# Patient Record
Sex: FEMALE | Race: WHITE | NOT HISPANIC OR LATINO | Employment: FULL TIME | URBAN - METROPOLITAN AREA
[De-identification: names, ages, dates, MRNs, and addresses within clinical notes are randomized per-mention and may not be internally consistent; named-entity substitution may affect disease eponyms.]

---

## 2018-05-21 LAB
HPV ABSTRACT: NORMAL
PAP-ABSTRACT: NORMAL

## 2018-10-26 ENCOUNTER — TRANSFERRED RECORDS (OUTPATIENT)
Dept: HEALTH INFORMATION MANAGEMENT | Facility: CLINIC | Age: 43
End: 2018-10-26

## 2019-02-25 ENCOUNTER — OFFICE VISIT (OUTPATIENT)
Dept: FAMILY MEDICINE | Facility: CLINIC | Age: 44
End: 2019-02-25
Payer: COMMERCIAL

## 2019-02-25 VITALS
DIASTOLIC BLOOD PRESSURE: 84 MMHG | RESPIRATION RATE: 20 BRPM | HEART RATE: 91 BPM | WEIGHT: 277 LBS | TEMPERATURE: 98.5 F | SYSTOLIC BLOOD PRESSURE: 130 MMHG | OXYGEN SATURATION: 96 % | HEIGHT: 64 IN | BODY MASS INDEX: 47.29 KG/M2

## 2019-02-25 DIAGNOSIS — E78.5 HYPERLIPIDEMIA LDL GOAL <100: ICD-10-CM

## 2019-02-25 DIAGNOSIS — G43.009 MIGRAINE WITHOUT AURA AND WITHOUT STATUS MIGRAINOSUS, NOT INTRACTABLE: ICD-10-CM

## 2019-02-25 DIAGNOSIS — K21.9 GASTROESOPHAGEAL REFLUX DISEASE WITHOUT ESOPHAGITIS: ICD-10-CM

## 2019-02-25 DIAGNOSIS — J30.9 ALLERGIC RHINITIS, UNSPECIFIED SEASONALITY, UNSPECIFIED TRIGGER: ICD-10-CM

## 2019-02-25 DIAGNOSIS — Z79.4 TYPE 2 DIABETES MELLITUS WITH HYPERGLYCEMIA, WITH LONG-TERM CURRENT USE OF INSULIN (H): ICD-10-CM

## 2019-02-25 DIAGNOSIS — R42 VERTIGO: Primary | ICD-10-CM

## 2019-02-25 DIAGNOSIS — E11.65 TYPE 2 DIABETES MELLITUS WITH HYPERGLYCEMIA, WITH LONG-TERM CURRENT USE OF INSULIN (H): ICD-10-CM

## 2019-02-25 DIAGNOSIS — E66.01 MORBID OBESITY (H): ICD-10-CM

## 2019-02-25 DIAGNOSIS — R51.9 OCCIPITAL PAIN: ICD-10-CM

## 2019-02-25 PROCEDURE — 99203 OFFICE O/P NEW LOW 30 MIN: CPT | Performed by: FAMILY MEDICINE

## 2019-02-25 RX ORDER — ROSUVASTATIN CALCIUM 10 MG/1
10 TABLET, COATED ORAL DAILY
COMMUNITY
Start: 2018-11-12 | End: 2019-06-06

## 2019-02-25 RX ORDER — KETOCONAZOLE 20 MG/ML
SHAMPOO TOPICAL
COMMUNITY
Start: 2018-01-03 | End: 2021-04-26

## 2019-02-25 RX ORDER — GLIPIZIDE 10 MG/1
20 TABLET, FILM COATED, EXTENDED RELEASE ORAL DAILY
COMMUNITY
Start: 2017-05-07 | End: 2019-04-30

## 2019-02-25 RX ORDER — MULTIPLE VITAMINS W/ MINERALS TAB 9MG-400MCG
1 TAB ORAL
COMMUNITY

## 2019-02-25 RX ORDER — MONTELUKAST SODIUM 10 MG/1
10 TABLET ORAL AT BEDTIME
COMMUNITY
Start: 2018-11-12 | End: 2019-06-06

## 2019-02-25 RX ORDER — ASCORBATE CALCIUM 500 MG
500 TABLET ORAL DAILY
COMMUNITY

## 2019-02-25 RX ORDER — CETIRIZINE HYDROCHLORIDE 10 MG/1
10 TABLET ORAL DAILY
COMMUNITY

## 2019-02-25 RX ORDER — DOXYLAMINE SUCCINATE 25 MG
1 TABLET ORAL
COMMUNITY
Start: 2018-08-09

## 2019-02-25 RX ORDER — METHOCARBAMOL 750 MG/1
750 TABLET, FILM COATED ORAL PRN
COMMUNITY
Start: 2017-11-07 | End: 2019-07-08

## 2019-02-25 RX ORDER — SUCRALFATE 1 G/1
1 TABLET ORAL PRN
Refills: 5 | COMMUNITY
Start: 2018-04-09 | End: 2020-06-09

## 2019-02-25 RX ORDER — SUMATRIPTAN 100 MG/1
100 TABLET, FILM COATED ORAL PRN
COMMUNITY
Start: 2018-05-21

## 2019-02-25 RX ORDER — ALBUTEROL SULFATE 90 UG/1
2 AEROSOL, METERED RESPIRATORY (INHALATION)
COMMUNITY
Start: 2017-11-13

## 2019-02-25 RX ORDER — GABAPENTIN 300 MG/1
CAPSULE ORAL
Qty: 90 CAPSULE | Refills: 0 | Status: SHIPPED | OUTPATIENT
Start: 2019-02-25 | End: 2019-03-20 | Stop reason: SINTOL

## 2019-02-25 RX ORDER — ACETAMINOPHEN 325 MG/1
TABLET ORAL
COMMUNITY

## 2019-02-25 ASSESSMENT — MIFFLIN-ST. JEOR: SCORE: 1890.43

## 2019-02-25 ASSESSMENT — PAIN SCALES - GENERAL: PAINLEVEL: EXTREME PAIN (8)

## 2019-02-25 NOTE — PROGRESS NOTES
"  SUBJECTIVE:   Cornelia Rodriguez is a 43 year old female who presents to clinic today for the following health issues:    Dizziness  Onset: 2016    Description:   Do you feel faint:  YES  Does it feel like the surroundings (bed, room) are moving: YES  Unsteady/off balance: YES  Have you passed out or fallen: no     Intensity: 8/10    Progression of Symptoms:  worsening    Accompanying Signs & Symptoms:  Heart palpitations: no   Nausea, vomiting: YES  Weakness in arms or legs: no   Fatigue: YES- because the pain is waking her up at night  Vision or speech changes: no   Ringing in ears (Tinnitus): No, pain in back of head  Hearing Loss: no     History:   Head trauma/concussion hx: no   Previous similar symptoms: YES  Recent bleeding history: no     Precipitating factors:   Worse with activity or head movement: No  Any new medications (BP?): no   Alcohol/drug abuse/withdrawal: no     Alleviating factors:   Does staying in a fixed position give relief:  no     Therapies Tried and outcome: saw an ENT    -Patient complains of sharp pain in the back of her head on the right, onset two and a half years ago after mastoiditis and progressively worsening, flared 5 days ago. Pain radiates down her neck into her shoulder. She originally attributed the pain to her ear because when it flared five days ago her ear was congested but the ear congestion has improved and the pain has not. The current episode of pain is different from past episodes because symptoms usually occur with sinus infections   -Denies any injuries preceding pain, though does note a week and a half ago she had an episode of vomiting while at work   -Complains of dizziness described as spinning and off balance, lightheadedness, nausea that occur with the sharp stabs of pain. Also mentions feeling cognitively \"off\" as it took her longer than usual to understand a text her sister sent her   -Denies hearing changes, weakness or numbness in arms or legs, vision " changes, changes to gait, fever, chills, rhinorrhea, sinus pressure or pain   -Pain is worse with certain neck movements, is not affected by breathing  -Patient has not been to work in over a week due to her dizziness. 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 She needs disability paperwork figured out and her ENT said they couldn't do it because they did not find any abnormal results and that primary care needed to manage the care, but patients PCP said ENT needed to manage it. Patient was frustrated by this and came to new office today for our visit  -Saw ENT who ordered a VNG for tomorrow  -Has not seen neurology or had any recent brain imaging. Ct sinuses was negative.   -Patient has history of migraines in her temples, treatable with Imitrex. This pain is different because it is not relieved by Imitrex or Tylenol. Migraines usually start with photophobia   --No chance of pregnancy     Additional:  -Since symptoms started patient's blood sugars have been above 200  -Patient usually wakes up feeling refreshed but hasn't since symptoms flared because of the pain. She does not think she snores a lot; has never been screened for narendra     Problem list and histories reviewed & adjusted, as indicated.  Additional history: as documented    Patient Active Problem List   Diagnosis     Acute recurrent maxillary sinusitis     Right chronic serous otitis media     Morbid obesity (H)     Migraine without aura and without status migrainosus, not intractable     Gastroesophageal reflux disease without esophagitis     Type 2 diabetes mellitus with hyperglycemia (H)     Allergic rhinitis, unspecified seasonality, unspecified trigger     Hyperlipidemia LDL goal <100     Past Surgical History:   Procedure Laterality Date     CHOLECYSTECTOMY         Social History     Tobacco Use     Smoking status: Former Smoker     Smokeless tobacco: Never Used     Tobacco comment: quit 3/2013   Substance Use Topics      "Alcohol use: Yes     Comment: Occasionally     Family History   Problem Relation Age of Onset     Diabetes Maternal Grandmother      Diabetes Sister      Hypertension Sister            Reviewed and updated as needed this visit by clinical staff  Tobacco  Allergies  Meds  Med Hx  Surg Hx  Fam Hx  Soc Hx      Reviewed and updated as needed this visit by Provider         ROS:  Constitutional, HEENT, cardiovascular, pulmonary, gi and gu systems are negative, except as otherwise noted.    This document serves as a record of the services and decisions personally performed by ANGI BRAGA. It was created on his/her behalf by Sarthak Bautista, a trained medical scribe. The creation of this document is based on the provider's statements to the medical scribe. Sarthak Bautista, February 25, 2019 2:25 PM  OBJECTIVE:     /84   Pulse 91   Temp 98.5  F (36.9  C) (Oral)   Resp 20   Ht 1.632 m (5' 4.25\")   Wt 125.6 kg (277 lb)   LMP 02/24/2019 (Exact Date)   SpO2 96%   Breastfeeding? No   BMI 47.18 kg/m    Body mass index is 47.18 kg/m .  GENERAL: healthy, alert and no distress  HENT: crowded arleen/posteriorpharynx. ear canals and TM's normal, nose and mouth without ulcers or lesions  NECK: no adenopathy, no asymmetry, masses, or scars and thyroid normal to palpation  RESP: lungs clear to auscultation - no rales, rhonchi or wheezes  CV: regular rate and rhythm, normal S1 S2, no S3 or S4, no murmur, click or rub, no peripheral edema and peripheral pulses strong  ABDOMEN: soft, nontender, no hepatosplenomegaly, no masses and bowel sounds normal  MS:Tender over right posterior occipital w/ two focal areas of exquisite tenderness, otherwise no gross musculoskeletal defects noted, no edema  NEURO: Normal strength and tone, mentation intact and speech normal. Normal heel, toe, and heel-to-toe walk. Negative Romberg's. Cranial nerves 2-12 intact   PSYCH: mentation appears normal, affect " normal/bright  ASSESSMENT/PLAN:       ICD-10-CM    1. Vertigo R42 MR Brain w/o & w Contrast     MRA Brain ([facility]) wo Contrast     gabapentin (NEURONTIN) 300 MG capsule     NEUROLOGY ADULT REFERRAL   2. Occipital pain R51 MR Brain w/o & w Contrast     MRA Brain ([facility]) wo Contrast     gabapentin (NEURONTIN) 300 MG capsule     NEUROLOGY ADULT REFERRAL   3. Morbid obesity (H) E66.01    4. Migraine without aura and without status migrainosus, not intractable G43.009 gabapentin (NEURONTIN) 300 MG capsule     NEUROLOGY ADULT REFERRAL   5. Gastroesophageal reflux disease without esophagitis K21.9    6. Type 2 diabetes mellitus with hyperglycemia, with long-term current use of insulin (H) E11.65     Z79.4    7. Allergic rhinitis, unspecified seasonality, unspecified trigger J30.9    8. Hyperlipidemia LDL goal <100 E78.5      ? Pain due to occipital neuralgia, dizziness of unclear etiology. Continue evaluation with ENT including VNG. Given worsening symptoms would like patient to see neurology. Also, would be due dilligence at this point to get mri brain especially given the cognitive issues patient is reporting. Filled out form for patient to be off work 2/17/19-3/2/19. Will need clinic f/u if further time off is needed.   Also discussed referral for sleep aval at some point- patient denies overt fatigue but given body habitus/exam and atypical headaches think this would be benefiscial. Patient declines today but will consider going forward.     Patient Instructions   Please call [facility] (formerly called [facility]) at [phone] to schedule your brain MRI.     Wait to start the gabapentin until after you have the VNG done.    Start with one tablet of gabapentin at bedtime. After 3-5 days you can add a second dose, and after another 3-5 days you can add a third dose, spaced out throughout the day.       Length of visit was 37 minutes with more than  50 percent of that time used for discussing medical concerns and education    The information in this document, created by the medical scribe for me, accurately reflects the services I personally performed and the decisions made by me. I have reviewed and approved this document for accuracy.   Sarah Lombardo MD  [facility]

## 2019-02-25 NOTE — PATIENT INSTRUCTIONS
Please call [facility] (formerly called [facility]) at [phone] to schedule your brain MRI.     Wait to start the gabapentin until after you have the VNG done.Start with one tablet of gabapentin at bedtime. After 3-5 days you can add a second dose, and after another 3-5 days you can add a third dose, spaced out throughout the day.    Schedule an appointment with neurology.

## 2019-02-25 NOTE — Clinical Note
Please abstract the following data from this visit with this patient into the appropriate field in Epic:Mammogram done on this date: 10/26/18 (approximately), by this group: [facility], results were negative. Pap smear done on this date: 5/21/18 (approximately), by this group: [facility], results were NIL. See Care Everywhere

## 2019-02-28 ENCOUNTER — OFFICE VISIT (OUTPATIENT)
Dept: FAMILY MEDICINE | Facility: CLINIC | Age: 44
End: 2019-02-28
Payer: COMMERCIAL

## 2019-02-28 VITALS
WEIGHT: 280 LBS | TEMPERATURE: 98 F | DIASTOLIC BLOOD PRESSURE: 96 MMHG | RESPIRATION RATE: 22 BRPM | HEIGHT: 64 IN | BODY MASS INDEX: 47.8 KG/M2 | OXYGEN SATURATION: 96 % | SYSTOLIC BLOOD PRESSURE: 144 MMHG | HEART RATE: 86 BPM

## 2019-02-28 DIAGNOSIS — R42 VERTIGO: ICD-10-CM

## 2019-02-28 DIAGNOSIS — M54.2 CERVICALGIA: ICD-10-CM

## 2019-02-28 DIAGNOSIS — R51.9 OCCIPITAL PAIN: Primary | ICD-10-CM

## 2019-02-28 PROCEDURE — 99214 OFFICE O/P EST MOD 30 MIN: CPT | Performed by: FAMILY MEDICINE

## 2019-02-28 RX ORDER — CARBAMAZEPINE 200 MG/1
200 CAPSULE, EXTENDED RELEASE ORAL DAILY
Qty: 30 CAPSULE | Refills: 0 | Status: SHIPPED | OUTPATIENT
Start: 2019-02-28 | End: 2019-03-20

## 2019-02-28 ASSESSMENT — MIFFLIN-ST. JEOR: SCORE: 1904.04

## 2019-02-28 NOTE — PROGRESS NOTES
SUBJECTIVE:   Cornelia Rodriguez is a 43 year old female who presents to clinic today for the following health issues:    Patient is here today, still having nerve pain on the back of her head. Sharp pain that runs down neck.     Neck/Head Pain  Onset: ongoing     Description:   Location: Neck/Head     Intensity: moderate, severe    Progression of Symptoms:  intermittent    Accompanying Signs & Symptoms:  Burning, prickly sensation (paresthesias) in arm(s): no   Numbness in arm(s): no   Weakness in arm(s):  no   Fever: no   Headache: no   Nausea and/or vomiting: YES- Nausea    History:   Trauma: no   Previous neck pain: YES- stiff neck once in a while   Previous surgery or injections: no   Previous Imaging (MRI,X ray): no     Precipitating factors:   Does movement increase the pain:  No     Alleviating factors:  Goes away on its own     Therapies Tried and outcome:  Gabapentin did not help - caused nausea     -Patient was seen three days ago for occipital pain and vertigo. Pain described as sharp and burning. It radiates down into right shoulder and up into head, which patient wonders if it is related to tendonitis in her arm. She notes stretching her head in a certain way improves pain  -Ordered MR/MRA brain which is scheduled for tomorrow and neurology referral; neurology appointment scheduled for 03/20  -Patient was given gabapentin for pain. She does not think it is helping. She takes it at night prior to going to bed and wakes up in more pain, though overall pain is slightly improved. She was told she cannot go back to work until she is improved completely and she does not feel she is ready. She brings in disability work forms again  -Last night when patient got up to use the bathroom at night she felt very unsteady and thinks this may be due to the gabapentin, though notes she had similar symptoms when her dizziness was worse but not to that extent    -Thinks the gabapentin is causing nausea because she wakes  up nauseous, and does not think the nausea is correlated with pain. Nausea usually resolves after 2-3 hours  -Dizziness and hearing are improved. Patient had her VNG two days ago but does not know the results yet and does not have any follow-up scheduled w/ ENT   -Denies new neurological symptoms   -Patient needs an updated disability form since she cannot return to work until her pain is 100% improved     Diabetes:  -Blood sugars have been in the 240-250, coming down from the 270's in the last few days      Problem list and histories reviewed & adjusted, as indicated.  Additional history: as documented    Patient Active Problem List   Diagnosis     Acute recurrent maxillary sinusitis     Right chronic serous otitis media     Morbid obesity (H)     Migraine without aura and without status migrainosus, not intractable     Gastroesophageal reflux disease without esophagitis     Type 2 diabetes mellitus with hyperglycemia (H)     Allergic rhinitis, unspecified seasonality, unspecified trigger     Hyperlipidemia LDL goal <100     Past Surgical History:   Procedure Laterality Date     CHOLECYSTECTOMY         Social History     Tobacco Use     Smoking status: Former Smoker     Smokeless tobacco: Never Used     Tobacco comment: quit 3/2013   Substance Use Topics     Alcohol use: Yes     Comment: Occasionally     Family History   Problem Relation Age of Onset     Diabetes Maternal Grandmother      Diabetes Sister      Hypertension Sister            Reviewed and updated as needed this visit by clinical staff  Tobacco  Allergies  Meds       Reviewed and updated as needed this visit by Provider  Allergies         ROS:  Constitutional, HEENT, cardiovascular, pulmonary, gi and gu systems are negative, except as otherwise noted.    This document serves as a record of the services and decisions personally performed by ANGI BRAGA. It was created on his/her behalf by Sarthak Bautista, a trained medical scribe. The  "creation of this document is based on the provider's statements to the medical scribe. Sarthak Bautista, February 28, 2019 10:00 AM  OBJECTIVE:     BP (!) 144/96 (BP Location: Right arm, Patient Position: Sitting, Cuff Size: Adult Large)   Pulse 86   Temp 98  F (36.7  C) (Oral)   Resp 22   Ht 1.632 m (5' 4.25\")   Wt 127 kg (280 lb)   LMP 02/24/2019 (Exact Date)   SpO2 96%   BMI 47.69 kg/m    Body mass index is 47.69 kg/m .  GENERAL: healthy, alert and no distress  NECK: no adenopathy, no asymmetry, masses, or scars and thyroid normal to palpation  MS: tender over right posterior occipital area in pinpoint areas. Also mild pericervical muslce tenderness on right. no gross musculoskeletal defects noted, no edema  PSYCH: mentation appears normal, affect normal/bright    ASSESSMENT/PLAN:       ICD-10-CM    1. Occipital pain R51 carBAMazepine (CARBATROL) 200 MG 12 hr capsule     [facility]   2. Vertigo R42    3. Cervicalgia M54.2 [facility]     Stop gabapentin due to adverse effects (nausea and unsteadiness) and start carbamazepine. Reviewed timing of taking, onset, benefits, monitoring and typical and severe AE (including SJS) of the medication including allergic rash. Start PT. Neurology visit in 3 weeks, follow-up after that. Updated short-term disability form filled out for work off 2/17-3/20, if symptoms are improving and patient wants to return to work she will let me know to update form.     Patient Instructions   Stop using the gabapentin and start the carbamazepine. Start with 200 mg daily until you see the neurologist.     Schedule a follow-up visit at the end of March, after you have seen neurology.     If the medicine and physical therapy is working and your pain is improving and you're ready to go back to work, call us and we can give you an updated note.         The information in this document, created by the medical scribe for me, accurately " reflects the services I personally performed and the decisions made by me. I have reviewed and approved this document for accuracy.   Sarah Lombardo MD  [facility]

## 2019-02-28 NOTE — PATIENT INSTRUCTIONS
Stop using the gabapentin and start the carbamazepine. Start with 200 mg daily until you see the neurologist.     Schedule a follow-up visit at the end of March, after you have seen neurology.     If the medicine and physical therapy is working and your pain is improving and you're ready to go back to work, call us and we can give you an updated note.

## 2019-03-01 ENCOUNTER — ANCILLARY PROCEDURE (OUTPATIENT)
Dept: MRI IMAGING | Facility: CLINIC | Age: 44
End: 2019-03-01
Attending: FAMILY MEDICINE
Payer: COMMERCIAL

## 2019-03-01 DIAGNOSIS — R42 VERTIGO: ICD-10-CM

## 2019-03-01 DIAGNOSIS — R51.9 OCCIPITAL PAIN: ICD-10-CM

## 2019-03-01 LAB
CREAT BLD-MCNC: 0.5 MG/DL (ref 0.52–1.04)
GFR SERPL CREATININE-BSD FRML MDRD: >90 ML/MIN/{1.73_M2}

## 2019-03-01 PROCEDURE — 70544 MR ANGIOGRAPHY HEAD W/O DYE: CPT | Performed by: RADIOLOGY

## 2019-03-01 PROCEDURE — 70553 MRI BRAIN STEM W/O & W/DYE: CPT | Performed by: RADIOLOGY

## 2019-03-01 PROCEDURE — A9585 GADOBUTROL INJECTION: HCPCS | Performed by: FAMILY MEDICINE

## 2019-03-01 RX ORDER — GADOBUTROL 604.72 MG/ML
10 INJECTION INTRAVENOUS ONCE
Status: COMPLETED | OUTPATIENT
Start: 2019-03-01 | End: 2019-03-01

## 2019-03-01 RX ADMIN — GADOBUTROL 10 ML: 604.72 INJECTION INTRAVENOUS at 10:46

## 2019-03-04 ENCOUNTER — TELEPHONE (OUTPATIENT)
Dept: FAMILY MEDICINE | Facility: CLINIC | Age: 44
End: 2019-03-04

## 2019-03-04 NOTE — TELEPHONE ENCOUNTER
Called patient with mri brain results  Small punctate infarct in cerebellum reviewed with patient. She already has f/u scheduled with neurology     She is sleeping better since start of carbamazepine.     Please continue with the plan we developed in the office and f/u prn

## 2019-03-05 ENCOUNTER — THERAPY VISIT (OUTPATIENT)
Dept: PHYSICAL THERAPY | Facility: CLINIC | Age: 44
End: 2019-03-05
Attending: FAMILY MEDICINE
Payer: COMMERCIAL

## 2019-03-05 DIAGNOSIS — M54.2 CERVICALGIA: ICD-10-CM

## 2019-03-05 DIAGNOSIS — R51.9 OCCIPITAL PAIN: ICD-10-CM

## 2019-03-05 PROCEDURE — 97110 THERAPEUTIC EXERCISES: CPT | Mod: GP | Performed by: PHYSICAL THERAPIST

## 2019-03-05 PROCEDURE — 97140 MANUAL THERAPY 1/> REGIONS: CPT | Mod: GP | Performed by: PHYSICAL THERAPIST

## 2019-03-05 PROCEDURE — 97162 PT EVAL MOD COMPLEX 30 MIN: CPT | Mod: GP | Performed by: PHYSICAL THERAPIST

## 2019-03-05 NOTE — PROGRESS NOTES
"[facility] Initial Evaluation  Subjective:  [facility] Evaluation    Ms. 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 In February 2019, she had been having a sinus infection - then woke up with a very stiff upper neck. She has improved since her MD put her on a medication, but still rates her pain as 7/10. No complaint of radicular symptoms. U/E AROM and strength are WNL's. Pain with palpation at the occiput. Although CROM is \"tight\" the patient feels better when she stretches. MRI performed - apparent prior cerebral infarct. The patient is see a neurologist.      The history is provided by the patient. No  was used.   Cornelia Rodriguez is a 43 year old female with a cervical spine condition.  Condition occurred with:  Insidious onset.  Condition occurred: at home.  This is a new condition  February 2019.    Patient reports pain:  Upper cervical spine.  Radiates to:  None.  Pain is described as cramping and aching and is constant and reported as 7/10.  Associated symptoms:  Headache and loss of motion/stiffness. Pain is the same all the time.  Symptoms are exacerbated by certain positions and lifting Relieved by: recent medication prescribed by MD.  Since onset symptoms are gradually improving.        General health as reported by patient is fair.                  Barriers include:  None as reported by the patient.    Red flags:  Pain at rest/night.                        Objective:  Standing Alignment:    Cervical/Thoracic:  Forward head and cervical lordosis decreased                Gait:    Gait Type:  Normal         Flexibility/Screens:   Positive screens:  CervicalNegative screens: Shoulder   Upper Extremity:    Decreased left upper extremity flexibility at:  Pectoralis Major and Pectoralis Minor    Decreased right upper extremity flexibility present at:  Pectoralis Major and Pectoralis Minor    Spine:  Decreased left " spine flexibility:  Sternocleidomastoid and Upper Trap    Decreased right spine flexibility:  Sternocleidomastoid and Upper Trap                  Cervical/Thoracic Evaluation    AROM:  AROM Cervical:    Flexion:          WNL  Extension:       60%  Rotation:         Left: 70%     Right: 60%  Side Bend:      Left:     Right:       Headaches: migraine  Cervical Myotomes:  normal                  DTR's:  not assessed          Cervical Dermatomes:  not assessed                    Cervical Palpation:    Tenderness present at Left:    Suboccipitals  Tenderness present at Right:    Suboccipitals        Cord Sign:  not assessed                                                                             HENT:   Head:           Musculoskeletal:        Cervical back: She exhibits decreased range of motion and tenderness.       ROS    Assessment/Plan:    Patient is a 43 year old female with cervical complaints.    Patient has the following significant findings with corresponding treatment plan.                Diagnosis 1:  cervalgia  Pain -  manual therapy, self management, education and home program  Decreased ROM/flexibility - manual therapy, therapeutic exercise and home program  Impaired muscle performance - neuro re-education and home program  Decreased function - home program  Impaired posture - neuro re-education and home program    Therapy Evaluation Codes:   1) History comprised of:   Personal factors that impact the plan of care:      None.    Comorbidity factors that impact the plan of care are:      Diabetes, Migraines/headaches, Overweight and Pain at night/rest.     Medications impacting care: Meds for diabetes.  2) Examination of Body Systems comprised of:   Body structures and functions that impact the plan of care:      Cervical spine.   Activity limitations that impact the plan of care are:      Lifting, Working and Sleeping.  3) Clinical presentation characteristics  are:   Evolving/Changing.  4) Decision-Making    Moderate complexity using standardized patient assessment instrument and/or measureable assessment of functional outcome.  Cumulative Therapy Evaluation is: Moderate complexity.    Previous and current functional limitations:  (See Goal Flow Sheet for this information)    Short term and Long term goals: (See Goal Flow Sheet for this information)     Communication ability:  Patient appears to be able to clearly communicate and understand verbal and written communication and follow directions correctly.  Treatment Explanation - The following has been discussed with the patient:   RX ordered/plan of care  Anticipated outcomes  Possible risks and side effects  This patient would benefit from PT intervention to resume normal activities.   Rehab potential is good.    Frequency:  1 X week, once daily  Duration:  for 6 weeks  Discharge Plan:  Achieve all LTG.  Independent in home treatment program.  Reach maximal therapeutic benefit.    Please refer to the daily flowsheet for treatment today, total treatment time and time spent performing 1:1 timed codes.

## 2019-03-05 NOTE — PROGRESS NOTES
[facility] Initial Evaluation  Subjective:                                       Pertinent medical history includes:  Diabetes, migraines/headaches and overweight.  Medical allergies: latex.  Surgical history: gall bladder.  Medication history: diabetic medicine.  Current occupation is [employer].    Primary job tasks include:  Driving, lifting, repetitive tasks and other (carrying, pushing, pulling, computer work).

## 2019-03-07 ENCOUNTER — THERAPY VISIT (OUTPATIENT)
Dept: PHYSICAL THERAPY | Facility: CLINIC | Age: 44
End: 2019-03-07
Payer: COMMERCIAL

## 2019-03-07 DIAGNOSIS — M54.2 CERVICALGIA: ICD-10-CM

## 2019-03-07 PROCEDURE — 97035 APP MDLTY 1+ULTRASOUND EA 15: CPT | Mod: GP | Performed by: PHYSICAL THERAPIST

## 2019-03-07 PROCEDURE — 97140 MANUAL THERAPY 1/> REGIONS: CPT | Mod: GP | Performed by: PHYSICAL THERAPIST

## 2019-03-11 ENCOUNTER — THERAPY VISIT (OUTPATIENT)
Dept: PHYSICAL THERAPY | Facility: CLINIC | Age: 44
End: 2019-03-11
Payer: COMMERCIAL

## 2019-03-11 DIAGNOSIS — M54.2 CERVICALGIA: ICD-10-CM

## 2019-03-11 PROCEDURE — 97140 MANUAL THERAPY 1/> REGIONS: CPT | Mod: GP | Performed by: PHYSICAL THERAPIST

## 2019-03-11 PROCEDURE — 97035 APP MDLTY 1+ULTRASOUND EA 15: CPT | Mod: GP | Performed by: PHYSICAL THERAPIST

## 2019-03-11 PROCEDURE — 97110 THERAPEUTIC EXERCISES: CPT | Mod: GP | Performed by: PHYSICAL THERAPIST

## 2019-03-14 ENCOUNTER — THERAPY VISIT (OUTPATIENT)
Dept: PHYSICAL THERAPY | Facility: CLINIC | Age: 44
End: 2019-03-14
Payer: COMMERCIAL

## 2019-03-14 DIAGNOSIS — M54.2 CERVICALGIA: ICD-10-CM

## 2019-03-14 PROCEDURE — 97140 MANUAL THERAPY 1/> REGIONS: CPT | Mod: GP | Performed by: PHYSICAL THERAPIST

## 2019-03-14 PROCEDURE — 97035 APP MDLTY 1+ULTRASOUND EA 15: CPT | Mod: GP | Performed by: PHYSICAL THERAPIST

## 2019-03-18 ENCOUNTER — PRE VISIT (OUTPATIENT)
Dept: NEUROLOGY | Facility: CLINIC | Age: 44
End: 2019-03-18

## 2019-03-18 ENCOUNTER — THERAPY VISIT (OUTPATIENT)
Dept: PHYSICAL THERAPY | Facility: CLINIC | Age: 44
End: 2019-03-18
Payer: COMMERCIAL

## 2019-03-18 DIAGNOSIS — M54.2 CERVICALGIA: ICD-10-CM

## 2019-03-18 PROCEDURE — 97035 APP MDLTY 1+ULTRASOUND EA 15: CPT | Mod: GP | Performed by: PHYSICAL THERAPIST

## 2019-03-18 PROCEDURE — 97140 MANUAL THERAPY 1/> REGIONS: CPT | Mod: GP | Performed by: PHYSICAL THERAPIST

## 2019-03-18 NOTE — TELEPHONE ENCOUNTER
[facility] CLINICAL DOCUMENTATION    Pre-Visit Planning   PREVISIT INFORMATION                                                    Cornelia Rodriguez scheduled for future visit at [facility] specialty clinics.    Patient is scheduled to see humberto (provider) on 3/20/19 (date)  Reason for visit:   R42 (ICD-10-CM) - Vertigo   R51 (ICD-10-CM) - Occipital pain   G43.009 (ICD-10-CM) - Migraine without aura and without status migrainosus, not intractable       Referring provider Sarah Lombardo MD  Has patient seen previous specialist? ??  Medical Records:  Available in chart.  Patient was previously seen at a [facility] or [facility] facility. Asked patient to callback if they have seen any other Neurologist  REVIEW                                                      New patient packet mailed to patient: No  Medication reconciliation complete: No      Current Outpatient Medications   Medication Sig Dispense Refill     acetaminophen (TYLENOL) 325 MG tablet        albuterol (PROVENTIL HFA) 108 (90 Base) MCG/ACT inhaler Inhale 2 puffs into the lungs       calcium ascorbate 500 MG TABS Take 500 mg by mouth daily       carBAMazepine (CARBATROL) 200 MG 12 hr capsule Take 1 capsule (200 mg) by mouth daily 30 capsule 0     cetirizine (ZYRTEC) 10 MG tablet Take 10 mg by mouth daily       dulaglutide (TRULICITY) 1.5 MG/0.5ML pen Inject 1.5 mg Subcutaneous       gabapentin (NEURONTIN) 300 MG capsule Take 1 capsule (300 mg) by mouth At Bedtime for 5 days, THEN 1 capsule (300 mg) 2 times daily for 5 days, THEN 1 capsule (300 mg) 3 times daily. 90 capsule 0     glipiZIDE (GLUCOTROL XL) 10 MG 24 hr tablet Take 20 mg by mouth daily       insulin glargine (LANTUS SOLOSTAR PEN) 100 UNIT/ML pen Inject 70-80 Units Subcutaneous       ketoconazole (NIZORAL) 2 % external shampoo        methocarbamol (ROBAXIN) 750 MG tablet Take 750 mg by mouth as needed       montelukast (SINGULAIR) 10 MG tablet  Take 10 mg by mouth At Bedtime       Multiple Vitamins-Minerals (HM HAIR/SKIN/NAILS) TABS Take 1 tablet by mouth       multivitamin w/minerals (MULTI-VITAMIN) tablet Take 1 tablet by mouth       ranitidine (ZANTAC) 300 MG tablet Take 300 mg by mouth daily  3     rosuvastatin (CRESTOR) 10 MG tablet Take 10 mg by mouth daily       sucralfate (CARAFATE) 1 GM tablet Take 1 tablet by mouth 4 times daily  5     SUMAtriptan (IMITREX) 100 MG tablet Take 100 mg by mouth as needed         Allergies: Adhesive tape; Benadryl [diphenhydramine]; Codeine; Ibuprofen; Penicillins; Ciprofloxacin; Cyclobenzaprine; Ferrous gluconate; Nortriptyline; and Sumatriptan    (insert provider dot-phrase for provider specific visit requirements)    PLAN/FOLLOW-UP NEEDED                                                      Previsit review complete.  Patient will see provider at future scheduled appointment.     Patient Reminders Given:  Please, make sure you bring an updated list of your medications.   If you are having a procedure, please, present 15 minutes early.  If you need to cancel or reschedule,please call [phone].    Darla Severin-Brown

## 2019-03-18 NOTE — PROGRESS NOTES
Subjective:  HPI                    Objective:  System    Physical Exam    General     ROS    Assessment/Plan:    PROGRESS  REPORT    Progress reporting period is from 3/5 to 3/18/19.       SUBJECTIVE  Subjective changes noted by patient:  Cornelia had been doing somewhat better last week with PT and her HEP, but now reports pain - like she had initially - since 3/16/19. The only change was that she went to see a movie. Cornelia is having bilateral cervical symptoms, right > left, and some left upper extremity numbness that does not follow a dermatome level. She is still off of work..        Current Pain level: (3-8/10).     Initial Pain level: 8/10.   Changes in function:  None  Adverse reaction to treatment or activity: None    OBJECTIVE  Changes noted in objective findings:  CROM limited by 50% in extension, right rotation and right side bending. AROM of bilateral U/E's is WNL's. Normal left arm strength.        ASSESSMENT/PLAN  Updated problem list and treatment plan: Diagnosis 1:  Cervalgia  Pain -  US, manual therapy, self management, education and home program  Decreased ROM/flexibility - manual therapy, therapeutic exercise and home program  Impaired muscle performance - home program  Decreased function - home program  Impaired posture - neuro re-education and home program  STG/LTGs have been met or progress has been made towards goals:  Intermittent improvement last week. Exacerbation this weekend.  Assessment of Progress: The patient's condition is unchanged.  Self Management Plans:  Patient has been instructed in a home treatment program.  Patient  has been instructed in self management of symptoms.      Recommendations:  Cornelia is seeing the neurologist this week. Will keep the chart open if further PT is indicated. No further appointments made.    Please refer to the daily flowsheet for treatment today, total treatment time and time spent performing 1:1 timed codes.

## 2019-03-18 NOTE — LETTER
[facility]  [address] [address] [place] [id number]  [phone]    2019    Re: Cornelia Rodriguez   :   1975  MRN:  [id number]   REFERRING PHYSICIAN:   Sarah Lombardo  [facility]    Date of Initial Evaluation:  2019  Visits:  Rxs Used: 5  Reason for Referral:  Cervicalgia    EVALUATION SUMMARY    PROGRESS  REPORT  Progress reporting period is from 3/5 to 3/18/19.       SUBJECTIVE  Subjective changes noted by patient:  Cornelia had been doing somewhat better last week with PT and her HEP, but now reports pain - like she had initially - since 3/16/19. The only change was that she went to see a movie. Cornelia is having bilateral cervical symptoms, right > left, and some left upper extremity numbness that does not follow a dermatome level. She is still off of work..        Current Pain level: (3-8/10).     Initial Pain level: 8/10.   Changes in function:  None  Adverse reaction to treatment or activity: None  OBJECTIVE  Changes noted in objective findings:  CROM limited by 50% in extension, right rotation and right side bending. AROM of bilateral U/E's is WNL's. Normal left arm strength.  ASSESSMENT/PLAN  Updated problem list and treatment plan: Diagnosis 1:  Cervalgia  Pain -  US, manual therapy, self management, education and home program  Decreased ROM/flexibility - manual therapy, therapeutic exercise and home program  Impaired muscle performance - home program  Decreased function - home program  Impaired posture - neuro re-education and home program  STG/LTGs have been met or progress has been made towards goals:  Intermittent improvement last week. Exacerbation this weekend.  Assessment of Progress: The patient's condition is unchanged.  Self Management Plans:  Patient has been instructed in a home treatment program.  Patient  has been instructed in self management of symptoms.    Recommendations:  Cornelia is seeing the neurologist this week.  Will keep the chart open if further PT is indicated. No further appointments made.            Thank you for your referral.    INQUIRIES  Therapist: Mala Chowdary DPT  [address] [address]  Phone: [phone]  Fax: [phone]

## 2019-03-20 ENCOUNTER — OFFICE VISIT (OUTPATIENT)
Dept: NEUROLOGY | Facility: CLINIC | Age: 44
End: 2019-03-20
Attending: FAMILY MEDICINE
Payer: COMMERCIAL

## 2019-03-20 VITALS
HEIGHT: 64 IN | HEART RATE: 96 BPM | DIASTOLIC BLOOD PRESSURE: 97 MMHG | BODY MASS INDEX: 47.63 KG/M2 | OXYGEN SATURATION: 98 % | SYSTOLIC BLOOD PRESSURE: 147 MMHG | WEIGHT: 279 LBS

## 2019-03-20 DIAGNOSIS — M54.81 OCCIPITAL NEURALGIA OF RIGHT SIDE: Primary | ICD-10-CM

## 2019-03-20 DIAGNOSIS — R51.9 OCCIPITAL PAIN: ICD-10-CM

## 2019-03-20 PROCEDURE — 99204 OFFICE O/P NEW MOD 45 MIN: CPT | Performed by: PSYCHIATRY & NEUROLOGY

## 2019-03-20 RX ORDER — CARBAMAZEPINE 200 MG/1
200 CAPSULE, EXTENDED RELEASE ORAL SEE ADMIN INSTRUCTIONS
Qty: 90 CAPSULE | Refills: 1 | Status: SHIPPED | OUTPATIENT
Start: 2019-03-20 | End: 2019-06-20

## 2019-03-20 ASSESSMENT — PAIN SCALES - GENERAL: PAINLEVEL: EXTREME PAIN (8)

## 2019-03-20 ASSESSMENT — MIFFLIN-ST. JEOR: SCORE: 1899.51

## 2019-03-20 NOTE — LETTER
3/20/2019         RE: Cornelia Rodriguez  [address]  [address]  [place] [address]        Dear Colleague,    Thank you for referring your patient, Cornelia Rodriguez, to the [facility]. Please see a copy of my visit note below.    Visit Date:   03/20/2019      NEUROLOGIC CONSULTATION      HISTORY OF PRESENT ILLNESS:  This patient is a 43-year-old right-handed woman seen at the request of Dr. Lombardo for neurologic consultation with complaint of right occipital head pain.  She has had this symptoms since January.  She reports that in 2016, she had a mastoid infection bilaterally.  This was successfully treated.  She had a lot of pain with the infection.  Subsequently, she would get an intermittent pain in the mastoid region.  In January, she experienced a sensation of her right ear becoming plugged with ringing in the ear.  She had right occipital pain as well.  The symptoms seem to merge.  In any case, she was taking an over-the-counter remedy for sinus congestion.  That did not really help.  She saw ENT.  She had Audiology and Balance testing.  There was a questionable finding of central vertigo.  I do not have the report for review.  She reports that this pain persists.  That is the main problem.  Some days she has minor pain and other days she has major pain.  The pain is behind the right ear and in the occipital region.  It extends down into the right shoulder.  After the vestibular testing, she no longer has the symptom of ringing in the ear or vertigo.  Her hearing is good.  The main symptom now is just the discomfort.  She has tried hot and cold packs without benefit.  She describes it as a shooting type of pain from the right occiput down into the right shoulder.  It does not go into the top of her head.  Yesterday the pain was recurring over hours.  It was so bad that she had to go to bed.  She does take carbamazepine sporadically.  It has not really helped much.  She tried  "gabapentin first but that had too many side effects.  She has no symptoms with regard to vision or hearing.  Her speech is slightly slurred.  She has no problem with swallowing.  She has no problem with bowel or bladder control.  At times her left upper arm feels \"shaky.\"  She has no issues with walking or balance.      PAST MEDICAL HISTORY:  Significant for diabetes.  She does not have high blood pressure, thyroid or asthma.  She has not had pertinent surgery or trauma to the head or neck.  She is not pregnant.  She rarely has an alcoholic beverage.  She quit smoking several years ago.  I have reviewed her medication list with her.  She uses an occasional Aleve for headache.      SOCIAL HISTORY:  She is unmarried with 1 child.  She works for [employer].  She is currently on disability for these symptoms.      FAMILY HISTORY:  Positive for heart disease and diabetes.      PHYSICAL EXAMINATION:   GENERAL:  The patient is cooperative and in no distress.   VITAL SIGNS:  Her blood pressure is 147/97.   NECK:  There are no carotid bruits.   HEART:  Auscultation of the heart shows S1 and S2.   NEUROLOGIC:  The patient is alert, oriented and lucid.  She does have tenderness at the tip of the right mastoid, her occipital condyle on the right.  There is no specific tenderness in the right occipital foramen, but she does have some discomfort in this region.  Cranial nerve testing shows full visual fields to confrontation.  Funduscopic exam shows sharp discs bilaterally.  Visual acuity 20/20 bilaterally.  Eye movements are complete and conjugate without nystagmus.  Pupils react to light.  Facial sensation is normal.  Face moves symmetrically.  Palate elevates in the midline.  Tongue protrudes in the midline.  Motor evaluation shows no pronator drift, normal finger tapping, finger-nose-finger and heel-knee-shin.  The patient has good strength in the arms and legs.  Muscle stretch reflexes are trace-absent and symmetric.  Toes are " downgoing.  Sensory exam shows preserved vibration and temperature. Romberg sign is absent.  She can walk on her heels, toes and tandem.      She did have an MRI scan of the brain performed.  I have reviewed it with her.  The study is basically normal aside from an area of a signal change in the right cerebellar hemisphere suspicious for punctate infarction, age indeterminate.  She had an MR angiogram of the head.  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      ASSESSMENT: Right occipital head pain.      DISCUSSION:  This patient is seen for evaluation of pain in the right occipital and suboccipital region and at the tip of the occipital condyle.  Differential diagnosis includes occipital neuralgia.  Her exam is normal.  The MR angiogram of the head does show evidence for an absent left vertebral artery, of doubtful significance.  In terms of further evaluation, I am going to obtain an MRA of the neck to make sure there is no dissection or other lesion that could explain this.  I am going to put her on carbamazepine scheduled building up to 200 mg 3 times a day.  If she stays on this medicine, she will need to get labs checked every month or so, including CBC and metabolic panel.      I am going to refer her to Dr. Rutledge for consideration of an occipital nerve block.  This will be arranged.  I will see her in followup afterwards on an as needed basis.         CHI LAI MD             D: 2019   T: 2019   MT: AS      Name:     DAPHNEY POWELL   MRN:      [id number]        Account:      [id number]   :      1975           Visit Date:   2019      Document: [id number]       cc: Sarah Lombardo MD      Again, thank you for allowing me to participate in the care of your patient.        Sincerely,        Chi Lai MD

## 2019-03-20 NOTE — NURSING NOTE
"Cornelia Rodriguez's goals for this visit include:   Chief Complaint   Patient presents with     Consult     Vertigo, right ear pain, head pain      She requests these members of her care team be copied on today's visit information: pcp    PCP: [facility]    Referring Provider:  Sarah Lombardo MD  [address] [place] [address]    BP (!) 147/97 (BP Location: Left arm, Patient Position: Sitting, Cuff Size: Adult Regular)   Pulse 96   Ht 1.632 m (5' 4.25\")   Wt 126.6 kg (279 lb)   LMP 02/24/2019 (Exact Date)   SpO2 98%   BMI 47.52 kg/m      Do you need any medication refills at today's visit? n  "

## 2019-03-21 ENCOUNTER — ANCILLARY PROCEDURE (OUTPATIENT)
Dept: MRI IMAGING | Facility: CLINIC | Age: 44
End: 2019-03-21
Attending: PSYCHIATRY & NEUROLOGY
Payer: COMMERCIAL

## 2019-03-21 DIAGNOSIS — R51.9 OCCIPITAL PAIN: ICD-10-CM

## 2019-03-21 NOTE — PROGRESS NOTES
Visit Date:   03/20/2019      NEUROLOGIC CONSULTATION      HISTORY OF PRESENT ILLNESS:  This patient is a 43-year-old right-handed woman seen at the request of Dr. Lombardo for neurologic consultation with complaint of right occipital head pain.  She has had this symptoms since January.  She reports that in 2016, she had a mastoid infection bilaterally.  This was successfully treated.  She had a lot of pain with the infection.  Subsequently, she would get an intermittent pain in the mastoid region.  In January, she experienced a sensation of her right ear becoming plugged with ringing in the ear.  She had right occipital pain as well.  The symptoms seem to merge.  In any case, she was taking an over-the-counter remedy for sinus congestion.  That did not really help.  She saw ENT.  She had Audiology and Balance testing.  There was a questionable finding of central vertigo.  I do not have the report for review.  She reports that this pain persists.  That is the main problem.  Some days she has minor pain and other days she has major pain.  The pain is behind the right ear and in the occipital region.  It extends down into the right shoulder.  After the vestibular testing, she no longer has the symptom of ringing in the ear or vertigo.  Her hearing is good.  The main symptom now is just the discomfort.  She has tried hot and cold packs without benefit.  She describes it as a shooting type of pain from the right occiput down into the right shoulder.  It does not go into the top of her head.  Yesterday the pain was recurring over hours.  It was so bad that she had to go to bed.  She does take carbamazepine sporadically.  It has not really helped much.  She tried gabapentin first but that had too many side effects.  She has no symptoms with regard to vision or hearing.  Her speech is slightly slurred.  She has no problem with swallowing.  She has no problem with bowel or bladder control.  At times her left upper arm  "feels \"shaky.\"  She has no issues with walking or balance.      PAST MEDICAL HISTORY:  Significant for diabetes.  She does not have high blood pressure, thyroid or asthma.  She has not had pertinent surgery or trauma to the head or neck.  She is not pregnant.  She rarely has an alcoholic beverage.  She quit smoking several years ago.  I have reviewed her medication list with her.  She uses an occasional Aleve for headache.      SOCIAL HISTORY:  She is unmarried with 1 child.  She works for [employer].  She is currently on disability for these symptoms.      FAMILY HISTORY:  Positive for heart disease and diabetes.      PHYSICAL EXAMINATION:   GENERAL:  The patient is cooperative and in no distress.   VITAL SIGNS:  Her blood pressure is 147/97.   NECK:  There are no carotid bruits.   HEART:  Auscultation of the heart shows S1 and S2.   NEUROLOGIC:  The patient is alert, oriented and lucid.  She does have tenderness at the tip of the right mastoid, her occipital condyle on the right.  There is no specific tenderness in the right occipital foramen, but she does have some discomfort in this region.  Cranial nerve testing shows full visual fields to confrontation.  Funduscopic exam shows sharp discs bilaterally.  Visual acuity 20/20 bilaterally.  Eye movements are complete and conjugate without nystagmus.  Pupils react to light.  Facial sensation is normal.  Face moves symmetrically.  Palate elevates in the midline.  Tongue protrudes in the midline.  Motor evaluation shows no pronator drift, normal finger tapping, finger-nose-finger and heel-knee-shin.  The patient has good strength in the arms and legs.  Muscle stretch reflexes are trace-absent and symmetric.  Toes are downgoing.  Sensory exam shows preserved vibration and temperature. Romberg sign is absent.  She can walk on her heels, toes and tandem.      She did have an MRI scan of the brain performed.  I have reviewed it with her.  The study is basically normal aside from " an area of a signal change in the right cerebellar hemisphere suspicious for punctate infarction, age indeterminate.  She had an MR angiogram of the head.  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      ASSESSMENT: Right occipital head pain.      DISCUSSION:  This patient is seen for evaluation of pain in the right occipital and suboccipital region and at the tip of the occipital condyle.  Differential diagnosis includes occipital neuralgia.  Her exam is normal.  The MR angiogram of the head does show evidence for an absent left vertebral artery, of doubtful significance.  In terms of further evaluation, I am going to obtain an MRA of the neck to make sure there is no dissection or other lesion that could explain this.  I am going to put her on carbamazepine scheduled building up to 200 mg 3 times a day.  If she stays on this medicine, she will need to get labs checked every month or so, including CBC and metabolic panel.      I am going to refer her to Dr. Rutledge for consideration of an occipital nerve block.  This will be arranged.  I will see her in followup afterwards on an as needed basis.         CHI LAI MD             D: 2019   T: 2019   MT: AS      Name:     DAPHNEY POWELL   MRN:      [id number]        Account:      [id number]   :      1975           Visit Date:   2019      Document: [id number]       cc: Sarah Lombardo MD

## 2019-03-25 ENCOUNTER — TELEPHONE (OUTPATIENT)
Dept: NEUROLOGY | Facility: CLINIC | Age: 44
End: 2019-03-25

## 2019-03-25 NOTE — TELEPHONE ENCOUNTER
I called patient and she stated that they just did not like the images and want to add contrast. Pt does not need sedation.     Gladys Lam LPN

## 2019-03-25 NOTE — TELEPHONE ENCOUNTER
----- Message from Ajit Terry MD sent at 3/25/2019  6:41 AM CDT -----  Regarding: MRA neck  Gladys. This is a [facility] patient. She had a neck MRA that was nondiagnostic. Can you contact her and find out what happened? Does she need sedation or just redo the studt. Thanks Ajit Terry

## 2019-03-26 ENCOUNTER — ANCILLARY PROCEDURE (OUTPATIENT)
Dept: MRI IMAGING | Facility: CLINIC | Age: 44
End: 2019-03-26
Attending: PSYCHIATRY & NEUROLOGY
Payer: COMMERCIAL

## 2019-03-26 DIAGNOSIS — R51.9 OCCIPITAL PAIN: ICD-10-CM

## 2019-03-26 PROCEDURE — A9585 GADOBUTROL INJECTION: HCPCS | Performed by: PSYCHIATRY & NEUROLOGY

## 2019-03-26 PROCEDURE — 70548 MR ANGIOGRAPHY NECK W/DYE: CPT | Performed by: RADIOLOGY

## 2019-03-26 RX ORDER — GADOBUTROL 604.72 MG/ML
10 INJECTION INTRAVENOUS ONCE
Status: COMPLETED | OUTPATIENT
Start: 2019-03-26 | End: 2019-03-26

## 2019-03-26 RX ADMIN — GADOBUTROL 10 ML: 604.72 INJECTION INTRAVENOUS at 15:31

## 2019-03-27 ENCOUNTER — OFFICE VISIT (OUTPATIENT)
Dept: FAMILY MEDICINE | Facility: CLINIC | Age: 44
End: 2019-03-27
Payer: COMMERCIAL

## 2019-03-27 VITALS
RESPIRATION RATE: 18 BRPM | SYSTOLIC BLOOD PRESSURE: 122 MMHG | HEART RATE: 89 BPM | HEIGHT: 64 IN | OXYGEN SATURATION: 99 % | BODY MASS INDEX: 47.8 KG/M2 | TEMPERATURE: 98.8 F | DIASTOLIC BLOOD PRESSURE: 86 MMHG | WEIGHT: 280 LBS

## 2019-03-27 DIAGNOSIS — Z11.4 SCREENING FOR HIV (HUMAN IMMUNODEFICIENCY VIRUS): ICD-10-CM

## 2019-03-27 DIAGNOSIS — E78.5 HYPERLIPIDEMIA LDL GOAL <100: ICD-10-CM

## 2019-03-27 DIAGNOSIS — R51.9 OCCIPITAL PAIN: ICD-10-CM

## 2019-03-27 DIAGNOSIS — R74.8 ELEVATED LIVER ENZYMES: ICD-10-CM

## 2019-03-27 DIAGNOSIS — E66.01 MORBID OBESITY (H): ICD-10-CM

## 2019-03-27 DIAGNOSIS — Z13.89 SCREENING FOR DIABETIC PERIPHERAL NEUROPATHY: ICD-10-CM

## 2019-03-27 DIAGNOSIS — Z79.4 TYPE 2 DIABETES MELLITUS WITH HYPERGLYCEMIA, WITH LONG-TERM CURRENT USE OF INSULIN (H): Primary | ICD-10-CM

## 2019-03-27 DIAGNOSIS — E11.65 TYPE 2 DIABETES MELLITUS WITH HYPERGLYCEMIA, WITH LONG-TERM CURRENT USE OF INSULIN (H): Primary | ICD-10-CM

## 2019-03-27 LAB
ALBUMIN SERPL-MCNC: 3.9 G/DL (ref 3.4–5)
ALP SERPL-CCNC: 79 U/L (ref 40–150)
ALT SERPL W P-5'-P-CCNC: 86 U/L (ref 0–50)
ANION GAP SERPL CALCULATED.3IONS-SCNC: 6 MMOL/L (ref 3–14)
AST SERPL W P-5'-P-CCNC: 60 U/L (ref 0–45)
BASOPHILS # BLD AUTO: 0 10E9/L (ref 0–0.2)
BASOPHILS NFR BLD AUTO: 0.5 %
BILIRUB SERPL-MCNC: 0.3 MG/DL (ref 0.2–1.3)
BUN SERPL-MCNC: 9 MG/DL (ref 7–30)
CALCIUM SERPL-MCNC: 9.5 MG/DL (ref 8.5–10.1)
CHLORIDE SERPL-SCNC: 99 MMOL/L (ref 94–109)
CO2 SERPL-SCNC: 32 MMOL/L (ref 20–32)
CREAT SERPL-MCNC: 0.59 MG/DL (ref 0.52–1.04)
CREAT UR-MCNC: 100 MG/DL
DIFFERENTIAL METHOD BLD: NORMAL
EOSINOPHIL # BLD AUTO: 0.2 10E9/L (ref 0–0.7)
EOSINOPHIL NFR BLD AUTO: 2.6 %
ERYTHROCYTE [DISTWIDTH] IN BLOOD BY AUTOMATED COUNT: 12.5 % (ref 10–15)
GFR SERPL CREATININE-BSD FRML MDRD: >90 ML/MIN/{1.73_M2}
GLUCOSE SERPL-MCNC: 259 MG/DL (ref 70–99)
HBA1C MFR BLD: 10.8 % (ref 0–5.6)
HCT VFR BLD AUTO: 44.3 % (ref 35–47)
HGB BLD-MCNC: 15.2 G/DL (ref 11.7–15.7)
LYMPHOCYTES # BLD AUTO: 2.1 10E9/L (ref 0.8–5.3)
LYMPHOCYTES NFR BLD AUTO: 34 %
MCH RBC QN AUTO: 29.2 PG (ref 26.5–33)
MCHC RBC AUTO-ENTMCNC: 34.3 G/DL (ref 31.5–36.5)
MCV RBC AUTO: 85 FL (ref 78–100)
MICROALBUMIN UR-MCNC: 14 MG/L
MICROALBUMIN/CREAT UR: 13.57 MG/G CR (ref 0–25)
MONOCYTES # BLD AUTO: 0.4 10E9/L (ref 0–1.3)
MONOCYTES NFR BLD AUTO: 6.3 %
NEUTROPHILS # BLD AUTO: 3.4 10E9/L (ref 1.6–8.3)
NEUTROPHILS NFR BLD AUTO: 56.6 %
PLATELET # BLD AUTO: 244 10E9/L (ref 150–450)
POTASSIUM SERPL-SCNC: 4.3 MMOL/L (ref 3.4–5.3)
PROT SERPL-MCNC: 7.5 G/DL (ref 6.8–8.8)
RBC # BLD AUTO: 5.2 10E12/L (ref 3.8–5.2)
SODIUM SERPL-SCNC: 137 MMOL/L (ref 133–144)
TSH SERPL DL<=0.005 MIU/L-ACNC: 1.34 MU/L (ref 0.4–4)
WBC # BLD AUTO: 6.1 10E9/L (ref 4–11)

## 2019-03-27 PROCEDURE — 84443 ASSAY THYROID STIM HORMONE: CPT | Performed by: FAMILY MEDICINE

## 2019-03-27 PROCEDURE — 36415 COLL VENOUS BLD VENIPUNCTURE: CPT | Performed by: FAMILY MEDICINE

## 2019-03-27 PROCEDURE — 82043 UR ALBUMIN QUANTITATIVE: CPT | Performed by: FAMILY MEDICINE

## 2019-03-27 PROCEDURE — 83036 HEMOGLOBIN GLYCOSYLATED A1C: CPT | Performed by: FAMILY MEDICINE

## 2019-03-27 PROCEDURE — 85025 COMPLETE CBC W/AUTO DIFF WBC: CPT | Performed by: FAMILY MEDICINE

## 2019-03-27 PROCEDURE — 80053 COMPREHEN METABOLIC PANEL: CPT | Performed by: FAMILY MEDICINE

## 2019-03-27 PROCEDURE — 87389 HIV-1 AG W/HIV-1&-2 AB AG IA: CPT | Performed by: FAMILY MEDICINE

## 2019-03-27 PROCEDURE — 99214 OFFICE O/P EST MOD 30 MIN: CPT | Performed by: FAMILY MEDICINE

## 2019-03-27 PROCEDURE — 99207 C FOOT EXAM  NO CHARGE: CPT | Mod: 25 | Performed by: FAMILY MEDICINE

## 2019-03-27 ASSESSMENT — MIFFLIN-ST. JEOR: SCORE: 1900.07

## 2019-03-27 NOTE — PATIENT INSTRUCTIONS
Split your lantus to 40 in the morning and 40 at night.     Schedule an appointment with diabetes education.     Schedule a lab only visit for fasting labs in 1 month. You need to be fasting for 10-12 hours.     Schedule an eye exam.     Schedule a medication check in 3-6 months.

## 2019-03-27 NOTE — PROGRESS NOTES
SUBJECTIVE:   Cornelia Rodriguez is a 43 year old female who presents to clinic today for the following health issues:    Diabetes Follow-up    Patient is checking blood sugars: twice daily.    Blood sugar testing frequency justification: On insulin, frequency appropriate  and Uncontrolled diabetes  Results are as follows:         am - Avg. 230-250's         suppertime - same     Diabetic concerns: blood sugar frequently over 200     Symptoms of hypoglycemia (low blood sugar): none     Paresthesias (numbness or burning in feet) or sores: No     Date of last diabetic eye exam: 2018    -Patient's fasting sugar in the morning and sugar before bed have been 230-250's. These higher sugars have been for the past couple months. At one point they were 280-290 so they have been decreasing   -Taking Trulicity once weekly, taking glipizide daily, 80 units lantus daily   -Previously took metformin but could not tolerate due to diarrhea. She did try extended release but could not tolerate it either  -After her last elevated A1c her lantus was increased by 2 units  -Has not worked with diabetes education in a long time   -Denies numbness or tingling in feet     BP Readings from Last 2 Encounters:   03/27/19 122/86   03/20/19 (!) 147/97     No results found for: A1C, LDL    Diabetes Management Resources    Hyperlipidemia Follow-Up      Rate your low fat/cholesterol diet?: not monitoring fat    Taking statin?  Yes, no muscle aches from statin    Other lipid medications/supplements?:  none    Migraine Follow-Up    Headaches symptoms:  Stable     Frequency: 6 months      Duration of headaches: N/A    Able to do normal daily activities/work with migraines: No     Rescue/Relief medication:sumatriptan (Imitrex)              Effectiveness: total relief    Preventative medication: Tylenol or Aleve     Neurologic complications: No new stroke-like symptoms, loss of vision or speech, numbness or weakness    In the past 4 weeks, how often have  you gone to Urgent Care or the emergency room because of your headaches?  0     Neck pain:  -Patient saw neurology who increased carbamazepine which is helping. Patient also has an appointment scheduled with a nerve specialist       Amount of exercise or physical activity: Going up the stairs at home     Problems taking medications regularly: No    Medication side effects: none    Diet: regular (no restrictions)      Problem list and histories reviewed & adjusted, as indicated.  Additional history: as documented    Patient Active Problem List   Diagnosis     Acute recurrent maxillary sinusitis     Right chronic serous otitis media     Morbid obesity (H)     Migraine without aura and without status migrainosus, not intractable     Gastroesophageal reflux disease without esophagitis     Type 2 diabetes mellitus with hyperglycemia (H)     Allergic rhinitis, unspecified seasonality, unspecified trigger     Hyperlipidemia LDL goal <100     Cervicalgia     Past Surgical History:   Procedure Laterality Date     CHOLECYSTECTOMY         Social History     Tobacco Use     Smoking status: Former Smoker     Smokeless tobacco: Never Used     Tobacco comment: quit 3/2013   Substance Use Topics     Alcohol use: Yes     Comment: Occasionally     Family History   Problem Relation Age of Onset     Diabetes Maternal Grandmother      Diabetes Sister      Hypertension Sister            Reviewed and updated as needed this visit by clinical staff  Tobacco  Allergies  Meds  Med Hx  Surg Hx  Fam Hx  Soc Hx      Reviewed and updated as needed this visit by Provider         ROS:  Constitutional, HEENT, cardiovascular, pulmonary, gi and gu systems are negative, except as otherwise noted.    This document serves as a record of the services and decisions personally performed by ANGI BRAGA. It was created on his/her behalf by Sarthak Bautista, a trained medical scribe. The creation of this document is based on the provider's  "statements to the medical scribe. Sarthak Bautista, March 27, 2019 1:02 PM  OBJECTIVE:     /86 (BP Location: Right arm)   Pulse 89   Temp 98.8  F (37.1  C) (Oral)   Resp 18   Ht 1.626 m (5' 4\")   Wt 127 kg (280 lb)   LMP 02/01/2019 (Approximate)   SpO2 99%   BMI 48.06 kg/m    Body mass index is 48.06 kg/m .  GENERAL: healthy, alert and no distress  NECK: no adenopathy, no asymmetry, masses, or scars and thyroid normal to palpation  RESP: lungs clear to auscultation - no rales, rhonchi or wheezes  CV: regular rate and rhythm, normal S1 S2, no S3 or S4, no murmur, click or rub, no peripheral edema and peripheral pulses strong  ABDOMEN: soft, nontender, no hepatosplenomegaly, no masses and bowel sounds normal  MS: no gross musculoskeletal defects noted, no edema  PSYCH: mentation appears normal, affect normal/bright  Diabetic foot exam: normal DP and PT pulses, no trophic changes or ulcerative lesions and normal monofilament exam  ASSESSMENT/PLAN:     1. Type 2 diabetes mellitus with hyperglycemia, with long-term current use of insulin (H)  Depending on A1c today will start Jardiance. Patient will meet with diabetes education. Might need to add daytime insulin but will start with the jardiance first and have her check in with diabetic ed to monitor sugars. Next diabetes check in 3 months of jardiance is started, otherwise 6 months  - HEMOGLOBIN A1C  - Lipid panel reflex to direct LDL Fasting; Future  - Albumin Random Urine Quantitative with Creat Ratio  - TSH WITH FREE T4 REFLEX  - Comprehensive metabolic panel  - DIABETES EDUCATOR REFERRAL  - insulin glargine (LANTUS SOLOSTAR PEN) 100 UNIT/ML pen; Inject 40 Units Subcutaneous 2 times daily  Dispense: 90 mL; Refill: 3  - empagliflozin (JARDIANCE) 10 MG TABS tablet; Take 1 tablet (10 mg) by mouth daily  Dispense: 30 tablet; Refill: 2  - **Basic metabolic panel FUTURE anytime; Standing  - CBC with platelets differential; Standing    2. Screening for diabetic " peripheral neuropathy  - FOOT EXAM  NO CHARGE [51776.114]    3. Screening for HIV (human immunodeficiency virus)  - HIV Screening    4. Morbid obesity (H)  Body mass index is 48.06 kg/m .     5. Hyperlipidemia LDL goal <100  - Lipid panel reflex to direct LDL Fasting; Future    6. Occipital pain  Followed by neurology   - CBC with platelets differential  - **Basic metabolic panel FUTURE anytime; Standing  - CBC with platelets differential; Standing    Patient Instructions   Split your lantus to 40 in the morning and 40 at night.     Schedule an appointment with diabetes education.     Schedule a lab only visit for fasting labs in 1 month. You need to be fasting for 10-12 hours.     Schedule an eye exam.     Schedule a medication check in 3-6 months.           The information in this document, created by the medical scribe for me, accurately reflects the services I personally performed and the decisions made by me. I have reviewed and approved this document for accuracy.   Sarah Lombardo MD  [facility]

## 2019-03-28 DIAGNOSIS — E11.65 TYPE 2 DIABETES MELLITUS WITH HYPERGLYCEMIA, WITH LONG-TERM CURRENT USE OF INSULIN (H): Primary | ICD-10-CM

## 2019-03-28 DIAGNOSIS — Z79.4 TYPE 2 DIABETES MELLITUS WITH HYPERGLYCEMIA, WITH LONG-TERM CURRENT USE OF INSULIN (H): Primary | ICD-10-CM

## 2019-03-28 LAB — HIV 1+2 AB+HIV1 P24 AG SERPL QL IA: NONREACTIVE

## 2019-03-28 NOTE — TELEPHONE ENCOUNTER
Prescription signed. Please clarify with patient over the phone that order is correct and 180 needles should last 90 days

## 2019-03-28 NOTE — RESULT ENCOUNTER NOTE
Pao Lombardo is out of the office and I am reviewing your results.   Your HIV test was negative.   Please call or MyChart my office with any questions or concerns.    Naomi Cosby, PAC

## 2019-03-28 NOTE — TELEPHONE ENCOUNTER
No Rx for pen needles on file. Provider, please review pen needle size for Rx. Pharmacy is pended too.     Jessica Valdez RN

## 2019-03-28 NOTE — TELEPHONE ENCOUNTER
Reason for Call:  Medication or medication refill:    Do you use a [facility]?  Name of the pharmacy and phone number for the current request:  -[facility], [place], [place] - [place] [facility] [address] [facility]     Name of the medication requested: Insulin pen needles richelle gauage 32g x4     Other request: All out needs asap please call when approved. Sending High Priority Message    Can we leave a detailed message on this number? YES    Phone number patient can be reached at: Cell number on file:    Telephone Information:   Mobile [phone]     Best Time: any    Call taken on 3/28/2019 at 3:36 PM by Paris Hurst

## 2019-04-01 ENCOUNTER — TELEPHONE (OUTPATIENT)
Dept: NEUROLOGY | Facility: CLINIC | Age: 44
End: 2019-04-01

## 2019-04-01 NOTE — TELEPHONE ENCOUNTER
Reviewed MRI/A studies with pt.  No evidence of dissection or other explanation for headache.  CBZ helping to sleep but not helping head pain.  Has appt with Dr. Rutledge next week.

## 2019-04-02 ENCOUNTER — TELEPHONE (OUTPATIENT)
Dept: FAMILY MEDICINE | Facility: CLINIC | Age: 44
End: 2019-04-02

## 2019-04-02 ENCOUNTER — OFFICE VISIT (OUTPATIENT)
Dept: FAMILY MEDICINE | Facility: CLINIC | Age: 44
End: 2019-04-02
Payer: COMMERCIAL

## 2019-04-02 VITALS
DIASTOLIC BLOOD PRESSURE: 82 MMHG | SYSTOLIC BLOOD PRESSURE: 132 MMHG | WEIGHT: 279 LBS | BODY MASS INDEX: 47.63 KG/M2 | HEART RATE: 88 BPM | TEMPERATURE: 98 F | OXYGEN SATURATION: 99 % | HEIGHT: 64 IN | RESPIRATION RATE: 20 BRPM

## 2019-04-02 DIAGNOSIS — Z02.89 ENCOUNTER FOR COMPLETION OF FORM WITH PATIENT: Primary | ICD-10-CM

## 2019-04-02 PROCEDURE — 99212 OFFICE O/P EST SF 10 MIN: CPT | Performed by: PHYSICIAN ASSISTANT

## 2019-04-02 ASSESSMENT — MIFFLIN-ST. JEOR: SCORE: 1895.54

## 2019-04-02 ASSESSMENT — PAIN SCALES - GENERAL: PAINLEVEL: MODERATE PAIN (4)

## 2019-04-02 NOTE — PROGRESS NOTES
SUBJECTIVE:   Cornelia Rodriguez is a 43 year old female who presents to clinic today for the following health issues:      FMLA Forms      Has been on Disability since February 17 - followed by Dr. Lombardo who is currently out of the office.   Job giving me till Friday to complete FMLA forms- because past due  Aggravated nerve with Occipital neuralgia   Has another appointment April 8 with neurologist.   Saw Dr paul (neurologist) and referred on to a different neurologist    Problem list and histories reviewed & adjusted, as indicated.  Additional history: as documented    Patient Active Problem List   Diagnosis     Acute recurrent maxillary sinusitis     Right chronic serous otitis media     Morbid obesity (H)     Migraine without aura and without status migrainosus, not intractable     Gastroesophageal reflux disease without esophagitis     Type 2 diabetes mellitus with hyperglycemia (H)     Allergic rhinitis, unspecified seasonality, unspecified trigger     Hyperlipidemia LDL goal <100     Cervicalgia     Past Surgical History:   Procedure Laterality Date     CHOLECYSTECTOMY         Social History     Tobacco Use     Smoking status: Former Smoker     Smokeless tobacco: Never Used     Tobacco comment: quit 3/2013   Substance Use Topics     Alcohol use: Yes     Comment: Occasionally     Family History   Problem Relation Age of Onset     Diabetes Maternal Grandmother      Diabetes Sister      Hypertension Sister          Current Outpatient Medications   Medication Sig Dispense Refill     acetaminophen (TYLENOL) 325 MG tablet        albuterol (PROVENTIL HFA) 108 (90 Base) MCG/ACT inhaler Inhale 2 puffs into the lungs       calcium ascorbate 500 MG TABS Take 500 mg by mouth daily       carBAMazepine (CARBATROL) 200 MG 12 hr capsule Take 1 capsule (200 mg) by mouth See Admin Instructions One po bid for one week, then one po tid. 90 capsule 1     cetirizine (ZYRTEC) 10 MG tablet Take 10 mg by mouth daily        "dulaglutide (TRULICITY) 1.5 MG/0.5ML pen Inject 1.5 mg Subcutaneous       empagliflozin (JARDIANCE) 10 MG TABS tablet Take 1 tablet (10 mg) by mouth daily 30 tablet 2     glipiZIDE (GLUCOTROL XL) 10 MG 24 hr tablet Take 20 mg by mouth daily       insulin glargine (LANTUS SOLOSTAR PEN) 100 UNIT/ML pen Inject 40 Units Subcutaneous 2 times daily 90 mL 3     insulin pen needle (32G X 4 MM) 32G X 4 MM miscellaneous Use 2 pen needles daily or as directed. 180 each 1     ketoconazole (NIZORAL) 2 % external shampoo        methocarbamol (ROBAXIN) 750 MG tablet Take 750 mg by mouth as needed       montelukast (SINGULAIR) 10 MG tablet Take 10 mg by mouth At Bedtime       Multiple Vitamins-Minerals (HM HAIR/SKIN/NAILS) TABS Take 1 tablet by mouth       multivitamin w/minerals (MULTI-VITAMIN) tablet Take 1 tablet by mouth       ranitidine (ZANTAC) 300 MG tablet Take 300 mg by mouth daily  3     rosuvastatin (CRESTOR) 10 MG tablet Take 10 mg by mouth daily       sucralfate (CARAFATE) 1 GM tablet Take 1 tablet by mouth 4 times daily  5     SUMAtriptan (IMITREX) 100 MG tablet Take 100 mg by mouth as needed       BP Readings from Last 3 Encounters:   04/02/19 132/82   03/27/19 122/86   03/20/19 (!) 147/97    Wt Readings from Last 3 Encounters:   04/02/19 126.6 kg (279 lb)   03/27/19 127 kg (280 lb)   03/20/19 126.6 kg (279 lb)                    Reviewed and updated as needed this visit by clinical staff  Tobacco  Allergies  Meds  Med Hx  Surg Hx  Fam Hx  Soc Hx      Reviewed and updated as needed this visit by Provider         ROS:  Constitutional, HEENT, cardiovascular, pulmonary, gi and gu systems are negative, except as otherwise noted.    OBJECTIVE:     /82 (BP Location: Right arm, Patient Position: Chair, Cuff Size: Adult Large)   Pulse 88   Temp 98  F (36.7  C) (Oral)   Resp 20   Ht 1.626 m (5' 4\")   Wt 126.6 kg (279 lb)   LMP 04/02/2019 (Approximate)   SpO2 99%   Breastfeeding? No   BMI 47.89 kg/m  "   Body mass index is 47.89 kg/m .  GENERAL: healthy, alert and no distress  PSYCH: mentation appears normal, affect normal/bright and appearance well groomed    Diagnostic Test Results:  none     ASSESSMENT/PLAN:             1. Encounter for completion of form with patient  Dr. Lobmardo is out of the office. Has upcoming appointment with neurology- unable to work.  FMLA form completed and return to work possibly 4/22/19 - upcoming appointment with neurology on 4/8/19       There are no Patient Instructions on file for this visit.    Naomi Cosby PA-C  [facility]

## 2019-04-08 ENCOUNTER — OFFICE VISIT (OUTPATIENT)
Dept: NEUROLOGY | Facility: CLINIC | Age: 44
End: 2019-04-08
Attending: PSYCHIATRY & NEUROLOGY
Payer: COMMERCIAL

## 2019-04-08 VITALS
HEIGHT: 64 IN | WEIGHT: 277 LBS | DIASTOLIC BLOOD PRESSURE: 96 MMHG | OXYGEN SATURATION: 98 % | HEART RATE: 98 BPM | SYSTOLIC BLOOD PRESSURE: 145 MMHG | BODY MASS INDEX: 47.29 KG/M2

## 2019-04-08 DIAGNOSIS — M54.81 OCCIPITAL NEURALGIA OF RIGHT SIDE: Primary | ICD-10-CM

## 2019-04-08 RX ORDER — DULOXETIN HYDROCHLORIDE 30 MG/1
30 CAPSULE, DELAYED RELEASE ORAL 2 TIMES DAILY
Qty: 60 CAPSULE | Refills: 3 | Status: SHIPPED | OUTPATIENT
Start: 2019-04-08 | End: 2019-09-19

## 2019-04-08 ASSESSMENT — ENCOUNTER SYMPTOMS
SEIZURES: 0
EYE WATERING: 0
EYE PAIN: 0
DOUBLE VISION: 0
HOT FLASHES: 0
WEAKNESS: 0
TINGLING: 1
DIZZINESS: 0
LOSS OF CONSCIOUSNESS: 0
HEADACHES: 1
NUMBNESS: 0
EYE REDNESS: 0
EYE IRRITATION: 0
DECREASED LIBIDO: 0
PARALYSIS: 0
MEMORY LOSS: 0
TREMORS: 0
SPEECH CHANGE: 0
DISTURBANCES IN COORDINATION: 0

## 2019-04-08 ASSESSMENT — PAIN SCALES - GENERAL: PAINLEVEL: EXTREME PAIN (9)

## 2019-04-08 ASSESSMENT — MIFFLIN-ST. JEOR: SCORE: 1886.46

## 2019-04-08 NOTE — LETTER
4/8/2019       RE: Cornelia Rodriguez  [address]  [address]  [facility] [address]     Dear Colleague,    Thank you for referring your patient, Cornelia Rodriguez, to the [facility] NEUROLOGY at [facility]. Please see a copy of my visit note below.    [facility]  Neurology Consult     Cornelia Rodriguez MRN# [id number]   YOB: 1975 Age: 43 year old     Requesting physician: Dr. Combs         Assessment and Recommendations:   Cornelia Rodriguez is a 43 year old female who was referred to the neurology clinic for further evaluation of right posterior head pain.    Her headache presentation is most consistent with right occipital neuralgia, most prominent in the lesser branch distribution.  She has been suffering from this for the last 3 years, with worsening in pain since January 2019.  She has some intermittent neck pain as well, and I wonder if this could be secondary to a cervicogenic issue.  She has undergone physical therapy for her neck, which has been somewhat helpful.    We reviewed her recent MRI imaging, which was unrevealing for structural causes, although this did not include the cervical spine.  I did not recommend further workup today, but x-ray of the cervical spine could be considered in the future.  Going forward, we discussed several symptomatic treatment strategies that could be tried, including trying an antidepressant as a preventative, such as duloxetine, or an occipital nerve block.  She is interested in starting duloxetine 30 mg twice a day, and we will add on a nerve block if this is not adequate.  She was encouraged to continue daily neck stretches and exercises that she learned at physical therapy.    She was given prescriptions for duloxetine 30 mg twice a day and offered an appointment for an occipital nerve block, lesser branch on the right.    I will plan to see her back in 3 months to  monitor her progress, or sooner for the nerve block if needed.    Carolann Rutledge MD  Neurology  Pager: [phone]              Chief Complaint:   Chief Complaint   Patient presents with     RECHECK     OCCIPITAL NEURALGIA           History is obtained from the patient and medical record.      Cornelia Rodriguez is a 43 year old female who is been suffering from right-sided posterior head pain since suffering from mastoiditis in 2016.  Initially, she reports significant ear symptoms, although these resolved, and she was left with constant pain.  She describes the pain as shooting or twinges of pain, which is constant since January 2019.  She rates the pain as a 9 out of 10.  When asked to point to where it occurs, she traces the distribution of the lesser occipital nerve branch on the right.  She denies pain in other areas, but has rarely had left-sided pain, which she attributes to tensing her neck when the right side is very painful.    Her pain is worse with sneezing.  She can rarely have nausea associated with severe pain, although this is generally mild.  She denies other associated features.  She denies any warning.  She denies any clear positional component.  She denies autonomic features.  She denies other illness associated with her headaches.    Separate from this, she is also suffered from headaches diagnosed as migraines in the past.  She describes these as occurring rarely, most recently 6 months ago, that tend to be triggered by stress.  These are well treated with Imitrex 100 mg as needed.    For treatment of her right posterior head pain, she has tried carbamazepine, which helps her sleep does not help the pain.  She is also tried gabapentin which caused severe nausea and was not helpful for pain.  She is tried over-the-counter medications, including acetaminophen and NSAIDs, which were not helpful.  She has tried nortriptyline in the past, and had hives and rash, so this has been avoided.  She underwent  physical therapy, which was somewhat helpful.  She has neck stretches that she does, and finds that if she stretches appropriately this does help.  She also notes neck cracking at times, which sometimes relieves pain in her neck.            Past Medical History:     Past Medical History:   Diagnosis Date     Diabetes (H)    She has occasional neck pain.          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  She has not been working since February 2019 due to her symptoms.  Social History     Socioeconomic History     Marital status: Single     Spouse name: Not on file     Number of children: Not on file     Years of education: Not on file     Highest education level: Not on file   Occupational History     Not on file   Social Needs     Financial resource strain: Not on file     Food insecurity:     Worry: Not on file     Inability: Not on file     Transportation needs:     Medical: Not on file     Non-medical: Not on file   Tobacco Use     Smoking status: Former Smoker     Smokeless tobacco: Never Used     Tobacco comment: quit 3/2013   Substance and Sexual Activity     Alcohol use: Yes     Comment: Occasionally     Drug use: No     Sexual activity: Never   Lifestyle     Physical activity:     Days per week: Not on file     Minutes per session: Not on file     Stress: Not on file   Relationships     Social connections:     Talks on phone: Not on file     Gets together: Not on file     Attends [employer] service: Not on file     Active member of club or organization: Not on file     Attends meetings of clubs or organizations: Not on file     Relationship status: Not on file     Intimate partner violence:     Fear of current or ex partner: Not on file     Emotionally abused: Not on file     Physically abused: Not on file     Forced sexual activity: Not on file    Other Topics Concern     Not on file   Social History Narrative     Not on file             Family History:   Sister with migraines  Family History   Problem Relation Age of Onset     Diabetes Maternal Grandmother      Diabetes Sister      Hypertension Sister              Allergies:      Allergies   Allergen Reactions     Adhesive Tape Dermatitis     Benadryl [Diphenhydramine]      Codeine      Gabapentin      Ibuprofen      Penicillins      Ciprofloxacin Diarrhea, Nausea and Nausea and Vomiting     Cyclobenzaprine Nausea and Vomiting     Feels shakey       Ferrous Gluconate Rash     Nortriptyline Hives and Rash     Sumatriptan Nausea and Nausea and Vomiting             Medications:     Current Outpatient Medications:      acetaminophen (TYLENOL) 325 MG tablet, , Disp: , Rfl:      albuterol (PROVENTIL HFA) 108 (90 Base) MCG/ACT inhaler, Inhale 2 puffs into the lungs, Disp: , Rfl:      calcium ascorbate 500 MG TABS, Take 500 mg by mouth daily, Disp: , Rfl:      carBAMazepine (CARBATROL) 200 MG 12 hr capsule, Take 1 capsule (200 mg) by mouth See Admin Instructions One po bid for one week, then one po tid., Disp: 90 capsule, Rfl: 1     cetirizine (ZYRTEC) 10 MG tablet, Take 10 mg by mouth daily, Disp: , Rfl:      dulaglutide (TRULICITY) 1.5 MG/0.5ML pen, Inject 1.5 mg Subcutaneous, Disp: , Rfl:      empagliflozin (JARDIANCE) 10 MG TABS tablet, Take 1 tablet (10 mg) by mouth daily, Disp: 30 tablet, Rfl: 2     glipiZIDE (GLUCOTROL XL) 10 MG 24 hr tablet, Take 20 mg by mouth daily, Disp: , Rfl:      insulin glargine (LANTUS SOLOSTAR PEN) 100 UNIT/ML pen, Inject 40 Units Subcutaneous 2 times daily, Disp: 90 mL, Rfl: 3     insulin pen needle (32G X 4 MM) 32G X 4 MM miscellaneous, Use 2 pen needles daily or as directed., Disp: 180 each, Rfl: 1     ketoconazole (NIZORAL) 2 % external shampoo, , Disp: , Rfl:      methocarbamol (ROBAXIN) 750 MG tablet, Take 750 mg by mouth as needed, Disp: , Rfl:      montelukast (SINGULAIR)  "10 MG tablet, Take 10 mg by mouth At Bedtime, Disp: , Rfl:      Multiple Vitamins-Minerals (HM HAIR/SKIN/NAILS) TABS, Take 1 tablet by mouth, Disp: , Rfl:      multivitamin w/minerals (MULTI-VITAMIN) tablet, Take 1 tablet by mouth, Disp: , Rfl:      ranitidine (ZANTAC) 300 MG tablet, Take 300 mg by mouth daily, Disp: , Rfl: 3     rosuvastatin (CRESTOR) 10 MG tablet, Take 10 mg by mouth daily, Disp: , Rfl:      sucralfate (CARAFATE) 1 GM tablet, Take 1 tablet by mouth 4 times daily, Disp: , Rfl: 5     SUMAtriptan (IMITREX) 100 MG tablet, Take 100 mg by mouth as needed, Disp: , Rfl:             Physical Exam:   BP (!) 145/96 (BP Location: Left arm, Patient Position: Sitting, Cuff Size: Adult Large)   Pulse 98   Ht 1.626 m (5' 4\")   Wt 125.6 kg (277 lb)   LMP 04/02/2019 (Approximate)   SpO2 98%   BMI 47.55 kg/m      Physical Exam:   General: NAD  Neurologic:   Mental Status Exam: Alert, awake and oriented to situation. No dysarthria. Speech of normal fluency.   Cranial Nerves: PERRLA, EOMs intact, no nystagmus, facial movements symmetric, facial sensation intact to light touch, hearing intact to conversation, trapezius and SCMs 5/5 bilaterally, tongue midline and fully mobile. No tongue atrophy or fasciculations.    Motor: Normal tone in all four extremities, no atrophy or fasciculations. 5/5 strength bilaterally in shoulder abduction, elbow extensors and flexors, wrist extensors and flexors, hip flexors, knee extensors and flexors, dorsi- and plantarflexion. No tremors or abnormal movements noted.   Sensory: Sensation intact to light touch on arms and legs bilaterally.    Coordination: Finger-nose-finger intact bilaterally.  Rapidly alternating movements intact bilaterally in the upper extremities.  Normal finger tapping bilaterally.  Normal Romberg.   Reflexes: 2+ and symmetric in triceps, biceps, brachioradialis, patellar, Achilles, and plantars downgoing bilaterally.   Gait: Normal gait.  Able to toe and heel " walk.  Tandem gait normal.  Head: Normocephalic, atraumatic. No radiating pain with palpation over the supraorbital notches.  There is pain re-created with palpation over the right occipital nerve.  Temporal pulses intact.   Neck: Normal range of motion with lateral head movements and neck flexion.  Lateral head movement to the right exacerbates pain.  Eyes: No conjunctival injection, no scleral icterus.   Respiratory: No increased work of breathing.  Cardiovascular: No lower extremity edema.  Extremities: Warm, dry.          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    Again, thank you for allowing me to participate in the care of your patient.      Sincerely,    Carolann Rutledge MD

## 2019-04-08 NOTE — PROGRESS NOTES
[facility]  Neurology Consult     Cornelia Rodriguez MRN# [id number]   YOB: 1975 Age: 43 year old     Requesting physician: Dr. Combs         Assessment and Recommendations:   Cornelia Rodriguez is a 43 year old female who was referred to the neurology clinic for further evaluation of right posterior head pain.    Her headache presentation is most consistent with right occipital neuralgia, most prominent in the lesser branch distribution.  She has been suffering from this for the last 3 years, with worsening in pain since January 2019.  She has some intermittent neck pain as well, and I wonder if this could be secondary to a cervicogenic issue.  She has undergone physical therapy for her neck, which has been somewhat helpful.    We reviewed her recent MRI imaging, which was unrevealing for structural causes, although this did not include the cervical spine.  I did not recommend further workup today, but x-ray of the cervical spine could be considered in the future.  Going forward, we discussed several symptomatic treatment strategies that could be tried, including trying an antidepressant as a preventative, such as duloxetine, or an occipital nerve block.  She is interested in starting duloxetine 30 mg twice a day, and we will add on a nerve block if this is not adequate.  She was encouraged to continue daily neck stretches and exercises that she learned at physical therapy.    She was given prescriptions for duloxetine 30 mg twice a day and offered an appointment for an occipital nerve block, lesser branch on the right.    I will plan to see her back in 3 months to monitor her progress, or sooner for the nerve block if needed.    Carolann Rutledge MD  Neurology  Pager: [phone]            Chief Complaint:   Chief Complaint   Patient presents with     RECHECK     OCCIPITAL NEURALGIA           History is obtained from the patient and medical record.       Cornelia Rodriguez is a 43 year old female who is been suffering from right-sided posterior head pain since suffering from mastoiditis in 2016.  Initially, she reports significant ear symptoms, although these resolved, and she was left with constant pain.  She describes the pain as shooting or twinges of pain, which is constant since January 2019.  She rates the pain as a 9 out of 10.  When asked to point to where it occurs, she traces the distribution of the lesser occipital nerve branch on the right.  She denies pain in other areas, but has rarely had left-sided pain, which she attributes to tensing her neck when the right side is very painful.    Her pain is worse with sneezing.  She can rarely have nausea associated with severe pain, although this is generally mild.  She denies other associated features.  She denies any warning.  She denies any clear positional component.  She denies autonomic features.  She denies other illness associated with her headaches.    Separate from this, she is also suffered from headaches diagnosed as migraines in the past.  She describes these as occurring rarely, most recently 6 months ago, that tend to be triggered by stress.  These are well treated with Imitrex 100 mg as needed.    For treatment of her right posterior head pain, she has tried carbamazepine, which helps her sleep does not help the pain.  She is also tried gabapentin which caused severe nausea and was not helpful for pain.  She is tried over-the-counter medications, including acetaminophen and NSAIDs, which were not helpful.  She has tried nortriptyline in the past, and had hives and rash, so this has been avoided.  She underwent physical therapy, which was somewhat helpful.  She has neck stretches that she does, and finds that if she stretches appropriately this does help.  She also notes neck cracking at times, which sometimes relieves pain in her neck.            Past Medical History:     Past Medical History:    Diagnosis Date     Diabetes (H)    She has occasional neck pain.          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  She has not been working since February 2019 due to her symptoms.  Social History     Socioeconomic History     Marital status: Single     Spouse name: Not on file     Number of children: Not on file     Years of education: Not on file     Highest education level: Not on file   Occupational History     Not on file   Social Needs     Financial resource strain: Not on file     Food insecurity:     Worry: Not on file     Inability: Not on file     Transportation needs:     Medical: Not on file     Non-medical: Not on file   Tobacco Use     Smoking status: Former Smoker     Smokeless tobacco: Never Used     Tobacco comment: quit 3/2013   Substance and Sexual Activity     Alcohol use: Yes     Comment: Occasionally     Drug use: No     Sexual activity: Never   Lifestyle     Physical activity:     Days per week: Not on file     Minutes per session: Not on file     Stress: Not on file   Relationships     Social connections:     Talks on phone: Not on file     Gets together: Not on file     Attends Je[employer]'s witness service: Not on file     Active member of club or organization: Not on file     Attends meetings of clubs or organizations: Not on file     Relationship status: Not on file     Intimate partner violence:     Fear of current or ex partner: Not on file     Emotionally abused: Not on file     Physically abused: Not on file     Forced sexual activity: Not on file   Other Topics Concern     Not on file   Social History Narrative     Not on file             Family History:   Sister with migraines  Family History   Problem Relation Age of Onset     Diabetes Maternal Grandmother      Diabetes Sister      Hypertension Sister              Allergies:       Allergies   Allergen Reactions     Adhesive Tape Dermatitis     Benadryl [Diphenhydramine]      Codeine      Gabapentin      Ibuprofen      Penicillins      Ciprofloxacin Diarrhea, Nausea and Nausea and Vomiting     Cyclobenzaprine Nausea and Vomiting     Feels shakey       Ferrous Gluconate Rash     Nortriptyline Hives and Rash     Sumatriptan Nausea and Nausea and Vomiting             Medications:     Current Outpatient Medications:      acetaminophen (TYLENOL) 325 MG tablet, , Disp: , Rfl:      albuterol (PROVENTIL HFA) 108 (90 Base) MCG/ACT inhaler, Inhale 2 puffs into the lungs, Disp: , Rfl:      calcium ascorbate 500 MG TABS, Take 500 mg by mouth daily, Disp: , Rfl:      carBAMazepine (CARBATROL) 200 MG 12 hr capsule, Take 1 capsule (200 mg) by mouth See Admin Instructions One po bid for one week, then one po tid., Disp: 90 capsule, Rfl: 1     cetirizine (ZYRTEC) 10 MG tablet, Take 10 mg by mouth daily, Disp: , Rfl:      dulaglutide (TRULICITY) 1.5 MG/0.5ML pen, Inject 1.5 mg Subcutaneous, Disp: , Rfl:      empagliflozin (JARDIANCE) 10 MG TABS tablet, Take 1 tablet (10 mg) by mouth daily, Disp: 30 tablet, Rfl: 2     glipiZIDE (GLUCOTROL XL) 10 MG 24 hr tablet, Take 20 mg by mouth daily, Disp: , Rfl:      insulin glargine (LANTUS SOLOSTAR PEN) 100 UNIT/ML pen, Inject 40 Units Subcutaneous 2 times daily, Disp: 90 mL, Rfl: 3     insulin pen needle (32G X 4 MM) 32G X 4 MM miscellaneous, Use 2 pen needles daily or as directed., Disp: 180 each, Rfl: 1     ketoconazole (NIZORAL) 2 % external shampoo, , Disp: , Rfl:      methocarbamol (ROBAXIN) 750 MG tablet, Take 750 mg by mouth as needed, Disp: , Rfl:      montelukast (SINGULAIR) 10 MG tablet, Take 10 mg by mouth At Bedtime, Disp: , Rfl:      Multiple Vitamins-Minerals (HM HAIR/SKIN/NAILS) TABS, Take 1 tablet by mouth, Disp: , Rfl:      multivitamin w/minerals (MULTI-VITAMIN) tablet, Take 1 tablet by mouth, Disp: , Rfl:      ranitidine (ZANTAC) 300 MG tablet, Take  "300 mg by mouth daily, Disp: , Rfl: 3     rosuvastatin (CRESTOR) 10 MG tablet, Take 10 mg by mouth daily, Disp: , Rfl:      sucralfate (CARAFATE) 1 GM tablet, Take 1 tablet by mouth 4 times daily, Disp: , Rfl: 5     SUMAtriptan (IMITREX) 100 MG tablet, Take 100 mg by mouth as needed, Disp: , Rfl:           Review of Systems:   ROS submitted by the patient on 4/8/2019   General Symptoms: No  Skin Symptoms: No  HENT Symptoms: No  EYE SYMPTOMS: Yes  HEART SYMPTOMS: No  LUNG SYMPTOMS: No  INTESTINAL SYMPTOMS: No  URINARY SYMPTOMS: No  GYNECOLOGIC SYMPTOMS: Yes  BREAST SYMPTOMS: No  SKELETAL SYMPTOMS: No  BLOOD SYMPTOMS: No  NERVOUS SYSTEM SYMPTOMS: Yes  MENTAL HEALTH SYMPTOMS: No  Eye pain: No  Vision loss: No  Dry eyes: Yes  Watery eyes: No  Eye bulging: No  Double vision: No  Flashing of lights: No  Spots: No  Floaters: No  Redness: No  Crossed eyes: No  Tunnel Vision: No  Yellowing of eyes: No  Eye irritation: No  Trouble with coordination: No  Dizziness or trouble with balance: No  Fainting or black-out spells: No  Memory loss: No  Headache: Yes  Seizures: No  Speech problems: No  Tingling: Yes  Tremor: No  Weakness: No  Difficulty walking: No  Paralysis: No  Numbness: No  Bleeding or spotting between periods: No  Heavy or painful periods: No  Irregular periods: Yes  Vaginal discharge: No  Hot flashes: No  Vaginal dryness: Yes  Genital ulcers: No  Reduced libido: No  Painful intercourse: No  Difficulty with sexual arousal: No  Post-menopausal bleeding: No         Physical Exam:   BP (!) 145/96 (BP Location: Left arm, Patient Position: Sitting, Cuff Size: Adult Large)   Pulse 98   Ht 1.626 m (5' 4\")   Wt 125.6 kg (277 lb)   LMP 04/02/2019 (Approximate)   SpO2 98%   BMI 47.55 kg/m     Physical Exam:   General: NAD  Neurologic:   Mental Status Exam: Alert, awake and oriented to situation. No dysarthria. Speech of normal fluency.   Cranial Nerves: PERRLA, EOMs intact, no nystagmus, facial movements symmetric, " facial sensation intact to light touch, hearing intact to conversation, trapezius and SCMs 5/5 bilaterally, tongue midline and fully mobile. No tongue atrophy or fasciculations.    Motor: Normal tone in all four extremities, no atrophy or fasciculations. 5/5 strength bilaterally in shoulder abduction, elbow extensors and flexors, wrist extensors and flexors, hip flexors, knee extensors and flexors, dorsi- and plantarflexion. No tremors or abnormal movements noted.   Sensory: Sensation intact to light touch on arms and legs bilaterally.    Coordination: Finger-nose-finger intact bilaterally.  Rapidly alternating movements intact bilaterally in the upper extremities.  Normal finger tapping bilaterally.  Normal Romberg.   Reflexes: 2+ and symmetric in triceps, biceps, brachioradialis, patellar, Achilles, and plantars downgoing bilaterally.   Gait: Normal gait.  Able to toe and heel walk.  Tandem gait normal.  Head: Normocephalic, atraumatic. No radiating pain with palpation over the supraorbital notches.  There is pain re-created with palpation over the right occipital nerve.  Temporal pulses intact.   Neck: Normal range of motion with lateral head movements and neck flexion.  Lateral head movement to the right exacerbates pain.  Eyes: No conjunctival injection, no scleral icterus.   Respiratory: No increased work of breathing.  Cardiovascular: No lower extremity edema.  Extremities: Warm, dry.          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

## 2019-04-09 ENCOUNTER — TELEPHONE (OUTPATIENT)
Dept: NEUROLOGY | Facility: CLINIC | Age: 44
End: 2019-04-09

## 2019-04-09 NOTE — TELEPHONE ENCOUNTER
Dr. Rutledge is advising that Cornelia stop the Carbamazapine and she placed a referral for PT.  I called Cornelia and informed her of this information.  No further questions or concerns.

## 2019-04-09 NOTE — TELEPHONE ENCOUNTER
[facility] Call Center    Phone Message    May a detailed message be left on voicemail: yes    Reason for Call: Other: Cornelia calling to ask if she should still take the Carbatrol prescribed by Dr. Combs in addition to the Cymbalta.  Also she is wondering about getting a referral for PT for posture and strengthening.  Please call her back to discuss     Action Taken: Message routed to:  [facility]

## 2019-04-16 ENCOUNTER — THERAPY VISIT (OUTPATIENT)
Dept: PHYSICAL THERAPY | Facility: CLINIC | Age: 44
End: 2019-04-16
Payer: COMMERCIAL

## 2019-04-16 DIAGNOSIS — M54.81 OCCIPITAL NEURALGIA OF RIGHT SIDE: ICD-10-CM

## 2019-04-16 DIAGNOSIS — M54.2 CERVICAL PAIN: ICD-10-CM

## 2019-04-16 PROCEDURE — 97112 NEUROMUSCULAR REEDUCATION: CPT | Mod: GP | Performed by: PHYSICAL THERAPIST

## 2019-04-16 PROCEDURE — 97110 THERAPEUTIC EXERCISES: CPT | Mod: GP | Performed by: PHYSICAL THERAPIST

## 2019-04-16 PROCEDURE — 97161 PT EVAL LOW COMPLEX 20 MIN: CPT | Mod: GP | Performed by: PHYSICAL THERAPIST

## 2019-04-16 NOTE — PROGRESS NOTES
[facility] Initial Evaluation  Subjective:    Cornelia Rodriguez is a 43 year old female with a cervical spine condition.  Condition occurred with:  Insidious onset.  Condition occurred: for unknown reasons.  This is a new condition  Pt presents to clinic with ongoing complaints of R sided upper cervical pain starting in February 2019 for unknown reasons. Pt had neurology appointment on 4/8/19 and referred to PT. Pt will be having occipital nerve injection on 4/19/19. Pt reports having inconsistency of symptoms throughout the day. Pt having difficulty with overhead lifting and rotating neck at times. .    Patient reports pain:  Cervical right side and upper cervical spine.  Radiates to:  Head and shoulder right.  Pain is described as sharp and shooting and is intermittent and reported as 8/10.  Associated symptoms:  Headache and loss of motion/stiffness. Pain is the same all the time.  Symptoms are exacerbated by lifting, rotating head and certain positions and relieved by rest, ice and analgesics.  Since onset symptoms are gradually worsening.  Special tests:  MRI.  Previous treatment includes physical therapy.  There was mild improvement following previous treatment.  General health as reported by patient is good.                  Barriers include:  None as reported by the patient.    Red flags:  None as reported by the patient.                        Objective:  Standing Alignment:    Cervical/Thoracic:  Forward head  Shoulder/UE:  Rounded shoulders                                  Cervical/Thoracic Evaluation    AROM:  AROM Cervical:    Flexion:            WNL -pain  Extension:       75% -pain  Rotation:         Left: 60 deg -pain     Right: 60 deg +pain R  Side Bend:      Left: 30 deg -pain     Right:  30 deg +pain R        Cervical Myotomes:  normal                      Cervical Dermatomes:  normal                    Cervical Palpation:      Tenderness not present at Left:   Upper Trap;  Levator; Erector Spinae or Suboccipitals  Tenderness present at Right:    Upper Trap; Levator; Erector Spinae and Suboccipitals      Spinal Segmental Conclusions:    Level:  Hypo at C7, T1, T5 and T6                                                General     ROS    Assessment/Plan:    Patient is a 43 year old female with cervical complaints.    Patient has the following significant findings with corresponding treatment plan.                Diagnosis 1:  Cervical pain, occipital neuralgia  Pain -  hot/cold therapy, manual therapy, self management, education and home program  Decreased ROM/flexibility - manual therapy, therapeutic exercise, therapeutic activity and home program  Impaired muscle performance - neuro re-education and home program  Decreased function - therapeutic activities and home program  Impaired posture - neuro re-education, therapeutic activities and home program    Therapy Evaluation Codes:   1) History comprised of:   Personal factors that impact the plan of care:      None.    Comorbidity factors that impact the plan of care are:      Diabetes and Migraines/headaches.     Medications impacting care: Anti-depressant and Pain.  2) Examination of Body Systems comprised of:   Body structures and functions that impact the plan of care:      Cervical spine.   Activity limitations that impact the plan of care are:      Driving, Lifting and Sleeping.  3) Clinical presentation characteristics are:   Stable/Uncomplicated.  4) Decision-Making    Low complexity using standardized patient assessment instrument and/or measureable assessment of functional outcome.  Cumulative Therapy Evaluation is: Low complexity.    Previous and current functional limitations:  (See Goal Flow Sheet for this information)    Short term and Long term goals: (See Goal Flow Sheet for this information)     Communication ability:  Patient appears to be able to clearly communicate and understand verbal and written communication and  follow directions correctly.  Treatment Explanation - The following has been discussed with the patient:   RX ordered/plan of care  Anticipated outcomes  Possible risks and side effects  This patient would benefit from PT intervention to resume normal activities.   Rehab potential is good.    Frequency:  1 X week, once daily  Duration:  for 6 weeks  Discharge Plan:  Achieve all LTG.  Independent in home treatment program.  Return to previous functional level by discharge.  Reach maximal therapeutic benefit.    Please refer to the daily flowsheet for treatment today, total treatment time and time spent performing 1:1 timed codes.

## 2019-04-16 NOTE — PROGRESS NOTES
[facility] Initial Evaluation  Subjective:                                       Pertinent medical history includes:  Diabetes and migraines/headaches.  Medical allergies: no.  Other surgeries include:  Other (Gallbladder).  Current medications:  Pain medication and other.  Current occupation is [employer].                                    Objective:  System    Physical Exam    General     ROS    Assessment/Plan:

## 2019-04-19 ENCOUNTER — OFFICE VISIT (OUTPATIENT)
Dept: NEUROLOGY | Facility: CLINIC | Age: 44
End: 2019-04-19
Payer: COMMERCIAL

## 2019-04-19 VITALS
SYSTOLIC BLOOD PRESSURE: 132 MMHG | HEIGHT: 64 IN | DIASTOLIC BLOOD PRESSURE: 80 MMHG | WEIGHT: 274 LBS | BODY MASS INDEX: 46.78 KG/M2 | HEART RATE: 94 BPM

## 2019-04-19 DIAGNOSIS — E11.65 TYPE 2 DIABETES MELLITUS WITH HYPERGLYCEMIA, WITH LONG-TERM CURRENT USE OF INSULIN (H): ICD-10-CM

## 2019-04-19 DIAGNOSIS — R51.9 OCCIPITAL PAIN: ICD-10-CM

## 2019-04-19 DIAGNOSIS — R74.8 ELEVATED LIVER ENZYMES: ICD-10-CM

## 2019-04-19 DIAGNOSIS — M54.81 OCCIPITAL NEURALGIA OF RIGHT SIDE: Primary | ICD-10-CM

## 2019-04-19 DIAGNOSIS — Z79.4 TYPE 2 DIABETES MELLITUS WITH HYPERGLYCEMIA, WITH LONG-TERM CURRENT USE OF INSULIN (H): ICD-10-CM

## 2019-04-19 DIAGNOSIS — E78.5 HYPERLIPIDEMIA LDL GOAL <100: ICD-10-CM

## 2019-04-19 PROBLEM — L23.1 ALLERGIC CONTACT DERMATITIS DUE TO ADHESIVES: Status: ACTIVE | Noted: 2017-01-13

## 2019-04-19 PROBLEM — R10.9 ABDOMINAL PAIN: Status: ACTIVE | Noted: 2017-06-15

## 2019-04-19 PROBLEM — J30.89 PERENNIAL ALLERGIC RHINITIS: Status: ACTIVE | Noted: 2017-01-13

## 2019-04-19 LAB
ALBUMIN SERPL-MCNC: 4.2 G/DL (ref 3.4–5)
ALP SERPL-CCNC: 74 U/L (ref 40–150)
ALT SERPL W P-5'-P-CCNC: 100 U/L (ref 0–50)
ANION GAP SERPL CALCULATED.3IONS-SCNC: 4 MMOL/L (ref 3–14)
AST SERPL W P-5'-P-CCNC: 58 U/L (ref 0–45)
BASOPHILS # BLD AUTO: 0 10E9/L (ref 0–0.2)
BASOPHILS NFR BLD AUTO: 0.8 %
BILIRUB DIRECT SERPL-MCNC: 0.2 MG/DL (ref 0–0.2)
BILIRUB SERPL-MCNC: 0.5 MG/DL (ref 0.2–1.3)
BUN SERPL-MCNC: 7 MG/DL (ref 7–30)
CALCIUM SERPL-MCNC: 9.3 MG/DL (ref 8.5–10.1)
CHLORIDE SERPL-SCNC: 106 MMOL/L (ref 94–109)
CHOLEST SERPL-MCNC: 128 MG/DL
CO2 SERPL-SCNC: 30 MMOL/L (ref 20–32)
CREAT SERPL-MCNC: 0.62 MG/DL (ref 0.52–1.04)
DIFFERENTIAL METHOD BLD: ABNORMAL
EOSINOPHIL # BLD AUTO: 0.1 10E9/L (ref 0–0.7)
EOSINOPHIL NFR BLD AUTO: 2.7 %
ERYTHROCYTE [DISTWIDTH] IN BLOOD BY AUTOMATED COUNT: 13.4 % (ref 10–15)
GFR SERPL CREATININE-BSD FRML MDRD: >90 ML/MIN/{1.73_M2}
GLUCOSE SERPL-MCNC: 148 MG/DL (ref 70–99)
HCT VFR BLD AUTO: 49.9 % (ref 35–47)
HDLC SERPL-MCNC: 41 MG/DL
HGB BLD-MCNC: 16.8 G/DL (ref 11.7–15.7)
LDLC SERPL CALC-MCNC: 65 MG/DL
LYMPHOCYTES # BLD AUTO: 1.5 10E9/L (ref 0.8–5.3)
LYMPHOCYTES NFR BLD AUTO: 30.8 %
MCH RBC QN AUTO: 29.3 PG (ref 26.5–33)
MCHC RBC AUTO-ENTMCNC: 33.7 G/DL (ref 31.5–36.5)
MCV RBC AUTO: 87 FL (ref 78–100)
MONOCYTES # BLD AUTO: 0.4 10E9/L (ref 0–1.3)
MONOCYTES NFR BLD AUTO: 7.6 %
NEUTROPHILS # BLD AUTO: 2.9 10E9/L (ref 1.6–8.3)
NEUTROPHILS NFR BLD AUTO: 58.1 %
NONHDLC SERPL-MCNC: 87 MG/DL
PLATELET # BLD AUTO: 222 10E9/L (ref 150–450)
POTASSIUM SERPL-SCNC: 4 MMOL/L (ref 3.4–5.3)
PROT SERPL-MCNC: 7.5 G/DL (ref 6.8–8.8)
RBC # BLD AUTO: 5.73 10E12/L (ref 3.8–5.2)
SODIUM SERPL-SCNC: 140 MMOL/L (ref 133–144)
TRIGL SERPL-MCNC: 108 MG/DL
WBC # BLD AUTO: 4.9 10E9/L (ref 4–11)

## 2019-04-19 PROCEDURE — 86803 HEPATITIS C AB TEST: CPT | Performed by: FAMILY MEDICINE

## 2019-04-19 PROCEDURE — 36415 COLL VENOUS BLD VENIPUNCTURE: CPT | Performed by: FAMILY MEDICINE

## 2019-04-19 PROCEDURE — 82728 ASSAY OF FERRITIN: CPT | Performed by: FAMILY MEDICINE

## 2019-04-19 PROCEDURE — 87340 HEPATITIS B SURFACE AG IA: CPT | Performed by: FAMILY MEDICINE

## 2019-04-19 PROCEDURE — 83540 ASSAY OF IRON: CPT | Performed by: FAMILY MEDICINE

## 2019-04-19 PROCEDURE — 83550 IRON BINDING TEST: CPT | Performed by: FAMILY MEDICINE

## 2019-04-19 PROCEDURE — 82977 ASSAY OF GGT: CPT | Performed by: FAMILY MEDICINE

## 2019-04-19 PROCEDURE — 80048 BASIC METABOLIC PNL TOTAL CA: CPT | Performed by: FAMILY MEDICINE

## 2019-04-19 PROCEDURE — 85025 COMPLETE CBC W/AUTO DIFF WBC: CPT | Performed by: FAMILY MEDICINE

## 2019-04-19 PROCEDURE — 80076 HEPATIC FUNCTION PANEL: CPT | Performed by: FAMILY MEDICINE

## 2019-04-19 PROCEDURE — 80061 LIPID PANEL: CPT | Performed by: FAMILY MEDICINE

## 2019-04-19 RX ORDER — TRIAMCINOLONE ACETONIDE 40 MG/ML
20 INJECTION, SUSPENSION INTRA-ARTICULAR; INTRAMUSCULAR ONCE
Status: COMPLETED | OUTPATIENT
Start: 2019-04-19 | End: 2019-04-19

## 2019-04-19 RX ORDER — BUPIVACAINE HYDROCHLORIDE 5 MG/ML
4 INJECTION, SOLUTION PERINEURAL ONCE
Status: COMPLETED | OUTPATIENT
Start: 2019-04-19 | End: 2019-04-19

## 2019-04-19 RX ADMIN — TRIAMCINOLONE ACETONIDE 20 MG: 40 INJECTION, SUSPENSION INTRA-ARTICULAR; INTRAMUSCULAR at 13:58

## 2019-04-19 RX ADMIN — BUPIVACAINE HYDROCHLORIDE 20 MG: 5 INJECTION, SOLUTION PERINEURAL at 13:57

## 2019-04-19 ASSESSMENT — MIFFLIN-ST. JEOR: SCORE: 1872.86

## 2019-04-19 ASSESSMENT — PAIN SCALES - GENERAL: PAINLEVEL: EXTREME PAIN (8)

## 2019-04-19 NOTE — NURSING NOTE
Chief Complaint   Patient presents with     RECHECK     OCCIPITAL NEVER BLOCK       Naomi May MA

## 2019-04-19 NOTE — LETTER
4/19/2019       RE: Cornelia Rodriguez  [address]  [address]  [place] [address]     Dear Colleague,    Thank you for referring your patient, Cornelia Rodriguez, to the [facility] NEUROLOGY at [facility]. Please see a copy of my visit note below.    [facility]  Neurology Procedure  04/19/19     Cornelia Rodriguez MRN# [id number]   YOB: 1975 Age: 43 year old            Occipital Nerve Block Procedure Note   DIAGNOSIS  Encounter Diagnosis   Name Primary?     Occipital neuralgia of right side Yes      PROCEDURE   Right, Greater and Lesser occipital nerve blocks.      INFORMED CONSENT  I discussed the risks, benefits, alternatives, and the necessity of other members of the healthcare team participating in the procedure with the patient.  The patient understood and wished to proceed with the procedure.    PROCEDURAL PAUSE   Patient identity, procedure to be performed, correct side and site, patient position, availability of equipment, and special requirements were verified.      PROCEDURE DETAILS   Ms. Rodriguez was placed in the sitting position with her head and neck flexed.  Landmarks were palpated.  The area was prepped with ChloraPrep and sterile technique was used.  A 1.50 inch 25 gauge sterile needle was introduced 1/3 of the distance lateral from the occipital protuberance to the mastoid process on the right side.  After negative aspiration for blood, a solution containing 20 mg of triamcinolone acetonide diluted in a 1:2 mixture of 1% lidocaine and 0.5% bupivacaine for a total volume of 6 mL was injected in a fan-like fashion along the nuchal ridge between the occipital protuberance and mastoid process.     Ms. Rodriguez tolerated the procedure well, and there were no apparent complications.  After appropriate observation, she was dismissed in good condition under her own power.    COMMENTS  Ms. Rodriguez received a total of 20 mg of  triamcinolone acetonide.  Her pain was 8/10 in severity prior to the procedure, and 2/10 following the procedure.    Lidocaine 1%, lot number [id number], expiration date June 2022  Bupivacaine 0.5%, lot number [id number], expiration date September 2021  Triamcinolone acetonide, lot number [id number], expiration date September 2020    Again, thank you for allowing me to participate in the care of your patient.      Sincerely,    Carolann Rutledge MD

## 2019-04-19 NOTE — PROGRESS NOTES
[facility]  Neurology Procedure  04/19/19     Cornelia Rodriguez MRN# [id number]   YOB: 1975 Age: 43 year old            Occipital Nerve Block Procedure Note   DIAGNOSIS  Encounter Diagnosis   Name Primary?     Occipital neuralgia of right side Yes      PROCEDURE   Right, Greater and Lesser occipital nerve blocks.      INFORMED CONSENT  I discussed the risks, benefits, alternatives, and the necessity of other members of the healthcare team participating in the procedure with the patient.  The patient understood and wished to proceed with the procedure.    PROCEDURAL PAUSE   Patient identity, procedure to be performed, correct side and site, patient position, availability of equipment, and special requirements were verified.      PROCEDURE DETAILS   Ms. Rodriguez was placed in the sitting position with her head and neck flexed.  Landmarks were palpated.  The area was prepped with ChloraPrep and sterile technique was used.  A 1.50 inch 25 gauge sterile needle was introduced 1/3 of the distance lateral from the occipital protuberance to the mastoid process on the right side.  After negative aspiration for blood, a solution containing 20 mg of triamcinolone acetonide diluted in a 1:2 mixture of 1% lidocaine and 0.5% bupivacaine for a total volume of 6 mL was injected in a fan-like fashion along the nuchal ridge between the occipital protuberance and mastoid process.     Ms. Rodriguez tolerated the procedure well, and there were no apparent complications.  After appropriate observation, she was dismissed in good condition under her own power.    COMMENTS  Ms. Rodriguez received a total of 20 mg of triamcinolone acetonide.  Her pain was 8/10 in severity prior to the procedure, and 2/10 following the procedure.    Lidocaine 1%, lot number [id number], expiration date June 2022  Bupivacaine 0.5%, lot number [id number], expiration date September 2021  Triamcinolone acetonide, lot  number [id number], expiration date September 2020

## 2019-04-23 ENCOUNTER — DOCUMENTATION ONLY (OUTPATIENT)
Dept: FAMILY MEDICINE | Facility: CLINIC | Age: 44
End: 2019-04-23

## 2019-04-23 ENCOUNTER — THERAPY VISIT (OUTPATIENT)
Dept: PHYSICAL THERAPY | Facility: CLINIC | Age: 44
End: 2019-04-23
Payer: COMMERCIAL

## 2019-04-23 DIAGNOSIS — M54.2 CERVICAL PAIN: ICD-10-CM

## 2019-04-23 LAB
FERRITIN SERPL-MCNC: 124 NG/ML (ref 8–252)
GGT SERPL-CCNC: 102 U/L (ref 0–40)
IRON SATN MFR SERPL: 20 % (ref 15–46)
IRON SERPL-MCNC: 70 UG/DL (ref 35–180)
TIBC SERPL-MCNC: 355 UG/DL (ref 240–430)

## 2019-04-23 PROCEDURE — 97112 NEUROMUSCULAR REEDUCATION: CPT | Mod: GP | Performed by: PHYSICAL THERAPIST

## 2019-04-23 PROCEDURE — 97110 THERAPEUTIC EXERCISES: CPT | Mod: GP | Performed by: PHYSICAL THERAPIST

## 2019-04-23 NOTE — PROGRESS NOTES
Priority list would be:  Ferritin  tibc  Hep b surface ag  Hep c immanuel  GGT  TTG/IGA  KAYDEN    Thanks!

## 2019-04-23 NOTE — PROGRESS NOTES
"With regard to your add-on orders from 4/23/2019, we have enough sample to do some of the tests you ordered but we do not have specimen for the INR. She will have to be drawn for that as that requires a specific tube that is not normally drawn as a \"just in case\" tube. She can also be drawn for whatever tests do not get done today at that time.  I can do the Ferritin and the iron study for sure. I am wondering if you have a preference to what other tests are sent out today.     Lakisha Butcher MLT, (ASCP)  [facility]    "

## 2019-04-24 LAB
HBV SURFACE AG SERPL QL IA: NONREACTIVE
HCV AB SERPL QL IA: NONREACTIVE

## 2019-04-26 ENCOUNTER — OFFICE VISIT (OUTPATIENT)
Dept: FAMILY MEDICINE | Facility: CLINIC | Age: 44
End: 2019-04-26
Payer: COMMERCIAL

## 2019-04-26 VITALS
TEMPERATURE: 97.9 F | BODY MASS INDEX: 46.61 KG/M2 | OXYGEN SATURATION: 98 % | RESPIRATION RATE: 18 BRPM | HEIGHT: 64 IN | HEART RATE: 95 BPM | SYSTOLIC BLOOD PRESSURE: 124 MMHG | WEIGHT: 273 LBS | DIASTOLIC BLOOD PRESSURE: 84 MMHG

## 2019-04-26 DIAGNOSIS — M54.81 OCCIPITAL NEURALGIA OF RIGHT SIDE: Primary | ICD-10-CM

## 2019-04-26 PROCEDURE — 99213 OFFICE O/P EST LOW 20 MIN: CPT | Performed by: NURSE PRACTITIONER

## 2019-04-26 ASSESSMENT — MIFFLIN-ST. JEOR: SCORE: 1868.32

## 2019-04-26 ASSESSMENT — PAIN SCALES - GENERAL: PAINLEVEL: NO PAIN (0)

## 2019-04-26 NOTE — LETTER
April 26, 2019      Cornelia Rodriguez  [address]  [address]  [place] [address]        To Whom It May Concern:    Cornelia Rodriguez was seen in our clinic today 4/26/19. She may return to work without restrictions or limitations as of 4/29/19.       Sincerely,      IRMA Latham, NP-C  [facility]

## 2019-04-26 NOTE — PROGRESS NOTES
SUBJECTIVE:   Cornelia Rodriguez is a 43 year old female who presents to clinic today for the following   health issues:      Need a work letter stating  no restrictions or limitations  On disability since Feb 17th.   Had nerve block on 4/19/19: feeling much improved. Wakes up a little stiff, then stretches and improves.   Works for [employer], in [employer] . 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     Ideally weeks to months the shot will last. She may do another shot if it is needed.  Still taking duloxetine 30 mg BID-has other joint pains it is helping so she feels significantly better since the nerve block.        Additional history: as documented    Reviewed  and updated as needed this visit by clinical staff  Tobacco  Allergies  Meds  Problems  Med Hx  Surg Hx  Fam Hx         Reviewed and updated as needed this visit by Provider  Tobacco  Allergies  Meds  Problems  Med Hx  Surg Hx  Fam Hx         Patient Active Problem List   Diagnosis     Acute recurrent maxillary sinusitis     Right chronic serous otitis media     Morbid obesity (H)     Migraine without aura and without status migrainosus, not intractable     Gastroesophageal reflux disease without esophagitis     Type 2 diabetes mellitus with hyperglycemia (H)     Allergic rhinitis, unspecified seasonality, unspecified trigger     Hyperlipidemia LDL goal <100     Cervical pain     Allergic contact dermatitis due to adhesives     Back muscle spasm     History of Helicobacter pylori infection     Migraine     Seborrheic dermatitis of scalp     Type 2 diabetes mellitus (H)     Perennial allergic rhinitis     Hyperlipidemia     Abdominal pain     Low back pain     Occipital neuralgia of right side     Past Surgical History:   Procedure Laterality Date     CHOLECYSTECTOMY         Social History     Tobacco Use     Smoking status: Former Smoker     Smokeless tobacco: Never Used     Tobacco comment: quit 3/2013   Substance Use Topics     Alcohol  use: Yes     Comment: Occasionally     Family History   Problem Relation Age of Onset     Diabetes Maternal Grandmother      Diabetes Sister      Hypertension Sister          Current Outpatient Medications   Medication Sig Dispense Refill     acetaminophen (TYLENOL) 325 MG tablet        albuterol (PROVENTIL HFA) 108 (90 Base) MCG/ACT inhaler Inhale 2 puffs into the lungs       calcium ascorbate 500 MG TABS Take 500 mg by mouth daily       carBAMazepine (CARBATROL) 200 MG 12 hr capsule Take 1 capsule (200 mg) by mouth See Admin Instructions One po bid for one week, then one po tid. 90 capsule 1     cetirizine (ZYRTEC) 10 MG tablet Take 10 mg by mouth daily       dulaglutide (TRULICITY) 1.5 MG/0.5ML pen Inject 1.5 mg Subcutaneous       DULoxetine (CYMBALTA) 30 MG capsule Take 1 capsule (30 mg) by mouth 2 times daily 60 capsule 3     empagliflozin (JARDIANCE) 10 MG TABS tablet Take 1 tablet (10 mg) by mouth daily 30 tablet 2     glipiZIDE (GLUCOTROL XL) 10 MG 24 hr tablet Take 20 mg by mouth daily       insulin glargine (LANTUS SOLOSTAR PEN) 100 UNIT/ML pen Inject 40 Units Subcutaneous 2 times daily 90 mL 3     insulin pen needle (32G X 4 MM) 32G X 4 MM miscellaneous Use 2 pen needles daily or as directed. 180 each 1     ketoconazole (NIZORAL) 2 % external shampoo        methocarbamol (ROBAXIN) 750 MG tablet Take 750 mg by mouth as needed       montelukast (SINGULAIR) 10 MG tablet Take 10 mg by mouth At Bedtime       Multiple Vitamins-Minerals (HM HAIR/SKIN/NAILS) TABS Take 1 tablet by mouth       multivitamin w/minerals (MULTI-VITAMIN) tablet Take 1 tablet by mouth       ranitidine (ZANTAC) 300 MG tablet Take 300 mg by mouth daily  3     rosuvastatin (CRESTOR) 10 MG tablet Take 10 mg by mouth daily       sucralfate (CARAFATE) 1 GM tablet Take 1 tablet by mouth 4 times daily  5     SUMAtriptan (IMITREX) 100 MG tablet Take 100 mg by mouth as needed       Allergies   Allergen Reactions     Adhesive Tape Dermatitis      "Benadryl [Diphenhydramine]      Codeine      Gabapentin      Ibuprofen      Penicillins      Ciprofloxacin Diarrhea, Nausea and Nausea and Vomiting     Cyclobenzaprine Nausea and Vomiting     Feels shakey       Ferrous Gluconate Rash     Nortriptyline Hives and Rash     Sumatriptan Nausea and Nausea and Vomiting       ROS:  CV, Res, MS    OBJECTIVE:     /84 (BP Location: Right arm, Patient Position: Sitting, Cuff Size: Adult Regular)   Pulse 95   Temp 97.9  F (36.6  C) (Oral)   Resp 18   Ht 1.626 m (5' 4\")   Wt 123.8 kg (273 lb)   LMP 04/02/2019 (Approximate)   SpO2 98%   BMI 46.86 kg/m    Body mass index is 46.86 kg/m .  GENERAL: healthy, alert and no distress  RESP: lungs clear to auscultation - no rales, rhonchi or wheezes  CV: regular rate and rhythm, normal S1 S2, no S3 or S4, no murmur, click or rub  MS: no gross musculoskeletal defects noted, movement a little stiff    Diagnostic Test Results:  No results found for this or any previous visit (from the past 24 hour(s)).    ASSESSMENT/PLAN:         1. Occipital neuralgia of right side  Pain much improved since nerve block.  Letter written to go back to work with no restrictions      FUTURE APPOINTMENTS:       - Follow-up visit in 1 month as needed    IRMA Latham, NP-C  [facility]    This chart was documented by provider using a voice activated software called Dragon in addition to manual typing. There may be vocabulary errors or other grammatical errors due to this.       "

## 2019-04-29 PROBLEM — M54.81 OCCIPITAL NEURALGIA OF RIGHT SIDE: Status: ACTIVE | Noted: 2019-04-29

## 2019-04-30 DIAGNOSIS — E11.65 TYPE 2 DIABETES MELLITUS WITH HYPERGLYCEMIA, WITH LONG-TERM CURRENT USE OF INSULIN (H): Primary | ICD-10-CM

## 2019-04-30 DIAGNOSIS — Z79.4 TYPE 2 DIABETES MELLITUS WITH HYPERGLYCEMIA, WITH LONG-TERM CURRENT USE OF INSULIN (H): Primary | ICD-10-CM

## 2019-04-30 NOTE — TELEPHONE ENCOUNTER
"Requested Prescriptions   Pending Prescriptions Disp Refills     glipiZIDE (GLUCOTROL XL) 10 MG 24 hr tablet       Sig: Take 2 tablets (20 mg) by mouth daily       Sulfonylurea Agents Passed - 4/30/2019  8:31 AM        Passed - Blood pressure less than 140/90 in past 6 months     BP Readings from Last 3 Encounters:   04/26/19 124/84   04/19/19 132/80   04/08/19 (!) 145/96                 Passed - Patient has documented LDL within the past 12 mos.     Recent Labs   Lab Test 04/19/19  0757   LDL 65             Passed - Patient has had a Microalbumin in the past 12 mos.     Recent Labs   Lab Test 03/27/19  1331   MICROL 14   UMALCR 13.57             Passed - Patient has documented A1c within the specified period of time.     If HgbA1C is 8 or greater, it needs to be on file within the past 3 months.  If less than 8, must be on file within the past 6 months.     Recent Labs   Lab Test 03/27/19  1325   A1C 10.8*             Passed - Medication is active on med list        Passed - Patient is age 18 or older        Passed - No active pregnancy on record        Passed - Patient has a recent creatinine (normal) within the past 12 mos.     Recent Labs   Lab Test 04/19/19  0757  03/01/19  0916   CR 0.62   < >  --    CREAT  --   --  0.5*    < > = values in this interval not displayed.             Passed - Patient has not had a positive pregnancy test within the past 12 mos.        Passed - Recent (6 mo) or future (30 days) visit within the authorizing provider's specialty     Patient had office visit in the last 6 months or has a visit in the next 30 days with authorizing provider or within the authorizing provider's specialty.  See \"Patient Info\" tab in inbasket, or \"Choose Columns\" in Meds & Orders section of the refill encounter.              glipiZIDE (GLUCOTROL XL) 10 MG 24 hr tablet      Last Written Prescription Date:  5/7/17  Last Fill Quantity: na,   # refills: na  Last Office Visit: 4/26/19  Future Office visit:   "     Routing refill request to provider for review/approval because:  Medication is reported/historical

## 2019-05-01 RX ORDER — GLIPIZIDE 10 MG/1
20 TABLET, FILM COATED, EXTENDED RELEASE ORAL DAILY
Qty: 180 TABLET | Refills: 1 | Status: SHIPPED | OUTPATIENT
Start: 2019-05-01 | End: 2019-12-04

## 2019-05-01 NOTE — TELEPHONE ENCOUNTER
Routing refill request to provider for review/approval because:  Medication is reported/historical    Jessica Valdez RN

## 2019-05-03 ENCOUNTER — THERAPY VISIT (OUTPATIENT)
Dept: PHYSICAL THERAPY | Facility: CLINIC | Age: 44
End: 2019-05-03
Payer: COMMERCIAL

## 2019-05-03 DIAGNOSIS — M54.2 CERVICAL PAIN: ICD-10-CM

## 2019-05-03 PROCEDURE — 97110 THERAPEUTIC EXERCISES: CPT | Mod: GP | Performed by: PHYSICAL THERAPIST

## 2019-05-03 PROCEDURE — 97112 NEUROMUSCULAR REEDUCATION: CPT | Mod: GP | Performed by: PHYSICAL THERAPIST

## 2019-05-03 NOTE — PROGRESS NOTES
Subjective:  HPI                    Objective:  System    Physical Exam    General     ROS    Assessment/Plan:    PROGRESS  REPORT    Progress reporting period is from 4/16/19 to 5/3/19. Progress report on 5/3/19 equal to discharge summary.     SUBJECTIVE  Subjective: Cornelia has returned to work this week. Noticing overall body/muscle soreness since starting but no complaints of upper cervical pain. Has been consistent with HEP and will continue independently at this time, reporting back is symptoms return.   Current Pain level: 0/10   Initial Pain level: 8/10   Changes in function: Yes, see goal flow sheet for change in function   Adverse reactions: None;   ,         OBJECTIVE  Objective: Cervical AROM: extension 75% -pain, flexion WNL -pain, L rotation 80 deg R rotation 75 deg, bilateral SB 40 deg; bilateral shoulder abduction, flexion, IR, ER WNL and painfree      ASSESSMENT/PLAN  Updated problem list and treatment plan: Diagnosis 1:  Cervical pain, occipital neuralgia  Pain -  hot/cold therapy, manual therapy, self management, education and home program  Decreased ROM/flexibility - manual therapy, therapeutic exercise, therapeutic activity and home program  Decreased strength - therapeutic exercise, therapeutic activities and home program  Impaired muscle performance - neuro re-education and home program  Decreased function - therapeutic activities and home program  Impaired posture - neuro re-education, therapeutic activities and home program  STG/LTGs have been met or progress has been made towards goals:  Yes (See Goal flow sheet completed today.)  Assessment of Progress: The patient has met all of their long term goals.  Self Management Plans:  Patient has been instructed in a home treatment program.  Patient is independent in a home treatment program.  Patient  has been instructed in self management of symptoms.  Patient is independent in self management of symptoms.  I have re-evaluated this patient and find  that the nature, scope, duration and intensity of the therapy is appropriate for the medical condition of the patient.  Cornelia continues to require the following intervention to meet STG and LTG's: PT intervention is no longer required to meet STG/LTG.  We will discharge this patient from PT.    Recommendations:  This patient is ready to be discharged from therapy and continue their home treatment program.    Please refer to the daily flowsheet for treatment today, total treatment time and time spent performing 1:1 timed codes.

## 2019-05-07 DIAGNOSIS — Z79.4 TYPE 2 DIABETES MELLITUS WITH HYPERGLYCEMIA, WITH LONG-TERM CURRENT USE OF INSULIN (H): Primary | ICD-10-CM

## 2019-05-07 DIAGNOSIS — E11.65 TYPE 2 DIABETES MELLITUS WITH HYPERGLYCEMIA, WITH LONG-TERM CURRENT USE OF INSULIN (H): Primary | ICD-10-CM

## 2019-05-07 NOTE — TELEPHONE ENCOUNTER
"Requested Prescriptions   Pending Prescriptions Disp Refills     dulaglutide (TRULICITY) 1.5 MG/0.5ML pen  Last Written Prescription Date:  12/21/16  Last Fill Quantity: n/a,  # refills: n/a   Last office visit: 4/26/2019 with Abida Mckee NP  Future Office Visit:      Routing refill request to provider for review/approval because:  Drug not active on patient's medication list  Medication is reported/historical           Sig: Inject 1.5 mg Subcutaneous       GLP-1 Agonists Protocol Passed - 5/7/2019  9:10 AM        Passed - Blood pressure less than 140/90 in past 6 months     BP Readings from Last 3 Encounters:   04/26/19 124/84   04/19/19 132/80   04/08/19 (!) 145/96                 Passed - LDL on file in past 12 months     Recent Labs   Lab Test 04/19/19  0757   LDL 65             Passed - Microalbumin on file in past 12 months     Recent Labs   Lab Test 03/27/19  1331   MICROL 14   UMALCR 13.57             Passed - HgbA1C in past 3 or 6 months     If HgbA1C is 8 or greater, it needs to be on file within the past 3 months.  If less than 8, must be on file within the past 6 months.     Recent Labs   Lab Test 03/27/19  1325   A1C 10.8*             Passed - Medication is active on med list        Passed - Patient is age 18 or older        Passed - No active pregnancy on record        Passed - Normal serum creatinine on file in past 12 months     Recent Labs   Lab Test 04/19/19  0757  03/01/19  0916   CR 0.62   < >  --    CREAT  --   --  0.5*    < > = values in this interval not displayed.             Passed - No positive pregnancy test in past 12 months        Passed - Recent (6 mo) or future (30 days) visit within the authorizing provider's specialty     Patient had office visit in the last 6 months or has a visit in the next 30 days with authorizing provider.  See \"Patient Info\" tab in inbasket, or \"Choose Columns\" in Meds & Orders section of the refill encounter.              "

## 2019-05-09 NOTE — TELEPHONE ENCOUNTER
Routing refill request to provider for review/approval because:  Medication is reported/historical    Jessica Valdez RN, BSN

## 2019-05-17 ENCOUNTER — ANCILLARY PROCEDURE (OUTPATIENT)
Dept: ULTRASOUND IMAGING | Facility: CLINIC | Age: 44
End: 2019-05-17
Attending: FAMILY MEDICINE
Payer: COMMERCIAL

## 2019-05-17 DIAGNOSIS — R74.8 ELEVATED LIVER ENZYMES: ICD-10-CM

## 2019-05-17 PROCEDURE — 76705 ECHO EXAM OF ABDOMEN: CPT | Performed by: RADIOLOGY

## 2019-05-28 DIAGNOSIS — E11.65 TYPE 2 DIABETES MELLITUS WITH HYPERGLYCEMIA, WITH LONG-TERM CURRENT USE OF INSULIN (H): ICD-10-CM

## 2019-05-28 DIAGNOSIS — Z79.4 TYPE 2 DIABETES MELLITUS WITH HYPERGLYCEMIA, WITH LONG-TERM CURRENT USE OF INSULIN (H): ICD-10-CM

## 2019-05-28 NOTE — TELEPHONE ENCOUNTER
"Requested Prescriptions   Pending Prescriptions Disp Refills     insulin glargine (LANTUS SOLOSTAR PEN) 100 UNIT/ML pen  Last Written Prescription Date:  3/27/19  Last Fill Quantity: 90 mL,  # refills: 3   Last office visit: 4/26/2019 with prescribing provider:  Dr. Lombardo   Future Office Visit:   Next 5 appointments (look out 90 days)    Jun 20, 2019  7:00 AM CDT  Candi Guzman with Sarah Lombardo MD  [facility] ([address] [address] [phone]          90 mL 3     Sig: Inject 40 Units Subcutaneous 2 times daily       Long Acting Insulin Protocol Passed - 5/28/2019  9:34 AM        Passed - Blood pressure less than 140/90 in past 6 months     BP Readings from Last 3 Encounters:   04/26/19 124/84   04/19/19 132/80   04/08/19 (!) 145/96                 Passed - LDL on file in past 12 months     Recent Labs   Lab Test 04/19/19  0757   LDL 65             Passed - Microalbumin on file in past 12 months     Recent Labs   Lab Test 03/27/19  1331   MICROL 14   UMALCR 13.57             Passed - Serum creatinine on file in past 12 months     Recent Labs   Lab Test 04/19/19  0757  03/01/19  0916   CR 0.62   < >  --    CREAT  --   --  0.5*    < > = values in this interval not displayed.             Passed - HgbA1C in past 3 or 6 months     If HgbA1C is 8 or greater, it needs to be on file within the past 3 months.  If less than 8, must be on file within the past 6 months.     Recent Labs   Lab Test 03/27/19  1325   A1C 10.8*             Passed - Medication is active on med list        Passed - Patient is age 18 or older        Passed - Recent (6 mo) or future (30 days) visit within the authorizing provider's specialty     Patient had office visit in the last 6 months or has a visit in the next 30 days with authorizing provider or within the authorizing provider's specialty.  See \"Patient Info\" tab in inbasket, or \"Choose Columns\" " in Meds & Orders section of the refill encounter.

## 2019-05-29 NOTE — TELEPHONE ENCOUNTER
Called and verified that the patient still has medication available.    Jeanie Castillo RN, [facility] Triage

## 2019-06-06 ENCOUNTER — TELEPHONE (OUTPATIENT)
Dept: FAMILY MEDICINE | Facility: CLINIC | Age: 44
End: 2019-06-06

## 2019-06-06 DIAGNOSIS — E78.5 HYPERLIPIDEMIA LDL GOAL <100: Primary | ICD-10-CM

## 2019-06-06 DIAGNOSIS — J30.9 ALLERGIC RHINITIS, UNSPECIFIED SEASONALITY, UNSPECIFIED TRIGGER: ICD-10-CM

## 2019-06-06 NOTE — TELEPHONE ENCOUNTER
"Requested Prescriptions   Pending Prescriptions Disp Refills     rosuvastatin (CRESTOR) 10 MG tablet       Sig: Take 1 tablet (10 mg) by mouth daily       Statins Protocol Passed - 6/6/2019  8:09 AM        Passed - LDL on file in past 12 months     Recent Labs   Lab Test 04/19/19  0757   LDL 65             Passed - No abnormal creatine kinase in past 12 months     No lab results found.             Passed - Recent (12 mo) or future (30 days) visit within the authorizing provider's specialty     Patient had office visit in the last 12 months or has a visit in the next 30 days with authorizing provider or within the authorizing provider's specialty.  See \"Patient Info\" tab in inbasket, or \"Choose Columns\" in Meds & Orders section of the refill encounter.              Passed - Medication is active on med list        Passed - Patient is age 18 or older        Passed - No active pregnancy on record        Passed - No positive pregnancy test in past 12 months        montelukast (SINGULAIR) 10 MG tablet       Sig: Take 1 tablet (10 mg) by mouth At Bedtime       Leukotriene Inhibitors Protocol Passed - 6/6/2019  8:09 AM        Passed - Patient is age 12 or older     If patient is under 16, ok to refill using age based dosing.           Passed - Recent (12 mo) or future (30 days) visit within the authorizing provider's specialty     Patient had office visit in the last 12 months or has a visit in the next 30 days with authorizing provider or within the authorizing provider's specialty.  See \"Patient Info\" tab in inbasket, or \"Choose Columns\" in Meds & Orders section of the refill encounter.              Passed - Medication is active on med list          rosuvastatin (CRESTOR) 10 MG tablet      Last Written Prescription Date:  11/12/18  Last Fill Quantity: 90,   # refills: 0  Last Office Visit: 4/26/19  Future Office visit:    Next 5 appointments (look out 90 days)    Jun 20, 2019  7:00 AM KYET  Candi Guzman with Sarah Soares " MD [place]  [facility] [address] [facility] [address]  [phone]           Routing refill request to provider for review/approval because:  Medication is reported/historical    montelukast (SINGULAIR) 10 MG tablet      Last Written Prescription Date:  11/12/18  Last Fill Quantity: 90,   # refills: 0  Last Office Visit: 4/26/19  Future Office visit:    Next 5 appointments (look out 90 days)    Jun 20, 2019  7:00 AM CDT  Candi Guzman with Sarah Lombardo MD  [facility] [address] [facility] [address]  [phone]           Routing refill request to provider for review/approval because:  Medication is reported/historical

## 2019-06-10 NOTE — TELEPHONE ENCOUNTER
.Reason for Call:   checking on her refill requests    Detailed comments: originally called the pharmacy on 05-, patient is now all out    [facility] [phone]    Phone Number Patient can be reached at: Home number on file [phone] (home)    Best Time: anytime    Can we leave a detailed message on this number? YES    Call taken on 6/10/2019 at 3:00 PM by Anika Thrasher

## 2019-06-11 ENCOUNTER — OFFICE VISIT (OUTPATIENT)
Dept: PODIATRY | Facility: CLINIC | Age: 44
End: 2019-06-11
Payer: COMMERCIAL

## 2019-06-11 VITALS — OXYGEN SATURATION: 98 % | SYSTOLIC BLOOD PRESSURE: 118 MMHG | DIASTOLIC BLOOD PRESSURE: 84 MMHG | HEART RATE: 90 BPM

## 2019-06-11 DIAGNOSIS — B35.3 TINEA PEDIS OF BOTH FEET: ICD-10-CM

## 2019-06-11 DIAGNOSIS — L03.032 ONYCHIA OF TOE OF LEFT FOOT: Primary | ICD-10-CM

## 2019-06-11 PROCEDURE — 99202 OFFICE O/P NEW SF 15 MIN: CPT | Performed by: PODIATRIST

## 2019-06-11 RX ORDER — ECONAZOLE NITRATE 10 MG/G
CREAM TOPICAL DAILY
Qty: 85 G | Refills: 5 | Status: SHIPPED | OUTPATIENT
Start: 2019-06-11 | End: 2020-06-04

## 2019-06-11 RX ORDER — MONTELUKAST SODIUM 10 MG/1
10 TABLET ORAL AT BEDTIME
Qty: 90 TABLET | Refills: 3 | Status: SHIPPED | OUTPATIENT
Start: 2019-06-11 | End: 2020-08-14

## 2019-06-11 RX ORDER — ROSUVASTATIN CALCIUM 10 MG/1
10 TABLET, COATED ORAL DAILY
Qty: 90 TABLET | Refills: 3 | Status: SHIPPED | OUTPATIENT
Start: 2019-06-11 | End: 2020-06-06

## 2019-06-11 NOTE — PATIENT INSTRUCTIONS
Thanks for coming today.  [facility]  [address] [place] [address]    To schedule future appointments in Ortho Clinic, you may call [phone].    To schedule ordered imaging by your Provider: Call [facility] at [phone]    [facility] available online at:   [email or web address]    Please call if any further questions or concerns [phone] and ask for the Orthopedic Department. Clinic hours 8 am to 5 pm.    Return to clinic if symptoms worsen.

## 2019-06-11 NOTE — LETTER
6/11/2019         RE: Cornelia Rodriguez  [address]  [address]  [place] [address]        Dear Colleague,    Thank you for referring your patient, Cornelia Rodriguez, to the [facility]. Please see a copy of my visit note below.    Past Medical History:   Diagnosis Date     Diabetes (H)      Patient Active Problem List   Diagnosis     Acute recurrent maxillary sinusitis     Right chronic serous otitis media     Morbid obesity (H)     Migraine without aura and without status migrainosus, not intractable     Gastroesophageal reflux disease without esophagitis     Type 2 diabetes mellitus with hyperglycemia (H)     Allergic rhinitis, unspecified seasonality, unspecified trigger     Hyperlipidemia LDL goal <100     Cervical pain     Allergic contact dermatitis due to adhesives     Back muscle spasm     History of Helicobacter pylori infection     Migraine     Seborrheic dermatitis of scalp     Type 2 diabetes mellitus (H)     Perennial allergic rhinitis     Hyperlipidemia     Abdominal pain     Low back pain     Occipital neuralgia of right side     Past Surgical History:   Procedure Laterality Date     CHOLECYSTECTOMY       Social History     Socioeconomic History     Marital status: Single     Spouse name: Not on file     Number of children: Not on file     Years of education: Not on file     Highest education level: Not on file   Occupational History     Not on file   Social Needs     Financial resource strain: Not on file     Food insecurity:     Worry: Not on file     Inability: Not on file     Transportation needs:     Medical: Not on file     Non-medical: Not on file   Tobacco Use     Smoking status: Former Smoker     Smokeless tobacco: Never Used     Tobacco comment: quit 3/2013   Substance and Sexual Activity     Alcohol use: Yes     Comment: Occasionally     Drug use: No     Sexual activity: Never   Lifestyle     Physical activity:     Days per week: Not on file     Minutes per session: Not  on file     Stress: Not on file   Relationships     Social connections:     Talks on phone: Not on file     Gets together: Not on file     Attends Yazidism service: Not on file     Active member of club or organization: Not on file     Attends meetings of clubs or organizations: Not on file     Relationship status: Not on file     Intimate partner violence:     Fear of current or ex partner: Not on file     Emotionally abused: Not on file     Physically abused: Not on file     Forced sexual activity: Not on file   Other Topics Concern     Not on file   Social History Narrative     Not on file     Family History   Problem Relation Age of Onset     Diabetes Maternal Grandmother      Diabetes Sister      Hypertension Sister      Lab Results   Component Value Date    A1C 10.8 03/27/2019     SUBJECTIVE FINDINGS:  A 43-year-old female presents for left hallux nail border.  She relates it was partially removed for an infection in December.  They removed it permanently, and it has kind of grown back and is catching, and she is scared to trim it.  She relates she wears steel-toed boots.  It catches and swells up and hurts during the day.  She relates no injury, no specific treatment.       OBJECTIVE FINDINGS:  DP and PT are 2/4 left.  She has left lateral hallux nail border that is cracked with some discoloration and subungual debris.  She has a very mild discoloration of her lesser toes bilaterally distally.  She has some scaly dry skin across the toes bilaterally.      ASSESSMENT AND PLAN:  Onychodystrophy, left hallux.  She is status post nail procedure.  She is either getting some early fungal changes versus just nail changes from the nail growing out.  She does have subungual debris upon debridement.  Left hallux nail debrided upon consent.  Diagnosis and treatment options discussed with the patient.  Prescription for econazole cream given and use discussed with her.  She also has tinea pedis changes.  She will return  to clinic and see me in 1 month.         Again, thank you for allowing me to participate in the care of your patient.        Sincerely,        Mariusz Phelps DPM

## 2019-06-11 NOTE — PROGRESS NOTES
Past Medical History:   Diagnosis Date     Diabetes (H)      Patient Active Problem List   Diagnosis     Acute recurrent maxillary sinusitis     Right chronic serous otitis media     Morbid obesity (H)     Migraine without aura and without status migrainosus, not intractable     Gastroesophageal reflux disease without esophagitis     Type 2 diabetes mellitus with hyperglycemia (H)     Allergic rhinitis, unspecified seasonality, unspecified trigger     Hyperlipidemia LDL goal <100     Cervical pain     Allergic contact dermatitis due to adhesives     Back muscle spasm     History of Helicobacter pylori infection     Migraine     Seborrheic dermatitis of scalp     Type 2 diabetes mellitus (H)     Perennial allergic rhinitis     Hyperlipidemia     Abdominal pain     Low back pain     Occipital neuralgia of right side     Past Surgical History:   Procedure Laterality Date     CHOLECYSTECTOMY       Social History     Socioeconomic History     Marital status: Single     Spouse name: Not on file     Number of children: Not on file     Years of education: Not on file     Highest education level: Not on file   Occupational History     Not on file   Social Needs     Financial resource strain: Not on file     Food insecurity:     Worry: Not on file     Inability: Not on file     Transportation needs:     Medical: Not on file     Non-medical: Not on file   Tobacco Use     Smoking status: Former Smoker     Smokeless tobacco: Never Used     Tobacco comment: quit 3/2013   Substance and Sexual Activity     Alcohol use: Yes     Comment: Occasionally     Drug use: No     Sexual activity: Never   Lifestyle     Physical activity:     Days per week: Not on file     Minutes per session: Not on file     Stress: Not on file   Relationships     Social connections:     Talks on phone: Not on file     Gets together: Not on file     Attends Judaism service: Not on file     Active member of club or organization: Not on file     Attends  meetings of clubs or organizations: Not on file     Relationship status: Not on file     Intimate partner violence:     Fear of current or ex partner: Not on file     Emotionally abused: Not on file     Physically abused: Not on file     Forced sexual activity: Not on file   Other Topics Concern     Not on file   Social History Narrative     Not on file     Family History   Problem Relation Age of Onset     Diabetes Maternal Grandmother      Diabetes Sister      Hypertension Sister      Lab Results   Component Value Date    A1C 10.8 03/27/2019     SUBJECTIVE FINDINGS:  A 43-year-old female presents for left hallux nail border.  She relates it was partially removed for an infection in December.  They removed it permanently, and it has kind of grown back and is catching, and she is scared to trim it.  She relates she wears steel-toed boots.  It catches and swells up and hurts during the day.  She relates no injury, no specific treatment.       OBJECTIVE FINDINGS:  DP and PT are 2/4 left.  She has left lateral hallux nail border that is cracked with some discoloration and subungual debris.  She has a very mild discoloration of her lesser toes bilaterally distally.  She has some scaly dry skin across the toes bilaterally.      ASSESSMENT AND PLAN:  Onychodystrophy, left hallux.  She is status post nail procedure.  She is either getting some early fungal changes versus just nail changes from the nail growing out.  She does have subungual debris upon debridement.  Left hallux nail debrided upon consent.  Diagnosis and treatment options discussed with the patient.  Prescription for econazole cream given and use discussed with her.  She also has tinea pedis changes.  She will return to clinic and see me in 1 month.

## 2019-06-11 NOTE — TELEPHONE ENCOUNTER
Routing refill request to provider for review/approval because:  Patient reported medications and not associated.    Jeanie Castillo RN, [facility]

## 2019-06-12 PROBLEM — M54.2 CERVICAL PAIN: Status: RESOLVED | Noted: 2019-04-16 | Resolved: 2019-06-12

## 2019-06-20 ENCOUNTER — OFFICE VISIT (OUTPATIENT)
Dept: FAMILY MEDICINE | Facility: CLINIC | Age: 44
End: 2019-06-20
Payer: COMMERCIAL

## 2019-06-20 VITALS
BODY MASS INDEX: 46.95 KG/M2 | WEIGHT: 275 LBS | HEIGHT: 64 IN | TEMPERATURE: 97.9 F | HEART RATE: 83 BPM | OXYGEN SATURATION: 99 % | DIASTOLIC BLOOD PRESSURE: 82 MMHG | SYSTOLIC BLOOD PRESSURE: 126 MMHG | RESPIRATION RATE: 18 BRPM

## 2019-06-20 DIAGNOSIS — L29.2 VULVAR ITCHING: ICD-10-CM

## 2019-06-20 DIAGNOSIS — M54.81 OCCIPITAL NEURALGIA OF RIGHT SIDE: ICD-10-CM

## 2019-06-20 DIAGNOSIS — K76.0 FATTY LIVER: ICD-10-CM

## 2019-06-20 DIAGNOSIS — E11.65 TYPE 2 DIABETES MELLITUS WITH HYPERGLYCEMIA, WITH LONG-TERM CURRENT USE OF INSULIN (H): Primary | ICD-10-CM

## 2019-06-20 DIAGNOSIS — Z79.4 TYPE 2 DIABETES MELLITUS WITH HYPERGLYCEMIA, WITH LONG-TERM CURRENT USE OF INSULIN (H): Primary | ICD-10-CM

## 2019-06-20 DIAGNOSIS — R74.8 ELEVATED LIVER ENZYMES: ICD-10-CM

## 2019-06-20 LAB
ANION GAP SERPL CALCULATED.3IONS-SCNC: 5 MMOL/L (ref 3–14)
BUN SERPL-MCNC: 10 MG/DL (ref 7–30)
CALCIUM SERPL-MCNC: 9.3 MG/DL (ref 8.5–10.1)
CHLORIDE SERPL-SCNC: 106 MMOL/L (ref 94–109)
CO2 SERPL-SCNC: 30 MMOL/L (ref 20–32)
CREAT SERPL-MCNC: 0.59 MG/DL (ref 0.52–1.04)
GFR SERPL CREATININE-BSD FRML MDRD: >90 ML/MIN/{1.73_M2}
GLUCOSE SERPL-MCNC: 134 MG/DL (ref 70–99)
HBA1C MFR BLD: 7.9 % (ref 0–5.6)
POTASSIUM SERPL-SCNC: 4.1 MMOL/L (ref 3.4–5.3)
SODIUM SERPL-SCNC: 141 MMOL/L (ref 133–144)
SPECIMEN SOURCE: ABNORMAL
WET PREP SPEC: ABNORMAL

## 2019-06-20 PROCEDURE — 83036 HEMOGLOBIN GLYCOSYLATED A1C: CPT | Performed by: FAMILY MEDICINE

## 2019-06-20 PROCEDURE — 87210 SMEAR WET MOUNT SALINE/INK: CPT | Performed by: FAMILY MEDICINE

## 2019-06-20 PROCEDURE — 99214 OFFICE O/P EST MOD 30 MIN: CPT | Performed by: FAMILY MEDICINE

## 2019-06-20 PROCEDURE — 80048 BASIC METABOLIC PNL TOTAL CA: CPT | Performed by: FAMILY MEDICINE

## 2019-06-20 PROCEDURE — 36415 COLL VENOUS BLD VENIPUNCTURE: CPT | Performed by: FAMILY MEDICINE

## 2019-06-20 RX ORDER — FLUCONAZOLE 150 MG/1
150 TABLET ORAL ONCE
Qty: 2 TABLET | Refills: 0 | Status: SHIPPED | OUTPATIENT
Start: 2019-06-20 | End: 2019-07-15

## 2019-06-20 ASSESSMENT — MIFFLIN-ST. JEOR: SCORE: 1877.39

## 2019-06-20 NOTE — PROGRESS NOTES
Subjective     Cornelia Rodriguez is a 43 year old female who presents to clinic today for the following health issues:    HPI   Diabetes Follow-up      How often are you checking your blood sugar? Not at all - misplaced machine     What time of day are you checking your blood sugars (select all that apply)?  N/A    Have you had any blood sugars above 200?  N/A    Have you had any blood sugars below 70?  N/A    What symptoms do you notice when your blood sugar is low?  None    What concerns do you have today about your diabetes? Need a referral to see Diabetic educator      Do you have any of these symptoms? (Select all that apply)  No numbness or tingling in feet.  No redness, sores or blisters on feet.  No complaints of excessive thirst.  No reports of blurry vision.  No significant changes to weight.     Have you had a diabetic eye exam in the last 12 months? No    -She has not been able to get a appointment with diabetes education. Reports They called her three months ago but she was driving and didn't . When she tried to call them back multiple times she wasn't able to reach them even though she left messages for them   -She has been busy preparing for her son's graduation party and has family in town and with all of this going on she misplaced her glucometer   -She last checked her blood sugar about a month and a half ago. The last she remembers was 160 fasting sugar, which was an improvement from the 290's that she was having  -After her last visit split Lantus into twice daily and started Jardiance. She does not have any side effects from Jardiance except for possible labial itching, see below. Denies dysuria   -Has been hydrating well, drinks half a gallon daily of diet iced tea and half a gallon of water   -Her diet had been pretty good but within the last couple weeks she has had a lot of grad parties to go to which has made it harder to follow her diet     BP Readings from Last 2 Encounters:    06/20/19 126/82   06/11/19 118/84     Hemoglobin A1C (%)   Date Value   03/27/2019 10.8 (H)     LDL Cholesterol Calculated (mg/dL)   Date Value   04/19/2019 65       Diabetes Management Resources      Vaginal Symptoms - external itch   Onset: 4-5 weeks     Description:  Vaginal Discharge: none   Itching (Pruritis): YES  Burning sensation:  no   Odor: no     Accompanying Signs & Symptoms:  Pain with Urination: no   Abdominal Pain: no   Fever: no     History:   Sexually active: no   New Partner: no   Possibility of Pregnancy:  No    Precipitating factors:   Recent Antibiotic Use: no     Alleviating factors:  None     Therapies Tried and outcome: Monostat cream; nothing seems to help    -The pruritis improves when she has her menstrual period but then returns. LMP is current  -Her periods are very irregular. She goes multiple months without a period, and then has very light spotting periods. She has not had hot flashes though feels like she is always hot  -No exposure to STDs, patient has not been sexually active in 10 years     Neck and head pain:  -Since she had a nerve block two months ago she has not had any further pain. She will see neurology again next month   -Only taking duloxetine once daily because the second dose was making her vomit at work     Additional:  -She tripped at work and bruised her left knee. She is still able to move her knee and bear weight normally  -Reports her iron levels have varied in the past. When she was pregnant 18 years ago her iron levels were too high, but a few years ago she was told her hemoglobin was low and she might need an iron supplement. She was still having periods at that time      Patient Active Problem List   Diagnosis     Acute recurrent maxillary sinusitis     Right chronic serous otitis media     Morbid obesity (H)     Migraine without aura and without status migrainosus, not intractable     Gastroesophageal reflux disease without esophagitis     Type 2 diabetes  "mellitus with hyperglycemia (H)     Allergic rhinitis, unspecified seasonality, unspecified trigger     Hyperlipidemia LDL goal <100     Allergic contact dermatitis due to adhesives     Back muscle spasm     History of Helicobacter pylori infection     Migraine     Seborrheic dermatitis of scalp     Type 2 diabetes mellitus (H)     Perennial allergic rhinitis     Hyperlipidemia     Abdominal pain     Low back pain     Occipital neuralgia of right side     Past Surgical History:   Procedure Laterality Date     CHOLECYSTECTOMY         Social History     Tobacco Use     Smoking status: Former Smoker     Smokeless tobacco: Never Used     Tobacco comment: quit 3/2013   Substance Use Topics     Alcohol use: Yes     Comment: Occasionally     Family History   Problem Relation Age of Onset     Diabetes Maternal Grandmother      Diabetes Sister      Hypertension Sister              Reviewed and updated as needed this visit by Provider         Review of Systems   ROS COMP: Constitutional, HEENT, cardiovascular, pulmonary, gi and gu systems are negative, except as otherwise noted.    This document serves as a record of the services and decisions personally performed by ANGI BRAGA. It was created on his/her behalf by Sarthak Bautista, a trained medical scribe. The creation of this document is based on the provider's statements to the medical scribe. Sarthak Bautista, June 20, 2019 7:07 AM      Objective    /82 (BP Location: Right arm, Patient Position: Sitting, Cuff Size: Adult Large)   Pulse 83   Temp 97.9  F (36.6  C) (Oral)   Resp 18   Ht 1.626 m (5' 4\")   Wt 124.7 kg (275 lb)   SpO2 99%   BMI 47.20 kg/m    Body mass index is 47.2 kg/m .  Physical Exam   GENERAL: healthy, alert and no distress   (female): mild labial erythema. scant amount of dry whitish discharge present , normal urethral meatus, vaginal mucosa, internal exam not done as no internal symptoms   MS: no gross musculoskeletal defects " noted, no edema  PSYCH: mentation appears normal, affect normal/bright      5/17/19 US Abdomen:  IMPRESSION:   1.  Increased diffuse liver echogenicity, indicative of intrinsic  hepatic disease, most commonly fatty liver.      Component      Latest Ref Rng & Units 4/19/2019   WBC      4.0 - 11.0 10e9/L 4.9   RBC Count      3.8 - 5.2 10e12/L 5.73 (H)   Hemoglobin      11.7 - 15.7 g/dL 16.8 (H)   Hematocrit      35.0 - 47.0 % 49.9 (H)   MCV      78 - 100 fl 87   MCH      26.5 - 33.0 pg 29.3   MCHC      31.5 - 36.5 g/dL 33.7   RDW      10.0 - 15.0 % 13.4   Platelet Count      150 - 450 10e9/L 222   % Neutrophils      % 58.1   % Lymphocytes      % 30.8   % Monocytes      % 7.6   % Eosinophils      % 2.7   % Basophils      % 0.8   Absolute Neutrophil      1.6 - 8.3 10e9/L 2.9   Absolute Lymphocytes      0.8 - 5.3 10e9/L 1.5   Absolute Monocytes      0.0 - 1.3 10e9/L 0.4   Absolute Eosinophils      0.0 - 0.7 10e9/L 0.1   Absolute Basophils      0.0 - 0.2 10e9/L 0.0   Diff Method       Automated Method   Sodium      133 - 144 mmol/L 140   Potassium      3.4 - 5.3 mmol/L 4.0   Chloride      94 - 109 mmol/L 106   Carbon Dioxide      20 - 32 mmol/L 30   Anion Gap      3 - 14 mmol/L 4   Glucose      70 - 99 mg/dL 148 (H)   Urea Nitrogen      7 - 30 mg/dL 7   Creatinine      0.52 - 1.04 mg/dL 0.62   GFR Estimate      >60 mL/min/1.73:m2 >90   GFR Estimate If Black      >60 mL/min/1.73:m2 >90   Calcium      8.5 - 10.1 mg/dL 9.3   Bilirubin Direct      0.0 - 0.2 mg/dL 0.2   Bilirubin Total      0.2 - 1.3 mg/dL 0.5   Albumin      3.4 - 5.0 g/dL 4.2   Protein Total      6.8 - 8.8 g/dL 7.5   Alkaline Phosphatase      40 - 150 U/L 74   ALT      0 - 50 U/L 100 (H)   AST      0 - 45 U/L 58 (H)   Cholesterol      <200 mg/dL 128   Triglycerides      <150 mg/dL 108   HDL Cholesterol      >49 mg/dL 41 (L)   LDL Cholesterol Calculated      <100 mg/dL 65   Non HDL Cholesterol      <130 mg/dL 87   Iron      35 - 180 ug/dL 70   Iron Binding  "Cap      240 - 430 ug/dL 355   Iron Saturation Index      15 - 46 % 20   Hep B Surface Agn      NR:Nonreactive Nonreactive   Hepatitis C Antibody      NR:Nonreactive Nonreactive   Ferritin      8 - 252 ng/mL 124   GGT      0 - 40 U/L 102 (H)           Assessment & Plan     1. Type 2 diabetes mellitus with hyperglycemia, with long-term current use of insulin (H)  Suspect A1C will be above 7, increase Jardiance for better control. Check labs after this change. Patient will let me know if she is still unable to get in with diabetes education   - DIABETES EDUCATOR REFERRAL  - Hemoglobin A1c  - Basic metabolic panel  - blood glucose monitoring (NO BRAND SPECIFIED) meter device kit; Use to test blood sugar 2 times daily or as directed.  Dispense: 1 kit; Refill: 0  - empagliflozin (JARDIANCE) 25 MG TABS tablet; Take 1 tablet (25 mg) by mouth daily  Dispense: 30 tablet; Refill: 0  - **Basic metabolic panel FUTURE anytime; Future    2. Occipital neuralgia of right side  Improved. Followed by neurology     3. Elevated liver enzymes  4. Fatty liver  Patient educated on fatty liver disease   - GASTROENTEROLOGY ADULT REF CONSULT ONLY    5. Vulvar itching  - Wet prep  - fluconazole (DIFLUCAN) 150 MG tablet; Take 1 tablet (150 mg) by mouth once for 1 dose May repeat in 72 hours if symptoms are not yet resolved  Dispense: 2 tablet; Refill: 0     BMI:   Estimated body mass index is 47.2 kg/m  as calculated from the following:    Height as of this encounter: 1.626 m (5' 4\").    Weight as of this encounter: 124.7 kg (275 lb).   Weight management plan: Discussed healthy diet and exercise guidelines        Patient Instructions     You can take both of your doses of duloxetine at the same time.     Take a multi-vitamin without iron.    Schedule an appointment with the liver doctor.    Schedule a lab only visit for non-fasting labs for 3 weeks after you start the increased dose of Jardiance.     Let me know if you are not able to get " scheduled with diabetes education.    You can take a second Diflucan tablet after 3 days if your symptoms are improving but not completely resolved.     Patient Education       At [facility], we strive to deliver an exceptional experience to you, every time we see you.  If you receive a survey in the mail, please send us back your thoughts. We really do value your feedback.    Your care team:     Family Medicine   NABIL Johnston MD Emily Bunt, IRMA CRABTREE   S. MD Radha Flower MD Angela Wermerskirchen, MD         Clinic hours: Monday - Wednesday 7 am-7 pm   Thursdays and Fridays 7 am-5 pm.     [facility]: Monday - Friday 11 am-9 pm,   Saturday and Sunday 9 am-5 pm.    [facility]: Monday -Thursday 8 am-8 pm; Friday 8 am-6 pm; Saturday and Sunday 9 am-5 pm.     [facility]: Monday - Thursday 8 am - 7 pm; Friday 8 am - 6 pm    Clinic: [phone]   [facility]: [phone]   [facility]: [phone]                Return in about 3 months (around 9/20/2019) for Medication check.     Length of visit was 30 minutes with more than 50 percent of that time used for discussing medical concerns and education    The information in this document, created by the medical scribe for me, accurately reflects the services I personally performed and the decisions made by me. I have reviewed and approved this document for accuracy.   Sarah Lombardo MD  [facility]

## 2019-06-20 NOTE — PATIENT INSTRUCTIONS
You can take both of your doses of duloxetine at the same time.     Take a multi-vitamin without iron.    Schedule an appointment with the liver doctor.    Schedule a lab only visit for non-fasting labs for 3 weeks after you start the increased dose of Jardiance.     Let me know if you are not able to get scheduled with diabetes education.    You can take a second Diflucan tablet after 3 days if your symptoms are improving but not completely resolved.     Patient Education       At [facility], we strive to deliver an exceptional experience to you, every time we see you.  If you receive a survey in the mail, please send us back your thoughts. We really do value your feedback.    Your care team:     Family Medicine   NABIL Johnston MD Emily Bunt, APRN CNP S. Matthew Hockett, MD Pamela Kolacz, MD Angela Wermerskirchen, MD         Clinic hours: Monday - Wednesday 7 am-7 pm   Thursdays and Fridays 7 am-5 pm.     [facility]: Monday - Friday 11 am-9 pm,   Saturday and Sunday 9 am-5 pm.    [facility]: Monday -Thursday 8 am-8 pm; Friday 8 am-6 pm; Saturday and Sunday 9 am-5 pm.     [facility]: Monday - Thursday 8 am - 7 pm; Friday 8 am - 6 pm    Clinic: [phone]   [facility]: [phone]   [facility]: [phone]

## 2019-06-21 ENCOUNTER — TELEPHONE (OUTPATIENT)
Dept: FAMILY MEDICINE | Facility: CLINIC | Age: 44
End: 2019-06-21

## 2019-06-21 DIAGNOSIS — N76.0 BV (BACTERIAL VAGINOSIS): Primary | ICD-10-CM

## 2019-06-21 DIAGNOSIS — B96.89 BV (BACTERIAL VAGINOSIS): Primary | ICD-10-CM

## 2019-06-21 RX ORDER — METRONIDAZOLE 500 MG/1
500 TABLET ORAL 2 TIMES DAILY
Qty: 14 TABLET | Refills: 0 | Status: SHIPPED | OUTPATIENT
Start: 2019-06-21 | End: 2019-07-08

## 2019-06-21 NOTE — TELEPHONE ENCOUNTER
Reason for Call:  Other prescription    Detailed comments: patient received results and patient wants the oral antibiotic.    Phone Number Patient can be reached at: Home number on file [phone] (home)    Best Time: any    Can we leave a detailed message on this number? YES    Call taken on 6/21/2019 at 8:52 AM by Radha Curry

## 2019-06-21 NOTE — TELEPHONE ENCOUNTER
Patient has noted pharmacy and it is entered below. Please see the result note. Seen yesterday in clinic.    Jeanie Castillo RN, [facility]

## 2019-06-26 ENCOUNTER — OFFICE VISIT (OUTPATIENT)
Dept: FAMILY MEDICINE | Facility: CLINIC | Age: 44
End: 2019-06-26
Payer: COMMERCIAL

## 2019-06-26 VITALS
WEIGHT: 267 LBS | DIASTOLIC BLOOD PRESSURE: 70 MMHG | RESPIRATION RATE: 18 BRPM | BODY MASS INDEX: 45.58 KG/M2 | TEMPERATURE: 97.8 F | OXYGEN SATURATION: 98 % | HEART RATE: 89 BPM | HEIGHT: 64 IN | SYSTOLIC BLOOD PRESSURE: 108 MMHG

## 2019-06-26 DIAGNOSIS — Z79.4 TYPE 2 DIABETES MELLITUS WITH HYPERGLYCEMIA, WITH LONG-TERM CURRENT USE OF INSULIN (H): ICD-10-CM

## 2019-06-26 DIAGNOSIS — R53.83 FATIGUE, UNSPECIFIED TYPE: ICD-10-CM

## 2019-06-26 DIAGNOSIS — R51.9 ACUTE INTRACTABLE HEADACHE, UNSPECIFIED HEADACHE TYPE: Primary | ICD-10-CM

## 2019-06-26 DIAGNOSIS — E11.65 TYPE 2 DIABETES MELLITUS WITH HYPERGLYCEMIA, WITH LONG-TERM CURRENT USE OF INSULIN (H): ICD-10-CM

## 2019-06-26 LAB
ANION GAP SERPL CALCULATED.3IONS-SCNC: 7 MMOL/L (ref 3–14)
BASOPHILS # BLD AUTO: 0 10E9/L (ref 0–0.2)
BASOPHILS NFR BLD AUTO: 0.5 %
BUN SERPL-MCNC: 9 MG/DL (ref 7–30)
CALCIUM SERPL-MCNC: 9.1 MG/DL (ref 8.5–10.1)
CHLORIDE SERPL-SCNC: 103 MMOL/L (ref 94–109)
CO2 SERPL-SCNC: 29 MMOL/L (ref 20–32)
CREAT SERPL-MCNC: 0.75 MG/DL (ref 0.52–1.04)
DIFFERENTIAL METHOD BLD: ABNORMAL
EOSINOPHIL # BLD AUTO: 0.2 10E9/L (ref 0–0.7)
EOSINOPHIL NFR BLD AUTO: 2.1 %
ERYTHROCYTE [DISTWIDTH] IN BLOOD BY AUTOMATED COUNT: 13.3 % (ref 10–15)
GFR SERPL CREATININE-BSD FRML MDRD: >90 ML/MIN/{1.73_M2}
GLUCOSE SERPL-MCNC: 99 MG/DL (ref 70–99)
HCT VFR BLD AUTO: 49 % (ref 35–47)
HGB BLD-MCNC: 16 G/DL (ref 11.7–15.7)
LYMPHOCYTES # BLD AUTO: 1.9 10E9/L (ref 0.8–5.3)
LYMPHOCYTES NFR BLD AUTO: 23.3 %
MCH RBC QN AUTO: 29.2 PG (ref 26.5–33)
MCHC RBC AUTO-ENTMCNC: 32.7 G/DL (ref 31.5–36.5)
MCV RBC AUTO: 89 FL (ref 78–100)
MONOCYTES # BLD AUTO: 0.5 10E9/L (ref 0–1.3)
MONOCYTES NFR BLD AUTO: 6.4 %
NEUTROPHILS # BLD AUTO: 5.5 10E9/L (ref 1.6–8.3)
NEUTROPHILS NFR BLD AUTO: 67.7 %
PLATELET # BLD AUTO: 281 10E9/L (ref 150–450)
POTASSIUM SERPL-SCNC: 3.9 MMOL/L (ref 3.4–5.3)
RBC # BLD AUTO: 5.48 10E12/L (ref 3.8–5.2)
SODIUM SERPL-SCNC: 139 MMOL/L (ref 133–144)
WBC # BLD AUTO: 8.1 10E9/L (ref 4–11)

## 2019-06-26 PROCEDURE — 80048 BASIC METABOLIC PNL TOTAL CA: CPT | Performed by: FAMILY MEDICINE

## 2019-06-26 PROCEDURE — 99214 OFFICE O/P EST MOD 30 MIN: CPT | Performed by: FAMILY MEDICINE

## 2019-06-26 PROCEDURE — 36415 COLL VENOUS BLD VENIPUNCTURE: CPT | Performed by: FAMILY MEDICINE

## 2019-06-26 PROCEDURE — 85025 COMPLETE CBC W/AUTO DIFF WBC: CPT | Performed by: FAMILY MEDICINE

## 2019-06-26 ASSESSMENT — MIFFLIN-ST. JEOR: SCORE: 1841.1

## 2019-06-26 NOTE — PATIENT INSTRUCTIONS
Stop the metronidazole.     Let me know how you are doing in a few days and update me sooner if new symptoms arise.    Push fluids     At [facility], we strive to deliver an exceptional experience to you, every time we see you.  If you receive a survey in the mail, please send us back your thoughts. We really do value your feedback.    Your care team:     Family Medicine   NABIL Johnston MD Emily Bunt, IRMA CRABTREE   S. MD Radha Flower MD Angela Wermerskirchen, MD         Clinic hours: Monday - Wednesday 7 am-7 pm   Thursdays and Fridays 7 am-5 pm.     [facility]: Monday - Friday 11 am-9 pm,   Saturday and Sunday 9 am-5 pm.    [facility]: Monday -Thursday 8 am-8 pm; Friday 8 am-6 pm; Saturday and Sunday 9 am-5 pm.     [facility]: Monday - Thursday 8 am - 7 pm; Friday 8 am - 6 pm    Clinic: [phone]   [facility]: [phone]   [facility]: [phone]

## 2019-06-26 NOTE — PROGRESS NOTES
Subjective     Cornelia Rodriguez is a 43 year old female who presents to clinic today for the following health issues:    HPI   Headache  Onset: 3-4 days     Description:   Location: bilateral in the frontal area   Character: throbbing pain  Frequency:  Hasn't gone away since it started    Intensity: moderate to severe]    Progression of Symptoms:  constant    Accompanying Signs & Symptoms:  Stiff neck: no   Neck or upper back pain: no   Fever: no   Sinus pressure: no   Nausea or vomiting: no   Dizziness: no   Numbness: no   Weakness: YES- but fatigue   Visual changes: no     History:   Head trauma: no   Family history of migraines: YES  Previous tests for headaches: no   Neurologist evaluations: no   Able to do daily activities: no   Wake with a headaches: YES  Do headaches wake you up: no   Daily pain medication use: no   Work/school stressors/changes: no     Precipitating factors:   Does light make it worse: no   Does sound make it worse: no     Alleviating factors:  Does sleep help: YES    Therapies Tried and outcome: Sleeping     -Patient has had a headache the past 3 days, different from her usual headaches and occipital neuralgia. It is located behind her forehead. She has been extremely fatigued and slept most of the past 2 days   -Started metronidazole last week for vaginal itching which has improved, has two days left of this rx. She started it 5 days ago, two days after increasing Jardiance   -Complains of intermittent chills and feeling hot, which started with the headache  -Denies vision changes, hearing changes, numbness or tingling of extremities, rhinorrhea, throat pain, fever, abdominal pain, diarrhea, cough, rash, myalgia, lower extremity edema, new neck pain/difficulty moving head  -She is pushing fluids, around 64 ounces per day, and denies  symptoms     Diabetes:  -Increased Jardiance seven days ago   -Morning sugar has been . The night before the 70 in the morning she had eaten sugary  cake so is not sure why it was low  -Before dinner blood sugar has been 104-133         Patient Active Problem List   Diagnosis     Acute recurrent maxillary sinusitis     Right chronic serous otitis media     Morbid obesity (H)     Migraine without aura and without status migrainosus, not intractable     Gastroesophageal reflux disease without esophagitis     Type 2 diabetes mellitus with hyperglycemia (H)     Allergic rhinitis, unspecified seasonality, unspecified trigger     Hyperlipidemia LDL goal <100     Allergic contact dermatitis due to adhesives     Back muscle spasm     History of Helicobacter pylori infection     Migraine     Seborrheic dermatitis of scalp     Type 2 diabetes mellitus (H)     Perennial allergic rhinitis     Hyperlipidemia     Abdominal pain     Low back pain     Occipital neuralgia of right side     Past Surgical History:   Procedure Laterality Date     CHOLECYSTECTOMY         Social History     Tobacco Use     Smoking status: Former Smoker     Smokeless tobacco: Never Used     Tobacco comment: quit 3/2013   Substance Use Topics     Alcohol use: Yes     Comment: Occasionally     Family History   Problem Relation Age of Onset     Diabetes Maternal Grandmother      Diabetes Sister      Hypertension Sister            Reviewed and updated as needed this visit by Provider         Review of Systems   ROS COMP: Constitutional, HEENT, cardiovascular, pulmonary, gi and gu systems are negative, except as otherwise noted.    This document serves as a record of the services and decisions personally performed by ANGI BRAGA. It was created on his/her behalf by Sarthak Bautista, a trained medical scribe. The creation of this document is based on the provider's statements to the medical scribe. Sarthak Bautista, June 26, 2019 11:25 AM       Objective    /70 (BP Location: Right arm, Patient Position: Sitting, Cuff Size: Adult Large)   Pulse 89   Temp 97.8  F (36.6  C) (Oral)   Resp 18   " Ht 1.626 m (5' 4\")   Wt 121.1 kg (267 lb)   SpO2 98%   BMI 45.83 kg/m    Body mass index is 45.83 kg/m .  Physical Exam   GENERAL: healthy, alert and no distress  EYES: Eyes grossly normal to inspection, PERRL and conjunctivae and sclerae normal  HENT: frontal sinus tenderness, ear canals and TM's normal, nose and mouth without ulcers or lesions  NECK: FROM,  no adenopathy, no asymmetry, masses, or scars and thyroid normal to palpation  RESP: lungs clear to auscultation - no rales, rhonchi or wheezes  CV: regular rate and rhythm, normal S1 S2, no S3 or S4, no murmur, click or rub, no peripheral edema and peripheral pulses strong  ABDOMEN: soft, nontender, no hepatosplenomegaly, no masses and bowel sounds normal  MS: no gross musculoskeletal defects noted, no edema  NEURO: Normal gait, normal heel, toe walk. Heel-to-toe at baseline. Negative Romberg. Normal strength and tone, mentation intact and speech normal. Cranial nerves II-XII intact   PSYCH: mentation appears normal, affect normal/bright          Assessment & Plan     1. Acute intractable headache, unspecified headache type  2. Fatigue, unspecified type  ? If this is due to a med rx. Will trial stopping metronidazole. If not improving with stopping metronidazole, will decrease Jardiance back to 10 mg. Push fluids. Reviewed red flag symptoms that would precipitate the need for routine, urgent or emergent f/u   - CBC with platelets differential  - Basic metabolic panel    3. Type 2 diabetes mellitus with hyperglycemia, with long-term current use of insulin (H)  Reviewed hypoglycemia prevention- consider dropping jadiance dose if persistently low sugars- Basic metabolic panel     BMI:   Estimated body mass index is 45.83 kg/m  as calculated from the following:    Height as of this encounter: 1.626 m (5' 4\").    Weight as of this encounter: 121.1 kg (267 lb).   Weight management plan: not addressed at this visit      Patient Instructions     Stop the " metronidazole.     Let me know how you are doing in a few days and update me sooner if new symptoms arise.    Push fluids     At [facility], we strive to deliver an exceptional experience to you, every time we see you.  If you receive a survey in the mail, please send us back your thoughts. We really do value your feedback.    Your care team:     Family Medicine   NABIL Johnston MD Emily Bunt, IRMA CRABTREE   S. MD Radha Flower MD Angela Wermerskirchen, MD         Clinic hours: Monday - Wednesday 7 am-7 pm   Thursdays and Fridays 7 am-5 pm.     [facility]: Monday - Friday 11 am-9 pm,   Saturday and Sunday 9 am-5 pm.    [facility]: Monday -Thursday 8 am-8 pm; Friday 8 am-6 pm; Saturday and Sunday 9 am-5 pm.     [facility]: Monday - Thursday 8 am - 7 pm; Friday 8 am - 6 pm    Clinic: [phone]   [facility]: [phone]   [facility]: [phone]                  The information in this document, created by the medical scribe for me, accurately reflects the services I personally performed and the decisions made by me. I have reviewed and approved this document for accuracy.   Sarah Lombardo MD  [facility]

## 2019-07-02 DIAGNOSIS — K21.9 GASTROESOPHAGEAL REFLUX DISEASE, ESOPHAGITIS PRESENCE NOT SPECIFIED: Primary | ICD-10-CM

## 2019-07-02 DIAGNOSIS — Z79.4 TYPE 2 DIABETES MELLITUS WITH HYPERGLYCEMIA, WITH LONG-TERM CURRENT USE OF INSULIN (H): ICD-10-CM

## 2019-07-02 DIAGNOSIS — E11.65 TYPE 2 DIABETES MELLITUS WITH HYPERGLYCEMIA, WITH LONG-TERM CURRENT USE OF INSULIN (H): ICD-10-CM

## 2019-07-02 NOTE — TELEPHONE ENCOUNTER
"Requested Prescriptions   Pending Prescriptions Disp Refills     ranitidine (ZANTAC) 300 MG tablet\  Last Written Prescription Date:  2/15/19  Last Fill Quantity: n/a,  # refills: 3   Last office visit: 6/26/2019rovider:  Dr. Lombardo  Future Office Visit:   Routing refill request to provider for review/approval because:  Medication is reported/historical    3     Sig: Take 1 tablet (300 mg) by mouth daily       H2 Blockers Protocol Passed - 7/2/2019  8:45 AM        Passed - Patient is age 12 or older        Passed - Recent (12 mo) or future (30 days) visit within the authorizing provider's specialty     Patient had office visit in the last 12 months or has a visit in the next 30 days with authorizing provider or within the authorizing provider's specialty.  See \"Patient Info\" tab in inbasket, or \"Choose Columns\" in Meds & Orders section of the refill encounter.              Passed - Medication is active on med list        insulin pen needle (32G X 4 MM) 32G X 4 MM miscellaneous  Last Written Prescription Date:  3/28/19  Last Fill Quantity: 180 each,  # refills: 1   Last office visit: 6/26/2019 with prescribing provider:  Dr. Lombardo   Future Office Visit:     180 each 1     Sig: Use 2 pen needles daily or as directed.       Diabetic Supplies Protocol Passed - 7/2/2019  8:45 AM        Passed - Medication is active on med list        Passed - Patient is 18 years of age or older        Passed - Recent (6 mo) or future (30 days) visit within the authorizing provider's specialty     Patient had office visit in the last 6 months or has a visit in the next 30 days with authorizing provider.  See \"Patient Info\" tab in inbasket, or \"Choose Columns\" in Meds & Orders section of the refill encounter.            blood glucose monitoring (NO BRAND SPECIFIED) meter device kit  Last Written Prescription Date:  6/20/19   1 kit 0     Sig: Use to test blood sugar 2 times daily or as directed.       Diabetic Supplies " "Protocol Passed - 7/2/2019  8:45 AM        Passed - Medication is active on med list        Passed - Patient is 18 years of age or older        Passed - Recent (6 mo) or future (30 days) visit within the authorizing provider's specialty     Patient had office visit in the last 6 months or has a visit in the next 30 days with authorizing provider.  See \"Patient Info\" tab in inbasket, or \"Choose Columns\" in Meds & Orders section of the refill encounter.              "

## 2019-07-02 NOTE — TELEPHONE ENCOUNTER
Rx re-sent blood glucose monitor kit and insulin pen needle.     Routing refill request to provider for review/approval because:  Medication is reported/historical - Ranitidine  Britany White RN

## 2019-07-08 ENCOUNTER — ANCILLARY PROCEDURE (OUTPATIENT)
Dept: GENERAL RADIOLOGY | Facility: CLINIC | Age: 44
End: 2019-07-08
Attending: PSYCHIATRY & NEUROLOGY
Payer: OTHER MISCELLANEOUS

## 2019-07-08 ENCOUNTER — OFFICE VISIT (OUTPATIENT)
Dept: FAMILY MEDICINE | Facility: CLINIC | Age: 44
End: 2019-07-08
Payer: COMMERCIAL

## 2019-07-08 ENCOUNTER — OFFICE VISIT (OUTPATIENT)
Dept: NEUROLOGY | Facility: CLINIC | Age: 44
End: 2019-07-08
Payer: OTHER MISCELLANEOUS

## 2019-07-08 ENCOUNTER — ANCILLARY PROCEDURE (OUTPATIENT)
Dept: GENERAL RADIOLOGY | Facility: CLINIC | Age: 44
End: 2019-07-08
Attending: FAMILY MEDICINE
Payer: COMMERCIAL

## 2019-07-08 VITALS
SYSTOLIC BLOOD PRESSURE: 132 MMHG | BODY MASS INDEX: 47.21 KG/M2 | TEMPERATURE: 98 F | OXYGEN SATURATION: 96 % | HEART RATE: 95 BPM | WEIGHT: 276.5 LBS | RESPIRATION RATE: 18 BRPM | DIASTOLIC BLOOD PRESSURE: 81 MMHG | HEIGHT: 64 IN

## 2019-07-08 VITALS
SYSTOLIC BLOOD PRESSURE: 124 MMHG | RESPIRATION RATE: 18 BRPM | BODY MASS INDEX: 46.95 KG/M2 | HEART RATE: 96 BPM | DIASTOLIC BLOOD PRESSURE: 84 MMHG | HEIGHT: 64 IN | TEMPERATURE: 98.8 F | WEIGHT: 275 LBS | OXYGEN SATURATION: 98 %

## 2019-07-08 DIAGNOSIS — M54.42 ACUTE LEFT-SIDED LOW BACK PAIN WITH LEFT-SIDED SCIATICA: Primary | ICD-10-CM

## 2019-07-08 DIAGNOSIS — M54.42 ACUTE LEFT-SIDED LOW BACK PAIN WITH LEFT-SIDED SCIATICA: ICD-10-CM

## 2019-07-08 DIAGNOSIS — M54.2 NECK PAIN: ICD-10-CM

## 2019-07-08 DIAGNOSIS — M54.2 NECK PAIN: Primary | ICD-10-CM

## 2019-07-08 DIAGNOSIS — N89.8 VAGINAL ITCHING: ICD-10-CM

## 2019-07-08 PROCEDURE — 99214 OFFICE O/P EST MOD 30 MIN: CPT | Performed by: FAMILY MEDICINE

## 2019-07-08 PROCEDURE — 72100 X-RAY EXAM L-S SPINE 2/3 VWS: CPT | Mod: FY

## 2019-07-08 RX ORDER — FLUCONAZOLE 150 MG/1
150 TABLET ORAL ONCE
Qty: 2 TABLET | Refills: 0 | Status: SHIPPED | OUTPATIENT
Start: 2019-07-08 | End: 2019-08-01

## 2019-07-08 RX ORDER — PREDNISONE 20 MG/1
40 TABLET ORAL DAILY
Qty: 10 TABLET | Refills: 0 | Status: SHIPPED | OUTPATIENT
Start: 2019-07-08 | End: 2019-07-15

## 2019-07-08 RX ORDER — METHOCARBAMOL 750 MG/1
750 TABLET, FILM COATED ORAL 3 TIMES DAILY
Qty: 60 TABLET | Refills: 1 | Status: SHIPPED | OUTPATIENT
Start: 2019-07-08 | End: 2022-10-17

## 2019-07-08 RX ORDER — METRONIDAZOLE 7.5 MG/G
1 GEL VAGINAL DAILY
Qty: 70 G | Refills: 0 | Status: SHIPPED | OUTPATIENT
Start: 2019-07-08 | End: 2019-07-15

## 2019-07-08 ASSESSMENT — ENCOUNTER SYMPTOMS
FLANK PAIN: 1
BLOATING: 0
DISTURBANCES IN COORDINATION: 0
DIFFICULTY URINATING: 0
SINUS CONGESTION: 1
NUMBNESS: 0
BLOOD IN STOOL: 0
ABDOMINAL PAIN: 0
MUSCLE CRAMPS: 0
JOINT SWELLING: 0
LOSS OF CONSCIOUSNESS: 0
MUSCLE WEAKNESS: 0
HEMATURIA: 0
DIZZINESS: 0
DIARRHEA: 1
ARTHRALGIAS: 0
JAUNDICE: 0
TROUBLE SWALLOWING: 0
HEARTBURN: 0
SEIZURES: 0
BACK PAIN: 0
WEAKNESS: 0
SORE THROAT: 0
RECTAL PAIN: 0
NECK PAIN: 1
NAUSEA: 0
HEADACHES: 1
MEMORY LOSS: 0
DYSURIA: 0
NECK MASS: 0
VOMITING: 0
TASTE DISTURBANCE: 0
PARALYSIS: 0
MYALGIAS: 0
CONSTIPATION: 1
TINGLING: 0
HOT FLASHES: 0
STIFFNESS: 1
SMELL DISTURBANCE: 0
SINUS PAIN: 0
DECREASED LIBIDO: 0
SPEECH CHANGE: 0
HOARSE VOICE: 0
TREMORS: 0
BOWEL INCONTINENCE: 0

## 2019-07-08 ASSESSMENT — MIFFLIN-ST. JEOR
SCORE: 1884.2
SCORE: 1877.39

## 2019-07-08 ASSESSMENT — PAIN SCALES - GENERAL
PAINLEVEL: WORST PAIN (10)
PAINLEVEL: NO PAIN (0)

## 2019-07-08 NOTE — NURSING NOTE
Chief Complaint   Patient presents with     Headache     [facility] RETURN F/U POST NERVE BLOCK       Ramakrishna Plata, EMT

## 2019-07-08 NOTE — PROGRESS NOTES
Subjective     Cornelia Rodriguez is a 43 year old female who presents to clinic today for the following health issues:    HPI   ED/UC Followup:    [place]  [facility] [place]  Date of visit: 7/2/19  Reason for visit: back pain  Current Status: worsening       Back Pain       Duration: Tuesday        Specific cause: none    Description:   Location of pain: low back left  Character of pain: throbbing  Pain radiation:radiates into the left leg, radiates into the left foot and around left abdomen  New numbness or weakness in legs, not attributed to pain:  no     Intensity: Currently 10/10    History:   Pain interferes with job: YES  History of back problems: recurrent self limited episodes of low back pain in the past  Any previous MRI or X-rays: None  Sees a specialist for back pain:  No  Therapies tried without relief: acetaminophen (Tylenol), ice    Alleviating factors:   Improved by: walking helps a little but then it starts to hurt, ice helps a little , tylenol     Precipitating factors:  Worsened by: Lifting, Bending, Standing and Sitting      Accompanying Signs & Symptoms:  Risk of Fracture:  None  Risk of Cauda Equina:  None  Risk of Infection:  None  Risk of Cancer:  None  Risk of Ankylosing Spondylitis:  Onset at age <35, male, AND morning back stiffness. no     -6 days ago she woke up with left back pain, radiating down back of leg, so she thinks she pinched her sciatic nerve in her sleep. She knows she sleeps in twisted and cramped positions sometimes. That evening her pain had become more severe so she went to the ED and was told to take Tylenol every 6 hours, which is not helping. She is using ice which helps somewhat  -Has not been able to work since the pain started, so has not been at work since 7/2/19  -Denies numbness or tingling, urinary incontinence or bowl incontinence   -18 years ago when patient was pregnant she had similar symptoms     Vaginal Itching:  -after last visit, Stopped metronidazole and  headache resolved within 1 day, but external vaginal itching returned  -She thinks the vaginal symptoms started before she started Jardiance     Diabetes:  -Blood sugars have varied; this morning was little higher at 184. She thinks her blood sugar is usually elevated when she is stressed or in pain       Patient Active Problem List   Diagnosis     Acute recurrent maxillary sinusitis     Right chronic serous otitis media     Morbid obesity (H)     Migraine without aura and without status migrainosus, not intractable     Gastroesophageal reflux disease without esophagitis     Type 2 diabetes mellitus with hyperglycemia (H)     Allergic rhinitis, unspecified seasonality, unspecified trigger     Hyperlipidemia LDL goal <100     Allergic contact dermatitis due to adhesives     Back muscle spasm     History of Helicobacter pylori infection     Migraine     Seborrheic dermatitis of scalp     Type 2 diabetes mellitus (H)     Perennial allergic rhinitis     Hyperlipidemia     Abdominal pain     Low back pain     Occipital neuralgia of right side     Past Surgical History:   Procedure Laterality Date     CHOLECYSTECTOMY         Social History     Tobacco Use     Smoking status: Former Smoker     Smokeless tobacco: Never Used     Tobacco comment: quit 3/2013   Substance Use Topics     Alcohol use: Yes     Comment: Occasionally     Family History   Problem Relation Age of Onset     Diabetes Maternal Grandmother      Diabetes Sister      Hypertension Sister              Reviewed and updated as needed this visit by Provider         Review of Systems   ROS COMP: Constitutional, HEENT, cardiovascular, pulmonary, gi and gu systems are negative, except as otherwise noted.    This document serves as a record of the services and decisions personally performed by ANGI BRAGA. It was created on his/her behalf by Sarthak Bautista, a trained medical scribe. The creation of this document is based on the provider's statements to  "the medical scribe. Sarthak Bautista, July 8, 2019 1:41 PM      Objective    /84 (BP Location: Right arm, Patient Position: Chair, Cuff Size: Adult Large)   Pulse 96   Temp 98.8  F (37.1  C) (Oral)   Resp 18   Ht 1.626 m (5' 4\")   Wt 124.7 kg (275 lb)   LMP  (LMP Unknown)   SpO2 98%   Breastfeeding? No   BMI 47.20 kg/m    Body mass index is 47.2 kg/m .  Physical Exam   GENERAL: healthy, alert and no distress  MS: no gross musculoskeletal defects noted, no edema  NEURO: Normal strength and tone, mentation intact and speech normal. Normal gait, normal heel, toe, heel-to-toe walk. Equal sensation bilaterally   Comprehensive back pain exam:  No tenderness, Pain limits the following motions: forward flexion, Lower extremity reflexes within normal limits bilaterally, Lower extremity sensation normal and equal on both sides and Straight leg raise negative bilaterally   PSYCH: mentation appears normal, affect normal/bright      XR Lumbar Spine and Cervical Spine: mild degenerative changes, distal thoracic and lumbar spine, no acute fracture, normal alignment. Xray personally reviewed and evaluated by me        Assessment & Plan     1. Acute left-sided low back pain with left-sided sciatica  Start robaxin and prednisone. Reviewed timing of taking, onset, benefits, monitoring and typical and severe AE of the medication. Start PHYSICAL THERAPY. Consider MR and ortho referral if not improving with time/tx.  - methocarbamol (ROBAXIN) 750 MG tablet; Take 1 tablet (750 mg) by mouth 3 times daily  Dispense: 60 tablet; Refill: 1  - predniSONE (DELTASONE) 20 MG tablet; Take 2 tablets (40 mg) by mouth daily for 5 days  Dispense: 10 tablet; Refill: 0  - [facility] PT, HAND, AND CHIROPRACTIC REFERRAL; Future  - XR Lumbar Spine 2/3 Views; Future    2. Vaginal itching  Use metronidazole gel as oral caused headache. If vaginal itching persists, consider adjusting diabetic medications (?flozin contributing)  - metroNIDAZOLE (METROGEL) " "0.75 % vaginal gel; Place 1 applicator (5 g) vaginally daily for 5 days  Dispense: 70 g; Refill: 0  - fluconazole (DIFLUCAN) 150 MG tablet; Take 1 tablet (150 mg) by mouth once for 1 dose May repeat after 72 if symptoms are not yet resolved  Dispense: 2 tablet; Refill: 0     BMI:   Estimated body mass index is 47.2 kg/m  as calculated from the following:    Height as of this encounter: 1.626 m (5' 4\").    Weight as of this encounter: 124.7 kg (275 lb).   Weight management plan: not addressed at this visit        Patient Instructions   Take one Diflucan right away. If you still have itching when you are done with the metronidazole gel, take another diflucan.     Use over the counter Miralax for constipation. Use half a capful to a full capful daily as needed.    Take prednisone in the morning with food. It is normal for prednisone to raise your blood sugar, so you should expect this. If your blood sugar goes over 500 you need to let us know.    Someone from the physical therapy scheduling office will call you to set up an appointment.    You can use heat or ice, whichever feels better.       Return in about 1 week (around 7/15/2019), or if symptoms worsen or fail to improve, for Recheck.     Length of visit was 20 minutes with more than 50 percent of that time used for discussing medical concerns and education    The information in this document, created by the medical scribe for me, accurately reflects the services I personally performed and the decisions made by me. I have reviewed and approved this document for accuracy.   Sarah Lombardo MD  [facility]      "

## 2019-07-08 NOTE — PROGRESS NOTES
[facility]  Neurology Progress Note    Subjective:    Ms. Rodriguez returns to the neurology clinic for follow-up of right occipital neuralgia.  I last saw her on April 19, 2019 for an occipital nerve block, and previously on April 8, 2019 in consultation for right posterior head pain.  At that time, she was diagnosed with right occipital neuralgia, affecting the lesser more than the greater branches, and started on duloxetine with the plan to add on an occipital nerve block.    Since I last saw her, she reports that her right posterior head pain has resolved, and she has not had any exacerbations.  She has stopped duloxetine at this time, after having a reaction with an antibiotic, and told to stop all nondiabetic medications, out of concern that the combination of medications may have been causing the reaction.  After stopping duloxetine, her symptoms did not return, and she has remained off of it.    She has not had any migraine headaches since the occipital nerve block, which she is happy about.    Unfortunately, she reports that if the stiffness in her neck has worsened, and it has become difficult to move at times until she is able to crack it.  She reports needing to do this 3 or 4 times a day, in combination with stretching, in order to maintain range of motion and to avoid pain.  She describes this is like someone is holding the back of her head and neck in one position, and makes a pinching motion.    She denies other associated symptoms.  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  She finds that the machines that require her to look down in one position for many hours at a time make her symptoms exacerbate.  She has been preferring to work on machines that allow her to look side to side.  Thus far, her work has been accommodating and allowing her to do this.    Objective:    Vitals: /81    "Pulse 95   Temp 98  F (36.7  C) (Oral)   Resp 18   Ht 1.626 m (5' 4\")   Wt 125.4 kg (276 lb 8 oz)   SpO2 96%   BMI 47.46 kg/m    General: Cooperative, NAD  Head: Atraumatic  Neck: Normal range of motion  Cardiac: no lower extremity edema  Neurologic:  Mental Status: Fully alert, attentive and oriented. Speech clear and fluent.   Cranial Nerves: Facial movements symmetric.   Motor: No abnormal movements.  Moving all extremities.    Station/Gait: Normal casual gait.     Assessment/Plan:   Cornelia Rodriguez is a 43-year-old woman who returns to the neurology clinic for follow-up of right posterior head pain, previously diagnosed as right occipital neuralgia, affecting the lesser more than the greater branch.  She receives good benefit with a combination of duloxetine and occipital nerve block, and reports that her right posterior head pain has resolved and not returned.  This was also beneficial in preventing migraine.    Unfortunately, she has had worsening of neck stiffness, and desires further evaluation of this.  Her range of motion on exam today is normal, she has no associated neurologic symptoms, and her symptoms are exacerbated by holding a flexed position while working, possibly suggesting a musculoskeletal component.  For further evaluation, I have ordered for cervical spine x-ray.  Assuming this rules out fracture, I would recommend further evaluation with one of our physical medicine and rehabilitation physicians for consideration of other treatment options that may be available to her, including other medications, injections, or physical therapies.  She did not receive benefit from duloxetine, which she had been using for right occipital neuralgia.    I will plan to see her back as needed in the future, I would be happy to see her back if her right occipital neuralgia flares again.  I spent 15 minutes with the patient, over half of which was counseling.    Carolann Rutledge MD  Neurology   Pager " [phone]    Answers for HPI/ROS submitted by the patient on 7/8/2019   General Symptoms: No  Skin Symptoms: No  HENT Symptoms: Yes  EYE SYMPTOMS: No  HEART SYMPTOMS: No  LUNG SYMPTOMS: No  INTESTINAL SYMPTOMS: Yes  URINARY SYMPTOMS: Yes  GYNECOLOGIC SYMPTOMS: Yes  BREAST SYMPTOMS: No  SKELETAL SYMPTOMS: Yes  BLOOD SYMPTOMS: No  NERVOUS SYSTEM SYMPTOMS: Yes  MENTAL HEALTH SYMPTOMS: No  Ear pain: No  Ear discharge: No  Hearing loss: No  Tinnitus: Yes  Nosebleeds: No  Congestion: Yes  Sinus pain: No  Trouble swallowing: No   Voice hoarseness: No  Mouth sores: No  Sore throat: No  Tooth pain: No  Gum tenderness: No  Bleeding gums: No  Change in taste: No  Change in sense of smell: No  Dry mouth: No  Hearing aid used: No  Neck lump: No  Heart burn or indigestion: No  Nausea: No  Vomiting: No  Abdominal pain: No  Bloating: No  Constipation: Yes  Diarrhea: Yes  Blood in stool: No  Black stools: No  Rectal or Anal pain: No  Fecal incontinence: No  Yellowing of skin or eyes: No  Vomit with blood: No  Change in stools: Yes  Trouble holding urine or incontinence: No  Pain or burning: No  Trouble starting or stopping: No  Increased frequency of urination: No  Blood in urine: No  Decreased frequency of urination: No  Frequent nighttime urination: No  Flank pain: Yes  Difficulty emptying bladder: No  Back pain: No  Muscle aches: No  Neck pain: Yes  Swollen joints: No  Joint pain: No  Bone pain: No  Muscle cramps: No  Muscle weakness: No  Joint stiffness: Yes  Bone fracture: No  Trouble with coordination: No  Dizziness or trouble with balance: No  Fainting or black-out spells: No  Memory loss: No  Headache: Yes  Seizures: No  Speech problems: No  Tingling: No  Tremor: No  Weakness: No  Difficulty walking: No  Paralysis: No  Numbness: No  Bleeding or spotting between periods: No  Heavy or painful periods: No  Irregular periods: No  Vaginal discharge: No  Hot flashes: No  Vaginal dryness: Yes  Genital ulcers: No  Reduced libido:  No  Painful intercourse: No  Difficulty with sexual arousal: No  Post-menopausal bleeding: No

## 2019-07-08 NOTE — LETTER
7/8/2019       RE: Cornelia Rodriguez  [address]  [address]  [facility] [address]     Dear Colleague,    Thank you for referring your patient, Cornelia Rodriguez, to the [facility] NEUROLOGY at [facility]. Please see a copy of my visit note below.    [facility]    Clinics and Surgery Center  Neurology Progress Note    Subjective:    Ms. Rodriguez returns to the neurology clinic for follow-up of right occipital neuralgia.  I last saw her on April 19, 2019 for an occipital nerve block, and previously on April 8, 2019 in consultation for right posterior head pain.  At that time, she was diagnosed with right occipital neuralgia, affecting the lesser more than the greater branches, and started on duloxetine with the plan to add on an occipital nerve block.    Since I last saw her, she reports that her right posterior head pain has resolved, and she has not had any exacerbations.  She has stopped duloxetine at this time, after having a reaction with an antibiotic, and told to stop all nondiabetic medications, out of concern that the combination of medications may have been causing the reaction.  After stopping duloxetine, her symptoms did not return, and she has remained off of it.    She has not had any migraine headaches since the occipital nerve block, which she is happy about.    Unfortunately, she reports that if the stiffness in her neck has worsened, and it has become difficult to move at times until she is able to crack it.  She reports needing to do this 3 or 4 times a day, in combination with stretching, in order to maintain range of motion and to avoid pain.  She describes this is like someone is holding the back of her head and neck in one position, and makes a pinching motion.    She denies other associated symptoms.  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  She finds that the machines that require  "her to look down in one position for many hours at a time make her symptoms exacerbate.  She has been preferring to work on machines that allow her to look side to side.  Thus far, her work has been accommodating and allowing her to do this.    Objective:    Vitals: /81   Pulse 95   Temp 98  F (36.7  C) (Oral)   Resp 18   Ht 1.626 m (5' 4\")   Wt 125.4 kg (276 lb 8 oz)   SpO2 96%   BMI 47.46 kg/m     General: Cooperative, NAD  Head: Atraumatic  Neck: Normal range of motion  Cardiac: no lower extremity edema  Neurologic:  Mental Status: Fully alert, attentive and oriented. Speech clear and fluent.   Cranial Nerves: Facial movements symmetric.   Motor: No abnormal movements.  Moving all extremities.    Station/Gait: Normal casual gait.     Assessment/Plan:   Cornelia Rodriguez is a 43-year-old woman who returns to the neurology clinic for follow-up of right posterior head pain, previously diagnosed as right occipital neuralgia, affecting the lesser more than the greater branch.  She receives good benefit with a combination of duloxetine and occipital nerve block, and reports that her right posterior head pain has resolved and not returned.  This was also beneficial in preventing migraine.    Unfortunately, she has had worsening of neck stiffness, and desires further evaluation of this.  Her range of motion on exam today is normal, she has no associated neurologic symptoms, and her symptoms are exacerbated by holding a flexed position while working, possibly suggesting a musculoskeletal component.  For further evaluation, I have ordered for cervical spine x-ray.  Assuming this rules out fracture, I would recommend further evaluation with one of our physical medicine and rehabilitation physicians for consideration of other treatment options that may be available to her, including other medications, injections, or physical therapies.  She did not receive benefit from duloxetine, which she had been using for " right occipital neuralgia.    I will plan to see her back as needed in the future, I would be happy to see her back if her right occipital neuralgia flares again.  I spent 15 minutes with the patient, over half of which was counseling.    Carolann Rutledge MD  Neurology   Pager [phone]

## 2019-07-08 NOTE — PATIENT INSTRUCTIONS
Take one Diflucan right away. If you still have itching when you are done with the metronidazole gel, take another diflucan.     Use over the counter Miralax for constipation. Use half a capful to a full capful daily as needed.    Take prednisone in the morning with food. It is normal for prednisone to raise your blood sugar, so you should expect this. If your blood sugar goes over 500 you need to let us know.    Someone from the physical therapy scheduling office will call you to set up an appointment.    You can use heat or ice, whichever feels better.

## 2019-07-09 ENCOUNTER — THERAPY VISIT (OUTPATIENT)
Dept: PHYSICAL THERAPY | Facility: CLINIC | Age: 44
End: 2019-07-09
Attending: FAMILY MEDICINE
Payer: COMMERCIAL

## 2019-07-09 DIAGNOSIS — M54.42 ACUTE LEFT-SIDED LOW BACK PAIN WITH LEFT-SIDED SCIATICA: ICD-10-CM

## 2019-07-09 DIAGNOSIS — E11.65 TYPE 2 DIABETES MELLITUS WITH HYPERGLYCEMIA, WITH LONG-TERM CURRENT USE OF INSULIN (H): Primary | ICD-10-CM

## 2019-07-09 DIAGNOSIS — M53.3 SACROILIAC JOINT DYSFUNCTION: ICD-10-CM

## 2019-07-09 DIAGNOSIS — Z79.4 TYPE 2 DIABETES MELLITUS WITH HYPERGLYCEMIA, WITH LONG-TERM CURRENT USE OF INSULIN (H): Primary | ICD-10-CM

## 2019-07-09 PROCEDURE — 97110 THERAPEUTIC EXERCISES: CPT | Mod: GP | Performed by: PHYSICAL THERAPIST

## 2019-07-09 PROCEDURE — 97140 MANUAL THERAPY 1/> REGIONS: CPT | Mod: GP | Performed by: PHYSICAL THERAPIST

## 2019-07-09 PROCEDURE — 97162 PT EVAL MOD COMPLEX 30 MIN: CPT | Mod: GP | Performed by: PHYSICAL THERAPIST

## 2019-07-09 NOTE — TELEPHONE ENCOUNTER
Pharmacy message:   PATIENT HAS GLUCOMETER, REQUESTING TEST STRIPS AND LANCETS.    NEED RX FOR ACCU-CHEK SMARTVIEW STRIPS & FASTCLIX LANCETS.  TESTS 2 TIMES A DAY. ONLY RECEIVED RX FOR METER.

## 2019-07-09 NOTE — PROGRESS NOTES
[facility] Initial Evaluation  Subjective:  The history is provided by the patient. No  was used.   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   Patient reports pain:  Lumbar spine left. Radiates to:  Lower leg left, foot left, gluteals left and thigh left.  Symptoms are exacerbated by bending (squatting; unable to put on socks ot shoes) and relieved by muscle relaxants, analgesics, ice and heat.      and reported as 10/10 on pain scale. General health as reported by patient is fair. Pertinent medical history includes:  Diabetes and overweight.         Primary job tasks include:  Lifting/carrying and repetitive tasks.  Pain is described as sharp and shooting  Pain is the same all the time. Since onset symptoms are unchanged.      Patient is [employer] . Restrictions include:  Currently not working due to present treatment.                            Objective:        Flexibility/Screens:       Lower Extremity:  Decreased left lower extremity flexibility:Hip IR's and Hamstrings    Decreased right lower extremity flexibility:  Hamstrings               Lumbar/SI Evaluation  ROM:    AROM Lumbar:   Flexion:          FIS - able to reach knees - LBP L   Ext:                    Wnl   Side Bend:        Left:  Wnl    Right:  Wnl   Rotation:           Left:     Right:   Side Glide:        Left:     Right:                     Lumbar Palpation:    Tenderness present at Left:    Quadratus Lumborum; Piriformis; PSIS and Vertebral (L4,L5)          SI joint/Sacrum:        Left positive at:    Squish    Sacral conclusion left:  Anterior inominate                                    Hip Evaluation  HIP AROM:    Flexion: Left: painful at 90    Right:  Wnl                        Hip Special Testing:    Left hip positive for the following special tests:  Jasper and Fadir/Labrum (L SI jt )  Right hip negative for the following special tests:  Jasper or Fadir/Labrum    Hip Palpation:      Left hip tenderness not present at:  Greater Trachanter               General     ROS    Assessment/Plan:    Patient is a 43 year old female with lumbar and sacral complaints.    Patient has the following significant findings with corresponding treatment plan.                Diagnosis 1:  L SI jt dysfunction   Pain -  hot/cold therapy, manual therapy, self management, education, directional preference exercise and home program  Decreased ROM/flexibility - manual therapy, therapeutic exercise and home program  Decreased joint mobility - manual therapy, therapeutic exercise and home program  Impaired gait - gait training and home program  Impaired muscle performance - neuro re-education and home program  Decreased function - therapeutic activities and home program    Therapy Evaluation Codes:   1) History comprised of:   Personal factors that impact the plan of care:      Work status.    Comorbidity factors that impact the plan of care are:      Diabetes and Overweight.     Medications impacting care: Muscle relaxant and Pain.  2) Examination of Body Systems comprised of:   Body structures and functions that impact the plan of care:      Lumbar spine and Sacral illiac joint.   Activity limitations that impact the plan of care are:      Bending and Squatting/kneeling.  3) Clinical presentation characteristics are:   Evolving/Changing.  4) Decision-Making    Moderate complexity using standardized patient assessment instrument and/or measureable assessment of functional outcome.  Cumulative Therapy Evaluation is: Moderate complexity.    Previous and current functional limitations:  (See Goal Flow Sheet for this information)    Short term and Long term goals: (See Goal Flow Sheet for this  information)     Communication ability:  Patient appears to be able to clearly communicate and understand verbal and written communication and follow directions correctly.  Treatment Explanation - The following has been discussed with the patient:   RX ordered/plan of care  Anticipated outcomes  Possible risks and side effects  This patient would benefit from PT intervention to resume normal activities.   Rehab potential is good.    Frequency:  2 X week, once daily  Duration:  for 2 weeks tapering to 1 X a week over 4 weeks  Discharge Plan:  Achieve all LTG.  Independent in home treatment program.  Return to previous functional level by discharge.  Reach maximal therapeutic benefit.    Please refer to the daily flowsheet for treatment today, total treatment time and time spent performing 1:1 timed codes.

## 2019-07-09 NOTE — TELEPHONE ENCOUNTER
Patient requesting diabetic supplies.  Writer pended supplies.  Provider to review and send if ok.    Tracy Aponte RN

## 2019-07-12 ENCOUNTER — OFFICE VISIT (OUTPATIENT)
Dept: PODIATRY | Facility: CLINIC | Age: 44
End: 2019-07-12
Payer: COMMERCIAL

## 2019-07-12 ENCOUNTER — THERAPY VISIT (OUTPATIENT)
Dept: PHYSICAL THERAPY | Facility: CLINIC | Age: 44
End: 2019-07-12
Payer: COMMERCIAL

## 2019-07-12 VITALS — HEART RATE: 84 BPM | OXYGEN SATURATION: 97 % | HEIGHT: 64 IN | WEIGHT: 275 LBS | BODY MASS INDEX: 46.95 KG/M2

## 2019-07-12 DIAGNOSIS — M53.3 SACROILIAC JOINT DYSFUNCTION: ICD-10-CM

## 2019-07-12 DIAGNOSIS — E87.6 HYPOKALEMIA: Primary | ICD-10-CM

## 2019-07-12 DIAGNOSIS — L03.032 ONYCHIA OF TOE OF LEFT FOOT: Primary | ICD-10-CM

## 2019-07-12 DIAGNOSIS — Z79.4 TYPE 2 DIABETES MELLITUS WITH HYPERGLYCEMIA, WITH LONG-TERM CURRENT USE OF INSULIN (H): ICD-10-CM

## 2019-07-12 DIAGNOSIS — E11.65 TYPE 2 DIABETES MELLITUS WITH HYPERGLYCEMIA, WITH LONG-TERM CURRENT USE OF INSULIN (H): ICD-10-CM

## 2019-07-12 LAB
ANION GAP SERPL CALCULATED.3IONS-SCNC: 6 MMOL/L (ref 3–14)
BUN SERPL-MCNC: 15 MG/DL (ref 7–30)
CALCIUM SERPL-MCNC: 8.9 MG/DL (ref 8.5–10.1)
CHLORIDE SERPL-SCNC: 102 MMOL/L (ref 94–109)
CO2 SERPL-SCNC: 30 MMOL/L (ref 20–32)
CREAT SERPL-MCNC: 0.65 MG/DL (ref 0.52–1.04)
GFR SERPL CREATININE-BSD FRML MDRD: >90 ML/MIN/{1.73_M2}
GLUCOSE SERPL-MCNC: 195 MG/DL (ref 70–99)
POTASSIUM SERPL-SCNC: 3.3 MMOL/L (ref 3.4–5.3)
SODIUM SERPL-SCNC: 138 MMOL/L (ref 133–144)

## 2019-07-12 PROCEDURE — 80048 BASIC METABOLIC PNL TOTAL CA: CPT | Performed by: FAMILY MEDICINE

## 2019-07-12 PROCEDURE — 97014 ELECTRIC STIMULATION THERAPY: CPT | Mod: GP | Performed by: PHYSICAL THERAPIST

## 2019-07-12 PROCEDURE — 97112 NEUROMUSCULAR REEDUCATION: CPT | Mod: GP | Performed by: PHYSICAL THERAPIST

## 2019-07-12 PROCEDURE — 97110 THERAPEUTIC EXERCISES: CPT | Mod: GP | Performed by: PHYSICAL THERAPIST

## 2019-07-12 PROCEDURE — 99212 OFFICE O/P EST SF 10 MIN: CPT | Performed by: PODIATRIST

## 2019-07-12 PROCEDURE — 36415 COLL VENOUS BLD VENIPUNCTURE: CPT | Performed by: FAMILY MEDICINE

## 2019-07-12 ASSESSMENT — MIFFLIN-ST. JEOR: SCORE: 1877.39

## 2019-07-12 NOTE — LETTER
7/12/2019         RE: Cornelia Rodriguez  [address]  [address]  [facility] [address]        Dear Colleague,    Thank you for referring your patient, Cornelia Rodriguez, to the [facility]. Please see a copy of my visit note below.    Past Medical History:   Diagnosis Date     Diabetes (H)      Patient Active Problem List   Diagnosis     Acute recurrent maxillary sinusitis     Right chronic serous otitis media     Morbid obesity (H)     Migraine without aura and without status migrainosus, not intractable     Gastroesophageal reflux disease without esophagitis     Type 2 diabetes mellitus with hyperglycemia (H)     Allergic rhinitis, unspecified seasonality, unspecified trigger     Hyperlipidemia LDL goal <100     Allergic contact dermatitis due to adhesives     Back muscle spasm     History of Helicobacter pylori infection     Migraine     Seborrheic dermatitis of scalp     Type 2 diabetes mellitus (H)     Perennial allergic rhinitis     Hyperlipidemia     Abdominal pain     Low back pain     Occipital neuralgia of right side     Sacroiliac joint dysfunction     Past Surgical History:   Procedure Laterality Date     CHOLECYSTECTOMY       Social History     Socioeconomic History     Marital status: Single     Spouse name: Not on file     Number of children: Not on file     Years of education: Not on file     Highest education level: Not on file   Occupational History     Not on file   Social Needs     Financial resource strain: Not on file     Food insecurity:     Worry: Not on file     Inability: Not on file     Transportation needs:     Medical: Not on file     Non-medical: Not on file   Tobacco Use     Smoking status: Former Smoker     Smokeless tobacco: Never Used     Tobacco comment: quit 3/2013   Substance and Sexual Activity     Alcohol use: Yes     Comment: Occasionally     Drug use: No     Sexual activity: Not Currently     Partners: Male   Lifestyle     Physical activity:     Days per week:  Not on file     Minutes per session: Not on file     Stress: Not on file   Relationships     Social connections:     Talks on phone: Not on file     Gets together: Not on file     Attends Sabianism service: Not on file     Active member of club or organization: Not on file     Attends meetings of clubs or organizations: Not on file     Relationship status: Not on file     Intimate partner violence:     Fear of current or ex partner: Not on file     Emotionally abused: Not on file     Physically abused: Not on file     Forced sexual activity: Not on file   Other Topics Concern     Not on file   Social History Narrative     Not on file     Family History   Problem Relation Age of Onset     Diabetes Maternal Grandmother      Diabetes Sister      Hypertension Sister      Lab Results   Component Value Date    A1C 7.9 06/20/2019    A1C 10.8 03/27/2019     SUBJECTIVE FINDINGS:  A 43-year-old female returns to clinic for onychodystrophy and onychomycosis changes on the left hallux status post nail procedure.  She relates it is doing well.  She was using the econazole regularly, but now she is kind of intermittently using it.  She keeps forgetting to put it on, but she relates it feels much better.       OBJECTIVE FINDINGS:  Vascular status intact on the left.  She has a left hallux nail with some distal thickening, dystrophy and subungual debris that is mild.  There is no erythema, no drainage, no odor, no calor.       ASSESSMENT/PLAN:  Onychodystrophy and onychomycosis changes on the left hallux.  Diagnosis and treatment options discussed with her. Continue the econazole cream.  She will return to clinic and see me as needed.         Again, thank you for allowing me to participate in the care of your patient.        Sincerely,        Mariusz Phelps DPM

## 2019-07-12 NOTE — PROGRESS NOTES
Past Medical History:   Diagnosis Date     Diabetes (H)      Patient Active Problem List   Diagnosis     Acute recurrent maxillary sinusitis     Right chronic serous otitis media     Morbid obesity (H)     Migraine without aura and without status migrainosus, not intractable     Gastroesophageal reflux disease without esophagitis     Type 2 diabetes mellitus with hyperglycemia (H)     Allergic rhinitis, unspecified seasonality, unspecified trigger     Hyperlipidemia LDL goal <100     Allergic contact dermatitis due to adhesives     Back muscle spasm     History of Helicobacter pylori infection     Migraine     Seborrheic dermatitis of scalp     Type 2 diabetes mellitus (H)     Perennial allergic rhinitis     Hyperlipidemia     Abdominal pain     Low back pain     Occipital neuralgia of right side     Sacroiliac joint dysfunction     Past Surgical History:   Procedure Laterality Date     CHOLECYSTECTOMY       Social History     Socioeconomic History     Marital status: Single     Spouse name: Not on file     Number of children: Not on file     Years of education: Not on file     Highest education level: Not on file   Occupational History     Not on file   Social Needs     Financial resource strain: Not on file     Food insecurity:     Worry: Not on file     Inability: Not on file     Transportation needs:     Medical: Not on file     Non-medical: Not on file   Tobacco Use     Smoking status: Former Smoker     Smokeless tobacco: Never Used     Tobacco comment: quit 3/2013   Substance and Sexual Activity     Alcohol use: Yes     Comment: Occasionally     Drug use: No     Sexual activity: Not Currently     Partners: Male   Lifestyle     Physical activity:     Days per week: Not on file     Minutes per session: Not on file     Stress: Not on file   Relationships     Social connections:     Talks on phone: Not on file     Gets together: Not on file     Attends Yarsanism service: Not on file     Active member of club or  organization: Not on file     Attends meetings of clubs or organizations: Not on file     Relationship status: Not on file     Intimate partner violence:     Fear of current or ex partner: Not on file     Emotionally abused: Not on file     Physically abused: Not on file     Forced sexual activity: Not on file   Other Topics Concern     Not on file   Social History Narrative     Not on file     Family History   Problem Relation Age of Onset     Diabetes Maternal Grandmother      Diabetes Sister      Hypertension Sister      Lab Results   Component Value Date    A1C 7.9 06/20/2019    A1C 10.8 03/27/2019     SUBJECTIVE FINDINGS:  A 43-year-old female returns to clinic for onychodystrophy and onychomycosis changes on the left hallux status post nail procedure.  She relates it is doing well.  She was using the econazole regularly, but now she is kind of intermittently using it.  She keeps forgetting to put it on, but she relates it feels much better.       OBJECTIVE FINDINGS:  Vascular status intact on the left.  She has a left hallux nail with some distal thickening, dystrophy and subungual debris that is mild.  There is no erythema, no drainage, no odor, no calor.       ASSESSMENT/PLAN:  Onychodystrophy and onychomycosis changes on the left hallux.  Diagnosis and treatment options discussed with her. Continue the econazole cream.  She will return to clinic and see me as needed.

## 2019-07-12 NOTE — NURSING NOTE
"Cornelia Savage Dendron's chief complaint for this visit includes:  Chief Complaint   Patient presents with     Consult For     left foot toenail pain     PCP: Sarah Lombardo    Referring Provider:  No referring provider defined for this encounter.    Pulse 84   Ht 1.626 m (5' 4\")   Wt 124.7 kg (275 lb)   LMP  (LMP Unknown)   SpO2 97%   BMI 47.20 kg/m    Data Unavailable     Do you need any medication refills at today's visit? no      "

## 2019-07-12 NOTE — PATIENT INSTRUCTIONS
Thanks for coming today.  Ortho/Sports Medicine Clinic  [address] [place] [address]    To schedule future appointments in Ortho Clinic, you may call [phone].    To schedule ordered imaging by your Provider: Call [facility] at [phone]    [facility] available online at:   [email or web address]    Please call if any further questions or concerns [phone] and ask for the Orthopedic Department. Clinic hours 8 am to 5 pm.    Return to clinic if symptoms worsen.

## 2019-07-15 ENCOUNTER — OFFICE VISIT (OUTPATIENT)
Dept: FAMILY MEDICINE | Facility: CLINIC | Age: 44
End: 2019-07-15
Payer: COMMERCIAL

## 2019-07-15 VITALS
WEIGHT: 276 LBS | HEART RATE: 88 BPM | BODY MASS INDEX: 47.12 KG/M2 | OXYGEN SATURATION: 97 % | HEIGHT: 64 IN | TEMPERATURE: 97.6 F | SYSTOLIC BLOOD PRESSURE: 124 MMHG | DIASTOLIC BLOOD PRESSURE: 80 MMHG

## 2019-07-15 DIAGNOSIS — E11.65 TYPE 2 DIABETES MELLITUS WITH HYPERGLYCEMIA, WITH LONG-TERM CURRENT USE OF INSULIN (H): ICD-10-CM

## 2019-07-15 DIAGNOSIS — M54.42 ACUTE LEFT-SIDED LOW BACK PAIN WITH LEFT-SIDED SCIATICA: Primary | ICD-10-CM

## 2019-07-15 DIAGNOSIS — Z79.4 TYPE 2 DIABETES MELLITUS WITH HYPERGLYCEMIA, WITH LONG-TERM CURRENT USE OF INSULIN (H): ICD-10-CM

## 2019-07-15 PROCEDURE — 99213 OFFICE O/P EST LOW 20 MIN: CPT | Performed by: FAMILY MEDICINE

## 2019-07-15 ASSESSMENT — MIFFLIN-ST. JEOR: SCORE: 1881.93

## 2019-07-15 ASSESSMENT — PAIN SCALES - GENERAL: PAINLEVEL: MILD PAIN (3)

## 2019-07-15 NOTE — PROGRESS NOTES
Subjective     Cornelia Rodriguez is a 43 year old female who presents to clinic today for the following health issues:    HPI   Back Pain       Duration: 2 weeks        Specific cause: none    Description:   Location of pain: low back left  Character of pain: waxing and waning  Pain radiation:radiates into the left buttocks and radiates into the left leg  New numbness or weakness in legs, not attributed to pain:  no     Intensity: Currently 3/10    History:   Pain interferes with job: YES  History of back problems: recurrent self limited episodes of low back pain in the past  Any previous MRI or X-rays: Yes- at [facility].  Date 7-8-19  Sees a specialist for back pain:  No  Therapies tried without relief: Physical Therapy    Alleviating factors:   Improved by: prednisone      Precipitating factors:  Worsened by: Bending      Accompanying Signs & Symptoms:  Risk of Fracture:  None  Risk of Cauda Equina:  None  Risk of Infection:  None  Risk of Cancer:  None  Risk of Ankylosing Spondylitis:  Onset at age <35, male, AND morning back stiffness. no     -Patient reports that pain is getting better. She took prednisone and it worked well for her. Her blood sugar levels have been in the 140-150s.   -She says that she is about 50% better. She no longer feels pain down her leg, the pain is just in her lower back.   -She has seen PT twice in the last week. Her first session was a little more aggressive than she would have liked and increased her pain, her second session went better, with more massage therapy and stretches. She has been using a muscle relaxant, but does not want to rely on it.   -She purchased a otc TENS unit a uses it on her lower back which has provided some relief.   -Denies any new symptoms, giveaway of her legs, bladder/stool incontinence,     Additional:  -Received a call from her boss that she is not allowed to return to work until she is 100% better and can perform 100% of her job duties.  If she cannot she  will remain on disability leave. Patient reports she needs to lift 70#'s at work  -Has an appointment with diabetic educator on the 7/18/19, and an appointment with the liver doctor on the 8/22/19.  -Patient had low potassium level. She says that she normally eats one banana every other day but has not been eating them lately. She has also been experiencing some loose stools recently when taking Miralax which may have contributed to these low levels.        Patient Active Problem List   Diagnosis     Acute recurrent maxillary sinusitis     Right chronic serous otitis media     Morbid obesity (H)     Migraine without aura and without status migrainosus, not intractable     Gastroesophageal reflux disease without esophagitis     Type 2 diabetes mellitus with hyperglycemia (H)     Allergic rhinitis, unspecified seasonality, unspecified trigger     Hyperlipidemia LDL goal <100     Allergic contact dermatitis due to adhesives     Back muscle spasm     History of Helicobacter pylori infection     Migraine     Seborrheic dermatitis of scalp     Type 2 diabetes mellitus (H)     Perennial allergic rhinitis     Hyperlipidemia     Abdominal pain     Low back pain     Occipital neuralgia of right side     Sacroiliac joint dysfunction     Past Surgical History:   Procedure Laterality Date     CHOLECYSTECTOMY         Social History     Tobacco Use     Smoking status: Former Smoker     Smokeless tobacco: Never Used     Tobacco comment: quit 3/2013   Substance Use Topics     Alcohol use: Yes     Comment: Occasionally     Family History   Problem Relation Age of Onset     Diabetes Maternal Grandmother      Diabetes Sister      Hypertension Sister              Reviewed and updated as needed this visit by Provider         Review of Systems   ROS COMP: Constitutional, HEENT, cardiovascular, pulmonary, gi and gu systems are negative, except as otherwise noted.      This document serves as a record of the services and decisions  "personally performed by ANGI BRAGA. It was created on his/her behalf by KAISER Fernandez, a trained medical scribe. The creation of this document is based on the provider's statements to the medical scribe. Carolann Rebecca, July 15, 2019 9:45 AM    Objective    /80 (BP Location: Right arm, Patient Position: Chair, Cuff Size: Adult Large)   Pulse 88   Temp 97.6  F (36.4  C) (Oral)   Ht 1.626 m (5' 4\")   Wt 125.2 kg (276 lb)   LMP 07/12/2019 (Exact Date)   SpO2 97%   Breastfeeding? No   BMI 47.38 kg/m    Body mass index is 47.38 kg/m .  Physical Exam   GENERAL: healthy, alert and no distress  MS: no gross musculoskeletal defects noted, no edema  BACK: lower lumbar SI joint tenderness,  no CVA tenderness, full rom.   Neuro: nl gait, heel and tip toe walking.     Assessment & Plan     1. Acute left-sided low back pain with left-sided sciatica  Improving. Radicular sx's have resolved.  PT started. Work form completed to be off again for few more weeks. Please see scanned copy. F/u can be as needed- only if sx's not improved to point she can return to work.     2. Type 2 diabetes mellitus with hyperglycemia, with long-term current use of insulin (H)  Minimal hyperglycemia with recent steroid. Continue current medication. Repeat metabolic panel- ? Low potassium due to looser stools from the miralax recently. Restart daily banana  - empagliflozin (JARDIANCE) 25 MG TABS tablet; Take 1 tablet (25 mg) by mouth daily  Dispense: 90 tablet; Refill: 3     BMI:   Estimated body mass index is 47.38 kg/m  as calculated from the following:    Height as of this encounter: 1.626 m (5' 4\").    Weight as of this encounter: 125.2 kg (276 lb).   Weight management plan: not addressed at this visit         Return in about 3 weeks (around 8/5/2019) for Recheck, Lab Work.  Length of visit was 17 minutes with more than 50 percent of that time used for discussing medical concerns and education    The information in " this document, created by the medical scribe for me, accurately reflects the services I personally performed and the decisions made by me. I have reviewed and approved this document for accuracy.   Sarah Lombardo MD  [facility]

## 2019-07-16 ENCOUNTER — THERAPY VISIT (OUTPATIENT)
Dept: PHYSICAL THERAPY | Facility: CLINIC | Age: 44
End: 2019-07-16
Attending: FAMILY MEDICINE
Payer: COMMERCIAL

## 2019-07-16 DIAGNOSIS — M53.3 SACROILIAC JOINT DYSFUNCTION: ICD-10-CM

## 2019-07-16 PROCEDURE — 97110 THERAPEUTIC EXERCISES: CPT | Mod: GP | Performed by: PHYSICAL THERAPIST

## 2019-07-16 PROCEDURE — 97112 NEUROMUSCULAR REEDUCATION: CPT | Mod: GP | Performed by: PHYSICAL THERAPIST

## 2019-07-16 PROCEDURE — 97014 ELECTRIC STIMULATION THERAPY: CPT | Mod: GP | Performed by: PHYSICAL THERAPIST

## 2019-07-18 ENCOUNTER — TELEPHONE (OUTPATIENT)
Dept: EDUCATION SERVICES | Facility: CLINIC | Age: 44
End: 2019-07-18

## 2019-07-18 ENCOUNTER — ALLIED HEALTH/NURSE VISIT (OUTPATIENT)
Dept: EDUCATION SERVICES | Facility: CLINIC | Age: 44
End: 2019-07-18
Payer: COMMERCIAL

## 2019-07-18 VITALS — WEIGHT: 273.4 LBS | HEIGHT: 64 IN | BODY MASS INDEX: 46.67 KG/M2

## 2019-07-18 DIAGNOSIS — Z79.4 TYPE 2 DIABETES MELLITUS WITH HYPERGLYCEMIA, WITH LONG-TERM CURRENT USE OF INSULIN (H): Primary | ICD-10-CM

## 2019-07-18 DIAGNOSIS — E11.65 TYPE 2 DIABETES MELLITUS WITH HYPERGLYCEMIA, WITH LONG-TERM CURRENT USE OF INSULIN (H): Primary | ICD-10-CM

## 2019-07-18 PROCEDURE — G0108 DIAB MANAGE TRN  PER INDIV: HCPCS

## 2019-07-18 PROCEDURE — 99207 ZZC DROP WITH A PROCEDURE: CPT

## 2019-07-18 RX ORDER — FLASH GLUCOSE SENSOR
1 KIT MISCELLANEOUS
Qty: 2 EACH | Refills: 11 | Status: SHIPPED | OUTPATIENT
Start: 2019-07-18 | End: 2019-08-08

## 2019-07-18 RX ORDER — FLASH GLUCOSE SCANNING READER
1 EACH MISCELLANEOUS DAILY
Qty: 1 DEVICE | Refills: 0 | Status: SHIPPED | OUTPATIENT
Start: 2019-07-18 | End: 2019-08-08

## 2019-07-18 ASSESSMENT — MIFFLIN-ST. JEOR: SCORE: 1866.16

## 2019-07-18 NOTE — PATIENT INSTRUCTIONS
"1. Increase Lantus to 44 units in the morning and 44 units in the evening.  If you start to have blood glucose drop under 100 mg/dL or go back to work, go back down to 40 units in the morning and 40 units in the evening.       Sensor Instructions:  1. The first hour after you place the sensor is the warm up period (black out period).  You will need to carry your blood glucose meter and check blood glucose during this time.    2. Check blood sugar if feeling symptoms of hypoglycemia but meter is not displaying a low number- may be some variability between sensor and meter at times.    3. Change sensor every 14 days.    4. Read/scan blood sugars every 8 hours to ensure entirety of data is available.     5. Watch trend arrows- will let you know if blood sugars are rising, falling or stable at the time you check.    6. It is okay to shower, bathe, and swim (up to 3 feet deep for 30 minutes) with sensor. Do not get reader wet.    7. Remove the sensor if you need to have an MRI or CT scan.    8. Do not cover the sensor with extra adhesive (the small hole  in the center of the sensor must remain uncovered).  If you have trouble with sensor falling off, these are approved products to help with sensor adhesion: Torbot Skin Tac, SKIN-PREP Protective Barrier Wipe and Mastisol Liquid Adhesive      Discount card:  1.  Go to singlecare.com and sign up for discount card.  This is free.  2.  Search under prescriptions for \"Freestyle System.\"  3. Enter zip code to location you want to go  4.  Choose which pharmacy your prescription was requested ([facility], [facility] or [facility], etc)   5. Choose how you would like to receive the savings card (email, text or print).  It will ask for your name and email address/phone number.  6. Take this card to the pharmacy and show it to your pharmacist when you  your prescription.     Software:    Freestyle Lynne -can find under \"support tab\"   [email or web address]    Follow-up appointment: " Tuesday, July 30th at 11am at [facility]    If personal not covered, can plan to place professionally - procedure 27228, 72+ hour continuous glucose sensor, placed in clinic    Steph Beal RD, LD, CDE   [phone]

## 2019-07-18 NOTE — TELEPHONE ENCOUNTER
Hi Dr. Lombardo,    Instructed on Freestyle Lynne use and Cornelia Savage would like to proceed to see if her insurance will cover.  If you are agreeable to this, can you please sign pending prescription for her?    Will see if Margarita able to get and if not, can send to another pharmacy.    Thanks!  Steph Beal RD, LD, CDE

## 2019-07-18 NOTE — Clinical Note
Hi Dr. Lombardo,Just FYI- suggested Cornelia Savage increase Lantus to 44 units both AM and PM due to higher blood glucose from being less active (not working). Told her to go back down to 40 units BID when restarts working or if sees any pre-meal blood glucose <100 mg/dL.  She was comfortable with this plan.Thanks,Steph Beal RD, LD, CDE

## 2019-07-18 NOTE — PROCEDURES
Diabetes Self-Management Education & Support      Diabetes Self-Management Education & Support - Personal CGM Start    SUBJECTIVE/OBJECTIVE  Presents for: Individual review  Accompanied by: Self  Diabetes education in the past 24mo: No  Focus of Visit: CGM, Assistance w/ making life changes  Diabetes type: Type 2  Date of diagnosis:   Disease course: Worsening  How confident are you filling out medical forms by yourself:: Extremely  Diabetes management related comments/concerns: Says her first medication was Metformin but made her sick but had diarrhea.  Says works at [employer] and constantly uses her hands.      Interested in CGM so not have to check blood glucose since her fingers hurt.      Says blood glucose got back since she was on a steroid.  Says she has almost been off of it for 1 week.    Says has horrible sweet tooth but learning moderation.  Will go to the [employer] store and limit candy for the day to 1 box.  Says she won't give up candy.     Says seeing improved blood glucose with dividing out Lantus.    Transportation concerns: No  Other concerns:: Glasses  Cultural Influences/Ethnic Background:  American      Patient seen today for Personal CGM Start:         Healthy Eating  Dinner: potato salad, macaroni and mini subs(3:30-4:30pm)  Beverages: Diet soda, Water(Diet 1x/day, sugar-free 1-2x/day)    Being Active       Monitoring  Monitoring Assessed Today: Yes  Did patient bring glucose meter to appointment? : No  Blood Glucose Meter: Unknown  Home Glucose (Sugar) Monitorin-2 times per day    B/18: 162  717: 149, -, -/156  716: 136, -, 142  715: 149, -, 142  712: 144  7/11: 151  7/10: 125  7/9: 114   7/8: 184  7/7: 143  7/6: 104  7/5: 100  7/4: 93  7/3: 86, -, -/167  7/1: 74, -, -/139      Taking Medications  Diabetes Medication(s)     Insulin       insulin glargine (LANTUS SOLOSTAR PEN) 100 UNIT/ML pen    Inject 40 Units Subcutaneous 2 times daily    Sodium-Glucose Co-Transporter 2 (SGLT2)  Inhibitors       empagliflozin (JARDIANCE) 25 MG TABS tablet    Take 1 tablet (25 mg) by mouth daily    Sulfonylureas       glipiZIDE (GLUCOTROL XL) 10 MG 24 hr tablet    Take 2 tablets (20 mg) by mouth daily    Incretin Mimetic Agents (GLP-1 Receptor Agonists)       dulaglutide (TRULICITY) 1.5 MG/0.5ML pen    Inject 1.5 mg Subcutaneous every 7 days          Taking Medication Assessed Today: Yes  Current Treatments: Oral Agent (dual therapy), Non-insulin Injectables, Insulin Injections(Saturdays - Trulicity.  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)  Injection/Infusion sites: Abdomen  Problems taking diabetes medications regularly?: No  Diabetes medication side effects?: No  Treatment Compliance: All of the time(Sometimes time may vary)    Problem Solving  Problem Solving Assessed Today: Yes  Hypoglycemia Frequency: Rarely  Hypoglycemia Treatment: Candy(Hard candy)  Patient carries a carbohydrate source: Yes    Hypoglycemia symptoms  Confusion: Yes  Dizziness or Light-Headedness: Yes(Weakness and fatigue)  Headaches: No  Hunger: No  Mood changes: No  Nervousness/Anxiety: No  Sleepiness: No  Speech difficulty: No  Sweats: Yes  Tremors: No    Hypoglycemia Complications  Blackouts: No  Hospitalization: No  Nocturnal hypoglycemia: No  Required assistance: No  Required glucagon injection: No  Seizures: No    Reducing Risks       Healthy Coping     Patient Activation Measure Survey Score:  TARSHA Score (Last Two) 2/25/2019   TARSHA Raw Score 34   Activation Score 68.9   TARSHA Level 3         ASSESSMENT  In the past 2 weeks, Cornelia Savage only had 39% pre-meal blood glucose to target but 100% post-meal blood glucose at target.  She is currently not working due to pain-related issues.  Indicates earlier in month when fasting blood glucose in target, was working.  Recommended she increase Lantus by 10% (4 units) in the morning and in the evening, increasing to 44  units.  Once goes back to work or sees pre-meal blood glucose lower back <100 mg/dL, then to lower back down to 40 units both in the morning and evening.  Cornelia Savage is agreeable to this plan.     Rest of time spent discussing CGM Freestyle Lynne personal sensor application and use.  Encouraged her to scan at least 3-4 times a day (every 8 hours or more frequent) and suggested scanning minimum before meals and bed.  Discussed when to use regular meter to check blood glucose.  Sent prescription for MD to sign (telephone encounter from 7/18/19).  If this is not covered, patient to call insurance to see if professional 72+ hour covered and will plan to place at follow up.  If personal one covered, patient to insert if comfortable; can help also at follow up or review sensor results if she has placed to discussed other changes and continue education.  Pt verbalized understanding of concepts discussed and recommendations provided.       Education provided today on:  AADE Self-Care Behaviors:  Diabetes Pathophysiology  Monitoring: purpose, proper technique, log and interpret results, individual blood glucose targets and frequency of monitoring  Taking Medication: action of prescribed medication, proper site selection and rotation for injections, side effects of prescribed medications and when to take medications  Problem Solving: high blood glucose - causes, signs/symptoms, treatment and prevention, low blood glucose - causes, signs/symptoms, treatment and prevention, carrying a carbohydrate source at all times and when to call health care provider    Opportunities for ongoing education and support in diabetes-self management were discussed.    Pt verbalized understanding of concepts discussed and recommendations provided today.       Education Materials Provided:  No new materials provided today    CGM-specific education:   Freestyle Lynne sensor: insertion technique, sensor site location and rotation, insulin  administration in relation to sensor placement, sensor wear, reasons to remove sensor (MRI, CT, diathermy), Vitamin C & Aspirin effects on sensor, Lynne [place]: frequency of scanning sensor, length of time data is visible, use of built in glucose meter, Precision X-tra test strips and Use of trends and graphs for pattern management and problem solving      PLAN  See Patient Instructions for co-developed, patient-stated behavior change goals.  AVS printed and provided to patient today. See Follow-Up section for recommended follow-up.    Steph Beal RD, LD, CDE   Time Spent: 65 minutes  Encounter Type: Individual    Any diabetes medication dose changes were made via the CDE Protocol and Collaborative Practice Agreement with the patient's referring provider. A copy of this encounter was shared with the provider.

## 2019-07-22 ENCOUNTER — OFFICE VISIT (OUTPATIENT)
Dept: GASTROENTEROLOGY | Facility: CLINIC | Age: 44
End: 2019-07-22
Attending: INTERNAL MEDICINE
Payer: COMMERCIAL

## 2019-07-22 ENCOUNTER — PRE VISIT (OUTPATIENT)
Dept: GASTROENTEROLOGY | Facility: CLINIC | Age: 44
End: 2019-07-22

## 2019-07-22 VITALS
SYSTOLIC BLOOD PRESSURE: 141 MMHG | BODY MASS INDEX: 47.82 KG/M2 | WEIGHT: 276.4 LBS | OXYGEN SATURATION: 96 % | DIASTOLIC BLOOD PRESSURE: 79 MMHG | HEART RATE: 94 BPM

## 2019-07-22 DIAGNOSIS — R74.8 ELEVATED LIVER ENZYMES: Primary | ICD-10-CM

## 2019-07-22 DIAGNOSIS — K76.0 FATTY LIVER: ICD-10-CM

## 2019-07-22 DIAGNOSIS — R74.8 ELEVATED LIVER ENZYMES: ICD-10-CM

## 2019-07-22 DIAGNOSIS — E11.9 TYPE 2 DIABETES MELLITUS WITHOUT COMPLICATION, WITH LONG-TERM CURRENT USE OF INSULIN (H): ICD-10-CM

## 2019-07-22 DIAGNOSIS — R51.9 OCCIPITAL PAIN: ICD-10-CM

## 2019-07-22 DIAGNOSIS — E11.65 TYPE 2 DIABETES MELLITUS WITH HYPERGLYCEMIA, WITH LONG-TERM CURRENT USE OF INSULIN (H): ICD-10-CM

## 2019-07-22 DIAGNOSIS — E87.6 HYPOKALEMIA: ICD-10-CM

## 2019-07-22 DIAGNOSIS — Z79.4 TYPE 2 DIABETES MELLITUS WITH HYPERGLYCEMIA, WITH LONG-TERM CURRENT USE OF INSULIN (H): ICD-10-CM

## 2019-07-22 DIAGNOSIS — Z79.4 TYPE 2 DIABETES MELLITUS WITHOUT COMPLICATION, WITH LONG-TERM CURRENT USE OF INSULIN (H): ICD-10-CM

## 2019-07-22 DIAGNOSIS — E78.5 DYSLIPIDEMIA: ICD-10-CM

## 2019-07-22 LAB
ALBUMIN SERPL-MCNC: 4.1 G/DL (ref 3.4–5)
ALP SERPL-CCNC: 71 U/L (ref 40–150)
ALT SERPL W P-5'-P-CCNC: 53 U/L (ref 0–50)
ANION GAP SERPL CALCULATED.3IONS-SCNC: 2 MMOL/L (ref 3–14)
AST SERPL W P-5'-P-CCNC: 20 U/L (ref 0–45)
BASOPHILS # BLD AUTO: 0.1 10E9/L (ref 0–0.2)
BASOPHILS NFR BLD AUTO: 0.8 %
BILIRUB DIRECT SERPL-MCNC: <0.1 MG/DL (ref 0–0.2)
BILIRUB SERPL-MCNC: 0.4 MG/DL (ref 0.2–1.3)
BUN SERPL-MCNC: 8 MG/DL (ref 7–30)
CALCIUM SERPL-MCNC: 9.4 MG/DL (ref 8.5–10.1)
CHLORIDE SERPL-SCNC: 105 MMOL/L (ref 94–109)
CO2 SERPL-SCNC: 30 MMOL/L (ref 20–32)
CREAT SERPL-MCNC: 0.62 MG/DL (ref 0.52–1.04)
DIFFERENTIAL METHOD BLD: ABNORMAL
EOSINOPHIL # BLD AUTO: 0.2 10E9/L (ref 0–0.7)
EOSINOPHIL NFR BLD AUTO: 2.4 %
ERYTHROCYTE [DISTWIDTH] IN BLOOD BY AUTOMATED COUNT: 12.4 % (ref 10–15)
GFR SERPL CREATININE-BSD FRML MDRD: >90 ML/MIN/{1.73_M2}
GLUCOSE SERPL-MCNC: 147 MG/DL (ref 70–99)
HCT VFR BLD AUTO: 47.6 % (ref 35–47)
HGB BLD-MCNC: 15.6 G/DL (ref 11.7–15.7)
IGA SERPL-MCNC: 262 MG/DL (ref 70–380)
IGG SERPL-MCNC: 786 MG/DL (ref 695–1620)
IMM GRANULOCYTES # BLD: 0.1 10E9/L (ref 0–0.4)
IMM GRANULOCYTES NFR BLD: 0.7 %
INR PPP: 0.97 (ref 0.86–1.14)
LYMPHOCYTES # BLD AUTO: 2 10E9/L (ref 0.8–5.3)
LYMPHOCYTES NFR BLD AUTO: 26.4 %
MCH RBC QN AUTO: 29.7 PG (ref 26.5–33)
MCHC RBC AUTO-ENTMCNC: 32.8 G/DL (ref 31.5–36.5)
MCV RBC AUTO: 91 FL (ref 78–100)
MONOCYTES # BLD AUTO: 0.4 10E9/L (ref 0–1.3)
MONOCYTES NFR BLD AUTO: 5.5 %
NEUTROPHILS # BLD AUTO: 4.9 10E9/L (ref 1.6–8.3)
NEUTROPHILS NFR BLD AUTO: 64.2 %
NRBC # BLD AUTO: 0 10*3/UL
NRBC BLD AUTO-RTO: 0 /100
PLATELET # BLD AUTO: 293 10E9/L (ref 150–450)
POTASSIUM SERPL-SCNC: 3.8 MMOL/L (ref 3.4–5.3)
PROT SERPL-MCNC: 7.8 G/DL (ref 6.8–8.8)
RBC # BLD AUTO: 5.25 10E12/L (ref 3.8–5.2)
SODIUM SERPL-SCNC: 137 MMOL/L (ref 133–144)
WBC # BLD AUTO: 7.6 10E9/L (ref 4–11)

## 2019-07-22 PROCEDURE — 83516 IMMUNOASSAY NONANTIBODY: CPT | Performed by: FAMILY MEDICINE

## 2019-07-22 PROCEDURE — 36415 COLL VENOUS BLD VENIPUNCTURE: CPT | Performed by: FAMILY MEDICINE

## 2019-07-22 PROCEDURE — 82784 ASSAY IGA/IGD/IGG/IGM EACH: CPT | Performed by: FAMILY MEDICINE

## 2019-07-22 PROCEDURE — 86038 ANTINUCLEAR ANTIBODIES: CPT | Performed by: FAMILY MEDICINE

## 2019-07-22 PROCEDURE — 86038 ANTINUCLEAR ANTIBODIES: CPT | Performed by: INTERNAL MEDICINE

## 2019-07-22 PROCEDURE — 80048 BASIC METABOLIC PNL TOTAL CA: CPT | Performed by: FAMILY MEDICINE

## 2019-07-22 PROCEDURE — 85610 PROTHROMBIN TIME: CPT | Performed by: FAMILY MEDICINE

## 2019-07-22 PROCEDURE — 85025 COMPLETE CBC W/AUTO DIFF WBC: CPT | Performed by: FAMILY MEDICINE

## 2019-07-22 PROCEDURE — G0463 HOSPITAL OUTPT CLINIC VISIT: HCPCS | Mod: ZF

## 2019-07-22 PROCEDURE — 80076 HEPATIC FUNCTION PANEL: CPT | Performed by: FAMILY MEDICINE

## 2019-07-22 PROCEDURE — 83516 IMMUNOASSAY NONANTIBODY: CPT | Mod: 91 | Performed by: INTERNAL MEDICINE

## 2019-07-22 ASSESSMENT — PAIN SCALES - GENERAL: PAINLEVEL: NO PAIN (0)

## 2019-07-22 NOTE — PATIENT INSTRUCTIONS
".Non-Alcoholic Fatty Liver Disease  - I had a long discussion with the patient about nonalcoholic fatty liver disease (NAFLD).  We discussed how fat deposits in the liver, how this leads to inflammation, and how chronic inflammation (DENNY) can ultimately lead to scarring and cirrhosis.  The long-term complications of fatty liver disease were discussed with the patient, including the increased risk of cardiovascular disease complications,the risk of developing diabetes (if not already), and variable progression to cirrhosis and end stage liver disease.    Management of NAFLD/DENNY  - I did discuss with the patient about an appropriate diet, exercise and weight loss plan.    - The patient should exercise regularly 4+ times per week, with an average of about 45 minutes per day.   - The patient should be on low-carbohydrate, low-calorie diet (6440-8362 calories per day). The weight loss plan is to lose 7-10% of body weight in the next three to six months' time.  It has shown that patients who exercise regularly can have improvement of insulin resistance and resolution of fatty liver disease, even if they are not able to lose weight.   - Diabetes management is crucial with ideal goal Hgb A1c goal of =/< 7%. If possible addition of insulin sensitizing agents like metformin of liraglutide will be helpful.    - Management of elevated cholesterol is crucial in this population.  The use of \"statins\" (HMG-CoA reductase medications) are an effective means of therapy and are not contra-indicated in those with abnormal liver tests OR those with cirrhosis.  The value of these medications in this population far outweigh the minor risks of abnormal liver tests.  Goal LDL in those with DENNY are < 100 mg/dL  - Consider the utility of liberalizing coffee consumption as some data that this may slow progression and reverse effects of DENNY-related fibrosis.  - Goal weight in 6 months (276lb today) -> 255 lbs  "

## 2019-07-22 NOTE — PROGRESS NOTES
"Date of Service: 7/22/2019     Referring Provider: Dr. Sarah Lombardo    Subjective:            Cornelia Rodriguez is a 43 year old female presenting for evaluation of abnormal liver tests    History of Present Illness   Cornelia Rodriguez is a 43 year old female with past medical history of morbid obesity, diabetes mellitus on insulin, dyslipidemia who presents with concerns of imaging evidence of fatty liver disease and abnormal liver tests.    She notes that she was only told that she had abnormal liver tests and fatty liver few months ago.  Prior to this she does not remember ever being informed of this.  She denies a significant history of alcohol use letter alone alcohol.  Denies history of IV or inhaled drugs of abuse.  She reports that she is not currently working she is out on disability secondary to a low back injury with sciatic pain.  She admits to a relatively sedentary lifestyle.  She notes that she does not eat regularly routinely, only eating when she is \"hungry\".  She notes that her current weight is at its peak, weighing 276 pounds on today's clinic exam.    She admits to generalized chronic fatigue, intermittent abdominal pain which she relates to insulin use.  Denies overt confusion or memory changes.  Denies recent changes in her bowel habits.  Denies significant issues related to swelling in her lower extremities or abdomen.    She denies a family history of liver disease, but does note that her maternal grandmother and sister both have diabetes.  She has an 18-year-old son who is overweight but otherwise healthy.    In April 2019 she had liver tests checked which demonstrated AST 58, , alkaline phosphatase 74, total bilirubin 0.5.  An abdominal ultrasound was performed on May 17, 2019 which demonstrated \"diffuse increased echogenicity of the liver, with the liver measuring 17.7 cm.  There is no overt hepatic masses.  The gallbladder was absent.  Common bile duct measured 7 mm.\"    Past " Medical History:  Past Medical History:   Diagnosis Date     Diabetes (H)    -Morbid obesity  -GERD  -Dyslipidemia    Surgical History:  Past Surgical History:   Procedure Laterality Date     CHOLECYSTECTOMY         Social History:  Social History     Tobacco Use     Smoking status: Former Smoker     Smokeless tobacco: Never Used     Tobacco comment: quit 3/2013   Substance Use Topics     Alcohol use: Yes     Comment: Occasionally     Drug use: No        Family History:  Family History   Problem Relation Age of Onset     Diabetes Maternal Grandmother      Diabetes Sister      Hypertension Sister        Medications:  Current Outpatient Medications   Medication     acetaminophen (TYLENOL) 325 MG tablet     albuterol (PROVENTIL HFA) 108 (90 Base) MCG/ACT inhaler     blood glucose (NO BRAND SPECIFIED) test strip     blood glucose monitoring (ACCU-CHEK MULTICLIX) lancets     blood glucose monitoring (NO BRAND SPECIFIED) meter device kit     calcium ascorbate 500 MG TABS     cetirizine (ZYRTEC) 10 MG tablet     Continuous Blood Gluc  (FREESTYLE ELINA 14 DAY READER) JUAN     Continuous Blood Gluc Sensor (FREESTYLE ELINA 14 DAY SENSOR) MISC     dulaglutide (TRULICITY) 1.5 MG/0.5ML pen     DULoxetine (CYMBALTA) 30 MG capsule     econazole nitrate 1 % external cream     empagliflozin (JARDIANCE) 25 MG TABS tablet     glipiZIDE (GLUCOTROL XL) 10 MG 24 hr tablet     insulin glargine (LANTUS SOLOSTAR) 100 UNIT/ML pen     insulin pen needle (32G X 4 MM) 32G X 4 MM miscellaneous     ketoconazole (NIZORAL) 2 % external shampoo     methocarbamol (ROBAXIN) 750 MG tablet     montelukast (SINGULAIR) 10 MG tablet     Multiple Vitamins-Minerals (HM HAIR/SKIN/NAILS) TABS     multivitamin w/minerals (MULTI-VITAMIN) tablet     ranitidine (ZANTAC) 300 MG tablet     rosuvastatin (CRESTOR) 10 MG tablet     sucralfate (CARAFATE) 1 GM tablet     SUMAtriptan (IMITREX) 100 MG tablet     No current facility-administered medications for this  visit.        Review of Systems  A complete 10 point review of systems was asked and answered in the negative unless specifically commented upon in the HPI    Objective:         Vitals:    07/22/19 0928   BP: 141/79   Pulse: 94   SpO2: 96%   Weight: 125.4 kg (276 lb 6.4 oz)     Body mass index is 47.82 kg/m .     Physical Exam    Vitals reviewed.   Constitutional: Well-developed, well-nourished, in no apparent distress.    HEENT: Normocephalic. no scleral icterus. Moist oral mucosa. Dentition moderate  Neck/Lymph: Normal ROM, supple. No thyromegaly.  No lymphadenopathy  Cardiac:  Regular rate and rhythm.  No overt murmurs  Respiratory: Clear to auscultation bilaterally.  No wheezes or rales  GI:  Abdomen soft, obese, non-tender. BS present. no shifting dullness. No overt hepatosplenomegaly.     Skin:  Skin is warm and dry. No rash noted.  no jaundice. no spider nevi noted.  no palmar erythema  Peripheral Vascular: no lower extremity edema. 2+ pulses in all extremities  Musculoskeletal:  ROM intact, normal muscle bulk    Psychiatric: Normal mood and affect. Behavior is normal.  Neuro:  no asterixis, no tremor    Labs and Diagnostic tests:  Lab Results   Component Value Date    BILITOTAL 0.5 04/19/2019     04/19/2019    AST 58 04/19/2019    ALKPHOS 74 04/19/2019     Lab Results   Component Value Date    ALBUMIN 4.2 04/19/2019    PROTTOTAL 7.5 04/19/2019        Imaging:  Abd US 5/17/2019  FINDINGS:   Fluid: No evidence of ascites or pleural effusions.  Pancreas: Visualized portions are within normal limits.  Liver: The liver has diffuse increased liver echogenicity which  attenuates the ultrasound beam for evaluation of underlying  structures, measuring 17.7 cm in craniocaudal dimension. The main  portal vein has antegrade flow, measuring 0.9 cm. No focal liver lesion.  Gallbladder: Surgically absent  Bile Ducts: No bile duct dilation.  The common bile duct measures 7 mm in diameter.  Kidney: The right kidney  measures 11.4 cm long. There is no hydronephrosis.  Aorta and IVC: The visualized portions are non-dilated.       Assessment and Plan:    Abnormal Liver Tests:    -Based on available data, felt the patient's abnormal liver tests are most likely related to long-standing history of morbid obesity and uncontrolled diabetes with resultant nonalcoholic fatty liver disease  -She denies a significant history of alcohol use and denies a history of high risk for viral hepatitis.  -Noted that lab test for hepatitis B and C were both negative  - Will evaluate for autoimmune liver disease    Non-Alcoholic Fatty Liver Disease  - I had a long discussion with the patient about nonalcoholic fatty liver disease (NAFLD).  We discussed how fat deposits in the liver, how this leads to inflammation, and how chronic inflammation (DENNY) can ultimately lead to scarring and cirrhosis.  The long-term complications of fatty liver disease were discussed with the patient, including the increased risk of cardiovascular disease complications,the risk of developing diabetes (if not already), and variable progression to cirrhosis and end stage liver disease.  - At this time, the only way to differentiate between benign steatosis vs. nonalcoholic steatohepatitis (DENNY) is to perform a liver biopsy.  The liver biopsy would be important for diagnostic, as well as a prognostic and management perspective     - If the patient only has benign steatosis, they will have low risk of progression and no treatment will be needed at that time except for lifestyle modifications.  - It is important to note that liver tests alone as a screening test for DENNY are not sensitive, as up to 20-30% of patients can have DENNY with fibrosis despite having normal liver chemistries.     Management of NAFLD/DENNY  - I did discuss with the patient about an appropriate diet, exercise and weight loss plan.    - The patient should exercise regularly 4+ times per week, with an  "average of about 45 minutes per day.   - The patient should be on low-carbohydrate, low-calorie diet (3909-2678 calories per day). The weight loss plan is to lose 7-10% of body weight in the next three to six months' time.  It has shown that patients who exercise regularly can have improvement of insulin resistance and resolution of fatty liver disease, even if they are not able to lose weight.   - Diabetes management is crucial with ideal goal Hgb A1c goal of =/< 7%. If possible addition of insulin sensitizing agents like metformin of liraglutide will be helpful.    - Management of elevated cholesterol is crucial in this population.  The use of \"statins\" (HMG-CoA reductase medications) are an effective means of therapy and are not contra-indicated in those with abnormal liver tests OR those with cirrhosis.  The value of these medications in this population far outweigh the minor risks of abnormal liver tests.  Goal LDL in those with DENNY are < 100 mg/dL  - Consider the utility of liberalizing coffee consumption as some data that this may slow progression and reverse effects of DENNY-related fibrosis.  - We plan to order liver tests to be checked every 3-6 months to assess for dynamic changes, as a potential marker of another cause of chronic liver disease.  - Goal weight in 6 months time would be 255 lbs    Routine Health Care in Patient with Chronic Liver Disease:  - All patients with liver disease, particularly those with cholestatic liver disease, are at an increased risk for osteoporosis.  We strongly recommend screening for Vitamin D deficiency at least twice yearly with aggressive supplementation/replacement as indicated.    - We also recommend a screening DEXA scan to evaluate for osteoporosis.  If present, should treat with calcium, Vitamin D supplementation, and recommend consideration of bisphosphonate therapy.  Also recommend follow up DEXA scans to evaluate for improvement of bone density on therapy.  - " All patients with liver disease should avoid the use of Non-steroidal Anti-Inflammatory (NSAID) medications as they can cause significant injury to the kidneys in this population    Follow Up:  6 months     Thank you very much for the opportunity to participate in the care of this patient.  If you have any further questions, please don't hesitate to contact our office.    Thomas M. Leventhal, M.D.   of Medicine  Advanced & Transplant Hepatology  [facility]

## 2019-07-22 NOTE — NURSING NOTE
"Chief Complaint   Patient presents with     Consult     elevated liver enzymes     Vital signs:      BP: 141/79 Pulse: 94     SpO2: 96 %       Weight: 125.4 kg (276 lb 6.4 oz)  Estimated body mass index is 47.82 kg/m  as calculated from the following:    Height as of 7/18/19: 1.619 m (5' 3.75\").    Weight as of this encounter: 125.4 kg (276 lb 6.4 oz).        Sherly Brantley    "

## 2019-07-22 NOTE — LETTER
"7/22/2019       RE: Cornelia Rodriguez  [address]  [address]  [facility] [address]     Dear Colleague,    Thank you for referring your patient, Cornelia Rodriguez, to the [facility]PAT[facility] at [facility]. Please see a copy of my visit note below.    Date of Service: 7/22/2019     Referring Provider: Dr. Sarah Lombardo    Subjective:            Cornelia Rodriguez is a 43 year old female presenting for evaluation of abnormal liver tests    History of Present Illness   Cornelia Rodriguez is a 43 year old female with past medical history of morbid obesity, diabetes mellitus on insulin, dyslipidemia who presents with concerns of imaging evidence of fatty liver disease and abnormal liver tests.    She notes that she was only told that she had abnormal liver tests and fatty liver few months ago.  Prior to this she does not remember ever being informed of this.  She denies a significant history of alcohol use letter alone alcohol.  Denies history of IV or inhaled drugs of abuse.  She reports that she is not currently working she is out on disability secondary to a low back injury with sciatic pain.  She admits to a relatively sedentary lifestyle.  She notes that she does not eat regularly routinely, only eating when she is \"hungry\".  She notes that her current weight is at its peak, weighing 276 pounds on today's clinic exam.    She admits to generalized chronic fatigue, intermittent abdominal pain which she relates to insulin use.  Denies overt confusion or memory changes.  Denies recent changes in her bowel habits.  Denies significant issues related to swelling in her lower extremities or abdomen.    She denies a family history of liver disease, but does note that her maternal grandmother and sister both have diabetes.  She has an 18-year-old son who is overweight but otherwise healthy.    In April 2019 she had liver tests checked which demonstrated AST 58, , alkaline phosphatase " "74, total bilirubin 0.5.  An abdominal ultrasound was performed on May 17, 2019 which demonstrated \"diffuse increased echogenicity of the liver, with the liver measuring 17.7 cm.  There is no overt hepatic masses.  The gallbladder was absent.  Common bile duct measured 7 mm.\"    Past Medical History:  Past Medical History:   Diagnosis Date     Diabetes (H)    -Morbid obesity  -GERD  -Dyslipidemia    Surgical History:  Past Surgical History:   Procedure Laterality Date     CHOLECYSTECTOMY         Social History:  Social History     Tobacco Use     Smoking status: Former Smoker     Smokeless tobacco: Never Used     Tobacco comment: quit 3/2013   Substance Use Topics     Alcohol use: Yes     Comment: Occasionally     Drug use: No        Family History:  Family History   Problem Relation Age of Onset     Diabetes Maternal Grandmother      Diabetes Sister      Hypertension Sister        Medications:  Current Outpatient Medications   Medication     acetaminophen (TYLENOL) 325 MG tablet     albuterol (PROVENTIL HFA) 108 (90 Base) MCG/ACT inhaler     blood glucose (NO BRAND SPECIFIED) test strip     blood glucose monitoring (ACCU-CHEK MULTICLIX) lancets     blood glucose monitoring (NO BRAND SPECIFIED) meter device kit     calcium ascorbate 500 MG TABS     cetirizine (ZYRTEC) 10 MG tablet     Continuous Blood Gluc  (FREESTYLE ELINA 14 DAY READER) JUAN     Continuous Blood Gluc Sensor (FREESTYLE ELINA 14 DAY SENSOR) MISC     dulaglutide (TRULICITY) 1.5 MG/0.5ML pen     DULoxetine (CYMBALTA) 30 MG capsule     econazole nitrate 1 % external cream     empagliflozin (JARDIANCE) 25 MG TABS tablet     glipiZIDE (GLUCOTROL XL) 10 MG 24 hr tablet     insulin glargine (LANTUS SOLOSTAR) 100 UNIT/ML pen     insulin pen needle (32G X 4 MM) 32G X 4 MM miscellaneous     ketoconazole (NIZORAL) 2 % external shampoo     methocarbamol (ROBAXIN) 750 MG tablet     montelukast (SINGULAIR) 10 MG tablet     Multiple Vitamins-Minerals (HM " HAIR/SKIN/NAILS) TABS     multivitamin w/minerals (MULTI-VITAMIN) tablet     ranitidine (ZANTAC) 300 MG tablet     rosuvastatin (CRESTOR) 10 MG tablet     sucralfate (CARAFATE) 1 GM tablet     SUMAtriptan (IMITREX) 100 MG tablet     No current facility-administered medications for this visit.        Review of Systems  A complete 10 point review of systems was asked and answered in the negative unless specifically commented upon in the HPI    Objective:         Vitals:    07/22/19 0928   BP: 141/79   Pulse: 94   SpO2: 96%   Weight: 125.4 kg (276 lb 6.4 oz)     Body mass index is 47.82 kg/m .     Physical Exam    Vitals reviewed.   Constitutional: Well-developed, well-nourished, in no apparent distress.    HEENT: Normocephalic. no scleral icterus. Moist oral mucosa. Dentition moderate  Neck/Lymph: Normal ROM, supple. No thyromegaly.  No lymphadenopathy  Cardiac:  Regular rate and rhythm.  No overt murmurs  Respiratory: Clear to auscultation bilaterally.  No wheezes or rales  GI:  Abdomen soft, obese, non-tender. BS present. no shifting dullness. No overt hepatosplenomegaly.     Skin:  Skin is warm and dry. No rash noted.  no jaundice. no spider nevi noted.  no palmar erythema  Peripheral Vascular: no lower extremity edema. 2+ pulses in all extremities  Musculoskeletal:  ROM intact, normal muscle bulk    Psychiatric: Normal mood and affect. Behavior is normal.  Neuro:  no asterixis, no tremor    Labs and Diagnostic tests:  Lab Results   Component Value Date    BILITOTAL 0.5 04/19/2019     04/19/2019    AST 58 04/19/2019    ALKPHOS 74 04/19/2019     Lab Results   Component Value Date    ALBUMIN 4.2 04/19/2019    PROTTOTAL 7.5 04/19/2019        Imaging:  Abd US 5/17/2019  FINDINGS:   Fluid: No evidence of ascites or pleural effusions.  Pancreas: Visualized portions are within normal limits.  Liver: The liver has diffuse increased liver echogenicity which  attenuates the ultrasound beam for evaluation of  underlying  structures, measuring 17.7 cm in craniocaudal dimension. The main  portal vein has antegrade flow, measuring 0.9 cm. No focal liver lesion.  Gallbladder: Surgically absent  Bile Ducts: No bile duct dilation.  The common bile duct measures 7 mm in diameter.  Kidney: The right kidney measures 11.4 cm long. There is no hydronephrosis.  Aorta and IVC: The visualized portions are non-dilated.       Assessment and Plan:    Abnormal Liver Tests:    -Based on available data, felt the patient's abnormal liver tests are most likely related to long-standing history of morbid obesity and uncontrolled diabetes with resultant nonalcoholic fatty liver disease  -She denies a significant history of alcohol use and denies a history of high risk for viral hepatitis.  -Noted that lab test for hepatitis B and C were both negative  - Will evaluate for autoimmune liver disease    Non-Alcoholic Fatty Liver Disease  - I had a long discussion with the patient about nonalcoholic fatty liver disease (NAFLD).  We discussed how fat deposits in the liver, how this leads to inflammation, and how chronic inflammation (DENNY) can ultimately lead to scarring and cirrhosis.  The long-term complications of fatty liver disease were discussed with the patient, including the increased risk of cardiovascular disease complications,the risk of developing diabetes (if not already), and variable progression to cirrhosis and end stage liver disease.  - At this time, the only way to differentiate between benign steatosis vs. nonalcoholic steatohepatitis (DENNY) is to perform a liver biopsy.  The liver biopsy would be important for diagnostic, as well as a prognostic and management perspective     - If the patient only has benign steatosis, they will have low risk of progression and no treatment will be needed at that time except for lifestyle modifications.  - It is important to note that liver tests alone as a screening test for DENNY are not  "sensitive, as up to 20-30% of patients can have DENNY with fibrosis despite having normal liver chemistries.     Management of NAFLD/DENNY  - I did discuss with the patient about an appropriate diet, exercise and weight loss plan.    - The patient should exercise regularly 4+ times per week, with an average of about 45 minutes per day.   - The patient should be on low-carbohydrate, low-calorie diet (1924-5558 calories per day). The weight loss plan is to lose 7-10% of body weight in the next three to six months' time.  It has shown that patients who exercise regularly can have improvement of insulin resistance and resolution of fatty liver disease, even if they are not able to lose weight.   - Diabetes management is crucial with ideal goal Hgb A1c goal of =/< 7%. If possible addition of insulin sensitizing agents like metformin of liraglutide will be helpful.    - Management of elevated cholesterol is crucial in this population.  The use of \"statins\" (HMG-CoA reductase medications) are an effective means of therapy and are not contra-indicated in those with abnormal liver tests OR those with cirrhosis.  The value of these medications in this population far outweigh the minor risks of abnormal liver tests.  Goal LDL in those with DENNY are < 100 mg/dL  - Consider the utility of liberalizing coffee consumption as some data that this may slow progression and reverse effects of DENNY-related fibrosis.  - We plan to order liver tests to be checked every 3-6 months to assess for dynamic changes, as a potential marker of another cause of chronic liver disease.  - Goal weight in 6 months time would be 255 lbs    Routine Health Care in Patient with Chronic Liver Disease:  - All patients with liver disease, particularly those with cholestatic liver disease, are at an increased risk for osteoporosis.  We strongly recommend screening for Vitamin D deficiency at least twice yearly with aggressive supplementation/replacement as " indicated.    - We also recommend a screening DEXA scan to evaluate for osteoporosis.  If present, should treat with calcium, Vitamin D supplementation, and recommend consideration of bisphosphonate therapy.  Also recommend follow up DEXA scans to evaluate for improvement of bone density on therapy.  - All patients with liver disease should avoid the use of Non-steroidal Anti-Inflammatory (NSAID) medications as they can cause significant injury to the kidneys in this population    Follow Up:  6 months     Thank you very much for the opportunity to participate in the care of this patient.  If you have any further questions, please don't hesitate to contact our office.    Thomas M. Leventhal, M.D.   of Medicine  Advanced & Transplant Hepatology  [facility]

## 2019-07-23 ENCOUNTER — THERAPY VISIT (OUTPATIENT)
Dept: PHYSICAL THERAPY | Facility: CLINIC | Age: 44
End: 2019-07-23
Attending: FAMILY MEDICINE
Payer: COMMERCIAL

## 2019-07-23 DIAGNOSIS — M53.3 SACROILIAC JOINT DYSFUNCTION: ICD-10-CM

## 2019-07-23 LAB
ANA PAT SER IF-IMP: NORMAL
ANA SER QL IF: NEGATIVE
ANA SER QL IF: NORMAL
ANA TITR SER IF: NORMAL {TITER}
SMA IGG SER-ACNC: 3 UNITS (ref 0–19)
TTG IGA SER-ACNC: 1 U/ML
TTG IGG SER-ACNC: <1 U/ML

## 2019-07-23 PROCEDURE — 97110 THERAPEUTIC EXERCISES: CPT | Mod: GP | Performed by: PHYSICAL THERAPIST

## 2019-07-23 PROCEDURE — 97140 MANUAL THERAPY 1/> REGIONS: CPT | Mod: GP | Performed by: PHYSICAL THERAPIST

## 2019-07-23 PROCEDURE — 97010 HOT OR COLD PACKS THERAPY: CPT | Mod: GP | Performed by: PHYSICAL THERAPIST

## 2019-07-23 PROCEDURE — 97014 ELECTRIC STIMULATION THERAPY: CPT | Mod: GP | Performed by: PHYSICAL THERAPIST

## 2019-07-23 NOTE — PROGRESS NOTES
Subjective:  HPI                    Objective:  System    Physical Exam    General     ROS    Assessment/Plan:    PROGRESS  REPORT    Progress reporting period is from 7/9 to 7/23/19.       SUBJECTIVE  Subjective changes noted by patient:  Cornelia feels 90% better, but she cannot rise from a squat position without significant hip pain. She is concerned about returning to work, because this prevents proper body mechanics. Cornelia is consistent with her HEP.   Current Pain level: (1-2/10).     Initial Pain level: 10/10.   Changes in function:  Yes (See Goal flowsheet attached for changes in current functional level)  Adverse reaction to treatment or activity: None    OBJECTIVE  Changes noted in objective findings:  Lumbar AROM is now WNL's. Negative SLR and Slump. Cornelia cannot rise from a squat position yet - due to significant pain.        ASSESSMENT/PLAN  Updated problem list and treatment plan: Diagnosis 1:  LBP  Pain -  hot/cold therapy, electric stimulation, manual therapy, self management, education and home program  Decreased strength - therapeutic exercise and home program  Impaired muscle performance - neuro re-education and home program  Decreased function - home program  STG/LTGs have been met or progress has been made towards goals:  Yes (See Goal flow sheet completed today.)  Assessment of Progress: The patient's condition is improving.  Self Management Plans:  Patient has been instructed in a home treatment program.  Patient  has been instructed in self management of symptoms.  I have re-evaluated this patient and find that the nature, scope, duration and intensity of the therapy is appropriate for the medical condition of the patient.  Cornelia continues to require the following intervention to meet STG and LTG's:  PT    Recommendations:  Cornelia will see her MD this week. Suggest seeing the patient rise from a squat position. I am concerned about Cornelia RTW yet - due to this.    Please refer to the daily flowsheet for  treatment today, total treatment time and time spent performing 1:1 timed codes.

## 2019-07-25 ENCOUNTER — OFFICE VISIT (OUTPATIENT)
Dept: FAMILY MEDICINE | Facility: CLINIC | Age: 44
End: 2019-07-25
Payer: COMMERCIAL

## 2019-07-25 VITALS
SYSTOLIC BLOOD PRESSURE: 126 MMHG | RESPIRATION RATE: 18 BRPM | WEIGHT: 275 LBS | HEART RATE: 96 BPM | HEIGHT: 64 IN | TEMPERATURE: 98 F | DIASTOLIC BLOOD PRESSURE: 82 MMHG | OXYGEN SATURATION: 97 % | BODY MASS INDEX: 46.95 KG/M2

## 2019-07-25 DIAGNOSIS — M53.3 SACROILIAC JOINT PAIN: ICD-10-CM

## 2019-07-25 DIAGNOSIS — M54.42 ACUTE LEFT-SIDED LOW BACK PAIN WITH LEFT-SIDED SCIATICA: Primary | ICD-10-CM

## 2019-07-25 DIAGNOSIS — R74.8 ELEVATED LIVER ENZYMES: ICD-10-CM

## 2019-07-25 DIAGNOSIS — L29.2 VULVAR ITCHING: ICD-10-CM

## 2019-07-25 LAB
SPECIMEN SOURCE: ABNORMAL
WET PREP SPEC: ABNORMAL

## 2019-07-25 PROCEDURE — 99214 OFFICE O/P EST MOD 30 MIN: CPT | Performed by: FAMILY MEDICINE

## 2019-07-25 PROCEDURE — 87210 SMEAR WET MOUNT SALINE/INK: CPT | Performed by: FAMILY MEDICINE

## 2019-07-25 ASSESSMENT — MIFFLIN-ST. JEOR: SCORE: 1873.42

## 2019-07-25 NOTE — PROGRESS NOTES
"Subjective     Cornelia Rodriguez is a 43 year old female who presents to clinic today for the following health issues:    HPI   Recurring Hip Pain Follow Up    Where is your back pain located? (Select all that apply) Left hip     How would you describe your hip pain?  cramping    Where does your hip pain spread? Stays at the hip     Since your last clinic visit for hip pain, how has your pain changed? gradually improving    Does your hip pain interfere with your job? YES    Since your last visit, have you tried any new treatment? Yes -  Physical Therapy      Amount of exercise or physical activity: 6-7 days/week     Problems taking medications regularly: No    Medication side effects: none    Diet: regular (no restrictions)    -Patient says she is doing much better, but her lower back still hurts when she squats and tries to stand back up. This motion is very common at her job. Flexion and extension of her back does not bother her and no longer has any pain most of the time. Denies pain down into the legs, pain waking her up at night, bladder/bowel changes. She is not using any pain medications.  -She saw Nisha Valadez PT, 2 days ago and has another appointment next Wednesday. She goes to PT once per week.   -At home she has moved 5-10 pound items.     Additionally:   -Patient went to the liver doctor who told her she needs to lose 20 lbs in the next 6 months.   -She looked at gym memberships but cannot afford one  -She said that she has tried to change her diet in the past and has lost up to 100 lbs but she always ends up putting the weight back on. She went to a nutritionist in the past who told her she could allow herself one \"cheat day\" per week. This worked for a period of time but gained the weight back when she tried switching days of the week for her \"cheat day\".  -She said that she has an achy knee that flares when it is going to rain. She sees a doctor who says that the cartilage in her knee is " thinning.  -Patient's insurance does not cover Lynne.   -Her blood sugar has been ranging in the 170's, but a few days ago it was above 200. She is wondering if she should change her insulin dosage to 40 twice per day and 8 once per day, versus 44 twice per day.   -Patients external vaginal itch has returned 1 day ago. In the past when she had this issue she used gel that helped relieve her symptoms. She noticed that the vaginal itching occurred around the same time her blood sugars spiked.     Patient Active Problem List   Diagnosis     Acute recurrent maxillary sinusitis     Right chronic serous otitis media     Morbid obesity (H)     Migraine without aura and without status migrainosus, not intractable     Gastroesophageal reflux disease without esophagitis     Type 2 diabetes mellitus with hyperglycemia (H)     Allergic rhinitis, unspecified seasonality, unspecified trigger     Hyperlipidemia LDL goal <100     Allergic contact dermatitis due to adhesives     Back muscle spasm     History of Helicobacter pylori infection     Migraine     Seborrheic dermatitis of scalp     Type 2 diabetes mellitus (H)     Perennial allergic rhinitis     Hyperlipidemia     Abdominal pain     Low back pain     Occipital neuralgia of right side     Sacroiliac joint dysfunction     Past Surgical History:   Procedure Laterality Date     CHOLECYSTECTOMY         Social History     Tobacco Use     Smoking status: Former Smoker     Smokeless tobacco: Never Used     Tobacco comment: quit 3/2013   Substance Use Topics     Alcohol use: Yes     Comment: Occasionally     Family History   Problem Relation Age of Onset     Diabetes Maternal Grandmother      Diabetes Sister      Hypertension Sister              Reviewed and updated as needed this visit by Provider         Review of Systems   ROS COMP: Constitutional, HEENT, cardiovascular, pulmonary, gi and gu systems are negative, except as otherwise noted.    This document serves as a record of  "the services and decisions personally performed by ANGI BRAGA. It was created on his/her behalf by Carolann Fernandez, a trained medical scribe. The creation of this document is based on the provider's statements to the medical scribe. Carolann Fernandez, July 25, 2019 8:20 AM      Objective    /82 (BP Location: Right arm, Patient Position: Sitting, Cuff Size: Adult Large)   Pulse 96   Temp 98  F (36.7  C) (Oral)   Resp 18   Ht 1.619 m (5' 3.75\")   Wt 124.7 kg (275 lb)   LMP 07/12/2019 (Exact Date)   SpO2 97%   BMI 47.57 kg/m    Body mass index is 47.57 kg/m .  Physical Exam   GENERAL: healthy, alert and no distress  MS: no gross musculoskeletal defects noted, no edema  BACK: lower lumbar SI joint tenderness,  no CVA tenderness, full rom. Patient is not really able to squat to stand without significant discomfort.   Neuro: nl gait, heel and tip toe walking.         Assessment & Plan     1. Acute left-sided low back pain with left-sided sciatica  2. Sacroiliac joint pain  Significantly improved.  Ready to rtc with restrictions. If symptoms not improving in the next 3 weeks discussed possibilities of further evaluation with sports medicine and injections.  -Patient has been continuously disabled from work from 7/2/19-7/29/19. I completed work forms and gave them to her to fax to her employer, as requested, stating that she can return to work with restrictions. A letter with recommended work restrictions was included (no lifting from squatted position, no lifting greater than 25 lbs, keep squatting limited to less than 10% of the shift). These restrictions are good through 8/19/19.    3. Elevated liver enzymes  Reviewed wt loss and Discussed importance of healthy diet and portion sizes-   **Vitamin D Deficiency FUTURE anytime; Future    4. Vulvar itching  Question if related to Jardiance/glucosuria. Till treat with gel if viral infection found with wet prep as significant AE from oral " "metronidazole.    - Wet prep         BMI:   Estimated body mass index is 47.57 kg/m  as calculated from the following:    Height as of this encounter: 1.619 m (5' 3.75\").    Weight as of this encounter: 124.7 kg (275 lb).   Weight Management Plan: Discussed healthy diet and exercise guidelines        Patient Instructions     I will send your lab results over [employer].     At [facility], we strive to deliver an exceptional experience to you, every time we see you.  If you receive a survey in the mail, please send us back your thoughts. We really do value your feedback.    Your care team:     Family Medicine   NABIL Johnston MD Emily Bunt, APRN CNP S. MD Radha Flower MD Angela Wermerskirchen, MD         Clinic hours: Monday - Wednesday 7 am-7 pm   Thursdays and Fridays 7 am-5 pm.     [facility]: Monday - Friday 11 am-9 pm,   Saturday and Sunday 9 am-5 pm.    [facility]: Monday -Thursday 8 am-8 pm; Friday 8 am-6 pm; Saturday and Sunday 9 am-5 pm.     [facility]: Monday - Thursday 8 am - 7 pm; Friday 8 am - 6 pm    Clinic: [phone]   [facility]: [phone]   [facility]: [phone]                Return in about 2 months (around 9/25/2019) for Routine Visit.  Length of visit was 26 minutes with more than 50 percent of that time used for discussing medical concerns and education    The information in this document, created by the medical scribe for me, accurately reflects the services I personally performed and the decisions made by me. I have reviewed and approved this document for accuracy.   Sarah Lombardo MD  [facility]      "

## 2019-07-25 NOTE — PATIENT INSTRUCTIONS
I will send your lab results over [employer].     At [facility], we strive to deliver an exceptional experience to you, every time we see you.  If you receive a survey in the mail, please send us back your thoughts. We really do value your feedback.    Your care team:     Family Medicine   NABIL Johnston MD Emily Bunt, IRMA CRABTREE   S. MD Radha Flower MD Angela Wermerskirchen, MD         Clinic hours: Monday - Wednesday 7 am-7 pm   Thursdays and Fridays 7 am-5 pm.     [facility]: Monday - Friday 11 am-9 pm,   Saturday and Sunday 9 am-5 pm.    [facility]: Monday -Thursday 8 am-8 pm; Friday 8 am-6 pm; Saturday and Sunday 9 am-5 pm.     [facility]: Monday - Thursday 8 am - 7 pm; Friday 8 am - 6 pm    Clinic: [phone]   [facility]: [phone]   [facility]: [phone]

## 2019-07-29 ENCOUNTER — TELEPHONE (OUTPATIENT)
Dept: FAMILY MEDICINE | Facility: CLINIC | Age: 44
End: 2019-07-29

## 2019-07-29 NOTE — TELEPHONE ENCOUNTER
What type of form? Disability  What day did you drop off your forms? Monday, July 29  Is there a due date? ASAP   (7-10 business days to compete forms)     How would you like to receive these forms?   PATIENT WILL  their copy also fax to:    [phone]   *Previous form stated that patient could return with restrictions and her employer states must be 100% ready to return to work so a new date needs to be implemented on form NEW DATE: SHOULD READ August 19 2019.    What is the best number to contact you?  [phone]  What time works best to contact you with in 4 hrs? Anytime   Is it okay to leave a message? Yes     Martine Ratliff (Auto signs name of person logged into Epic)

## 2019-07-30 ENCOUNTER — ALLIED HEALTH/NURSE VISIT (OUTPATIENT)
Dept: EDUCATION SERVICES | Facility: CLINIC | Age: 44
End: 2019-07-30
Payer: COMMERCIAL

## 2019-07-30 DIAGNOSIS — E11.65 TYPE 2 DIABETES MELLITUS WITH HYPERGLYCEMIA, WITH LONG-TERM CURRENT USE OF INSULIN (H): ICD-10-CM

## 2019-07-30 DIAGNOSIS — Z79.4 TYPE 2 DIABETES MELLITUS WITH HYPERGLYCEMIA, WITH LONG-TERM CURRENT USE OF INSULIN (H): ICD-10-CM

## 2019-07-30 PROCEDURE — 99207 ZZC DROP WITH A PROCEDURE: CPT

## 2019-07-30 PROCEDURE — G0108 DIAB MANAGE TRN  PER INDIV: HCPCS

## 2019-07-30 NOTE — Clinical Note
Hi Dr. Lombardo,Just FYI- recommended Cornelia Savage go up another 10% on Lantus today since no improvement in fasting blood glucose with last change (now up to 48 units BID).Also, kept Lynne on med list incase she is interested in the future but [employer]BS not cover cost and too expensive for her with being out of work. She is still checking to see if professional is covered.Thanks,Steph Beal RD, LD, CDE

## 2019-07-30 NOTE — PATIENT INSTRUCTIONS
1. Try plate method - 1/2 meal fruits and vegetables, 1/4 lean meat and grains <1 cup.    2. Try to find ways to limit portions- pre-portion dressing or sauces since these are calorie heavy.  Leaner meat choices?  See if you do better eating something (Fruit) mid-day or before dinner so not so hungry?  Way to slow eating or give yourself 20 minutes before getting more food?    3. Increase Lantus to 48 units in the morning and 48 units in the evening.  If you are more active or start to see blood glucose <100 mg/dL before meals, ok to lower back to 44 units in the morning and evening.     FOLLOW UP: Tuesday, September 10th at 10am at [facility]    Steph Beal RD, LD, CDE   [phone]

## 2019-07-30 NOTE — PROGRESS NOTES
Diabetes Self-Management Education & Support    Diabetes Education Self Management & Training    SUBJECTIVE/OBJECTIVE:  Presents for: Follow-up  Accompanied by: Self  Diabetes education in the past 24mo: Yes  Focus of Visit: CGM, Assistance w/ making life changes  Diabetes type: Type 2  Date of diagnosis: 2014  Disease course: Worsening  How confident are you filling out medical forms by yourself:: Extremely  Diabetes management related comments/concerns: Says insurance did not cover the personal sensor.  Tried to call to see if the professional sensor was covered but was placed on hold x 30 minutes and cell phone would turn off.    Cannot afford the personal sensor and says not want to chance placing the sensor since will be out of work another 3 weeks.  Sciatica is not any better.  May need cortisone injection.     Says takes Jardinace at night due to doing overnights. Not made this change without working since she was tolerating how she is taking them without having trouble with diarrhea.  Will get stomach pain if on empty stomach.     Has a question on what to eat to help with weight loss.  Says has been eating more fruits and vegetables lately and is seeing higher fasting blood glucose.  Was eating popcorn before bed and now 1.5 cups fruit.  Denies any hypoglycemia.    Transportation concerns: No  Other concerns:: Glasses  Cultural Influences/Ethnic Background:  American      Diabetes Symptoms & Complications  Blurred vision: Yes  Fatigue: No  Neuropathy: No  Foot ulcerations: No  Polydipsia: Yes  Polyphagia: No  Polyuria: No  Visual change: No  Weakness: Yes(Light headed, dizzy and nauseated)  Weight loss: No  Slow healing wounds: No  Recent Infection(s): No  Symptom course: Stable  Autonomic neuropathy: No  CVA: No  Heart disease: No  Nephropathy: No  Peripheral neuropathy: No  Peripheral Vascular Disease: No  Retinopathy: No  Sexual dysfunction: No    Patient Problem List and Family Medical History reviewed  "for relevant medical history, current medical status, and diabetes risk factors.    Vitals:  LMP 07/12/2019 (Exact Date)   Estimated body mass index is 47.57 kg/m  as calculated from the following:    Height as of 7/25/19: 1.619 m (5' 3.75\").    Weight as of 7/25/19: 124.7 kg (275 lb).   Last 3 BP:   BP Readings from Last 3 Encounters:   07/25/19 126/82   07/22/19 141/79   07/15/19 124/80       History   Smoking Status     Former Smoker   Smokeless Tobacco     Never Used     Comment: quit 3/2013       Labs:  Lab Results   Component Value Date    A1C 7.9 06/20/2019     Lab Results   Component Value Date     07/22/2019     Lab Results   Component Value Date    LDL 65 04/19/2019     HDL Cholesterol   Date Value Ref Range Status   04/19/2019 41 (L) >49 mg/dL Final   ]  GFR Estimate   Date Value Ref Range Status   07/22/2019 >90 >60 mL/min/[1.73_m2] Final     Comment:     Non  GFR Calc  Starting 12/18/2018, serum creatinine based estimated GFR (eGFR) will be   calculated using the Chronic Kidney Disease Epidemiology Collaboration   (CKD-EPI) equation.       GFR Estimate If Black   Date Value Ref Range Status   07/22/2019 >90 >60 mL/min/[1.73_m2] Final     Comment:      GFR Calc  Starting 12/18/2018, serum creatinine based estimated GFR (eGFR) will be   calculated using the Chronic Kidney Disease Epidemiology Collaboration   (CKD-EPI) equation.       Lab Results   Component Value Date    CR 0.62 07/22/2019     No results found for: MICROALBUMIN    Healthy Eating  Healthy Eating Assessed Today: Yes  Cultural/Mormon diet restrictions?: No  Patient on a regular basis: Eats 3 meals a day(Some trouble tolerating milk )  Meal planning: Low salt(Says not like salt.  Has Mrs. Dash for flavor.)  Meals include: Breakfast, Lunch, Dinner  Breakfast: 2 eggs and 1.5 slice arrington/3-4 sausage and 1/2 cup hashbrowns and water OR cappachino (5 CHO) OR cereal bowl with 2% milk (1-1.5 cup)(wakes: 7am. "  B: 8-8:30am)  Lunch: None OR handful of chips OR sandwich with wheat bread, barksdale and lunchmeat OR chicken with barksdale, onion, pickles and tomato or bread and water  Dinner: potato salad, macaroni and mini subs OR 1.5 burrito enchilada OR boiled potato and veggies with kielbasa/beef OR meat, potato/pasta/rice, veggie(4:30pm)  Snacks: 1-2 diet soda OR sweets (daniel butter or oatmeal cream pie) OR 1/2 cake piece and ice-cream  Other: HS: 1.5 cup fruit before bed OR popcorn(Bed: 11pm)  Beverages: Diet soda, Water, Tea(Diet 1-2x/day, sugar-free 1-2x/day, bottled water (dislikes tap water))  Has patient met with a dietitian in the past?: Yes    Being Active  Being Active Assessed Today: Yes  Exercise:: Currently not exercising  Barrier to exercise: Physical limitation(pain in back and has hills so hurts knees to walk them)    Monitoring  Monitoring Assessed Today: Yes  Did patient bring glucose meter to appointment? : No  Blood Glucose Meter: Unknown  Home Glucose (Sugar) Monitorin-2 times per day  Blood glucose trend: Increasing steadily  Low Glucose Range (mg/dL): 130-140  High Glucose Range (mg/dL): >200  Overall Range (mg/dL): 140-180    BG Lo/18: 162  : 134  : -/172  : 156  : 210  : 255  : 176  : 142  : 130  : 155, -, -/190  : 164, -, -/160  : 154  : 141    Taking Medications  Diabetes Medication(s)     Insulin       insulin glargine (LANTUS SOLOSTAR) 100 UNIT/ML pen    Inject 44 Units Subcutaneous 2 times daily    Sodium-Glucose Co-Transporter 2 (SGLT2) Inhibitors       empagliflozin (JARDIANCE) 25 MG TABS tablet    Take 1 tablet (25 mg) by mouth daily    Sulfonylureas       glipiZIDE (GLUCOTROL XL) 10 MG 24 hr tablet    Take 2 tablets (20 mg) by mouth daily    Incretin Mimetic Agents (GLP-1 Receptor Agonists)       dulaglutide (TRULICITY) 1.5 MG/0.5ML pen    Inject 1.5 mg Subcutaneous every 7 days          Taking Medication Assessed Today: Yes  Current  Treatments: Oral Agent (dual therapy), Non-insulin Injectables, Insulin Injections(Saturdays - Trulicity.  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)  Injection/Infusion sites: Abdomen  Problems taking diabetes medications regularly?: No  Diabetes medication side effects?: No  Treatment Compliance: All of the time(Sometimes time may vary)    Problem Solving  Problem Solving Assessed Today: Yes  Hypoglycemia Frequency: Rarely  Hypoglycemia Treatment: Candy(Hard candy)  Patient carries a carbohydrate source: Yes    Hypoglycemia symptoms  Confusion: Yes  Dizziness or Light-Headedness: Yes(Weakness and fatigue)  Headaches: No  Hunger: No  Mood changes: No  Nervousness/Anxiety: No  Sleepiness: No  Speech difficulty: No  Sweats: Yes  Tremors: No    Hypoglycemia Complications  Blackouts: No  Hospitalization: No  Nocturnal hypoglycemia: No  Required assistance: No  Required glucagon injection: No  Seizures: No    Reducing Risks  Reducing Risks Assessed Today: No    Healthy Coping  Healthy Coping Assessed Today: No  Patient Activation Measure Survey Score:  TARSHA Score (Last Two) 2/25/2019   TARSHA Raw Score 34   Activation Score 68.9   TARSHA Level 3       ASSESSMENT:  Even though Lantus was increased 10%, in the past 2 weeks, Cornelia Savage only saw 7% fasting and 67% post-meal blood glucose in target. Indicates she changed to fruit over popcorn as snack before bed so not sure why fasting blood glucose has not improved.  She denies any issues with hypoglycemia so suspect lack of activity is contributing to higher fasting blood glucose.  Since she still plans to be off work the next 3 weeks or more, will have her increase Lantus by another 10%, increasing to 48 units in the morning and 48 units in the evening.  She is agreeable to this suggestion.  If she goes back to work before we meet again and fasting blood glucose start to drop <100 mg/dL, told her to reduce  Lantus back down to 44 units BID.     Reviewed with Cornelia Savage contributing factors that cause elevated blood glucose and lowering of blood glucose.  Changes in carbohydrate amounts, medication timing, pain and stress and activity are all reasons why blood glucose may be fluctuating.  Cornelia Savage has found carbohydrate counting stressful in the past so did not review today but discussed how carbohydrate consistency helps reduce one variable for blood glucose changes and may be worth reviewing if seeing random high or low blood glucose.     For now, Cornelia Savage would like to focus on weight loss so suggested plate method for simplicity.  Also encouraged her to try to eat more whole foods and/or watch fats- minimize and control portions of added fats, work on healthy preparation methods and choose lean cuts of meat.  Reviewed ways to help control hunger and give body time to feel satisfied and encouraged her to watch days without lunch to see if struggles more in the evening. Encouraged her to increase activity again as able.         Plan is for her to try to email insurance about procedure (diagnostic CGM) and if covered, have plans and follow up dates in place to put sensor on Cornelia Savage in September for better picture of overall control.  Pt verbalized understanding of concepts discussed and recommendations provided.         Patient's most recent   Lab Results   Component Value Date    A1C 7.9 06/20/2019    is not meeting goal of <7.0    INTERVENTION:   Diabetes knowledge and skills assessment:     Patient is knowledgeable in diabetes management concepts related to: Healthy Eating, Being Active, Monitoring, Taking Medication, Problem Solving, Reducing Risks and Healthy Coping    Patient needs further education on the following diabetes management concepts: Healthy Eating,Taking Medication and Reducing Risks    Based on learning assessment above, most appropriate setting for further diabetes education would be: Group  class or Individual setting.    Education provided today on:  AADE Self-Care Behaviors:  Diabetes Pathophysiology  Healthy Eating: carbohydrate counting, consistency in amount, composition, and timing of food intake, weight reduction, heart healthy diet, portion control and plate planning method  Being Active: relationship to blood glucose, describe appropriate activity program and precautions to take  Monitoring: purpose, log and interpret results, individual blood glucose targets and frequency of monitoring  Taking Medication: action of prescribed medication and when to take medications    Opportunities for ongoing education and support in diabetes-self management were discussed.    Pt verbalized understanding of concepts discussed and recommendations provided today.       Education Materials Provided:  No new materials provided today    PLAN:  See Patient Instructions for co-developed, patient-stated behavior change goals.  AVS printed and provided to patient today. See Follow-Up section for recommended follow-up.    Steph Beal RD, LD, CDE   Time Spent: 65 minutes  Encounter Type: Individual    Any diabetes medication dose changes were made via the CDE Protocol and Collaborative Practice Agreement with the patient's referring provider. A copy of this encounter was shared with the provider.

## 2019-07-31 ENCOUNTER — THERAPY VISIT (OUTPATIENT)
Dept: PHYSICAL THERAPY | Facility: CLINIC | Age: 44
End: 2019-07-31
Payer: COMMERCIAL

## 2019-07-31 DIAGNOSIS — M53.3 SACROILIAC JOINT DYSFUNCTION: ICD-10-CM

## 2019-07-31 PROCEDURE — 97140 MANUAL THERAPY 1/> REGIONS: CPT | Mod: GP | Performed by: PHYSICAL THERAPIST

## 2019-07-31 PROCEDURE — 97110 THERAPEUTIC EXERCISES: CPT | Mod: GP | Performed by: PHYSICAL THERAPIST

## 2019-07-31 PROCEDURE — 97035 APP MDLTY 1+ULTRASOUND EA 15: CPT | Mod: GP | Performed by: PHYSICAL THERAPIST

## 2019-07-31 NOTE — TELEPHONE ENCOUNTER
Spoke with pt, informed here all forms can take 7-10 business days and that today is Dr. Sexton first day back in clinic this week.  Pt states she was promised by the  that these would be completed.    Verified with Martine who took forms from pt, she did not say this, as right on the yellow cover sheet states 7-10 days.    Apologized to pt, if she was given the wrong information.  Informed I will re route chart to Dr. Raya, but cant promise they will be done today.    Pt states if they can not be completed now, she wants them placed at the , so she can  the forms.      Ana CONNELLY, Patient Care

## 2019-07-31 NOTE — TELEPHONE ENCOUNTER
Reason for Call:  Other FORMS    Detailed comments: patient dropped off forms for disability on Monday the 29th and she states that the forms needed to be filled out that day or she will loose her disability. Patient was told, it would be no problem, because all the information that is needed is available and would be done right away.    Patient wants a phone call today.    Phone Number Patient can be reached at: Home number on file [phone] (home)    Best Time: any    Can we leave a detailed message on this number? YES    Call taken on 7/31/2019 at 11:14 AM by Radha Curry

## 2019-08-01 NOTE — TELEPHONE ENCOUNTER
Reason for Call:  Other     Detailed comments: patient is coming in 20 minutes to  the completed forms.    Phone Number Patient can be reached at: Home number on file [phone] (home)    Best Time: any    Can we leave a detailed message on this number? YES    Call taken on 8/1/2019 at 9:42 AM by Radha Curry

## 2019-08-02 ENCOUNTER — THERAPY VISIT (OUTPATIENT)
Dept: PHYSICAL THERAPY | Facility: CLINIC | Age: 44
End: 2019-08-02
Payer: COMMERCIAL

## 2019-08-02 DIAGNOSIS — M53.3 SACROILIAC JOINT DYSFUNCTION: ICD-10-CM

## 2019-08-02 PROCEDURE — 97140 MANUAL THERAPY 1/> REGIONS: CPT | Mod: GP | Performed by: PHYSICAL THERAPIST

## 2019-08-02 PROCEDURE — 97110 THERAPEUTIC EXERCISES: CPT | Mod: GP | Performed by: PHYSICAL THERAPIST

## 2019-08-02 PROCEDURE — 97035 APP MDLTY 1+ULTRASOUND EA 15: CPT | Mod: GP | Performed by: PHYSICAL THERAPIST

## 2019-08-05 ENCOUNTER — THERAPY VISIT (OUTPATIENT)
Dept: PHYSICAL THERAPY | Facility: CLINIC | Age: 44
End: 2019-08-05
Payer: COMMERCIAL

## 2019-08-05 DIAGNOSIS — M53.3 SACROILIAC JOINT DYSFUNCTION: ICD-10-CM

## 2019-08-05 PROCEDURE — 97110 THERAPEUTIC EXERCISES: CPT | Mod: GP | Performed by: PHYSICAL THERAPIST

## 2019-08-05 PROCEDURE — 97112 NEUROMUSCULAR REEDUCATION: CPT | Mod: GP | Performed by: PHYSICAL THERAPIST

## 2019-08-05 PROCEDURE — 97035 APP MDLTY 1+ULTRASOUND EA 15: CPT | Mod: GP | Performed by: PHYSICAL THERAPIST

## 2019-08-08 ENCOUNTER — THERAPY VISIT (OUTPATIENT)
Dept: PHYSICAL THERAPY | Facility: CLINIC | Age: 44
End: 2019-08-08
Payer: COMMERCIAL

## 2019-08-08 ENCOUNTER — OFFICE VISIT (OUTPATIENT)
Dept: FAMILY MEDICINE | Facility: CLINIC | Age: 44
End: 2019-08-08
Payer: COMMERCIAL

## 2019-08-08 VITALS
RESPIRATION RATE: 18 BRPM | SYSTOLIC BLOOD PRESSURE: 136 MMHG | WEIGHT: 275.2 LBS | BODY MASS INDEX: 46.98 KG/M2 | HEIGHT: 64 IN | OXYGEN SATURATION: 99 % | DIASTOLIC BLOOD PRESSURE: 86 MMHG | HEART RATE: 94 BPM | TEMPERATURE: 98.3 F

## 2019-08-08 DIAGNOSIS — M53.3 SACROILIAC JOINT DYSFUNCTION: ICD-10-CM

## 2019-08-08 DIAGNOSIS — E11.65 TYPE 2 DIABETES MELLITUS WITH HYPERGLYCEMIA, WITH LONG-TERM CURRENT USE OF INSULIN (H): ICD-10-CM

## 2019-08-08 DIAGNOSIS — Z79.4 TYPE 2 DIABETES MELLITUS WITH HYPERGLYCEMIA, WITH LONG-TERM CURRENT USE OF INSULIN (H): ICD-10-CM

## 2019-08-08 DIAGNOSIS — M54.42 ACUTE LEFT-SIDED LOW BACK PAIN WITH LEFT-SIDED SCIATICA: Primary | ICD-10-CM

## 2019-08-08 PROCEDURE — 97110 THERAPEUTIC EXERCISES: CPT | Mod: GP | Performed by: PHYSICAL THERAPIST

## 2019-08-08 PROCEDURE — 99213 OFFICE O/P EST LOW 20 MIN: CPT | Performed by: FAMILY MEDICINE

## 2019-08-08 PROCEDURE — 97014 ELECTRIC STIMULATION THERAPY: CPT | Mod: GP | Performed by: PHYSICAL THERAPIST

## 2019-08-08 PROCEDURE — 97010 HOT OR COLD PACKS THERAPY: CPT | Mod: GP | Performed by: PHYSICAL THERAPIST

## 2019-08-08 PROCEDURE — 97140 MANUAL THERAPY 1/> REGIONS: CPT | Mod: GP | Performed by: PHYSICAL THERAPIST

## 2019-08-08 RX ORDER — PREDNISONE 20 MG/1
40 TABLET ORAL DAILY
Qty: 10 TABLET | Refills: 0 | Status: SHIPPED | OUTPATIENT
Start: 2019-08-08 | End: 2019-08-19

## 2019-08-08 ASSESSMENT — PAIN SCALES - GENERAL: PAINLEVEL: SEVERE PAIN (6)

## 2019-08-08 ASSESSMENT — MIFFLIN-ST. JEOR: SCORE: 1878.3

## 2019-08-08 NOTE — LETTER
[facility]  [address] [address] [place] [id number]  [phone]    2019    Re: Cornelia Rodriguez   :   1975  MRN:  [id number]   REFERRING PHYSICIAN:   Sarah Garcia*    [facility]    Date of Initial Evaluation:  19  Visits:  Rxs Used: 8  Reason for Referral:  Sacroiliac joint dysfunction    PROGRESS  REPORT  Progress reporting period is from  to 19.       SUBJECTIVE  Subjective changes noted by patient:  Cornelia has had some improvement, but now is having more left leg symptoms. Over the past two days she cannot sit or stand very long, and is having a hard time sleeping, She tries to stay active. Mindful of her body mechanics and lifting restrictions. Consistent with her HEP - we stressed not doing any exercise that increased her pain.       Current Pain level: 6/10.     Initial Pain level: 10/10.   Changes in function:  Exacerbation of symptoms over the past two days.    OBJECTIVE  Changes noted in objective findings:  Negative SLR and Slump today. Myotomes are intact. Suspect an issue of the left lumbar spine along the L5 dermatome level.    ASSESSMENT/PLAN  Updated problem list and treatment plan: Diagnosis 1:  LBP  Pain -  hot/cold therapy, US, electric stimulation, manual therapy, self management, education and home program  Impaired gait - home program  Impaired muscle performance - neuro re-education and home program  Decreased function - home program  STG/LTGs have been met or progress has been made towards goals:  Temporary improvement - but now an exacerbation of pain.  Assessment of Progress: The patient's condition has exacerbated.  Self Management Plans:  Patient has been instructed in a home treatment program.  Patient  has been instructed in self management of symptoms.    Recommendations:  I have asked Cornelia to return to see her MD because of her radicular symptoms and lack of consistent improvement.          Cornelia Savage  Jennifer   :  1975-Page 2    Thank you for your referral.    INQUIRIES  Therapist: Mala Chowdary PT, DPT   [address] [address]  Phone: [phone]  Fax: [phone]

## 2019-08-08 NOTE — PROGRESS NOTES
Subjective:  HPI                    Objective:  System    Physical Exam    General     ROS    Assessment/Plan:    PROGRESS  REPORT    Progress reporting period is from 7/23 to 8/8/19.       SUBJECTIVE  Subjective changes noted by patient:  Cornelia has had some improvement, but now is having more left leg symptoms. Over the past two days she cannot sit or stand very long, and is having a hard time sleeping, She tries to stay active. Mindful of her body mechanics and lifting restrictions. Consistent with her HEP - we stressed not doing any exercise that increased her pain.        Current Pain level: 6/10.     Initial Pain level: 10/10.   Changes in function:  Exacerbation of symptoms over the past two days.    OBJECTIVE  Changes noted in objective findings:  Negative SLR and Slump today. Myotomes are intact. Suspect an issue of the left lumbar spine along the L5 dermatome level.        ASSESSMENT/PLAN  Updated problem list and treatment plan: Diagnosis 1:  LBP  Pain -  hot/cold therapy, US, electric stimulation, manual therapy, self management, education and home program  Impaired gait - home program  Impaired muscle performance - neuro re-education and home program  Decreased function - home program  STG/LTGs have been met or progress has been made towards goals:  Temporary improvement - but now an exacerbation of pain.  Assessment of Progress: The patient's condition has exacerbated.  Self Management Plans:  Patient has been instructed in a home treatment program.  Patient  has been instructed in self management of symptoms.      Recommendations:  I have asked Cornelia to return to see her MD because of her radicular symptoms and lack of consistent improvement.    Please refer to the daily flowsheet for treatment today, total treatment time and time spent performing 1:1 timed codes.

## 2019-08-08 NOTE — PATIENT INSTRUCTIONS
Please call [facility] (formerly called [facility]) at [phone] to schedule your MRI.     Make an appointment with Sports Medicine.     If you have progressive numbness, weakness, sudden bowel/bladder incontinence, or spike a fever you need to go to the emergency room.

## 2019-08-08 NOTE — PROGRESS NOTES
"Subjective     Cornelia Rodriguez is a 43 year old female who presents to clinic today for the following health issues:    HPI     Back Pain       Duration: 7/2/19        Specific cause: slept wrong - scatiaca    Description:   Location of pain: low back left  Character of pain: dull ache, cramping and shooting  Pain radiation:radiates into the left buttocks, radiates into the left leg and radiates into the left foot  New numbness or weakness in legs, not attributed to pain:  YES- in L foot    Intensity: Currently 6/10    History:   Pain interferes with job: Yes  History of back problems: no prior back problems  Any previous MRI or X-rays: Yes- at [place].  Date 7/8/19  Sees a specialist for back pain:  No  Therapies tried without relief: PT, Ice, Heat, Muscle Relaxant    Alleviating factors:   Improved by: None      Precipitating factors:  Worsened by: Lifting, Bending, Standing, Sitting and Lying Flat    -Starting 2 days ago the pain started radiating down her left leg into her foot. Her left foot feels numb. She is unsure of what triggered it. She has been stretching and doing exercises twice daily. She has been walking outside up and down the block three times a day and being vigilant not to sit a lot.   -She described the pain as a throb that goes down her leg. Denies tingling. It feels weak but she notes that the PT says that she has full strength. When she stands up her left leg and foot do not feel normal.   -She feels like her feet are swollen although they do not appear that way   -Her blood sugars have been in the 190s. She has not been taking her meds at the right time of the day.   -Patient says she is addicted to \"elias art\" which keeps her occupied.         Accompanying Signs & Symptoms:  Risk of Fracture:  None  Risk of Cauda Equina:  None  Risk of Infection:  None  Risk of Cancer:  None  Risk of Ankylosing Spondylitis:  Onset at age <35, male, AND morning back stiffness. no           Patient Active " "Problem List   Diagnosis     Acute recurrent maxillary sinusitis     Right chronic serous otitis media     Morbid obesity (H)     Migraine without aura and without status migrainosus, not intractable     Gastroesophageal reflux disease without esophagitis     Type 2 diabetes mellitus with hyperglycemia (H)     Allergic rhinitis, unspecified seasonality, unspecified trigger     Hyperlipidemia LDL goal <100     Allergic contact dermatitis due to adhesives     Back muscle spasm     History of Helicobacter pylori infection     Migraine     Seborrheic dermatitis of scalp     Type 2 diabetes mellitus (H)     Perennial allergic rhinitis     Hyperlipidemia     Abdominal pain     Low back pain     Occipital neuralgia of right side     Sacroiliac joint dysfunction     Past Surgical History:   Procedure Laterality Date     CHOLECYSTECTOMY         Social History     Tobacco Use     Smoking status: Former Smoker     Smokeless tobacco: Never Used     Tobacco comment: quit 3/2013   Substance Use Topics     Alcohol use: Yes     Comment: Occasionally     Family History   Problem Relation Age of Onset     Diabetes Maternal Grandmother      Diabetes Sister      Hypertension Sister              Reviewed and updated as needed this visit by Provider         Review of Systems   ROS COMP: Constitutional, HEENT, cardiovascular, pulmonary, gi and gu systems are negative, except as otherwise noted.    This document serves as a record of the services and decisions personally performed by ANGI BRAGA. It was created on his/her behalf by Carolann Fernandez, a trained medical scribe. The creation of this document is based on the provider's statements to the medical scribe. Carolann Fernandez, August 8, 2019 1:40 PM        Objective    /86 (BP Location: Right arm, Patient Position: Sitting, Cuff Size: Adult Large)   Pulse 94   Temp 98.3  F (36.8  C) (Oral)   Resp 18   Ht 1.626 m (5' 4\")   Wt 124.8 kg (275 lb 3.2 oz)   LMP " "07/12/2019 (Exact Date)   SpO2 99%   BMI 47.24 kg/m    Body mass index is 47.24 kg/m .  Physical Exam   GENERAL: healthy, alert and no distress  MS: no gross musculoskeletal defects noted, no edema, gait is normal, normal tiptoe, heel, and heel-toe walking, negative SLRs bilaterally   BACK: SI joint tenderness, mid-upper lumbar spine tenderness, FROM  NEURO: Normal strength and tone, mentation intact and speech normal, absent reflexes bilateral LE, reduced sensation on the lateral, dorsal, and plantar aspects of the left foot, slightly reduced sensation posterior left lower leg           Assessment & Plan     1. Acute left-sided low back pain with left-sided sciatica  Acute flare of radicular pain and numbness in left foot. She has now failed 6 weeks of conservative care with PHYSICAL THERAPY. Will get MR and have her f/u with ortho. Trial prednisone today to see if we can calm down the sx's- she tolerated this in the past and reviewed impact on blood sugars/DM.   - MR Lumbar Spine w/o Contrast; Future  - ORTHO  REFERRAL  - predniSONE (DELTASONE) 20 MG tablet; Take 2 tablets (40 mg) by mouth daily  Dispense: 10 tablet; Refill: 0     BMI:   Estimated body mass index is 47.24 kg/m  as calculated from the following:    Height as of this encounter: 1.626 m (5' 4\").    Weight as of this encounter: 124.8 kg (275 lb 3.2 oz).   Weight management plan: not addressed at this visit         Patient Instructions   Please call [facility] (formerly called [facility]) at [phone] to schedule your MRI.     Make an appointment with Sports Medicine.     If you have progressive numbness, weakness, sudden bowel/bladder incontinence, or spike a fever you need to go to the emergency room.       No follow-ups on file.  Length of visit was 17 minutes with more than 50 percent of that time used for discussing medical concerns and education    The information in this document, " created by the medical scribe for me, accurately reflects the services I personally performed and the decisions made by me. I have reviewed and approved this document for accuracy.   Sarah Lombardo MD  [facility]

## 2019-08-14 ENCOUNTER — ANCILLARY PROCEDURE (OUTPATIENT)
Dept: MRI IMAGING | Facility: CLINIC | Age: 44
End: 2019-08-14
Attending: FAMILY MEDICINE
Payer: COMMERCIAL

## 2019-08-14 DIAGNOSIS — M54.42 ACUTE LEFT-SIDED LOW BACK PAIN WITH LEFT-SIDED SCIATICA: ICD-10-CM

## 2019-08-14 PROCEDURE — 72148 MRI LUMBAR SPINE W/O DYE: CPT | Performed by: RADIOLOGY

## 2019-08-15 ENCOUNTER — THERAPY VISIT (OUTPATIENT)
Dept: PHYSICAL THERAPY | Facility: CLINIC | Age: 44
End: 2019-08-15
Payer: COMMERCIAL

## 2019-08-15 DIAGNOSIS — M53.3 SACROILIAC JOINT DYSFUNCTION: ICD-10-CM

## 2019-08-15 PROCEDURE — 97014 ELECTRIC STIMULATION THERAPY: CPT | Mod: GP | Performed by: PHYSICAL THERAPIST

## 2019-08-15 PROCEDURE — 97110 THERAPEUTIC EXERCISES: CPT | Mod: GP | Performed by: PHYSICAL THERAPIST

## 2019-08-19 ENCOUNTER — OFFICE VISIT (OUTPATIENT)
Dept: FAMILY MEDICINE | Facility: CLINIC | Age: 44
End: 2019-08-19
Payer: COMMERCIAL

## 2019-08-19 VITALS
WEIGHT: 273 LBS | SYSTOLIC BLOOD PRESSURE: 130 MMHG | HEART RATE: 100 BPM | RESPIRATION RATE: 19 BRPM | HEIGHT: 64 IN | BODY MASS INDEX: 46.61 KG/M2 | OXYGEN SATURATION: 100 % | DIASTOLIC BLOOD PRESSURE: 80 MMHG | TEMPERATURE: 98.4 F

## 2019-08-19 DIAGNOSIS — M54.42 ACUTE LEFT-SIDED LOW BACK PAIN WITH LEFT-SIDED SCIATICA: Primary | ICD-10-CM

## 2019-08-19 PROCEDURE — 99213 OFFICE O/P EST LOW 20 MIN: CPT | Performed by: FAMILY MEDICINE

## 2019-08-19 ASSESSMENT — PAIN SCALES - GENERAL: PAINLEVEL: SEVERE PAIN (6)

## 2019-08-19 ASSESSMENT — MIFFLIN-ST. JEOR: SCORE: 1868.32

## 2019-08-19 NOTE — PROGRESS NOTES
"Subjective     Cornelia Rodriguez is a 43 year old female who presents to clinic today for the following health issues:    HPI   -Pt is here to get work forms filled out, including FMLA forms.   -Her back pain fluctuates, sometimes she feels she is okay to go to work and then will get a pinch and be in intense pain.  -She has not had a fully pain-free day. 2 days ago she was carrying a basket of laundry down the stairs and she had to set it down due to severe pain.   -She took a course of prednisone that did not help her, all it did was give her \"the munchies\". Her highest blood sugar level while being on prednisone was 192.   -She takes a muscle relaxer TID, but she still is not sleeping well due to the pain. She has been occasionally taking tylenol but doesn't take it regularly due to liver issues.   -Pt has an appointment with ortho on 8/28/19 8/14/2019 MR Lumbar Spine w/o contrast   Findings: There are 5 lumbar-type vertebrae assumed for the purposes  of this dictation, based on counting from the presumed 12th rib.  The  tip of the conus medullaris is at L1.  Normal lumbar vertebral  alignment.  There is moderate disc height narrowing at T12-L1, L4-L5,  and L5-S1.  Modic type 2 endplate changes at T12-L1 and L4-L5.     On a level by level basis:     T12-L1: Bilobed disc extrusion extending into the central and  bilateral subarticular zones. There is moderate spinal canal  narrowing. No significant neural foraminal narrowing.     L1-2: Facet arthropathy. No spinal canal or neuroforaminal stenosis.     L2-3: Facet arthropathy. No spinal canal or neuroforaminal stenosis.     L3-4: Posterior disc bulge. Facet arthropathy. No spinal canal or  neuroforaminal stenosis.     L4-5: Circumferential disc bulge narrows the right lateral recess and  may abut the traversing right L5 nerve root. Facet arthropathy  bilaterally. Moderate right neuroforaminal narrowing. Mild left neural  foraminal and spinal canal narrowing. The " right nerve root abuts the  extraforaminal portion of the disc bulge as it exits the spinal canal.     L5-S1: Circumferential disc bulge with superimposed disc extrusions  extending into the foraminal zones. Moderate bilateral neural  foraminal narrowing and mild spinal canal narrowing.      Paraspinous tissues are within normal limits. Probable left superior  pole renal cyst.                                                                      Impression:   1. Multilevel lumbar spondylosis, most pronounced at L4-5 with  moderate right neural foraminal narrowing and abutment of the  extraforaminal right L4 nerve root.  2. Bilobed disc extrusion extending into the central and bilateral  subarticular zones at T12-L1 with associated mild to moderate spinal  canal narrowing at this level.    3. Additional degenerative changes of the spine as described above.     I have personally reviewed the examination and initial interpretation  and I agree with the findings.     DARIEN FLORES MD      Patient Active Problem List   Diagnosis     Acute recurrent maxillary sinusitis     Right chronic serous otitis media     Morbid obesity (H)     Migraine without aura and without status migrainosus, not intractable     Gastroesophageal reflux disease without esophagitis     Type 2 diabetes mellitus with hyperglycemia (H)     Allergic rhinitis, unspecified seasonality, unspecified trigger     Hyperlipidemia LDL goal <100     Allergic contact dermatitis due to adhesives     Back muscle spasm     History of Helicobacter pylori infection     Migraine     Seborrheic dermatitis of scalp     Type 2 diabetes mellitus (H)     Perennial allergic rhinitis     Hyperlipidemia     Abdominal pain     Low back pain     Occipital neuralgia of right side     Sacroiliac joint dysfunction     Past Surgical History:   Procedure Laterality Date     CHOLECYSTECTOMY         Social History     Tobacco Use     Smoking status: Former Smoker     Smokeless  "tobacco: Never Used     Tobacco comment: quit 3/2013   Substance Use Topics     Alcohol use: Yes     Comment: Occasionally     Family History   Problem Relation Age of Onset     Diabetes Maternal Grandmother      Diabetes Sister      Hypertension Sister              Reviewed and updated as needed this visit by Provider         Review of Systems   ROS COMP: Constitutional, HEENT, cardiovascular, pulmonary, gi and gu systems are negative, except as otherwise noted.    This document serves as a record of the services and decisions personally performed by ANGI BRAGA. It was created on his/her behalf by Carolann Fernandez, a trained medical scribe. The creation of this document is based on the provider's statements to the medical scribe. Carolann Fernandez, August 19, 2019 3:16 PM        Objective    /80 (BP Location: Right arm, Patient Position: Chair, Cuff Size: Adult Large)   Pulse 100   Temp 98.4  F (36.9  C) (Oral)   Resp 19   Ht 1.626 m (5' 4\")   Wt 123.8 kg (273 lb)   LMP 07/29/2019 (Approximate)   SpO2 100%   Breastfeeding? No   BMI 46.86 kg/m    Body mass index is 46.86 kg/m .  Physical Exam   GENERAL: healthy, alert and no distress          Assessment & Plan       1. Acute left-sided low back pain with left-sided sciatica  Persistent flare of radicular pain and numbness in left foot. Reviewed dermatome map with pt to help understand her symptoms. Prednisone did not help symptoms. Discussed using Tylenol for pain, 2000 mg maximum daily. Pt has upcoming appointment with ortho on 8/29/19. Work forms and FMLA forms filled out, scanned, and given to pt.        BMI:   Estimated body mass index is 46.86 kg/m  as calculated from the following:    Height as of this encounter: 1.626 m (5' 4\").    Weight as of this encounter: 123.8 kg (273 lb).   Weight management plan: not addressed at this visit        Patient Instructions   You can take 2000 mg Tylenol max in a 24 hour period.      No follow-ups " on file.  Length of visit was 14 minutes with more than 50 percent of that time used for discussing medical concerns and education    The information in this document, created by the medical scribe for me, accurately reflects the services I personally performed and the decisions made by me. I have reviewed and approved this document for accuracy.   Sarah Lombardo MD  [facility]

## 2019-08-28 ENCOUNTER — OFFICE VISIT (OUTPATIENT)
Dept: ORTHOPEDICS | Facility: CLINIC | Age: 44
End: 2019-08-28
Payer: COMMERCIAL

## 2019-08-28 VITALS — HEIGHT: 64 IN | WEIGHT: 273 LBS | BODY MASS INDEX: 46.61 KG/M2

## 2019-08-28 DIAGNOSIS — M54.16 LUMBAR RADICULOPATHY: Primary | ICD-10-CM

## 2019-08-28 PROCEDURE — 99214 OFFICE O/P EST MOD 30 MIN: CPT | Performed by: FAMILY MEDICINE

## 2019-08-28 ASSESSMENT — MIFFLIN-ST. JEOR: SCORE: 1868.32

## 2019-08-28 NOTE — PROGRESS NOTES
"      [facility]  8/28/2019    Cornelia Rodriguez's chief complaint for this visit includes:  Chief Complaint   Patient presents with     Consult     low back pain with some left leg pain, no known injury, pain started July, has tried PT      PCP: Sarah Lombardo    Referring Provider:  No referring provider defined for this encounter.    Ht 1.626 m (5' 4\")   Wt 123.8 kg (273 lb)   LMP 07/29/2019 (Approximate)   BMI 46.86 kg/m    Data Unavailable       Reason for visit:     What part of your body is injured / painful?  left low back    What caused the injury /pain? No inciting event     How long ago did your injury occur or pain begin? several months ago    What are your most bothersome symptoms? Pain    How would you characterize your symptom?  aching    What makes your symptoms better? Rest    What makes your symptoms worse? Movement    Have you been previously seen for this problem? No    Medical History:    Any recent changes to your medical history? No    Any new medication prescribed since last visit? No    Have you had surgery on this body part before? No    Social History:    Occupation: Disability     Handedness: Right    Review of Systems:    Do you have fever, chills, weight loss? No    Do you have any vision problems? No    Do you have any chest pain or edema? No    Do you have any shortness of breath or wheezing?  No    Do you have stomach problems? No    Do you have any numbness or focal weakness? No    Do you have diabetes? Yes,     Do you have problems with bleeding or clotting? No    Do you have an rashes or other skin lesions? No       CHIEF COMPLAINT:  Consult (low back pain with some left leg pain, no known injury, pain started July, has tried PT )       HISTORY OF PRESENT ILLNESS  Ms. Rodriguez is a pleasant 43 year old year old female who presents to clinic today with low back and leg paion.  She is seen at the request of Dr. Lombardo.    Cornelia Savage has had back and " leg pain since the beginning of July.  No clear inciting event that she can recall.  She feels pain throughout her back that radiates down the left leg, all the way to the left foot.  At times she feels numbness and tingling at the bottom of her left foot.  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  She has not been able to work since July 2.  Her leg is mildly better after starting physical therapy, her back is still bothering her quite a bit.      Additional history: as documented    MEDICAL HISTORY  Patient Active Problem List   Diagnosis     Acute recurrent maxillary sinusitis     Right chronic serous otitis media     Morbid obesity (H)     Migraine without aura and without status migrainosus, not intractable     Gastroesophageal reflux disease without esophagitis     Type 2 diabetes mellitus with hyperglycemia (H)     Allergic rhinitis, unspecified seasonality, unspecified trigger     Hyperlipidemia LDL goal <100     Allergic contact dermatitis due to adhesives     Back muscle spasm     History of Helicobacter pylori infection     Migraine     Seborrheic dermatitis of scalp     Type 2 diabetes mellitus (H)     Perennial allergic rhinitis     Hyperlipidemia     Abdominal pain     Low back pain     Occipital neuralgia of right side     Sacroiliac joint dysfunction       Current Outpatient Medications   Medication Sig Dispense Refill     acetaminophen (TYLENOL) 325 MG tablet        albuterol (PROVENTIL HFA) 108 (90 Base) MCG/ACT inhaler Inhale 2 puffs into the lungs       blood glucose (NO BRAND SPECIFIED) test strip Use to test blood sugar 2x's times daily or as directed. 200 each 3     blood glucose monitoring (ACCU-CHEK MULTICLIX) lancets Use to test blood sugar 2 x times daily or as directed. 204 each 3     blood glucose monitoring (NO BRAND SPECIFIED) meter device kit Use to test blood sugar 2 times daily or as directed. 1 kit 0     calcium ascorbate 500 MG TABS Take  500 mg by mouth daily       cetirizine (ZYRTEC) 10 MG tablet Take 10 mg by mouth daily       dulaglutide (TRULICITY) 1.5 MG/0.5ML pen Inject 1.5 mg Subcutaneous every 7 days 6 mL 3     DULoxetine (CYMBALTA) 30 MG capsule Take 1 capsule (30 mg) by mouth 2 times daily (Patient taking differently: Take 60 mg by mouth daily ) 60 capsule 3     econazole nitrate 1 % external cream Apply topically daily To feet and toenails. 85 g 5     empagliflozin (JARDIANCE) 25 MG TABS tablet Take 1 tablet (25 mg) by mouth daily 90 tablet 3     glipiZIDE (GLUCOTROL XL) 10 MG 24 hr tablet Take 2 tablets (20 mg) by mouth daily 180 tablet 1     insulin glargine (LANTUS SOLOSTAR) 100 UNIT/ML pen Inject 48 Units Subcutaneous 2 times daily 90 mL 3     insulin pen needle (32G X 4 MM) 32G X 4 MM miscellaneous Use 2 pen needles daily or as directed. 180 each 0     ketoconazole (NIZORAL) 2 % external shampoo        methocarbamol (ROBAXIN) 750 MG tablet Take 1 tablet (750 mg) by mouth 3 times daily 60 tablet 1     montelukast (SINGULAIR) 10 MG tablet Take 1 tablet (10 mg) by mouth At Bedtime 90 tablet 3     Multiple Vitamins-Minerals (HM HAIR/SKIN/NAILS) TABS Take 1 tablet by mouth       multivitamin w/minerals (MULTI-VITAMIN) tablet Take 1 tablet by mouth       ranitidine (ZANTAC) 300 MG tablet Take 1 tablet (300 mg) by mouth daily 90 tablet 1     rosuvastatin (CRESTOR) 10 MG tablet Take 1 tablet (10 mg) by mouth daily 90 tablet 3     sucralfate (CARAFATE) 1 GM tablet Take 1 tablet by mouth as needed   5     SUMAtriptan (IMITREX) 100 MG tablet Take 100 mg by mouth as needed         Allergies   Allergen Reactions     Adhesive Tape Dermatitis     Benadryl [Diphenhydramine]      Codeine      Gabapentin      Ibuprofen      Metronidazole Fatigue     Headache     Penicillins      Ciprofloxacin Diarrhea, Nausea and Nausea and Vomiting     Cyclobenzaprine Nausea and Vomiting     Feels shakey       Ferrous Gluconate Rash     Nortriptyline Hives and Rash  "    Sumatriptan Nausea and Nausea and Vomiting       Family History   Problem Relation Age of Onset     Diabetes Maternal Grandmother      Diabetes Sister      Hypertension Sister        Additional medical/Social/Surgical histories reviewed in EPIC and updated as appropriate.          PHYSICAL EXAM  Vitals:    08/28/19 0905   Weight: 123.8 kg (273 lb)   Height: 1.626 m (5' 4\")     General  - normal appearance, in no obvious distress  CV  - normal peripheral perfusion  Pulm  - normal respiratory pattern, non-labored  Musculoskeletal - lumbar spine  - stance: normal gait without limp  - inspection: normal bone and joint alignment, no obvious scoliosis  - palpation: no paravertebral or bony tenderness  - ROM: flexion exacerbates pain, normal extension, sidebending, rotation  - strength: lower extremities 5/5 in all planes  - special tests:  (-) slump test  Neuro  - patellar and Achilles DTRs 2+ bilaterally, grossly normal coordination, normal muscle tone  Skin  - no ecchymosis, erythema, warmth, or induration, no obvious rash  Psych  - interactive, appropriate, normal mood and affect             ASSESSMENT & PLAN  Ms. Rodriguez is a 43 year old year old female who presents to clinic today with back and left leg pain.    I reviewed her MR in the room with her which reads:    Cornelia Savage and I had a good discussion regarding non-operative treatment for degenerative disc disease.  This included strengthening of the paraspinal and core muscles, weight control, and oral analgesics when needed.  We also discussed the role of epidural corticosteroid injections.    I am referring her for a corticosteroid injection.  She can get this done at her earliest convenience.  If this injection does not help with her pain we could repeat it at a subsequent level, or in the lumbar facets, at 2 weeks post injection.    She is going to continue physical therapy exercises    I also filled out temporary disability paperwork for her job extending " her leave through September 30.  If she improves prior to this she can go back to work, which she greatly desires to do.    Thank you for allowing me to participate in Cornelia Savage's care.    Mejia Lindsay DO, [facility]  Primary Care Sports Medicine       This note was constructed using Dragon dictation software, please excuse any minor errors in spelling, grammar, or syntax.

## 2019-08-28 NOTE — LETTER
"    8/28/2019         RE: Cornelia Rodriguez  [address]  [address]  [facility] [address]        Dear Colleague,    Thank you for referring your patient, Cornelia Rodriguez, to the [facility]. Please see a copy of my visit note below.          [facility]  8/28/2019    Cornelia Rodriguez's chief complaint for this visit includes:  Chief Complaint   Patient presents with     Consult     low back pain with some left leg pain, no known injury, pain started July, has tried PT      PCP: Sarah Lombardo    Referring Provider:  No referring provider defined for this encounter.    Ht 1.626 m (5' 4\")   Wt 123.8 kg (273 lb)   LMP 07/29/2019 (Approximate)   BMI 46.86 kg/m     Data Unavailable       Reason for visit:     What part of your body is injured / painful?  left low back    What caused the injury /pain? No inciting event     How long ago did your injury occur or pain begin? several months ago    What are your most bothersome symptoms? Pain    How would you characterize your symptom?  aching    What makes your symptoms better? Rest    What makes your symptoms worse? Movement    Have you been previously seen for this problem? No    Medical History:    Any recent changes to your medical history? No    Any new medication prescribed since last visit? No    Have you had surgery on this body part before? No    Social History:    Occupation: Disability     Handedness: Right    Review of Systems:    Do you have fever, chills, weight loss? No    Do you have any vision problems? No    Do you have any chest pain or edema? No    Do you have any shortness of breath or wheezing?  No    Do you have stomach problems? No    Do you have any numbness or focal weakness? No    Do you have diabetes? Yes,     Do you have problems with bleeding or clotting? No    Do you have an rashes or other skin lesions? No       CHIEF COMPLAINT:  Consult (low back pain with some left leg pain, no known injury, pain " started July, has tried PT )       HISTORY OF PRESENT ILLNESS  Ms. Rodriguez is a pleasant 43 year old year old female who presents to clinic today with low back and leg paion.  She is seen at the request of Dr. Lombardo.    Cornelia Savage has had back and leg pain since the beginning of July.  No clear inciting event that she can recall.  She feels pain throughout her back that radiates down the left leg, all the way to the left foot.  At times she feels numbness and tingling at the bottom of her left foot.  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  She has not been able to work since July 2.  Her leg is mildly better after starting physical therapy, her back is still bothering her quite a bit.      Additional history: as documented    MEDICAL HISTORY  Patient Active Problem List   Diagnosis     Acute recurrent maxillary sinusitis     Right chronic serous otitis media     Morbid obesity (H)     Migraine without aura and without status migrainosus, not intractable     Gastroesophageal reflux disease without esophagitis     Type 2 diabetes mellitus with hyperglycemia (H)     Allergic rhinitis, unspecified seasonality, unspecified trigger     Hyperlipidemia LDL goal <100     Allergic contact dermatitis due to adhesives     Back muscle spasm     History of Helicobacter pylori infection     Migraine     Seborrheic dermatitis of scalp     Type 2 diabetes mellitus (H)     Perennial allergic rhinitis     Hyperlipidemia     Abdominal pain     Low back pain     Occipital neuralgia of right side     Sacroiliac joint dysfunction       Current Outpatient Medications   Medication Sig Dispense Refill     acetaminophen (TYLENOL) 325 MG tablet        albuterol (PROVENTIL HFA) 108 (90 Base) MCG/ACT inhaler Inhale 2 puffs into the lungs       blood glucose (NO BRAND SPECIFIED) test strip Use to test blood sugar 2x's times daily or as directed. 200 each 3     blood glucose monitoring (ACCU-CHEK  MULTICLIX) lancets Use to test blood sugar 2 x times daily or as directed. 204 each 3     blood glucose monitoring (NO BRAND SPECIFIED) meter device kit Use to test blood sugar 2 times daily or as directed. 1 kit 0     calcium ascorbate 500 MG TABS Take 500 mg by mouth daily       cetirizine (ZYRTEC) 10 MG tablet Take 10 mg by mouth daily       dulaglutide (TRULICITY) 1.5 MG/0.5ML pen Inject 1.5 mg Subcutaneous every 7 days 6 mL 3     DULoxetine (CYMBALTA) 30 MG capsule Take 1 capsule (30 mg) by mouth 2 times daily (Patient taking differently: Take 60 mg by mouth daily ) 60 capsule 3     econazole nitrate 1 % external cream Apply topically daily To feet and toenails. 85 g 5     empagliflozin (JARDIANCE) 25 MG TABS tablet Take 1 tablet (25 mg) by mouth daily 90 tablet 3     glipiZIDE (GLUCOTROL XL) 10 MG 24 hr tablet Take 2 tablets (20 mg) by mouth daily 180 tablet 1     insulin glargine (LANTUS SOLOSTAR) 100 UNIT/ML pen Inject 48 Units Subcutaneous 2 times daily 90 mL 3     insulin pen needle (32G X 4 MM) 32G X 4 MM miscellaneous Use 2 pen needles daily or as directed. 180 each 0     ketoconazole (NIZORAL) 2 % external shampoo        methocarbamol (ROBAXIN) 750 MG tablet Take 1 tablet (750 mg) by mouth 3 times daily 60 tablet 1     montelukast (SINGULAIR) 10 MG tablet Take 1 tablet (10 mg) by mouth At Bedtime 90 tablet 3     Multiple Vitamins-Minerals (HM HAIR/SKIN/NAILS) TABS Take 1 tablet by mouth       multivitamin w/minerals (MULTI-VITAMIN) tablet Take 1 tablet by mouth       ranitidine (ZANTAC) 300 MG tablet Take 1 tablet (300 mg) by mouth daily 90 tablet 1     rosuvastatin (CRESTOR) 10 MG tablet Take 1 tablet (10 mg) by mouth daily 90 tablet 3     sucralfate (CARAFATE) 1 GM tablet Take 1 tablet by mouth as needed   5     SUMAtriptan (IMITREX) 100 MG tablet Take 100 mg by mouth as needed         Allergies   Allergen Reactions     Adhesive Tape Dermatitis     Benadryl [Diphenhydramine]      Codeine       "Gabapentin      Ibuprofen      Metronidazole Fatigue     Headache     Penicillins      Ciprofloxacin Diarrhea, Nausea and Nausea and Vomiting     Cyclobenzaprine Nausea and Vomiting     Feels shakey       Ferrous Gluconate Rash     Nortriptyline Hives and Rash     Sumatriptan Nausea and Nausea and Vomiting       Family History   Problem Relation Age of Onset     Diabetes Maternal Grandmother      Diabetes Sister      Hypertension Sister        Additional medical/Social/Surgical histories reviewed in Psychiatric and updated as appropriate.          PHYSICAL EXAM  Vitals:    08/28/19 0905   Weight: 123.8 kg (273 lb)   Height: 1.626 m (5' 4\")     General  - normal appearance, in no obvious distress  CV  - normal peripheral perfusion  Pulm  - normal respiratory pattern, non-labored  Musculoskeletal - lumbar spine  - stance: normal gait without limp  - inspection: normal bone and joint alignment, no obvious scoliosis  - palpation: no paravertebral or bony tenderness  - ROM: flexion exacerbates pain, normal extension, sidebending, rotation  - strength: lower extremities 5/5 in all planes  - special tests:  (-) slump test  Neuro  - patellar and Achilles DTRs 2+ bilaterally, grossly normal coordination, normal muscle tone  Skin  - no ecchymosis, erythema, warmth, or induration, no obvious rash  Psych  - interactive, appropriate, normal mood and affect             ASSESSMENT & PLAN  Ms. Rodriguez is a 43 year old year old female who presents to clinic today with back and left leg pain.    I reviewed her MR in the room with her which reads:    Cornelia Savage and I had a good discussion regarding non-operative treatment for degenerative disc disease.  This included strengthening of the paraspinal and core muscles, weight control, and oral analgesics when needed.  We also discussed the role of epidural corticosteroid injections.    I am referring her for a corticosteroid injection.  She can get this done at her earliest convenience.  If this " injection does not help with her pain we could repeat it at a subsequent level, or in the lumbar facets, at 2 weeks post injection.    She is going to continue physical therapy exercises    I also filled out temporary disability paperwork for her job extending her leave through September 30.  If she improves prior to this she can go back to work, which she greatly desires to do.    Thank you for allowing me to participate in Cornelia Savage's care.    Mejia Lindsay DO, [facility]  Primary Care Sports Medicine       This note was constructed using Dragon dictation software, please excuse any minor errors in spelling, grammar, or syntax.      Again, thank you for allowing me to participate in the care of your patient.        Sincerely,        Mejia Lindsay DO

## 2019-09-05 ENCOUNTER — ANCILLARY PROCEDURE (OUTPATIENT)
Dept: GENERAL RADIOLOGY | Facility: CLINIC | Age: 44
End: 2019-09-05
Attending: FAMILY MEDICINE
Payer: COMMERCIAL

## 2019-09-05 DIAGNOSIS — M54.16 LUMBAR RADICULOPATHY: ICD-10-CM

## 2019-09-05 PROCEDURE — 62323 NJX INTERLAMINAR LMBR/SAC: CPT | Performed by: RADIOLOGY

## 2019-09-05 RX ORDER — IOPAMIDOL 408 MG/ML
10 INJECTION, SOLUTION INTRATHECAL ONCE
Status: COMPLETED | OUTPATIENT
Start: 2019-09-05 | End: 2019-09-05

## 2019-09-05 RX ORDER — LIDOCAINE HYDROCHLORIDE 10 MG/ML
5 INJECTION, SOLUTION EPIDURAL; INFILTRATION; INTRACAUDAL; PERINEURAL ONCE
Status: COMPLETED | OUTPATIENT
Start: 2019-09-05 | End: 2019-09-05

## 2019-09-05 RX ORDER — METHYLPREDNISOLONE ACETATE 80 MG/ML
80 INJECTION, SUSPENSION INTRA-ARTICULAR; INTRALESIONAL; INTRAMUSCULAR; SOFT TISSUE ONCE
Status: COMPLETED | OUTPATIENT
Start: 2019-09-05 | End: 2019-09-05

## 2019-09-05 RX ORDER — BUPIVACAINE HYDROCHLORIDE 5 MG/ML
5 INJECTION, SOLUTION EPIDURAL; INTRACAUDAL ONCE
Status: COMPLETED | OUTPATIENT
Start: 2019-09-05 | End: 2019-09-05

## 2019-09-05 RX ADMIN — METHYLPREDNISOLONE ACETATE 80 MG: 80 INJECTION, SUSPENSION INTRA-ARTICULAR; INTRALESIONAL; INTRAMUSCULAR; SOFT TISSUE at 14:58

## 2019-09-05 RX ADMIN — IOPAMIDOL 1 ML: 408 INJECTION, SOLUTION INTRATHECAL at 14:58

## 2019-09-05 RX ADMIN — LIDOCAINE HYDROCHLORIDE 5 ML: 10 INJECTION, SOLUTION EPIDURAL; INFILTRATION; INTRACAUDAL; PERINEURAL at 14:58

## 2019-09-05 RX ADMIN — BUPIVACAINE HYDROCHLORIDE 10 MG: 5 INJECTION, SOLUTION EPIDURAL; INTRACAUDAL at 14:57

## 2019-09-05 NOTE — PROGRESS NOTES
: Cornelia Savage was seen in X-ray today for a lumbar epidural injection. Patient rated pain before procedure 3/10. After procedure patient rated pain 0/10. This pain level is (is/is not) acceptable to patient. Patient discharged home with Friend.      AFTER YOU GO HOME    ? DO relax; minimize your activity for 24 hours  ? You may resume normal activity tomorrow  ? You may remove the bandage in the evening or next morning  ? You may resume bathing the next day  ? Drink at least 4 extra glasses of fluid today if not on fluid restrictions  ? DO NOT drive or operate machinery at home or at work for at least 24 hours      VISIT THE EMERGENCY ROOM OR URGENT CARE IF:    ? There is redness or swelling at the injection site  ? There is discharge from the injection site  ? You develop a temperature of 101  F or greater      ADDITIONAL INSTRUCTIONS:     ? You may resume your Coumadin or other blood thinner at your regular dose today.  Follow up with your physician to have your INR rechecked if indicated.  ? If you gain no relief from the injection after two (2) weeks, follow-up with your provider for your options.        Contacts:    During business hours from 8 to 5 pm, you may call [phone] to reach a nurse advisor at [facility].  After hours, call [facility]  [phone].  Ask for the Radiologist on-call.  Someone is on-call 24 hrs/day.  [facility] Toll Free Number   [phone]

## 2019-09-10 ENCOUNTER — ALLIED HEALTH/NURSE VISIT (OUTPATIENT)
Dept: EDUCATION SERVICES | Facility: CLINIC | Age: 44
End: 2019-09-10
Payer: COMMERCIAL

## 2019-09-10 DIAGNOSIS — Z79.4 TYPE 2 DIABETES MELLITUS WITH HYPERGLYCEMIA, WITH LONG-TERM CURRENT USE OF INSULIN (H): Primary | ICD-10-CM

## 2019-09-10 DIAGNOSIS — E11.65 TYPE 2 DIABETES MELLITUS WITH HYPERGLYCEMIA, WITH LONG-TERM CURRENT USE OF INSULIN (H): Primary | ICD-10-CM

## 2019-09-10 PROCEDURE — G0108 DIAB MANAGE TRN  PER INDIV: HCPCS

## 2019-09-10 PROCEDURE — 99207 ZZC DROP WITH A PROCEDURE: CPT

## 2019-09-10 NOTE — PROGRESS NOTES
Diabetes Self-Management Education & Support    Diabetes Education Self Management & Training    SUBJECTIVE/OBJECTIVE:  Presents for: Follow-up  Accompanied by: Self  Diabetes education in the past 24mo: Yes  Focus of Visit: CGM, Assistance w/ making life changes  Diabetes type: Type 2  Date of diagnosis: 2014  Disease course: Worsening  How confident are you filling out medical forms by yourself:: Extremely  Diabetes management related comments/concerns: Says not sure yet when she can go back to work.  Says back is hurting but shot not seem to be hurting.     Says feels munching is contributing to elevated blood glucose and munching more when not working.  Feels it would be more helpful if she could see her blood glucose values while wearing it.     Says when blood glucose high, if she drinks water, it can help lower her blood glucose while at work.       Would like to wait on sensor until she hears back from her [employer] to see if covered under medical. Would like to get away from picking to fingers.     Says knew blood glucose was going to be high this morning since last night ate late (10:30-11pm) - lost track of time yesterday when working on a project.     Transportation concerns: No  Other concerns:: Glasses  Cultural Influences/Ethnic Background:  American    Diabetes Symptoms & Complications  Blurred vision: Yes(Cannot read- vision is fuzzy)  Fatigue: No  Neuropathy: No  Foot ulcerations: No  Polydipsia: Yes  Polyphagia: No  Polyuria: No  Visual change: No  Weakness: Yes(Light headed, dizzy and nauseated)  Weight loss: No  Slow healing wounds: No  Recent Infection(s): No  Symptom course: Stable  Autonomic neuropathy: No  CVA: No  Heart disease: No  Nephropathy: No  Peripheral neuropathy: No  Peripheral Vascular Disease: No  Retinopathy: No  Sexual dysfunction: No    Patient Problem List and Family Medical History reviewed for relevant medical history, current medical status, and diabetes risk  "factors.    Vitals:  There were no vitals taken for this visit.  Estimated body mass index is 46.86 kg/m  as calculated from the following:    Height as of 8/28/19: 1.626 m (5' 4\").    Weight as of 8/28/19: 123.8 kg (273 lb).   Last 3 BP:   BP Readings from Last 3 Encounters:   08/19/19 130/80   08/08/19 136/86   08/01/19 130/80       History   Smoking Status     Former Smoker   Smokeless Tobacco     Never Used     Comment: quit 3/2013       Labs:  Lab Results   Component Value Date    A1C 7.9 06/20/2019     Lab Results   Component Value Date     07/22/2019     Lab Results   Component Value Date    LDL 65 04/19/2019     HDL Cholesterol   Date Value Ref Range Status   04/19/2019 41 (L) >49 mg/dL Final   ]  GFR Estimate   Date Value Ref Range Status   07/22/2019 >90 >60 mL/min/[1.73_m2] Final     Comment:     Non  GFR Calc  Starting 12/18/2018, serum creatinine based estimated GFR (eGFR) will be   calculated using the Chronic Kidney Disease Epidemiology Collaboration   (CKD-EPI) equation.       GFR Estimate If Black   Date Value Ref Range Status   07/22/2019 >90 >60 mL/min/[1.73_m2] Final     Comment:      GFR Calc  Starting 12/18/2018, serum creatinine based estimated GFR (eGFR) will be   calculated using the Chronic Kidney Disease Epidemiology Collaboration   (CKD-EPI) equation.       Lab Results   Component Value Date    CR 0.62 07/22/2019     No results found for: MICROALBUMIN    Healthy Eating  Healthy Eating Assessed Today: Yes  Cultural/Evangelical diet restrictions?: No  Patient on a regular basis: Has an inconsistent intake of carbohydrates, Snacks frequently throughout the day  Meal planning: Low salt(Says not like salt.  Has Mrs. Dash for flavor.)  Meals include: Breakfast, Lunch, Dinner  Breakfast: 2 eggs and 1.5 slice arrington/3-4 sausage and 1/2 cup hashbrowns and water OR cappachino (5 CHO) OR cereal bowl with 2% milk (1-1.5 cup)(wakes: 7am.  B: 8-8:30am)  Lunch: None " OR handful of chips OR sandwich with wheat bread, barksdale and lunchmeat OR chicken with barksdale, onion, pickles and tomato or bread and water  Dinner: potato salad, macaroni and mini subs OR 1.5 burrito enchilada OR boiled potato and veggies with kielbasa/beef OR meat, potato/pasta/rice, veggie(4:30pm)  Snacks: 1-2 diet soda OR sweets (daniel butter or oatmeal cream pie) OR 1/2 cake piece and ice-cream  Other: HS: 1.5 cup fruit before bed OR popcorn(Bed: 11pm)  Beverages: Diet soda, Water, Tea(Diet 1-2x/day, sugar-free 1-2x/day, bottled water (dislikes tap water))  Has patient met with a dietitian in the past?: Yes    Being Active  Being Active Assessed Today: Yes  Exercise:: Currently not exercising  Barrier to exercise: Physical limitation(pain in back and has hills so hurts knees to walk them)    Monitoring  Monitoring Assessed Today: Yes  Did patient bring glucose meter to appointment? : No  Blood Glucose Meter: Unknown  Home Glucose (Sugar) Monitorin-2 times per day  Blood glucose trend: No change  Low Glucose Range (mg/dL): 130-140  High Glucose Range (mg/dL): >200  Overall Range (mg/dL): 140-180    BG Log book:  9/10: 172  : 134, -, -/210  : 123, -, -/160  : 148, -, -/148  : 145, -, -/160  : 184, -, -/210  : 147, -, -/160  : 174, -, -/186  : 180, -, -/190  : 175, -, -/215  : 181, -, -/220  : 147, -, -/160  : 170, -/132, -  18: 132, -, -  : 161, -, -/161  16: 108  15: 154, -, -/142  : 275  : 125, -, -/190  : 171, -, -/185  8/10: 157, -, -/165    Taking Medications  Diabetes Medication(s)     Insulin       insulin glargine (LANTUS SOLOSTAR) 100 UNIT/ML pen    Inject 48 Units Subcutaneous 2 times daily    Sodium-Glucose Co-Transporter 2 (SGLT2) Inhibitors       empagliflozin (JARDIANCE) 25 MG TABS tablet    Take 1 tablet (25 mg) by mouth daily    Sulfonylureas       glipiZIDE (GLUCOTROL XL) 10 MG 24 hr tablet    Take 2 tablets (20 mg) by mouth daily     Incretin Mimetic Agents (GLP-1 Receptor Agonists)       dulaglutide (TRULICITY) 1.5 MG/0.5ML pen    Inject 1.5 mg Subcutaneous every 7 days          Taking Medication Assessed Today: Yes  Current Treatments: Oral Agent (dual therapy), Non-insulin Injectables, Insulin Injections(Saturdays - Trulicity.  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)  Injection/Infusion sites: Abdomen  Problems taking diabetes medications regularly?: No  Diabetes medication side effects?: No  Treatment Compliance: Most of the time(Sometimes time may vary and may have missed 1 shot.)    Problem Solving  Problem Solving Assessed Today: Yes  Hypoglycemia Frequency: Rarely  Hypoglycemia Treatment: Candy(Hard candy)  Patient carries a carbohydrate source: Yes    Hypoglycemia symptoms  Confusion: Yes  Dizziness or Light-Headedness: Yes(Weakness and fatigue)  Headaches: No  Hunger: No  Mood changes: No  Nervousness/Anxiety: No  Sleepiness: No  Speech difficulty: No  Sweats: Yes  Tremors: No    Hypoglycemia Complications  Blackouts: No  Hospitalization: No  Nocturnal hypoglycemia: No  Required assistance: No  Required glucagon injection: No  Seizures: No    Reducing Risks  Reducing Risks Assessed Today: Yes  Diabetes Risks: Family History(Sister and maternal grandma )  CAD Risks: Diabetes Mellitus, Family history, Obesity(Mom has heart mumur)  Has dilated eye exam at least once a year?: No(Year back in March)  Sees dentist every 6 months?: No(Says has cleaning tomorrow. Last went 6 years ago)  Sees podiatrist (foot doctor)?: Yes(December- uses fungal cream)    Healthy Coping  Healthy Coping Assessed Today: Yes  Emotional response to diabetes: Ready to learn  Informal Support system:: Friends, Parent, Children  Stage of change: CONTEMPLATION (Considering change and yet undecided)  Difficulty affording diabetes management supplies?: No  Support resources: None  Patient  Activation Measure Survey Score:  TARSHA Score (Last Two) 2/25/2019   TARSHA Raw Score 34   Activation Score 68.9   TARSHA Level 3       ASSESSMENT:  In the past month, patient only saw 14% fasting, 100% post-lunch and 50% post-dinner blood glucose in target but did not wish to make insulin changes at this time.  Says feels it is food related and feels comfortable adjusting on her own if needed.    Declines Professional CGM today. Not feel helpful if not see results. Wants personal so has less finger sticks and checking still into how well covered by [employer]; was recently told may be an option under her medical benefit.  Discussed the other sensors available (Dexcom and Medtronic) as well as pros/cons for each.  Cornelia Savage was worried the sensor may fall off her arm since she constantly bumps her arms at work.  She may find Dexcom or Medtronic better since able to place on abdomen.  She will keep follow up in 2 weeks since hoping to have an answer by then.      Reviewed reducing risks today.  Patient is due to annual comprehensive eye exam and has dental cleaning tomorrow.  Explained importance of eye exam to catch any problems early before causes issues with vision and encouraged her to get this scheduled as soon as she can. Says has delayed having this done with her neck issue; has PCP follow up on Thursday for her neck.  Encouraged good foot care.  Pt verbalized understanding of concepts discussed and recommendations provided.         Patient's most recent   Lab Results   Component Value Date    A1C 7.9 06/20/2019    is not meeting goal of <7.0    INTERVENTION:   Diabetes knowledge and skills assessment:     Patient is knowledgeable in diabetes management concepts related to: Healthy Eating, Being Active, Monitoring, Taking Medication, Problem Solving, Reducing Risks and Healthy Coping    Patient needs further education on the following diabetes management concepts: Taking Medication and Reducing Risks    Based on learning  assessment above, most appropriate setting for further diabetes education would be: Group class or Individual setting.    Education provided today on:  AADE Self-Care Behaviors:  Monitoring: purpose, log and interpret results, individual blood glucose targets and frequency of monitoring  Taking Medication: action of prescribed medication  Problem Solving: high blood glucose - causes, signs/symptoms, treatment and prevention, low blood glucose - causes, signs/symptoms, treatment and prevention, carrying a carbohydrate source at all times and when to call health care provider  Reducing Risks: major complications of diabetes, prevention, early diagnostic measures and treatment of complications, foot care, appropriate dental care, annual eye exam and A1C - goals, relating to blood glucose levels, how often to check  Healthy Coping: benefits of making appropriate lifestyle changes, utilize support systems and methods for coping with stress    Opportunities for ongoing education and support in diabetes-self management were discussed.    Pt verbalized understanding of concepts discussed and recommendations provided today.       Education Materials Provided:  No new materials provided today    PLAN:  See Patient Instructions for co-developed, patient-stated behavior change goals.  AVS printed and provided to patient today. See Follow-Up section for recommended follow-up.    Steph Beal RD, LD, CDE  Time Spent: 60 minutes  Encounter Type: Individual    Any diabetes medication dose changes were made via the CDE Protocol and Collaborative Practice Agreement with the patient's referring provider. A copy of this encounter was shared with the provider.

## 2019-09-10 NOTE — PATIENT INSTRUCTIONS
1. When you talk to [employer], ask about coverage for Freestyle Lynne, Dexcom G6 or Medtronic.   -Dexcom can be worn on your stomach and does not need finger sticks   -Medtronic needs fingerstick but can be worn on stomach   -Freestyle no fingerstick but only approved for back of arm    2. If they will cover as medical, ask them which pharmacy/clinic you need to place the order so run as medical.     3. Try to get comprehensive eye appointment when next is better- want done yearly.  Could also ask Dr. Raya about options to check it out (some clinics are doing eye scans if not able to get in for dilated exams).     FOLLOW UP:  Tuesday, September 24th at 10am at [facility]     Steph Beal RD, LD, CDE   [phone]

## 2019-09-12 ENCOUNTER — OFFICE VISIT (OUTPATIENT)
Dept: PHYSICAL MEDICINE AND REHAB | Facility: CLINIC | Age: 44
End: 2019-09-12
Payer: COMMERCIAL

## 2019-09-12 VITALS
DIASTOLIC BLOOD PRESSURE: 82 MMHG | OXYGEN SATURATION: 97 % | HEART RATE: 80 BPM | TEMPERATURE: 95.9 F | SYSTOLIC BLOOD PRESSURE: 133 MMHG

## 2019-09-12 DIAGNOSIS — M47.812 SPONDYLOSIS OF CERVICAL REGION WITHOUT MYELOPATHY OR RADICULOPATHY: Primary | ICD-10-CM

## 2019-09-12 RX ORDER — TRIAMCINOLONE ACETONIDE 40 MG/ML
40 INJECTION, SUSPENSION INTRA-ARTICULAR; INTRAMUSCULAR ONCE
Status: COMPLETED | OUTPATIENT
Start: 2019-09-12 | End: 2019-09-12

## 2019-09-12 RX ORDER — BUPIVACAINE HYDROCHLORIDE 5 MG/ML
10 INJECTION, SOLUTION EPIDURAL; INTRACAUDAL ONCE
Status: COMPLETED | OUTPATIENT
Start: 2019-09-12 | End: 2019-09-12

## 2019-09-12 RX ADMIN — TRIAMCINOLONE ACETONIDE 40 MG: 40 INJECTION, SUSPENSION INTRA-ARTICULAR; INTRAMUSCULAR at 09:43

## 2019-09-12 RX ADMIN — BUPIVACAINE HYDROCHLORIDE 50 MG: 5 INJECTION, SOLUTION EPIDURAL; INTRACAUDAL at 09:42

## 2019-09-12 ASSESSMENT — PAIN SCALES - GENERAL: PAINLEVEL: MODERATE PAIN (4)

## 2019-09-12 NOTE — NURSING NOTE
Chief Complaint   Patient presents with     New Patient     [facility] NEW RIGHT OCCIPITAL NEURALGIA       Marlee Pastor, EMT

## 2019-09-12 NOTE — PROGRESS NOTES
[facility] Rehabilitation Clinic    I was asked by Dr Rutledge to evaluate neck pain for possible occipital nerve block.    HPI  This is a 43 year old female with right sided neck pain presents to the clinic for possible occipital nerve blocks.     Cornelia Rodriguez states that she had a ear infection in Feb this year. She was seen by Dr Combs and referred to Dr Rutledge, was started her on Cymbalta for right occipital neuralgia. s. She states that the pain was not much better but she left lethargic and she stopped taking it. She was given the occipital nerve blocks in     She reports that she has neck pain, though the shooting pains are relived following the occipital nerve block on April 19th. Last migraine was mid march. She is on Imitrex.   Pain is 3-4/10 and points to the lower cervical neck area. No shooting pain no burning nature. She has cracks when she moves. She feels a sense of tightness. These symptoms are since April.     She works [employer], and her work involves overhead activities. She is constantly flexing and extending her neck at work. Her work involves lifting 1 lb to 70 lbs constantly.            XR Cervical spine 7/8/2019   Cervicothoracic junction is well visualized. Alignment is intact. Disc  space narrowing at C6-7 with marginal spurring. Mild degenerative  facets at this level as well as uncovertebral joints. Odontoid and C1  articulation appears normal. Soft tissues unremarkable.                                                          Impression: Normal alignment. Spondylosis at C6-7.        Physical Exam  /82 (Patient Position: Sitting)   Pulse 80   Temp 95.9  F (35.5  C) (Oral)   SpO2 97%     Right shoulder is drooping   She has normal Flexion, extension, tilt to the right and left, and rotation to the right and left. She has relief of pain with stretching.   On palpation of the muscles, she has taut muscles noted in the right side of the neck.   No weakness noted        Assessment and Plan  This is a 43 year old female with cervicalgia secondary to spondylosis of the C6-C7 area. There may be underlying facet arthropathy and     She has diabetes. And I would be cautious of the steroid dose pack.   Discussed all options including medrol dose pack and occipital nerve block which may not be helpful as I do not see a neuropathic component to the pain.   Recommend MRI of the neck, specifically the C5-6-7 areas to evaluate any pathology.          1. Patient education: I went over the above diagnoses and treatment plan with the patient and answered all of their questions.  2. Imaging review: RX of the neck was reviewed and discussed with the patient.     3. Exercise program: stretching exercise daily.   4. Medications: continue Imitrex. Robaxin prn. She denies any lethargy.   5. Imaging Orders: MRI of the neck without contrast.   6. Interventions: PT for myofascial release following the MRI results.    7. Referrals: to PT.     Procedure: Trigger Point injection  Indication: Trigger point pain / myofascial pain   Discussed indications, risks, benefits, alternatives, questions and concerns addressed appropriately, written consent obtained.     We have identified the trigger point / tender point areas and cleaned appropriately with use of either chloroprep, alcohol swabs.     Prior to the start of the procedure and with procedural staff participation, I verbally confirmed the patient s identity using two indicators, relevant allergies, that the procedure was appropriate and matched the consent or emergent situation, and that the correct equipment/implants were available. Immediately prior to starting the procedure I conducted the Time Out with the procedural staff and re-confirmed the patient s name, procedure, and site/side. (The Joint Commission universal protocol was followed.)  Yes    Sedation (Moderate or Deep): None      Prepared a total of 7 cc of 0.5% Bupivacaine (6 cc) and  Kenalog 40 mg in 1 ml Injected the following sites:   Upper trapezius, splenius semispinalis, longissimus, and para-cervicals.     Kenalog 40 mg   Lot [id number]   NDC 56910 62038 9     Bupivacaine   Lot [id number]   Exp date 03/23   NDC 62642-216-48    The injections were done in a fan-like technique.   The patient tolerated the injections well without significant bleeding.      Follow up 4 weeks.     Total of 60 minutes spent in direct patient interaction greater than 50% in counseling and education as above, excluding the trigger point injections.     CC:  Referring Physician Sarah Busby

## 2019-09-12 NOTE — LETTER
9/12/2019       RE: Cornelia Rodriguez  [address]  [address]  [facility] [place] [address]     Dear Colleague,    Thank you for referring your patient, Cornelia Rodriguez, to the [facility] PHYSICAL MEDICINE AND REHABILITATION at [facility]. Please see a copy of my visit note below.    Physical Medicine & Rehabilitation Clinic    I was asked by Dr Rutledge to evaluate neck pain for possible occipital nerve block.    HPI  This is a 43 year old female with right sided neck pain presents to the clinic for possible occipital nerve blocks.     Cornelia Rodriguez states that she had a ear infection in Feb this year. She was seen by Dr Combs and referred to Dr Rutledge, was started her on Cymbalta for right occipital neuralgia. s. She states that the pain was not much better but she left lethargic and she stopped taking it. She was given the occipital nerve blocks in     She reports that she has neck pain, though the shooting pains are relived following the occipital nerve block on April 19th. Last migraine was mid march. She is on Imitrex.   Pain is 3-4/10 and points to the lower cervical neck area. No shooting pain no burning nature. She has cracks when she moves. She feels a sense of tightness. These symptoms are since April.     She works [employer], and her work involves overhead activities. She is constantly flexing and extending her neck at work. Her work involves lifting 1 lb to 70 lbs constantly.      XR Cervical spine 7/8/2019   Cervicothoracic junction is well visualized. Alignment is intact. Disc  space narrowing at C6-7 with marginal spurring. Mild degenerative  facets at this level as well as uncovertebral joints. Odontoid and C1  articulation appears normal. Soft tissues unremarkable.                                                          Impression: Normal alignment. Spondylosis at C6-7.    Physical Exam  /82 (Patient Position: Sitting)   Pulse 80   Temp 95.9  F (35.5  C) (Oral)    SpO2 97%     Right shoulder is drooping   She has normal Flexion, extension, tilt to the right and left, and rotation to the right and left. She has relief of pain with stretching.   On palpation of the muscles, she has taut muscles noted in the right side of the neck.   No weakness noted     Assessment and Plan  This is a 43 year old female with cervicalgia secondary to spondylosis of the C6-C7 area. There may be underlying facet arthropathy and     She has diabetes. And I would be cautious of the steroid dose pack.   Discussed all options including medrol dose pack and occipital nerve block which may not be helpful as I do not see a neuropathic component to the pain.   Recommend MRI of the neck, specifically the C5-6-7 areas to evaluate any pathology.   1. Patient education: I went over the above diagnoses and treatment plan with the patient and answered all of their questions.  2. Imaging review: RX of the neck was reviewed and discussed with the patient.     3. Exercise program: stretching exercise daily.   4. Medications: continue Imitrex. Robaxin prn. She denies any lethargy.   5. Imaging Orders: MRI of the neck without contrast.   6. Interventions: PT for myofascial release following the MRI results.    7. Referrals: to PT.     Procedure: Trigger Point injection  Indication: Trigger point pain / myofascial pain   Discussed indications, risks, benefits, alternatives, questions and concerns addressed appropriately, written consent obtained.     We have identified the trigger point / tender point areas and cleaned appropriately with use of either chloroprep, alcohol swabs.     Prior to the start of the procedure and with procedural staff participation, I verbally confirmed the patient s identity using two indicators, relevant allergies, that the procedure was appropriate and matched the consent or emergent situation, and that the correct equipment/implants were available. Immediately prior to starting the  procedure I conducted the Time Out with the procedural staff and re-confirmed the patient s name, procedure, and site/side. (The Joint Commission universal protocol was followed.)  Yes    Sedation (Moderate or Deep): None    Prepared a total of 7 cc of 0.5% Bupivacaine (6 cc) and Kenalog 40 mg in 1 ml Injected the following sites:   Upper trapezius, splenius semispinalis, longissimus, and para-cervicals.     Kenalog 40 mg   Lot [id number]   NDC 62008 [id number] 9     Bupivacaine   Lot [id number]   Exp date 03/23   NDC 78149-264-56    The injections were done in a fan-like technique.   The patient tolerated the injections well without significant bleeding.      Follow up 4 weeks.     Total of 60 minutes spent in direct patient interaction greater than 50% in counseling and education as above, excluding the trigger point injections.     CC:  Referring Physician Sarah Busby    Again, thank you for allowing me to participate in the care of your patient.      Sincerely,    Annelise Harrison MD

## 2019-09-17 ENCOUNTER — TELEPHONE (OUTPATIENT)
Dept: ORTHOPEDICS | Facility: CLINIC | Age: 44
End: 2019-09-17

## 2019-09-17 DIAGNOSIS — M54.16 LUMBAR RADICULOPATHY: Primary | ICD-10-CM

## 2019-09-17 NOTE — TELEPHONE ENCOUNTER
[facility] Call Center    Phone Message    May a detailed message be left on voicemail: yes    Reason for Call: Symptoms or Concerns     After cortisone shot for back, it feels like mid back wont stretch and is very tight. Scheduled another appt but patient supposed to go back to work end of sept but wants to discuss if this is related to what she was seen for before, or new? Please call to advise.     Action Taken: Message routed to:  Adult Clinics: Sports Medicine p [phone]

## 2019-09-18 ENCOUNTER — ANCILLARY PROCEDURE (OUTPATIENT)
Dept: MRI IMAGING | Facility: CLINIC | Age: 44
End: 2019-09-18
Attending: PHYSICAL MEDICINE & REHABILITATION
Payer: COMMERCIAL

## 2019-09-18 PROBLEM — M53.3 SACROILIAC JOINT DYSFUNCTION: Status: RESOLVED | Noted: 2019-07-09 | Resolved: 2019-09-18

## 2019-09-18 PROCEDURE — 72141 MRI NECK SPINE W/O DYE: CPT | Performed by: RADIOLOGY

## 2019-09-18 NOTE — PROGRESS NOTES
Subjective:  HPI                    Objective:  System    Physical Exam    General     ROS    Assessment/Plan:    DISCHARGE REPORT    Progress reporting period is from 8/8 to 8/15/19.       SUBJECTIVE:   Subjective: MRI indicates stenosis in the lumbar area and a disc extrusion TL     Current Pain level: (5-7/10).      Initial Pain level: 10/10.   Changes in function:  None  Adverse reaction to treatment or activity: None    OBJECTIVE  Objective: no change in trunk AROM     ASSESSMENT/PLAN  Updated problem list and treatment plan: Diagnosis 1:  LBP  Pain -  US, manual therapy, self management, education and home program  Decreased strength - therapeutic exercise and home program  Impaired muscle performance - neuro re-education and home program  Decreased function - home program  Assessment of Progress: The patient's condition is unchanged.  Self Management Plans:  Patient has been instructed in a home treatment program.    Recommendations:  Cornelia is seeing a specialist.    Please refer to the daily flowsheet for treatment today, total treatment time and time spent performing 1:1 timed codes.

## 2019-09-19 ENCOUNTER — DOCUMENTATION ONLY (OUTPATIENT)
Dept: CARE COORDINATION | Facility: CLINIC | Age: 44
End: 2019-09-19

## 2019-09-19 ENCOUNTER — OFFICE VISIT (OUTPATIENT)
Dept: FAMILY MEDICINE | Facility: CLINIC | Age: 44
End: 2019-09-19
Payer: COMMERCIAL

## 2019-09-19 VITALS
SYSTOLIC BLOOD PRESSURE: 138 MMHG | RESPIRATION RATE: 18 BRPM | WEIGHT: 273 LBS | OXYGEN SATURATION: 98 % | HEIGHT: 64 IN | DIASTOLIC BLOOD PRESSURE: 81 MMHG | HEART RATE: 77 BPM | BODY MASS INDEX: 46.61 KG/M2

## 2019-09-19 DIAGNOSIS — K21.9 GASTROESOPHAGEAL REFLUX DISEASE, ESOPHAGITIS PRESENCE NOT SPECIFIED: ICD-10-CM

## 2019-09-19 DIAGNOSIS — E66.01 MORBID OBESITY (H): ICD-10-CM

## 2019-09-19 DIAGNOSIS — M54.81 OCCIPITAL NEURALGIA OF RIGHT SIDE: ICD-10-CM

## 2019-09-19 DIAGNOSIS — Z79.4 TYPE 2 DIABETES MELLITUS WITH HYPERGLYCEMIA, WITH LONG-TERM CURRENT USE OF INSULIN (H): Primary | ICD-10-CM

## 2019-09-19 DIAGNOSIS — E11.65 TYPE 2 DIABETES MELLITUS WITH HYPERGLYCEMIA, WITH LONG-TERM CURRENT USE OF INSULIN (H): Primary | ICD-10-CM

## 2019-09-19 DIAGNOSIS — K76.0 FATTY LIVER: ICD-10-CM

## 2019-09-19 LAB
ANION GAP SERPL CALCULATED.3IONS-SCNC: 3 MMOL/L (ref 3–14)
BUN SERPL-MCNC: 14 MG/DL (ref 7–30)
CALCIUM SERPL-MCNC: 9.3 MG/DL (ref 8.5–10.1)
CHLORIDE SERPL-SCNC: 102 MMOL/L (ref 94–109)
CO2 SERPL-SCNC: 34 MMOL/L (ref 20–32)
CREAT SERPL-MCNC: 0.64 MG/DL (ref 0.52–1.04)
GFR SERPL CREATININE-BSD FRML MDRD: >90 ML/MIN/{1.73_M2}
GLUCOSE SERPL-MCNC: 127 MG/DL (ref 70–99)
HBA1C MFR BLD: 8.6 % (ref 0–5.6)
POTASSIUM SERPL-SCNC: 4 MMOL/L (ref 3.4–5.3)
SODIUM SERPL-SCNC: 139 MMOL/L (ref 133–144)

## 2019-09-19 PROCEDURE — 80048 BASIC METABOLIC PNL TOTAL CA: CPT | Performed by: FAMILY MEDICINE

## 2019-09-19 PROCEDURE — 99214 OFFICE O/P EST MOD 30 MIN: CPT | Performed by: FAMILY MEDICINE

## 2019-09-19 PROCEDURE — 36415 COLL VENOUS BLD VENIPUNCTURE: CPT | Performed by: FAMILY MEDICINE

## 2019-09-19 PROCEDURE — 82306 VITAMIN D 25 HYDROXY: CPT | Performed by: FAMILY MEDICINE

## 2019-09-19 PROCEDURE — 83036 HEMOGLOBIN GLYCOSYLATED A1C: CPT | Performed by: FAMILY MEDICINE

## 2019-09-19 RX ORDER — DULOXETIN HYDROCHLORIDE 60 MG/1
60 CAPSULE, DELAYED RELEASE ORAL DAILY
Qty: 90 CAPSULE | Refills: 3 | Status: SHIPPED | OUTPATIENT
Start: 2019-09-19 | End: 2020-06-09 | Stop reason: DRUGHIGH

## 2019-09-19 ASSESSMENT — MIFFLIN-ST. JEOR: SCORE: 1868.32

## 2019-09-19 NOTE — PROGRESS NOTES
Subjective     Cornelia Rodriguez is a 43 year old female who presents to clinic today for the following health issues:    HPI   Diabetes Follow-up      How often are you checking your blood sugar? Two times daily    What time of day are you checking your blood sugars (select all that apply)?  Before meals    Have you had any blood sugars above 200?  Yes couple readings     Have you had any blood sugars below 70?  No    What symptoms do you notice when your blood sugar is low?  None    What concerns do you have today about your diabetes? None     Do you have any of these symptoms? (Select all that apply)  No numbness or tingling in feet.  No redness, sores or blisters on feet.  No complaints of excessive thirst.  No reports of blurry vision.  No significant changes to weight.     Have you had a diabetic eye exam in the last 12 months? No     -Has not been eating well because she is on the go and not eating as healthy. Her blood sugar was around 200 one morning after she ate greasy food the previous day.   -Her fasting blood sugar has been 132 or lower  -She has checked her blood sugar later in the day a few hours after she has eaten that runs in the 160s-170s.   -Likes to add flavor supplements to her water, it is sugar-free, calorie-free.   -She likes to snack on a lot of popcorn. She used to snack on vegetables but her paychecks are about 1/3 of what they used to be while she has been on leave for her back and she cannot afford healthier snacks.   -Intermittently follows a diabetic diet, depends on her schedule and if she is home to be able to cook meals.   -On 9/10/19 she saw diabetic education. She is working with her insurance to see if they will cover a continuous monitor.   -Weight has been stable   Wt Readings from Last 4 Encounters:   09/19/19 123.8 kg (273 lb)   08/28/19 123.8 kg (273 lb)   08/19/19 123.8 kg (273 lb)   08/08/19 124.8 kg (275 lb 3.2 oz)       Additional:   -Had a steroid injection in her back  that provided relief. She has another upcoming appointment next month.   -Last week she got trigger point injections in her neck that provided relief for a few days and was sleeping well. She is not sleeping as well anymore.   -Has been taking two 30 mg capsules of Cymbalta once daily  -Pt got her flu shot last week  -Has not been supplementing vitamin D  -Reflux has been controlled   -On her right lower leg she has some pigmentation that continues to spread. Denies edema in her legs at the end of the day.     9/12/19 MR Cervical Spine                                             Impression:   Mild-to-moderate degenerative disease of the cervical spine,  particularly severe at C6-C7 where there is moderate to severe  narrowing of the right neural foramen and moderate narrowing of the  spinal canal.     CHIQUIS CODY MD      BP Readings from Last 2 Encounters:   09/19/19 138/81   09/12/19 133/82     Hemoglobin A1C (%)   Date Value   06/20/2019 7.9 (H)   03/27/2019 10.8 (H)     LDL Cholesterol Calculated (mg/dL)   Date Value   04/19/2019 65       Diabetes Management Resources      How many servings of fruits and vegetables do you eat daily?  Depends if she has fruits/veggies available     On average, how many sweetened beverages do you drink each day (soda, juice, sweet tea, etc)?   1-3 sweetened drinks     How many days per week do you miss taking your medication? 0      Patient Active Problem List   Diagnosis     Acute recurrent maxillary sinusitis     Right chronic serous otitis media     Morbid obesity (H)     Migraine without aura and without status migrainosus, not intractable     Gastroesophageal reflux disease without esophagitis     Type 2 diabetes mellitus with hyperglycemia (H)     Allergic rhinitis, unspecified seasonality, unspecified trigger     Hyperlipidemia LDL goal <100     Allergic contact dermatitis due to adhesives     Back muscle spasm     History of Helicobacter pylori infection      "Migraine     Seborrheic dermatitis of scalp     Type 2 diabetes mellitus (H)     Perennial allergic rhinitis     Hyperlipidemia     Abdominal pain     Low back pain     Occipital neuralgia of right side     Past Surgical History:   Procedure Laterality Date     CHOLECYSTECTOMY         Social History     Tobacco Use     Smoking status: Former Smoker     Smokeless tobacco: Never Used     Tobacco comment: quit 3/2013   Substance Use Topics     Alcohol use: Yes     Comment: Occasionally     Family History   Problem Relation Age of Onset     Diabetes Maternal Grandmother      Diabetes Sister      Hypertension Sister              Reviewed and updated as needed this visit by Provider         Review of Systems   ROS COMP: Constitutional, HEENT, cardiovascular, pulmonary, gi and gu systems are negative, except as otherwise noted.    This document serves as a record of the services and decisions personally performed by ANGI BRAGA. It was created on his/her behalf by Carolann Fernandez, a trained medical scribe. The creation of this document is based on the provider's statements to the medical scribe. Carolann Fernandez, September 19, 2019 11:24 AM        Objective    /81 (BP Location: Right arm)   Pulse 77   Resp 18   Ht 1.626 m (5' 4\")   Wt 123.8 kg (273 lb)   SpO2 98%   BMI 46.86 kg/m    Body mass index is 46.86 kg/m .  Physical Exam   GENERAL: healthy, alert and no distress  NECK: no adenopathy, no asymmetry, masses, or scars and thyroid normal to palpation  RESP: lungs clear to auscultation - no rales, rhonchi or wheezes  CV: regular rate and rhythm, normal S1 S2, no S3 or S4, no murmur, click or rub, no peripheral edema and peripheral pulses strong  ABDOMEN: soft, nontender, no hepatosplenomegaly, no masses and bowel sounds normal  MS: no gross musculoskeletal defects noted, no edema  SKIN: erythematous and hyperpigmented blotchy changes to bilateral LEs   PSYCH: mentation appears normal, affect " "normal/bright        Assessment & Plan     1. Type 2 diabetes mellitus with hyperglycemia, with long-term current use of insulin (H)  Home control reported as fair-good, continue current medications. Pt is working with diabetic educators to get a continuous glucose monitor- may need to add daytime insulin if elevated A1C   - OPHTHALMOLOGY ADULT REFERRAL  - Basic metabolic panel  - Hemoglobin A1c    2. Occipital neuralgia of right side  Has been improved with injections. Continue current medication (changed rx to one tablet 60 mg for ease of use) . Getting evaulated for cervical radiculopathy now too.  - DULoxetine (CYMBALTA) 60 MG capsule; Take 1 capsule (60 mg) by mouth daily  Dispense: 90 capsule; Refill: 3    3. Morbid obesity (H)  Discussed healthy diet and exercise guidelines.   - Vitamin D Deficiency    4. Fatty liver  Monitor liver function test's inermittently- seeing GI    5. Gastroesophageal reflux disease, esophagitis presence not specified  Controlled, continue to current medication.       BMI:   Estimated body mass index is 46.86 kg/m  as calculated from the following:    Height as of this encounter: 1.626 m (5' 4\").    Weight as of this encounter: 123.8 kg (273 lb).   Weight management plan: Discussed healthy diet and exercise guidelines        Patient Instructions     Please call [facility] (formerly called [facility]) at [phone] to schedule an ophthalmology appointment.    Take one 60 mg capsule Cymbalta daily.    At [facility], we strive to deliver an exceptional experience to you, every time we see you.  If you receive a survey in the mail, please send us back your thoughts. We really do value your feedback.    Your care team:     Family Medicine   NABIL Johnston MD Emily Bunt, IRMA CNP   S. MD Radha Flower MD Angela Wermerskirchen, MD         Clinic hours: Monday - " Wednesday 7 am-7 pm   Thursdays and Fridays 7 am-5 pm.     [facility]: Monday - Friday 11 am-9 pm,   Saturday and Sunday 9 am-5 pm.    [facility]: Monday -Thursday 8 am-8 pm; Friday 8 am-6 pm; Saturday and Sunday 9 am-5 pm.     [facility]: Monday - Thursday 8 am - 7 pm; Friday 8 am - 6 pm    Clinic: [phone]   [facility]: [phone]   [facility]: [phone]                Return in about 6 months (around 3/19/2020) for Diabetes visit.      The information in this document, created by the medical scribe for me, accurately reflects the services I personally performed and the decisions made by me. I have reviewed and approved this document for accuracy.   Sarah Lombardo MD  [facility]

## 2019-09-20 LAB — DEPRECATED CALCIDIOL+CALCIFEROL SERPL-MC: 31 UG/L (ref 20–75)

## 2019-09-20 NOTE — TELEPHONE ENCOUNTER
9/20 Called and spoke to patient. She is currently scheduled for 10/1/19.    Chrissy Singletary   Procedure    Ortho/Sports Med/Ent/Eye   [facility]   [phone]

## 2019-09-20 NOTE — TELEPHONE ENCOUNTER
The patient was contacted to discuss her mid back tightness.  Dr. Lindsay believe it could still be from her radiculopathy, not the injection itself. Dr. Lindsay if offering to do a repeat injection, at L4-5 interlaminar approach. Dr. Lindsay also suggests a referral to a Surgeon or PT.  The patient would like to try another injection, an order will be placed today and scheduled. The patient has a follow up with Dr. Lindsay on 10/1/2019 to return to work.  She feels she is able to return to work on that day.

## 2019-09-26 NOTE — TELEPHONE ENCOUNTER
RECORDS RECEIVED FROM: Elevated liver enzymes- appt per pt   DATE RECEIVED: 07.22.2019   NOTES STATUS DETAILS   OFFICE NOTE from referring provider Internal 06.20.2019   OFFICE NOTES from other specialists N/A    DISCHARGE SUMMARY from hospital N/A    MEDICATION LIST Internal    LIVER BIOSPY (IF APPLICABLE)      PATHOLOGY REPORTS  N/A    IMAGING     ENDOSCOPY (IF AVAILABLE) Care Everywhere 07.13.2017   COLONOSCOPY (IF AVAILABLE) N/A    ULTRASOUND LIVER Internal 05.17.2019   CT OF ABDOMEN Care Everywhere 05.25.2017   MRI OF LIVER N/A    FIBROSCAN, US ELASTOGRAPHY, FIBROSIS SCAN, MR ELASTOGRAPHY N/A    LABS     HEPATIC PANEL (LIVER PANEL) Internal 04.19.2019   BASIC METABOLIC PANEL Internal 07.12.2019 06.26.2019   COMPLETE METABOLIC PANEL Internal 03.27.2019   COMPLETE BLOOD COUNT (CBC) Internal 06.26.2019   INTERNATIONAL NORMALIZED RATIO (INR) Internal 04.23.2019   HEPATITIS C ANTIBODY Internal    HEPATITIS C VIRAL LOAD/PCR N/A 04.19.2019   HEPATITIS C GENOTYPE N/A    HEPATITIS B SURFACE ANTIGEN Internal 04.19.2019   HEPATITIS B SURFACE ANTIBODY N/A    HEPATITIS B DNA QUANT LEVEL N/A    HEPATITIS B CORE ANTIBODY N/A         musculoskeletal

## 2019-10-01 ENCOUNTER — OFFICE VISIT (OUTPATIENT)
Dept: ORTHOPEDICS | Facility: CLINIC | Age: 44
End: 2019-10-01
Payer: COMMERCIAL

## 2019-10-01 VITALS — BODY MASS INDEX: 46.61 KG/M2 | WEIGHT: 273 LBS | HEIGHT: 64 IN

## 2019-10-01 DIAGNOSIS — M54.9 MID-BACK PAIN, ACUTE: Primary | ICD-10-CM

## 2019-10-01 PROCEDURE — 99213 OFFICE O/P EST LOW 20 MIN: CPT | Performed by: FAMILY MEDICINE

## 2019-10-01 ASSESSMENT — MIFFLIN-ST. JEOR: SCORE: 1868.32

## 2019-10-01 ASSESSMENT — PAIN SCALES - GENERAL: PAINLEVEL: MODERATE PAIN (4)

## 2019-10-01 NOTE — LETTER
"    10/1/2019         RE: Cornelia Rodriguez  [address]  [address]  [place] [address]        Dear Colleague,    Thank you for referring your patient, Cornelia Rodriguez, to the [facility]. Please see a copy of my visit note below.    HISTORY OF PRESENT ILLNESS  Ms. Rodriguez is a pleasant 43 year old year old female following up with back pain.  Cornelia feels quite a bit better after her lumbar injection.  Unfortunately she has developed mid back pain.  She points to her upper thoracic region, between her shoulder blades.  She feels the pain more to the outside.  She feels sore, tender to the touch.  She is now experienced any numbness or tingling, no issues in her arms.     PHYSICAL EXAM  Vitals:    10/01/19 0743   Weight: 123.8 kg (273 lb)   Height: 1.626 m (5' 4\")     General  - normal appearance, in no obvious distress  CV  - normal peripheral perfusion  Pulm  - normal respiratory pattern, non-labored  Musculoskeletal - thoracic spine  - stance: normal gait without limp  - inspection: normal bone and joint alignment, no obvious scoliosis  - palpation: tender left and right rhomboid musculature  - ROM: normal and painless flexion, extension, left and right sidebending and rotation  - strength: upper extremities 5/5 in all planes  Neuro  - no sensory or motor deficit, grossly normal coordination, normal muscle tone  Skin  - no ecchymosis, erythema, warmth, or induration, no obvious rash  Psych  - interactive, appropriate, normal mood and affect          ASSESSMENT & PLAN  Ms. Rodriguez is a 43 year old year old female following up with low back pain, also with new mid back pain.    I am happy that she is doing better after injection.  It does seem that her thoracic symptoms are most likely muscular in nature.  I am referring her to physical therapy, I do believe this will be a temporary issue.    I also wrote her a note allowing her to return to work, full duty.    It was a pleasure seeing Ruy        Mejia" "DO Neftali, CAQSM      This note was constructed using Dragon dictation software, please excuse any minor errors in spelling, grammar, or syntax.        [facility] FOLLOW-UP VISIT 10/1/2019    Cornelia Rodriguez's chief complaint for this visit includes:  Chief Complaint   Patient presents with     RECHECK     f/u still having the mid back tightness      PCP: Sarah Lombardo    Referring Provider:  No referring provider defined for this encounter.    Ht 1.626 m (5' 4\")   Wt 123.8 kg (273 lb)   BMI 46.86 kg/m     Moderate Pain (4)       Interval History:     Follow up reason: mid back pain     Medical History:    Any recent changes to your medical history? No    Any new medication prescribed since last visit? No    Review of Systems:    Do you have fever, chills, weight loss? No    Do you have any vision problems? No    Do you have any chest pain or edema? No    Do you have any shortness of breath or wheezing?  No    Do you have stomach problems? No    Do you have any numbness or focal weakness? No    Do you have diabetes? Yes,     Do you have problems with bleeding or clotting? No    Do you have an rashes or other skin lesions? No        Again, thank you for allowing me to participate in the care of your patient.        Sincerely,        Mejia Lindsay DO"

## 2019-10-01 NOTE — PROGRESS NOTES
"HISTORY OF PRESENT ILLNESS  Ms. Rodriguez is a pleasant 43 year old year old female following up with back pain.  Cornelia feels quite a bit better after her lumbar injection.  Unfortunately she has developed mid back pain.  She points to her upper thoracic region, between her shoulder blades.  She feels the pain more to the outside.  She feels sore, tender to the touch.  She is now experienced any numbness or tingling, no issues in her arms.     PHYSICAL EXAM  Vitals:    10/01/19 0743   Weight: 123.8 kg (273 lb)   Height: 1.626 m (5' 4\")     General  - normal appearance, in no obvious distress  CV  - normal peripheral perfusion  Pulm  - normal respiratory pattern, non-labored  Musculoskeletal - thoracic spine  - stance: normal gait without limp  - inspection: normal bone and joint alignment, no obvious scoliosis  - palpation: tender left and right rhomboid musculature  - ROM: normal and painless flexion, extension, left and right sidebending and rotation  - strength: upper extremities 5/5 in all planes  Neuro  - no sensory or motor deficit, grossly normal coordination, normal muscle tone  Skin  - no ecchymosis, erythema, warmth, or induration, no obvious rash  Psych  - interactive, appropriate, normal mood and affect          ASSESSMENT & PLAN  Ms. Rodriguez is a 43 year old year old female following up with low back pain, also with new mid back pain.    I am happy that she is doing better after injection.  It does seem that her thoracic symptoms are most likely muscular in nature.  I am referring her to physical therapy, I do believe this will be a temporary issue.    I also wrote her a note allowing her to return to work, full duty.    It was a pleasure seeing Cornelia.        Mejia Lindsay, , CAQSM      This note was constructed using Dragon dictation software, please excuse any minor errors in spelling, grammar, or syntax.        [facility] FOLLOW-UP VISIT 10/1/2019    Cornelia Rodriguez's chief complaint for " "this visit includes:  Chief Complaint   Patient presents with     RECHECK     f/u still having the mid back tightness      PCP: Sarah Lombardo    Referring Provider:  No referring provider defined for this encounter.    Ht 1.626 m (5' 4\")   Wt 123.8 kg (273 lb)   BMI 46.86 kg/m    Moderate Pain (4)       Interval History:     Follow up reason: mid back pain     Medical History:    Any recent changes to your medical history? No    Any new medication prescribed since last visit? No    Review of Systems:    Do you have fever, chills, weight loss? No    Do you have any vision problems? No    Do you have any chest pain or edema? No    Do you have any shortness of breath or wheezing?  No    Do you have stomach problems? No    Do you have any numbness or focal weakness? No    Do you have diabetes? Yes,     Do you have problems with bleeding or clotting? No    Do you have an rashes or other skin lesions? No      "

## 2019-10-01 NOTE — LETTER
Return to Work  2019     Seen today: yes    Patient:  Cornelia Rodriguez  :   1975  MRN:     [id number]  Physician: MEJIA LINDSAY    Cornelia Rodriguez is under my professional care for a medical condition.  Cornelia Savage may return to work on 10/2/19.    Please contact our office with any questions or concerns.       Electronically signed by Mejia Lindsay DO

## 2019-10-02 ENCOUNTER — THERAPY VISIT (OUTPATIENT)
Dept: PHYSICAL THERAPY | Facility: CLINIC | Age: 44
End: 2019-10-02
Payer: COMMERCIAL

## 2019-10-02 DIAGNOSIS — M54.9 MID-BACK PAIN, ACUTE: ICD-10-CM

## 2019-10-02 DIAGNOSIS — M54.50 ACUTE MIDLINE LOW BACK PAIN WITHOUT SCIATICA: ICD-10-CM

## 2019-10-02 PROCEDURE — 97110 THERAPEUTIC EXERCISES: CPT | Mod: GP | Performed by: PHYSICAL THERAPIST

## 2019-10-02 PROCEDURE — 97112 NEUROMUSCULAR REEDUCATION: CPT | Mod: GP | Performed by: PHYSICAL THERAPIST

## 2019-10-02 PROCEDURE — 97161 PT EVAL LOW COMPLEX 20 MIN: CPT | Mod: GP | Performed by: PHYSICAL THERAPIST

## 2019-10-02 PROCEDURE — 97140 MANUAL THERAPY 1/> REGIONS: CPT | Mod: GP | Performed by: PHYSICAL THERAPIST

## 2019-10-02 NOTE — PROGRESS NOTES
[facility] Initial Evaluation  Subjective:    Type of problem:  Lumbar   Condition occurred with:  Insidious onset.    Problem details: MD order 10/1/19. Cornelia Savage pinched her sciatic nerve in July 2019 while sleeping. She knew something was wrong when she woke up in the morning to go to work. She tried stretching and she made it worse at work. 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 She could barely move and had hard time to take steps. She saw Brit Valadez for PT for 2 months and was sent to Dr Lindsay. She received cortisone shot Sep 6th and felt better. Currently she is having muscular pain in her mid back. She was sent to PT for further management.  .   Patient reports pain:  Mid lumbar spine and mid thoracic spine.   Symptoms are exacerbated by bending and twisting     Cornelia Rodriguez being seen for mid back pain.   Date of Onset: 2 weeks ago. Where condition occurred: for unknown reasons.Problem occurred: unknown  and reported as 2/10 on pain scale. General health as reported by patient is good. Pertinent medical history includes:  Overweight, diabetes and migraines/headaches.  Medical allergies: adhesives. Other medical allergies details: on file.  Surgeries include:  Other. Other surgery history details: gall bladder surgery.   Other medications details: on file.   Primary job tasks include:  Lifting/carrying, prolonged standing, pushing/pulling and repetitive tasks.  Pain is described as aching and is intermittent. Pain is worse during the day. Since onset symptoms are gradually improving. Special tests:  MRI (L4-5 nerve impingement). Previous treatment includes physical therapy. There was mild improvement following previous treatment.   Patient is [employer]. Restrictions include:  Currently not working due to present treatment (to work today).    Barriers include:  None as reported by patient.  Red flags:  None as reported by patient.                       Objective:  System         Lumbar/SI Evaluation  ROM:  AROM Lumbar: normal    Strength: Back MMT 2/5, Abd MMT 1/5  Lumbar Myotomes:  normal            Lumbar DTR's:  normal      Cord Signs:  normal    Lumbar Dermtomes:  normal                Neural Tension/Mobility:  Lumbar:  Normal        Lumbar Palpation:    Tenderness present at Left:    Erector Spinae  Tenderness present at Right: Erector Spinae    Lumbar Provocation:  normal             Cervical/Thoracic Evaluation              Cervical Palpation:    Tenderness present at Left:    Rhomboids  Tenderness present at Right:    Rhomboids                                                    Dominic Lumbar Evaluation    Posture:  Sitting: fair  Standing: fair  Lordosis: Accentuated  Lateral Shift: no  Correction of Posture: better          Conclusion: posture  Principle of Treatment:  Posture Correction: sitting with back supported- lumbar roll advsied    Extension: encouraged                                           ROS    Assessment/Plan:    Patient is a 43 year old female with thoracic and lumbar complaints.    Patient has the following significant findings with corresponding treatment plan.                Diagnosis 1:  Mid back pain- Muscle tightness/ weakness  Pain -  hot/cold therapy, US, electric stimulation, manual therapy, STS, self management, education, directional preference exercise and home program  Decreased ROM/flexibility - manual therapy, therapeutic exercise, therapeutic activity and home program  Decreased strength - therapeutic exercise, therapeutic activities and home program  Decreased function - therapeutic activities and home program  Impaired posture - neuro re-education, therapeutic activities and home program    Therapy Evaluation Codes:   1) History comprised of:   Personal factors that impact the plan of care:      Past/current experiences and Time since onset of symptoms.    Comorbidity factors that impact the plan of care are:       check HPI.     Medications impacting care: Check HPI.  2) Examination of Body Systems comprised of:   Body structures and functions that impact the plan of care:      Lumbar spine and Thoracic Spine.   Activity limitations that impact the plan of care are:      Bending, Dressing, Lifting, Standing and Working.  3) Clinical presentation characteristics are:   Stable/Uncomplicated.  4) Decision-Making    Low complexity using standardized patient assessment instrument and/or measureable assessment of functional outcome.  Cumulative Therapy Evaluation is: Low complexity.    Previous and current functional limitations:  (See Goal Flow Sheet for this information)    Short term and Long term goals: (See Goal Flow Sheet for this information)     Communication ability:  Patient appears to be able to clearly communicate and understand verbal and written communication and follow directions correctly.  Treatment Explanation - The following has been discussed with the patient:   RX ordered/plan of care  Anticipated outcomes  Possible risks and side effects  This patient would benefit from PT intervention to resume normal activities.   Rehab potential is good.    Frequency:  1 X week, once daily  Duration:  for 6 weeks  Discharge Plan:  Achieve all LTG.  Independent in home treatment program.  Reach maximal therapeutic benefit.    Please refer to the daily flowsheet for treatment today, total treatment time and time spent performing 1:1 timed codes.

## 2019-10-10 ENCOUNTER — OFFICE VISIT (OUTPATIENT)
Dept: PHYSICAL MEDICINE AND REHAB | Facility: CLINIC | Age: 44
End: 2019-10-10
Payer: COMMERCIAL

## 2019-10-10 VITALS
BODY MASS INDEX: 46.61 KG/M2 | WEIGHT: 273 LBS | DIASTOLIC BLOOD PRESSURE: 85 MMHG | HEIGHT: 64 IN | HEART RATE: 84 BPM | RESPIRATION RATE: 16 BRPM | OXYGEN SATURATION: 98 % | SYSTOLIC BLOOD PRESSURE: 132 MMHG

## 2019-10-10 DIAGNOSIS — M47.812 SPONDYLOSIS OF CERVICAL REGION WITHOUT MYELOPATHY OR RADICULOPATHY: Primary | ICD-10-CM

## 2019-10-10 RX ORDER — TRIAMCINOLONE ACETONIDE 40 MG/ML
40 INJECTION, SUSPENSION INTRA-ARTICULAR; INTRAMUSCULAR ONCE
Status: COMPLETED | OUTPATIENT
Start: 2019-10-10 | End: 2019-10-10

## 2019-10-10 RX ADMIN — TRIAMCINOLONE ACETONIDE 40 MG: 40 INJECTION, SUSPENSION INTRA-ARTICULAR; INTRAMUSCULAR at 09:24

## 2019-10-10 ASSESSMENT — PAIN SCALES - GENERAL: PAINLEVEL: NO PAIN (0)

## 2019-10-10 ASSESSMENT — MIFFLIN-ST. JEOR: SCORE: 1872.29

## 2019-10-10 NOTE — PROGRESS NOTES
[facility]    I was asked by Dr Rutledge to evaluate neck pain for possible occipital nerve block.    HPI  This is a 43 year old female with right sided neck pain presents to the clinic for possible occipital nerve blocks.     Cornelia Rodriguez states that she had a ear infection in Feb this year. She was seen by Dr Combs and referred to Dr Rutledge, was started her on Cymbalta for right occipital neuralgia. She is taking it as needed. We discussed to take it daily and not as needed. Chelly  States that sometimes it helps.     Her response to the trigger point has been phenomenal. Janette reports that the stiff ness and sense of cracking disappeared for all this time until a few days back.     She reports that she has neck pain, though the shooting pains are relived following the occipital nerve block on April 19th. Last migraine was mid march. She is on Imitrex.   Pain is 0/10 and points to the lower cervical neck area for stiffness which she grades at 5. No shooting pain no burning nature.     She went back to work at [employer] last week, she states she has pain in the feet, but reacclimatize her activities and has been cognizant about overhead activities. She has to lift up to 70 lbs, but she is trying not to lift the weight above the head activities.     MRI OF THE NECK 9/18/19   Findings:  There is straightening of the cervical spine with decrease in the  normal lordosis. There is loss of normal disc signal intensity at  C2-C3 and C6-C7. The cervical spinal cord has normal signal intensity.  The craniovertebral junction appears normal. The spinal canal is  congenitally narrowed and measures less than 13 mm.     C2-3:  No spinal canal or neural foraminal stenosis. Mild right facet  arthropathy.     C3-4:  Mild uncinate spurring and posterior disc bulge with mild  narrowing of the left neural foramen. There is also bilateral facet  arthropathy, left moderate and right mild.     C4-5:  Mild  "uncinate spurring and posterior disc bulge as well as  moderate left facet arthropathy with mild left foraminal narrowing.     C5-6:  No spinal canal or neural foraminal stenosis. Mild left facet  arthropathy.     C6-7:  There is a broad-based posterior disc bulge with superimposed  disc extrusion to the right of the midline with moderate narrowing of  the spinal canal to the right of the midline an moderate to severe  narrowing of the right neural foramen. There is also uncinate spurring  at this level.     C7-T1:  Mild posterior disc bulge and uncinate spurring without  significant spinal canal or neural foraminal narrowing.     No abnormality of the paraspinous soft tissues.    Physical Exam  /85 (BP Location: Left arm, Patient Position: Chair, Cuff Size: Adult Large)   Pulse 84   Resp 16   Ht 1.632 m (5' 4.25\")   Wt 123.8 kg (273 lb)   SpO2 98%   BMI 46.50 kg/m      Right shoulder is drooping   She has normal Flexion, extension, however turning to the left is limited by the tightness on the right, and tilting to the left is limited by tightness in the right neck muscles.     On palpation of the muscles, she has taut muscles noted in the right side of the neck, much improved compared to the last examination.    No weakness noted       Assessment and Plan  This is a 43 year old female with cervicalgia secondary to spondylosis of the C6-C7 area. She has neck stiffness and decreased ROM leading to impaired function.     1. Patient education: I went over the above diagnoses and treatment plan with the patient and answered all of their questions. She has had a good response to the trigger point, do not recommend a medrol dose pack for now, do not recommend occipital nerve block.      2. Exercise program: stretching exercise daily.   3. Workability: highly recommend avoiding above the head activities using both hands. Up to 20 pounds is okay. Offered workability form but she refused. Discussed ergonomics at " work and counseled at length.   4. Medications: continue Cymbalta. She takes the Robaxin BID. She denies any lethargy.   5. Imaging Orders: MRI of the neck without contrast.   6. Interventions: PT for myofascial release following the MRI results.    7. Referrals: to PT.     Procedure: Trigger Point injection  Indication: Trigger point pain / myofascial pain   Discussed indications, risks, benefits, alternatives, questions and concerns addressed appropriately, written consent obtained.     We have identified the trigger point / tender point areas and cleaned appropriately with use of either chloroprep, alcohol swabs.     Prior to the start of the procedure and with procedural staff participation, I verbally confirmed the patient s identity using two indicators, relevant allergies, that the procedure was appropriate and matched the consent or emergent situation, and that the correct equipment/implants were available. Immediately prior to starting the procedure I conducted the Time Out with the procedural staff and re-confirmed the patient s name, procedure, and site/side. (The Joint Commission universal protocol was followed.)  Yes    Sedation (Moderate or Deep): None      Prepared a total of 7 cc of 0.5% Bupivacaine (6 cc) and Kenalog 40 mg in 1 ml Injected the following sites:   Upper trapezius, splenius semispinalis, longissimus, and para-cervicals.     Kenalog 40 mg   Lot [id number]  Exp 4/21  NDC 17606 78781 9     Lidocaine   Lot [id number]  Exp date 06/22   NDC 44285-151-50    The injections were done in a fan-like technique.   The patient tolerated the injections well without significant bleeding.      Follow up 4 weeks.     Total of 40 minutes spent in direct patient interaction greater than 50% in counseling and education as above, excluding the trigger point injections.     CC:  Referring Physician Sarah Busby

## 2019-10-10 NOTE — LETTER
10/10/2019       RE: Cornelia Rodriguez  [address]  [address]  [place] [address]     Dear Colleague,    Thank you for referring your patient, Cornelia Rodriguez, to the [facility] REH[facility]ILITATION at [facility]. Please see a copy of my visit note below.    [facility]    I was asked by Dr Rutledge to evaluate neck pain for possible occipital nerve block.    HPI  This is a 43 year old female with right sided neck pain presents to the clinic for possible occipital nerve blocks.     Cornelia Rodriguez states that she had a ear infection in Feb this year. She was seen by Dr Combs and referred to Dr Rutledge, was started her on Cymbalta for right occipital neuralgia. She is taking it as needed. We discussed to take it daily and not as needed. Chelly  States that sometimes it helps.     Her response to the trigger point has been phenomenal. Janette reports that the stiff ness and sense of cracking disappeared for all this time until a few days back.     She reports that she has neck pain, though the shooting pains are relived following the occipital nerve block on April 19th. Last migraine was mid march. She is on Imitrex.   Pain is 0/10 and points to the lower cervical neck area for stiffness which she grades at 5. No shooting pain no burning nature.     She went back to work at [employer] last week, she states she has pain in the feet, but reacclimatize her activities and has been cognizant about overhead activities. She has to lift up to 70 lbs, but she is trying not to lift the weight above the head activities.     MRI OF THE NECK 9/18/19   Findings:  There is straightening of the cervical spine with decrease in the  normal lordosis. There is loss of normal disc signal intensity at  C2-C3 and C6-C7. The cervical spinal cord has normal signal intensity.  The craniovertebral junction appears normal. The spinal canal is  congenitally narrowed and measures  "less than 13 mm.     C2-3:  No spinal canal or neural foraminal stenosis. Mild right facet  arthropathy.     C3-4:  Mild uncinate spurring and posterior disc bulge with mild  narrowing of the left neural foramen. There is also bilateral facet  arthropathy, left moderate and right mild.     C4-5:  Mild uncinate spurring and posterior disc bulge as well as  moderate left facet arthropathy with mild left foraminal narrowing.     C5-6:  No spinal canal or neural foraminal stenosis. Mild left facet  arthropathy.     C6-7:  There is a broad-based posterior disc bulge with superimposed  disc extrusion to the right of the midline with moderate narrowing of  the spinal canal to the right of the midline an moderate to severe  narrowing of the right neural foramen. There is also uncinate spurring  at this level.     C7-T1:  Mild posterior disc bulge and uncinate spurring without  significant spinal canal or neural foraminal narrowing.     No abnormality of the paraspinous soft tissues.    Physical Exam  /85 (BP Location: Left arm, Patient Position: Chair, Cuff Size: Adult Large)   Pulse 84   Resp 16   Ht 1.632 m (5' 4.25\")   Wt 123.8 kg (273 lb)   SpO2 98%   BMI 46.50 kg/m       Right shoulder is drooping   She has normal Flexion, extension, however turning to the left is limited by the tightness on the right, and tilting to the left is limited by tightness in the right neck muscles.     On palpation of the muscles, she has taut muscles noted in the right side of the neck, much improved compared to the last examination.    No weakness noted       Assessment and Plan  This is a 43 year old female with cervicalgia secondary to spondylosis of the C6-C7 area. She has neck stiffness and decreased ROM leading to impaired function.     1. Patient education: I went over the above diagnoses and treatment plan with the patient and answered all of their questions. She has had a good response to the trigger point, do not " recommend a medrol dose pack for now, do not recommend occipital nerve block.      2. Exercise program: stretching exercise daily.   3. Workability: highly recommend avoiding above the head activities using both hands. Up to 20 pounds is okay. Offered workability form but she refused. Discussed ergonomics at work and counseled at length.   4. Medications: continue Cymbalta. She takes the Robaxin BID. She denies any lethargy.   5. Imaging Orders: MRI of the neck without contrast.   6. Interventions: PT for myofascial release following the MRI results.    7. Referrals: to PT.     Procedure: Trigger Point injection  Indication: Trigger point pain / myofascial pain   Discussed indications, risks, benefits, alternatives, questions and concerns addressed appropriately, written consent obtained.     We have identified the trigger point / tender point areas and cleaned appropriately with use of either chloroprep, alcohol swabs.     Prior to the start of the procedure and with procedural staff participation, I verbally confirmed the patient s identity using two indicators, relevant allergies, that the procedure was appropriate and matched the consent or emergent situation, and that the correct equipment/implants were available. Immediately prior to starting the procedure I conducted the Time Out with the procedural staff and re-confirmed the patient s name, procedure, and site/side. (The Joint Commission universal protocol was followed.)  Yes    Sedation (Moderate or Deep): None      Prepared a total of 7 cc of 0.5% Bupivacaine (6 cc) and Kenalog 40 mg in 1 ml Injected the following sites:   Upper trapezius, splenius semispinalis, longissimus, and para-cervicals.     Kenalog 40 mg   Lot [id number]  Exp 4/21  NDC 88420 [id number] 9     Lidocaine   Lot [id number]  Exp date 06/22   NDC 45087-001-17    The injections were done in a fan-like technique.   The patient tolerated the injections well without significant bleeding.      Follow up  4 weeks.     Total of 40 minutes spent in direct patient interaction greater than 50% in counseling and education as above, excluding the trigger point injections.     CC:  Referring Physician Rubina Rutledge   PCP Sarah Lombardo                  Again, thank you for allowing me to participate in the care of your patient.      Sincerely,    Annelise Harrison MD

## 2019-10-10 NOTE — NURSING NOTE
Chief Complaint   Patient presents with     RECHECK     [employer] RETURN F/U AFTER MRI OF NECK TRIGGER POINT INJECTIONS    Lory Wilkes CMA

## 2019-10-11 ENCOUNTER — THERAPY VISIT (OUTPATIENT)
Dept: PHYSICAL THERAPY | Facility: CLINIC | Age: 44
End: 2019-10-11
Payer: COMMERCIAL

## 2019-10-11 DIAGNOSIS — M54.50 ACUTE MIDLINE LOW BACK PAIN WITHOUT SCIATICA: ICD-10-CM

## 2019-10-11 PROCEDURE — 97110 THERAPEUTIC EXERCISES: CPT | Mod: GP | Performed by: PHYSICAL THERAPIST

## 2019-10-11 PROCEDURE — 97140 MANUAL THERAPY 1/> REGIONS: CPT | Mod: GP | Performed by: PHYSICAL THERAPIST

## 2019-10-22 ENCOUNTER — OFFICE VISIT (OUTPATIENT)
Dept: OPHTHALMOLOGY | Facility: CLINIC | Age: 44
End: 2019-10-22
Attending: FAMILY MEDICINE
Payer: COMMERCIAL

## 2019-10-22 DIAGNOSIS — H47.322 OPTIC NERVE DRUSEN, LEFT: ICD-10-CM

## 2019-10-22 DIAGNOSIS — Z79.4 TYPE 2 DIABETES MELLITUS WITH HYPERGLYCEMIA, WITH LONG-TERM CURRENT USE OF INSULIN (H): Primary | ICD-10-CM

## 2019-10-22 DIAGNOSIS — E11.65 TYPE 2 DIABETES MELLITUS WITH HYPERGLYCEMIA, WITH LONG-TERM CURRENT USE OF INSULIN (H): Primary | ICD-10-CM

## 2019-10-22 DIAGNOSIS — H52.00 HYPERMETROPIA, UNSPECIFIED LATERALITY: ICD-10-CM

## 2019-10-22 PROCEDURE — 92004 COMPRE OPH EXAM NEW PT 1/>: CPT | Performed by: OPHTHALMOLOGY

## 2019-10-22 PROCEDURE — 92015 DETERMINE REFRACTIVE STATE: CPT | Performed by: OPHTHALMOLOGY

## 2019-10-22 PROCEDURE — 92134 CPTRZ OPH DX IMG PST SGM RTA: CPT | Performed by: OPHTHALMOLOGY

## 2019-10-22 ASSESSMENT — REFRACTION_MANIFEST
OD_AXIS: 120
OD_SPHERE: +0.25
OS_CYLINDER: +1.75
OS_SPHERE: -0.25
OS_AXIS: 073
OS_AXIS: 055
OS_CYLINDER: +0.75
OD_CYLINDER: +1.50
METHOD_AUTOREFRACTION: 1
OD_CYLINDER: +1.25
OD_AXIS: 125
OS_SPHERE: +0.75
OD_SPHERE: +0.50

## 2019-10-22 ASSESSMENT — VISUAL ACUITY
OD_CC: 20/20
METHOD: SNELLEN - LINEAR
OS_CC: 20/25-1
OS_CC: 20/20
CORRECTION_TYPE: GLASSES
OS_CC+: -1
OD_CC: 20/70-1

## 2019-10-22 ASSESSMENT — SLIT LAMP EXAM - LIDS
COMMENTS: NORMAL
COMMENTS: NORMAL

## 2019-10-22 ASSESSMENT — TONOMETRY
OD_IOP_MMHG: 24
IOP_METHOD: ICARE
OS_IOP_MMHG: 18
OD_IOP_MMHG: 23
OS_IOP_MMHG: 18
IOP_METHOD: ICARE

## 2019-10-22 ASSESSMENT — REFRACTION_WEARINGRX
OS_CYLINDER: +1.75
OD_SPHERE: +0.50
OD_AXIS: 116
OD_CYLINDER: +2.00
OS_AXIS: 074
SPECS_TYPE: SVL DISTANCE
OS_SPHERE: +0.50

## 2019-10-22 ASSESSMENT — CONF VISUAL FIELD
OD_NORMAL: 1
OS_NORMAL: 1

## 2019-10-22 ASSESSMENT — CUP TO DISC RATIO
OD_RATIO: 0.1
OS_RATIO: 0.1

## 2019-10-22 ASSESSMENT — EXTERNAL EXAM - RIGHT EYE: OD_EXAM: NORMAL

## 2019-10-22 ASSESSMENT — EXTERNAL EXAM - LEFT EYE: OS_EXAM: NORMAL

## 2019-10-22 NOTE — PROGRESS NOTES
Assessment & Plan   Cornelia Rodriguez is a 43 year old female who presents with:   Review of systems for the eyes was negative other than the pertinent positives and negatives noted in the HPI.    Type 2 diabetes mellitus with hyperglycemia, with long-term current use of insulin (H)  - without retinopathy  - SD-OCT Macula Optovue OU (both eyes)    Optic nerve drusen, left  - Observation    Hypermetropia, unspecified laterality  - Rx per MR (glasses, distance only)    Return in 12 months for annual (DM check), OCT RNFL (drusen)      Attending Physician Attestation:  Complete documentation of historical and exam elements from today's encounter can be found in the full encounter summary report (not reduplicated in this progress note).  I personally obtained the chief complaint(s) and history of present illness.  I confirmed and edited as necessary the review of systems, past medical/surgical history, family history, social history, and examination findings as documented by others; and I examined the patient myself.  I personally reviewed the relevant tests, images, and reports as documented above.  I formulated and edited as necessary the assessment and plan and discussed the findings and management plan with the patient and family. - Davion Delgado MD

## 2019-10-22 NOTE — NURSING NOTE
Chief Complaints and History of Present Illnesses   Patient presents with     Diabetic Eye Exam       Chief Complaint(s) and History of Present Illness(es)     Diabetic Eye Exam     Vision: is stable    Diabetes Type: Type 2 and on insulin    Duration: 6 years    Blood Sugars: is controlled              Comments     Patient presents for diabetic eye exam - referred by Dr. Lombardo. Patient reports no noticeable changes in vision.    Lab Results       Component                Value               Date                       A1C                      8.6                 09/19/2019                 A1C                      7.9                 06/20/2019                 A1C                      10.8                03/27/2019                          Melanie Jeans, OA

## 2019-11-02 ENCOUNTER — HEALTH MAINTENANCE LETTER (OUTPATIENT)
Age: 44
End: 2019-11-02

## 2019-11-07 DIAGNOSIS — E11.65 TYPE 2 DIABETES MELLITUS WITH HYPERGLYCEMIA, WITH LONG-TERM CURRENT USE OF INSULIN (H): ICD-10-CM

## 2019-11-07 DIAGNOSIS — Z79.4 TYPE 2 DIABETES MELLITUS WITH HYPERGLYCEMIA, WITH LONG-TERM CURRENT USE OF INSULIN (H): ICD-10-CM

## 2019-11-07 NOTE — TELEPHONE ENCOUNTER
"Requested Prescriptions   Pending Prescriptions Disp Refills     insulin pen needle (BD BASSEM U/F) 32G X 4 MM miscellaneous [Pharmacy Med Name: BD PEN NEEDLE BASSEM  32G X 4 MM MISC] 200 each 0     Sig: USE TWICE DAILY OR AS DIRECTED       Diabetic Supplies Protocol Passed - 11/7/2019  9:29 AM        Passed - Medication is active on med list        Passed - Patient is 18 years of age or older        Passed - Recent (6 mo) or future (30 days) visit within the authorizing provider's specialty     Patient had office visit in the last 6 months or has a visit in the next 30 days with authorizing provider.  See \"Patient Info\" tab in inbasket, or \"Choose Columns\" in Meds & Orders section of the refill encounter.            insulin pen needle (BD BASSEM U/F) 32G X 4 MM miscellaneous  Last Written Prescription Date:  7/2/19  Last Fill Quantity: 180,  # refills: 0   Last office visit: 9/19/2019 with prescribing provider:  Dr. Lombardo   Future Office Visit:      "

## 2019-11-08 NOTE — TELEPHONE ENCOUNTER
Prescription approved per [facility] Refill Protocol.  Hilda Reyes RN

## 2019-11-27 PROBLEM — M54.50 MIDLINE LOW BACK PAIN WITHOUT SCIATICA: Status: RESOLVED | Noted: 2019-10-02 | Resolved: 2019-11-27

## 2019-11-27 NOTE — PROGRESS NOTES
Discharge Note    Progress reporting period is from last progress note on   to Oct 11, 2019.    Cornelia failed to follow up and current status is unknown.  Please see information below for last relevant information on current status.  Patient seen for 2 visits.    SUBJECTIVE  Subjective changes noted by patient:  Cornelia Savage mentions she has more stiffness than pain. She is stretching on and off.   .  Current pain level is 1/10.     Previous pain level was  3/10.   Changes in function:  Yes (See Goal flowsheet attached for changes in current functional level)  Adverse reaction to treatment or activity: None    OBJECTIVE  Changes noted in objective findings: Tightness right rhomboids.  Increased tenderness to palpation.  Decrease pain with motion demonstrated.  Patient wants to continue with the same exercise program for the next week.  Reviewed all exercises and added stretch.     ASSESSMENT/PLAN  Diagnosis: Mid back pain- Tightness/ weakness   Updated problem list and treatment plan:     STG/LTGs have been met or progress has been made towards goals:  Yes, please see goal flowsheet for most current information  Assessment of Progress: current status is unknown.    Last current status:     Self Management Plans:  HEP  I have re-evaluated this patient and find that the nature, scope, duration and intensity of the therapy is appropriate for the medical condition of the patient.  Cornelia continues to require the following intervention to meet STG and LTG's:  HEP.    Recommendations:  Discharge with current home program.  Patient to follow up with MD as needed.    Please refer to the daily flowsheet for treatment today, total treatment time and time spent performing 1:1 timed codes.

## 2019-11-28 ENCOUNTER — MYC MEDICAL ADVICE (OUTPATIENT)
Dept: FAMILY MEDICINE | Facility: CLINIC | Age: 44
End: 2019-11-28

## 2019-11-28 DIAGNOSIS — K21.9 GASTROESOPHAGEAL REFLUX DISEASE, ESOPHAGITIS PRESENCE NOT SPECIFIED: Primary | ICD-10-CM

## 2019-11-29 RX ORDER — FAMOTIDINE 20 MG/1
20 TABLET, FILM COATED ORAL 2 TIMES DAILY
Qty: 60 TABLET | Refills: 5 | Status: SHIPPED | OUTPATIENT
Start: 2019-11-29 | End: 2020-06-09

## 2019-12-04 ENCOUNTER — OFFICE VISIT (OUTPATIENT)
Dept: FAMILY MEDICINE | Facility: CLINIC | Age: 44
End: 2019-12-04
Payer: COMMERCIAL

## 2019-12-04 VITALS
WEIGHT: 260.6 LBS | OXYGEN SATURATION: 98 % | RESPIRATION RATE: 18 BRPM | HEIGHT: 64 IN | HEART RATE: 113 BPM | SYSTOLIC BLOOD PRESSURE: 134 MMHG | DIASTOLIC BLOOD PRESSURE: 81 MMHG | BODY MASS INDEX: 44.49 KG/M2 | TEMPERATURE: 98.6 F

## 2019-12-04 DIAGNOSIS — R11.10 VOMITING AND DIARRHEA: Primary | ICD-10-CM

## 2019-12-04 DIAGNOSIS — R19.7 VOMITING AND DIARRHEA: Primary | ICD-10-CM

## 2019-12-04 DIAGNOSIS — E11.65 TYPE 2 DIABETES MELLITUS WITH HYPERGLYCEMIA, WITH LONG-TERM CURRENT USE OF INSULIN (H): ICD-10-CM

## 2019-12-04 DIAGNOSIS — Z79.4 TYPE 2 DIABETES MELLITUS WITH HYPERGLYCEMIA, WITH LONG-TERM CURRENT USE OF INSULIN (H): ICD-10-CM

## 2019-12-04 LAB
ALBUMIN SERPL-MCNC: 4.2 G/DL (ref 3.4–5)
ALP SERPL-CCNC: 73 U/L (ref 40–150)
ALT SERPL W P-5'-P-CCNC: 58 U/L (ref 0–50)
ANION GAP SERPL CALCULATED.3IONS-SCNC: 5 MMOL/L (ref 3–14)
AST SERPL W P-5'-P-CCNC: 17 U/L (ref 0–45)
BASOPHILS # BLD AUTO: 0 10E9/L (ref 0–0.2)
BASOPHILS NFR BLD AUTO: 0.4 %
BILIRUB SERPL-MCNC: 0.7 MG/DL (ref 0.2–1.3)
BUN SERPL-MCNC: 16 MG/DL (ref 7–30)
CALCIUM SERPL-MCNC: 9.1 MG/DL (ref 8.5–10.1)
CHLORIDE SERPL-SCNC: 105 MMOL/L (ref 94–109)
CO2 SERPL-SCNC: 25 MMOL/L (ref 20–32)
CREAT SERPL-MCNC: 0.79 MG/DL (ref 0.52–1.04)
DIFFERENTIAL METHOD BLD: ABNORMAL
EOSINOPHIL # BLD AUTO: 0.3 10E9/L (ref 0–0.7)
EOSINOPHIL NFR BLD AUTO: 2.6 %
ERYTHROCYTE [DISTWIDTH] IN BLOOD BY AUTOMATED COUNT: 13.8 % (ref 10–15)
GFR SERPL CREATININE-BSD FRML MDRD: 90 ML/MIN/{1.73_M2}
GLUCOSE SERPL-MCNC: 156 MG/DL (ref 70–99)
HCT VFR BLD AUTO: 50.1 % (ref 35–47)
HGB BLD-MCNC: 16.7 G/DL (ref 11.7–15.7)
LIPASE SERPL-CCNC: 103 U/L (ref 73–393)
LYMPHOCYTES # BLD AUTO: 2.2 10E9/L (ref 0.8–5.3)
LYMPHOCYTES NFR BLD AUTO: 21.7 %
MCH RBC QN AUTO: 29.3 PG (ref 26.5–33)
MCHC RBC AUTO-ENTMCNC: 33.3 G/DL (ref 31.5–36.5)
MCV RBC AUTO: 88 FL (ref 78–100)
MONOCYTES # BLD AUTO: 0.7 10E9/L (ref 0–1.3)
MONOCYTES NFR BLD AUTO: 6.9 %
NEUTROPHILS # BLD AUTO: 6.9 10E9/L (ref 1.6–8.3)
NEUTROPHILS NFR BLD AUTO: 68.4 %
PLATELET # BLD AUTO: 388 10E9/L (ref 150–450)
POTASSIUM SERPL-SCNC: 3.3 MMOL/L (ref 3.4–5.3)
PROT SERPL-MCNC: 8.3 G/DL (ref 6.8–8.8)
RBC # BLD AUTO: 5.69 10E12/L (ref 3.8–5.2)
SODIUM SERPL-SCNC: 135 MMOL/L (ref 133–144)
WBC # BLD AUTO: 10.1 10E9/L (ref 4–11)

## 2019-12-04 PROCEDURE — 36415 COLL VENOUS BLD VENIPUNCTURE: CPT | Performed by: FAMILY MEDICINE

## 2019-12-04 PROCEDURE — 83690 ASSAY OF LIPASE: CPT | Performed by: FAMILY MEDICINE

## 2019-12-04 PROCEDURE — 99214 OFFICE O/P EST MOD 30 MIN: CPT | Performed by: FAMILY MEDICINE

## 2019-12-04 PROCEDURE — 85025 COMPLETE CBC W/AUTO DIFF WBC: CPT | Performed by: FAMILY MEDICINE

## 2019-12-04 PROCEDURE — 80053 COMPREHEN METABOLIC PANEL: CPT | Performed by: FAMILY MEDICINE

## 2019-12-04 RX ORDER — GLIPIZIDE 10 MG/1
10 TABLET, FILM COATED, EXTENDED RELEASE ORAL DAILY
Qty: 180 TABLET | Refills: 0 | COMMUNITY
Start: 2019-12-04 | End: 2020-03-17

## 2019-12-04 ASSESSMENT — PAIN SCALES - GENERAL: PAINLEVEL: NO PAIN (0)

## 2019-12-04 ASSESSMENT — MIFFLIN-ST. JEOR: SCORE: 1811.04

## 2019-12-04 NOTE — PATIENT INSTRUCTIONS
Reduce glipizide to one tablet.    Hold Jardiance, glipizide, and Trulicity if you are throwing up and not able to keep any food down.     I recommend Pedialyte to help hydrate you and give you electrolytes and good sugar. Take tiny sips throughout the day, rather than drinking it all at once. This will help your stomach tolerate it better.     You can also eat ice chips to get some fluid.     When your stomach feels ready introduce bland foods into your diet. Avoid acidic foods and dairy.     If you get dizzy, lightheaded, confused, not urinating, blood in diarrhea, severe abdominal pain go to the ER. If you are not improving in a few days come back to see me.

## 2019-12-04 NOTE — PROGRESS NOTES
"Subjective     Cornelia Rodriguez is a 44 year old female who presents to clinic today for the following health issues:    HPI   Diarrhea  Onset: Monday morning    Description:   Consistency of stool: watery  Blood in stool: no   Number of loose stools in past 24 hours: 9    Progression of Symptoms:  improving    Accompanying Signs & Symptoms:  Fever: YES- low grade  Nausea or vomiting; YES  Abdominal pain: YES  Episodes of constipation: no   Weight loss: YES    History:   Ill contacts: no   Recent use of antibiotics: no    Recent travels: no          Recent medication-new or changes(Rx or OTC): no     Precipitating factors:   unsure    Alleviating factors:   nothing    Therapies Tried and outcome:  nothing; Outcome: nothing  -Pt has had a diarrheal episode every 1-1.5 hours over the past two day. She will sit on the toilet anywhere from one minute to one hour. She urinates every time she goes to the bathroom. Had a low grade fever of 99.6 degrees. Experiencing abdominal pain (not severe), feeling that her abdomen is \"hollow\". She can keep water down, but isn't able to tolerate orange juice or anything with flavor. Has been fatigued because she hasn't been able to sleep due to frequent bathroom trips.    -She has not been able to keep down her medications, throws them up. She is able to tolerate her DM injections. Her blood sugars have been around 146.   Prior to illness There was one day when she was at work and her blood sugar dropped to 85, this happened out of the blue.  Wt Readings from Last 4 Encounters:   12/04/19 118.2 kg (260 lb 9.6 oz)   10/10/19 123.8 kg (273 lb)   10/01/19 123.8 kg (273 lb)   09/19/19 123.8 kg (273 lb)     -Denies dizziness, lightheadedness, hematochezia, mucous in stool, recent travel, exposure to sick people, consuming uncooked food, rhinorrhea, sore throat, SOB, cp or pressure  -She worked over [employer], denies consumption of alcohol.     Patient Active Problem List   Diagnosis     " Acute recurrent maxillary sinusitis     Right chronic serous otitis media     Morbid obesity (H)     Migraine without aura and without status migrainosus, not intractable     Gastroesophageal reflux disease without esophagitis     Type 2 diabetes mellitus with hyperglycemia (H)     Allergic rhinitis, unspecified seasonality, unspecified trigger     Hyperlipidemia LDL goal <100     Allergic contact dermatitis due to adhesives     Back muscle spasm     History of Helicobacter pylori infection     Migraine     Seborrheic dermatitis of scalp     Type 2 diabetes mellitus (H)     Perennial allergic rhinitis     Hyperlipidemia     Abdominal pain     Low back pain     Occipital neuralgia of right side     Past Surgical History:   Procedure Laterality Date     CHOLECYSTECTOMY         Social History     Tobacco Use     Smoking status: Former Smoker     Packs/day: 0.00     Smokeless tobacco: Never Used     Tobacco comment: quit 3/2013   Substance Use Topics     Alcohol use: Yes     Comment: Occasionally     Family History   Problem Relation Age of Onset     Diabetes Maternal Grandmother      Glaucoma Maternal Grandmother      Diabetes Sister      Hypertension Sister      Glaucoma Mother      Macular Degeneration No family hx of              Reviewed and updated as needed this visit by Provider         Review of Systems   ROS COMP: Constitutional, HEENT, cardiovascular, pulmonary, gi and gu systems are negative, except as otherwise noted.    This document serves as a record of the services and decisions personally performed by ANGI BRAGA. It was created on his/her behalf by Carolann Fernandez, a trained medical scribe. The creation of this document is based on the provider's statements to the medical scribe. Carolann Fernandez, December 4, 2019 1:05 PM      Objective    /81 (BP Location: Right arm, Patient Position: Sitting, Cuff Size: Adult Large)   Pulse 113   Temp 98.6  F (37  C) (Oral)   Resp 18   Ht 1.632  "m (5' 4.25\")   Wt 118.2 kg (260 lb 9.6 oz)   SpO2 98%   BMI 44.38 kg/m    Body mass index is 44.38 kg/m .  Physical Exam   GENERAL: alert and no distress, obese   NECK: no adenopathy, no asymmetry, masses, or scars and thyroid normal to palpation  RESP: lungs clear to auscultation - no rales, rhonchi or wheezes  CV: tachycardic, regular rate and rhythm, normal S1 S2, no S3 or S4, no murmur, click or rub, no peripheral edema and peripheral pulses strong  ABDOMEN: periumbilical tenderness, soft, no hepatosplenomegaly, no masses and bowel sounds normal  MS: no gross musculoskeletal defects noted, no edema  PSYCH: mentation appears normal, affect normal/bright    Diagnostic Test Results:  Labs reviewed in Epic        Assessment & Plan     1. Vomiting and diarrhea  Likely viral. Discussed holding Jardiance, glipizide, and trulicity if she continues vomiting and not eating. Recommended small sips of Pedialyte throughout the day, ice chips, and introducing bland foods as tolerated. Reviewed red flag symptoms that would precipitate the need for routine, urgent or emergent f/u  - Lipase  - CBC with platelets differential  - Comprehensive metabolic panel    2. Type 2 diabetes mellitus with hyperglycemia, with long-term current use of insulin (H)  Reduce glipize to one tablet daily when working (eats less and a lot of physical activity) due to episodes of hypoglycemia. Continue 2 tabs per day on home days as long as no lows    - glipiZIDE (GLUCOTROL XL) 10 MG 24 hr tablet; Take 1 tablet (10 mg) by mouth daily Increase to 20 mg on non-work work days  Dispense: 180 tablet; Refill: 0     BMI:   Estimated body mass index is 44.38 kg/m  as calculated from the following:    Height as of this encounter: 1.632 m (5' 4.25\").    Weight as of this encounter: 118.2 kg (260 lb 9.6 oz).   Weight management plan: not addressed at this visit        Patient Instructions   Reduce glipizide to one tablet.    Hold Jardiance, glipizide, and " Trulicity if you are throwing up and not able to keep any food down.     I recommend Pedialyte to help hydrate you and give you electrolytes and good sugar. Take tiny sips throughout the day, rather than drinking it all at once. This will help your stomach tolerate it better.     You can also eat ice chips to get some fluid.     When your stomach feels ready introduce bland foods into your diet. Avoid acidic foods and dairy.     If you get dizzy, lightheaded, confused, not urinating, blood in diarrhea, severe abdominal pain go to the ER. If you are not improving in a few days come back to see me.      Return in about 3 days (around 12/7/2019), or if symptoms worsen or fail to improve, for Recheck, stool test.    The information in this document, created by the medical scribe for me, accurately reflects the services I personally performed and the decisions made by me. I have reviewed and approved this document for accuracy.   Sarah Lombardo MD  [facility]

## 2019-12-30 DIAGNOSIS — E11.65 TYPE 2 DIABETES MELLITUS WITH HYPERGLYCEMIA, WITH LONG-TERM CURRENT USE OF INSULIN (H): ICD-10-CM

## 2019-12-30 DIAGNOSIS — Z79.4 TYPE 2 DIABETES MELLITUS WITH HYPERGLYCEMIA, WITH LONG-TERM CURRENT USE OF INSULIN (H): ICD-10-CM

## 2019-12-31 DIAGNOSIS — K76.0 FATTY LIVER: Primary | ICD-10-CM

## 2019-12-31 NOTE — TELEPHONE ENCOUNTER
"Requested Prescriptions   Pending Prescriptions Disp Refills     glipiZIDE (GLUCOTROL XL) 10 MG 24 hr tablet [Pharmacy Med Name: GLIPIZIDE ER 10MG TB24] 180 tablet 1     Sig: TAKE TWO TABLETS BY MOUTH EVERY DAY       Sulfonylurea Agents Failed - 12/30/2019  5:59 PM        Failed - Patient has documented A1c within the specified period of time.     If HgbA1C is 8 or greater, it needs to be on file within the past 3 months.  If less than 8, must be on file within the past 6 months.     Recent Labs   Lab Test 09/19/19  1152   A1C 8.6*             Passed - Blood pressure less than 140/90 in past 6 months     BP Readings from Last 3 Encounters:   12/04/19 134/81   10/10/19 132/85   09/19/19 138/81                 Passed - Patient has documented LDL within the past 12 mos.     Recent Labs   Lab Test 04/19/19  0757   LDL 65             Passed - Patient has had a Microalbumin in the past 15 mos.     Recent Labs   Lab Test 03/27/19  1331   MICROL 14   UMALCR 13.57             Passed - Medication is active on med list        Passed - Patient is age 18 or older        Passed - No active pregnancy on record        Passed - Patient has a recent creatinine (normal) within the past 12 mos.     Recent Labs   Lab Test 12/04/19  1323  03/01/19  0916   CR 0.79   < >  --    CREAT  --   --  0.5*    < > = values in this interval not displayed.             Passed - Patient has not had a positive pregnancy test within the past 12 mos.        Passed - Recent (6 mo) or future (30 days) visit within the authorizing provider's specialty     Patient had office visit in the last 6 months or has a visit in the next 30 days with authorizing provider or within the authorizing provider's specialty.  See \"Patient Info\" tab in inbasket, or \"Choose Columns\" in Meds & Orders section of the refill encounter.              glipiZIDE (GLUCOTROL XL) 10 MG 24 hr tablet      Last Written Prescription Date:  12/4/19  Last Fill Quantity: 180,   # refills: " 0  Last Office Visit: 12/4/19  Future Office visit:       Routing refill request to provider for review/approval because:  Medication is reported/historical

## 2020-01-03 RX ORDER — GLIPIZIDE 10 MG/1
TABLET, FILM COATED, EXTENDED RELEASE ORAL
Qty: 180 TABLET | Refills: 1 | OUTPATIENT
Start: 2020-01-03

## 2020-01-03 NOTE — TELEPHONE ENCOUNTER
Called patient and let her know that I spoke with Margarita to provide verbal for this medication.     Patient is aware and will pit up rx    Keila Bolden RN

## 2020-01-03 NOTE — TELEPHONE ENCOUNTER
Routing refill request to provider for review/approval because:  A1c fails [facility] refill protocol. Last drawn on 09/19/2019 at 8.6.     Keila Bolden RN

## 2020-01-03 NOTE — TELEPHONE ENCOUNTER
Margarita didn't get the refils you ordered on 12/4/19 for 180 tablets    Can that be resent    Call patient to advise when done as she is out of this medication    [phone]    If this can be delegated

## 2020-01-09 ENCOUNTER — TELEPHONE (OUTPATIENT)
Dept: GASTROENTEROLOGY | Facility: CLINIC | Age: 45
End: 2020-01-09

## 2020-01-09 NOTE — TELEPHONE ENCOUNTER
Left message for pt reminding them of upcoming appointment.  Instructed pt to bring updated medications list.  Shauna Armando on 1/9/2020 at 2:07 PM

## 2020-01-21 ENCOUNTER — OFFICE VISIT (OUTPATIENT)
Dept: FAMILY MEDICINE | Facility: CLINIC | Age: 45
End: 2020-01-21
Payer: COMMERCIAL

## 2020-01-21 VITALS
BODY MASS INDEX: 45.31 KG/M2 | RESPIRATION RATE: 18 BRPM | OXYGEN SATURATION: 97 % | HEIGHT: 64 IN | WEIGHT: 265.4 LBS | HEART RATE: 84 BPM | SYSTOLIC BLOOD PRESSURE: 120 MMHG | TEMPERATURE: 98.5 F | DIASTOLIC BLOOD PRESSURE: 70 MMHG

## 2020-01-21 DIAGNOSIS — K76.0 FATTY LIVER: ICD-10-CM

## 2020-01-21 DIAGNOSIS — E66.01 MORBID OBESITY (H): ICD-10-CM

## 2020-01-21 DIAGNOSIS — K21.9 GASTROESOPHAGEAL REFLUX DISEASE WITHOUT ESOPHAGITIS: ICD-10-CM

## 2020-01-21 DIAGNOSIS — Z79.4 TYPE 2 DIABETES MELLITUS WITHOUT COMPLICATION, WITH LONG-TERM CURRENT USE OF INSULIN (H): Primary | ICD-10-CM

## 2020-01-21 DIAGNOSIS — M54.81 OCCIPITAL NEURALGIA OF RIGHT SIDE: ICD-10-CM

## 2020-01-21 DIAGNOSIS — R74.8 ELEVATED LIVER ENZYMES: ICD-10-CM

## 2020-01-21 DIAGNOSIS — G43.009 MIGRAINE WITHOUT AURA AND WITHOUT STATUS MIGRAINOSUS, NOT INTRACTABLE: ICD-10-CM

## 2020-01-21 DIAGNOSIS — E78.5 HYPERLIPIDEMIA LDL GOAL <100: ICD-10-CM

## 2020-01-21 DIAGNOSIS — E11.9 TYPE 2 DIABETES MELLITUS WITHOUT COMPLICATION, WITH LONG-TERM CURRENT USE OF INSULIN (H): Primary | ICD-10-CM

## 2020-01-21 LAB
ALBUMIN SERPL-MCNC: 4 G/DL (ref 3.4–5)
ALP SERPL-CCNC: 76 U/L (ref 40–150)
ALT SERPL W P-5'-P-CCNC: 41 U/L (ref 0–50)
ANION GAP SERPL CALCULATED.3IONS-SCNC: 6 MMOL/L (ref 3–14)
AST SERPL W P-5'-P-CCNC: 14 U/L (ref 0–45)
BASOPHILS # BLD AUTO: 0.1 10E9/L (ref 0–0.2)
BASOPHILS NFR BLD AUTO: 0.7 %
BILIRUB SERPL-MCNC: 0.4 MG/DL (ref 0.2–1.3)
BUN SERPL-MCNC: 11 MG/DL (ref 7–30)
CALCIUM SERPL-MCNC: 9.8 MG/DL (ref 8.5–10.1)
CHLORIDE SERPL-SCNC: 104 MMOL/L (ref 94–109)
CHOLEST SERPL-MCNC: 128 MG/DL
CO2 SERPL-SCNC: 29 MMOL/L (ref 20–32)
CREAT SERPL-MCNC: 0.67 MG/DL (ref 0.52–1.04)
DIFFERENTIAL METHOD BLD: ABNORMAL
EOSINOPHIL # BLD AUTO: 0.2 10E9/L (ref 0–0.7)
EOSINOPHIL NFR BLD AUTO: 3 %
ERYTHROCYTE [DISTWIDTH] IN BLOOD BY AUTOMATED COUNT: 12.9 % (ref 10–15)
GFR SERPL CREATININE-BSD FRML MDRD: >90 ML/MIN/{1.73_M2}
GLUCOSE SERPL-MCNC: 141 MG/DL (ref 70–99)
HBA1C MFR BLD: 7.5 % (ref 0–5.6)
HCT VFR BLD AUTO: 49.9 % (ref 35–47)
HDLC SERPL-MCNC: 46 MG/DL
HGB BLD-MCNC: 16 G/DL (ref 11.7–15.7)
LDLC SERPL CALC-MCNC: 44 MG/DL
LYMPHOCYTES # BLD AUTO: 2.1 10E9/L (ref 0.8–5.3)
LYMPHOCYTES NFR BLD AUTO: 28.9 %
MCH RBC QN AUTO: 28.4 PG (ref 26.5–33)
MCHC RBC AUTO-ENTMCNC: 32.1 G/DL (ref 31.5–36.5)
MCV RBC AUTO: 89 FL (ref 78–100)
MONOCYTES # BLD AUTO: 0.5 10E9/L (ref 0–1.3)
MONOCYTES NFR BLD AUTO: 6.5 %
NEUTROPHILS # BLD AUTO: 4.5 10E9/L (ref 1.6–8.3)
NEUTROPHILS NFR BLD AUTO: 60.9 %
NONHDLC SERPL-MCNC: 82 MG/DL
PLATELET # BLD AUTO: 292 10E9/L (ref 150–450)
POTASSIUM SERPL-SCNC: 4 MMOL/L (ref 3.4–5.3)
PROT SERPL-MCNC: 7.8 G/DL (ref 6.8–8.8)
RBC # BLD AUTO: 5.63 10E12/L (ref 3.8–5.2)
SODIUM SERPL-SCNC: 139 MMOL/L (ref 133–144)
TRIGL SERPL-MCNC: 190 MG/DL
TSH SERPL DL<=0.005 MIU/L-ACNC: 1.82 MU/L (ref 0.4–4)
WBC # BLD AUTO: 7.4 10E9/L (ref 4–11)

## 2020-01-21 PROCEDURE — 99214 OFFICE O/P EST MOD 30 MIN: CPT | Performed by: FAMILY MEDICINE

## 2020-01-21 PROCEDURE — 83036 HEMOGLOBIN GLYCOSYLATED A1C: CPT | Performed by: FAMILY MEDICINE

## 2020-01-21 PROCEDURE — 80061 LIPID PANEL: CPT | Performed by: FAMILY MEDICINE

## 2020-01-21 PROCEDURE — 36415 COLL VENOUS BLD VENIPUNCTURE: CPT | Performed by: FAMILY MEDICINE

## 2020-01-21 PROCEDURE — 80050 GENERAL HEALTH PANEL: CPT | Performed by: FAMILY MEDICINE

## 2020-01-21 ASSESSMENT — MIFFLIN-ST. JEOR: SCORE: 1832.82

## 2020-01-21 ASSESSMENT — PAIN SCALES - GENERAL: PAINLEVEL: NO PAIN (0)

## 2020-01-21 NOTE — PROGRESS NOTES
Subjective     Cornelia Rodriguez is a 44 year old female who presents to clinic today for the following health issues:    HPI   Diabetes Follow-up  How often are you checking your blood sugar? One time daily  What time of day are you checking your blood sugars (select all that apply)?  Before and after meals  Have you had any blood sugars above 200?  No  Have you had any blood sugars below 70?  Yes     What symptoms do you notice when your blood sugar is low?  Lethargy    What concerns do you have today about your diabetes? None     Do you have any of these symptoms? (Select all that apply)  No numbness or tingling in feet.  No redness, sores or blisters on feet.  No complaints of excessive thirst.  No reports of blurry vision.  No significant changes to weight.    -Pt states she has been doing well since her last visit, currently has time off from work. Two weeks ago she had chills, fever, aches, and congestion but that has resolved. She missed her appointment with her liver doctor while she was sick, still needs to reschedule.   -Was seen on 12/04/2019 for vomiting and diarrhea, symptoms went away a few days after that visit. Pt denies vomiting or diarrhea since.     - WE adjusted the glipizide dose last visit. Pt takes glipidize, adjusts her dose depending on her morning fasting sugars and if she is going to work or not (walks a lot at work). Her morning fasting sugars range from . Pt reports that non-fasting sugars are wnl. States that she hasn't had a blood sugar reading over 200 in about 5 months. No further hypoglycemia since adjustement of the glipizide  -There was once instance when she had low sugar that was triggered by missed meal. She injected her insulin and it took her longer to get to eat breakfast than she expected. She felt off and checked her blood sugar which was 62.   -Tries to drink lots of water daily, but some days she doesn't drink enough  -Denies neuropathy symptoms     Wt Readings from  Last 4 Encounters:   01/21/20 120.4 kg (265 lb 6.4 oz)   12/04/19 118.2 kg (260 lb 9.6 oz)   10/10/19 123.8 kg (273 lb)   10/01/19 123.8 kg (273 lb)       BP Readings from Last 2 Encounters:   01/21/20 120/70   12/04/19 134/81     Hemoglobin A1C (%)   Date Value   09/19/2019 8.6 (H)   06/20/2019 7.9 (H)     LDL Cholesterol Calculated (mg/dL)   Date Value   04/19/2019 65                 How many servings of fruits and vegetables do you eat daily?  0-1    On average, how many sweetened beverages do you drink each day (Examples: soda, juice, sweet tea, etc.  Do NOT count diet or artificially sweetened beverages)?   0    How many days per week do you exercise enough to make your heart beat faster? 4    How many minutes a day do you exercise enough to make your heart beat faster? 20 - 29    How many days per week do you miss taking your medication? 1    Occipital neuralgia   -Pt did not take Cymbalta in December 2019 because it made her very drowsy and she was working very long days/hours with the holidays. During this time she was off Cymbalta her headaches became very bad. She has restarted Cymbalta now that her schedule is better and she has noticed an improvement in her headaches.   -Has seen neurologist, Dr. Rutledge, in the past and only needs to f/u prn  -Neck pain is at baseline. She will take a muscle relaxer if it flares. She has noticed that she needs to take her muscle relaxer at least two hours after her other medications, otherwise she will throw up.    Patient Active Problem List   Diagnosis     Acute recurrent maxillary sinusitis     Right chronic serous otitis media     Morbid obesity (H)     Migraine without aura and without status migrainosus, not intractable     Gastroesophageal reflux disease without esophagitis     Type 2 diabetes mellitus with hyperglycemia (H)     Allergic rhinitis, unspecified seasonality, unspecified trigger     Hyperlipidemia LDL goal <100     Allergic contact dermatitis due to  "adhesives     Back muscle spasm     History of Helicobacter pylori infection     Migraine     Seborrheic dermatitis of scalp     Type 2 diabetes mellitus (H)     Perennial allergic rhinitis     Hyperlipidemia     Abdominal pain     Low back pain     Occipital neuralgia of right side     Past Surgical History:   Procedure Laterality Date     CHOLECYSTECTOMY         Social History     Tobacco Use     Smoking status: Former Smoker     Packs/day: 0.00     Smokeless tobacco: Never Used     Tobacco comment: quit 3/2013   Substance Use Topics     Alcohol use: Yes     Comment: Occasionally     Family History   Problem Relation Age of Onset     Diabetes Maternal Grandmother      Glaucoma Maternal Grandmother      Diabetes Sister      Hypertension Sister      Glaucoma Mother      Macular Degeneration No family hx of              Reviewed and updated as needed this visit by Provider  Tobacco  Allergies  Meds  Problems  Med Hx  Surg Hx  Fam Hx         Review of Systems   ROS COMP: Constitutional, HEENT, cardiovascular, pulmonary, gi and gu systems are negative, except as otherwise noted.    This document serves as a record of the services and decisions personally performed by ANGI BRAGA. It was created on his/her behalf by Carolann Fernandez, a trained medical scribe. The creation of this document is based on the provider's statements to the medical scribe. Carolann Fernandez, January 21, 2020 7:56 AM        Objective    /70 (BP Location: Right arm, Patient Position: Sitting, Cuff Size: Adult Large)   Pulse 84   Temp 98.5  F (36.9  C) (Oral)   Resp 18   Ht 1.632 m (5' 4.25\")   Wt 111.9 kg (246 lb 9.6 oz)   SpO2 97%   BMI 42.00 kg/m    Body mass index is 42 kg/m .  Physical Exam   GENERAL: alert and no distress, obese   NECK: no adenopathy, no asymmetry, masses, or scars and thyroid normal to palpation  RESP: lungs clear to auscultation - no rales, rhonchi or wheezes  CV: regular rate and rhythm, " normal S1 S2, no S3 or S4, no murmur, click or rub, no peripheral edema and peripheral pulses strong  ABDOMEN: soft, nontender, no hepatosplenomegaly, no masses and bowel sounds normal  MS: no gross musculoskeletal defects noted, no edema  PSYCH: mentation appears normal, affect normal/bright    Diagnostic Test Results:  Labs reviewed in Epic        Assessment & Plan     1. Type 2 diabetes mellitus without complication, with long-term current use of insulin (H)  Well controlled per reported sugars, continue current diabetes regimen. Adjust therapy based on labs. Follow-up in 6 months.  - Comprehensive metabolic panel  - Hemoglobin A1c  - TSH with free T4 reflex    2. Fatty liver  Adjust therapy based on labs. Encouraged pt to reschedule appt with GI as she missed the appointment a few weeks ago when she was sick.  - CBC with platelets differential  - Comprehensive metabolic panel    3. Elevated liver enzymes  As above    4. Hyperlipidemia LDL goal <100  Adjust therapy based on labs  - Lipid panel reflex to direct LDL Fasting    5. Gastroesophageal reflux disease without esophagitis  Stable. Continue current medication     6. Migraine without aura and without status migrainosus, not intractable  Stable. Continue current medication, she is currently tolerating the cymbalta     7. Morbid obesity (H)  Encouraged healthy diet. Adjust therapy based on labs  - Comprehensive metabolic panel  - Lipid panel reflex to direct LDL Fasting    8. Occipital neuralgia of right side  Flared one month ago when pt stopped Cymbalta because she had a busy work schedule and it was making her drowsy. It is now stable that she restarted Cymbalta.          Patient Instructions   Reschedule with your liver doctor.    Continue current diabetic regimen.       Return in about 6 months (around 7/21/2020) for Diabetes check.      The information in this document, created by the medical scribe for me, accurately reflects the services I personally  performed and the decisions made by me. I have reviewed and approved this document for accuracy.   Sarah Lombardo MD  [facility]

## 2020-03-17 ENCOUNTER — VIRTUAL VISIT (OUTPATIENT)
Dept: FAMILY MEDICINE | Facility: CLINIC | Age: 45
End: 2020-03-17
Payer: COMMERCIAL

## 2020-03-17 ENCOUNTER — MYC MEDICAL ADVICE (OUTPATIENT)
Dept: FAMILY MEDICINE | Facility: CLINIC | Age: 45
End: 2020-03-17

## 2020-03-17 DIAGNOSIS — E11.65 TYPE 2 DIABETES MELLITUS WITH HYPERGLYCEMIA, WITH LONG-TERM CURRENT USE OF INSULIN (H): ICD-10-CM

## 2020-03-17 DIAGNOSIS — Z79.4 TYPE 2 DIABETES MELLITUS WITH HYPERGLYCEMIA, WITH LONG-TERM CURRENT USE OF INSULIN (H): ICD-10-CM

## 2020-03-17 DIAGNOSIS — R68.83 CHILLS: Primary | ICD-10-CM

## 2020-03-17 PROCEDURE — 99441 ZZC PHYSICIAN TELEPHONE EVALUATION 5-10 MIN: CPT | Performed by: FAMILY MEDICINE

## 2020-03-17 RX ORDER — GLIPIZIDE 10 MG/1
10 TABLET, FILM COATED, EXTENDED RELEASE ORAL DAILY
Qty: 180 TABLET | Refills: 1 | Status: SHIPPED | OUTPATIENT
Start: 2020-03-17 | End: 2020-09-08

## 2020-03-17 RX ORDER — DULAGLUTIDE 1.5 MG/.5ML
1.5 INJECTION, SOLUTION SUBCUTANEOUS
Qty: 6 ML | Refills: 3 | Status: SHIPPED | OUTPATIENT
Start: 2020-03-17 | End: 2021-02-01

## 2020-03-17 NOTE — LETTER
March 20, 2020      Cornelia Rodriguez  [id number]  [address]  [place] [address]        To Whom It May Concern,     Cornelia Rodriguez may return to work on 3/20/20 if feeling well.         Sincerely,        Sarah Lombardo MD

## 2020-03-17 NOTE — PROGRESS NOTES
"Cornelia Rodriguez is a 44 year old female who is being evaluated via a billable telephone visit.      The patient has been notified of following:     \"This telephone visit will be conducted via a call between you and your physician/provider. We have found that certain health care needs can be provided without the need for a physical exam.  This service lets us provide the care you need with a short phone conversation.  If a prescription is necessary we can send it directly to your pharmacy.  If lab work is needed we can place an order for that and you can then stop by our lab to have the test done at a later time.    If during the course of the call the physician/provider feels a telephone visit is not appropriate, you will not be charged for this service.\"       Cornelia Rodriguez complains of    Chief Complaint   Patient presents with     Chills     Fatigue     Hypoglycemia       I have reviewed and updated the patient's Past Medical History, Social History, Family History and Medication List.    ALLERGIES  Adhesive tape; Benadryl [diphenhydramine]; Codeine; Gabapentin; Ibuprofen; Metronidazole; Penicillins; Ciprofloxacin; Cyclobenzaprine; Ferrous gluconate; Nortriptyline; and Sumatriptan    Venkatesh DIANA (MA signature)    Feeling little better  Starting last night while at work started to feel lousy. No fever.    sugar was 95 so one instead of 2 glipizides. Felt cold (can't get warm). Off and on headache. Sugar today is 117 after eating. Ate nl breakfast today- thanh is okay  Minor cough but will drink water and then resolves so thinks more likely dry throat.   No dyspnea.     No body aches.   No nasal congestion.   No dysuria, kidney pain.   No sick contacts but works for [employer].    bp has been normal.       Assessment/Plan:    ICD-10-CM    1. Chills  R68.83    2. Type 2 diabetes mellitus with hyperglycemia, with long-term current use of insulin (H)  E11.65 dulaglutide (TRULICITY) 1.5 MG/0.5ML pen    Z79.4 glipiZIDE (GLUCOTROL " XL) 10 MG 24 hr tablet     Illness of unclear etiology. Given current pandemic/outbreak recommend she stay home/not go to work until sx's fully resolve and she feels back to baseline. Hydrate. Hold glipidizide if sugars are low.   F/u visit if sx's evolving/worsening as needed. F/u this summer for routine DM  Work note to be sent to her via [employer] with this info.       I have reviewed the note as documented above.  This accurately captures the substance of my conversation with the patient.  Sarah Lombardo MD      Phone call contact time  Call Started at 11:42  Call Ended at 11:50    Sarah Lombardo MD

## 2020-03-17 NOTE — LETTER
March 17, 2020      Cornelia Rodriguez  [id number]  [address]  [place] [address]        To Whom It May Concern,     Please excuse Cornelia Rodriguez from work due to illness. She may return to work when feeling better and her current symptoms have resolved.           Sincerely,        Sarah Lombardo MD

## 2020-03-20 NOTE — TELEPHONE ENCOUNTER
Patient needs a new work letter now to say she can return to work on   3/20/2020    Add to cary and she will print

## 2020-05-08 ENCOUNTER — MYC MEDICAL ADVICE (OUTPATIENT)
Dept: FAMILY MEDICINE | Facility: CLINIC | Age: 45
End: 2020-05-08

## 2020-05-08 NOTE — TELEPHONE ENCOUNTER
[employer] message sent    Keila Santos RN  [facility]

## 2020-05-12 ENCOUNTER — VIRTUAL VISIT (OUTPATIENT)
Dept: FAMILY MEDICINE | Facility: CLINIC | Age: 45
End: 2020-05-12
Payer: COMMERCIAL

## 2020-05-12 ENCOUNTER — TELEPHONE (OUTPATIENT)
Dept: FAMILY MEDICINE | Facility: CLINIC | Age: 45
End: 2020-05-12

## 2020-05-12 ENCOUNTER — MYC MEDICAL ADVICE (OUTPATIENT)
Dept: FAMILY MEDICINE | Facility: CLINIC | Age: 45
End: 2020-05-12

## 2020-05-12 DIAGNOSIS — R11.2 NAUSEA AND VOMITING, INTRACTABILITY OF VOMITING NOT SPECIFIED, UNSPECIFIED VOMITING TYPE: ICD-10-CM

## 2020-05-12 DIAGNOSIS — K21.9 GASTROESOPHAGEAL REFLUX DISEASE WITHOUT ESOPHAGITIS: Primary | ICD-10-CM

## 2020-05-12 PROCEDURE — 99213 OFFICE O/P EST LOW 20 MIN: CPT | Mod: 95 | Performed by: PHYSICIAN ASSISTANT

## 2020-05-12 RX ORDER — ONDANSETRON 8 MG/1
8 TABLET, FILM COATED ORAL EVERY 8 HOURS PRN
Qty: 10 TABLET | Refills: 0 | Status: SHIPPED | OUTPATIENT
Start: 2020-05-12 | End: 2020-06-09

## 2020-05-12 ASSESSMENT — PAIN SCALES - GENERAL: PAINLEVEL: NO PAIN (0)

## 2020-05-12 NOTE — TELEPHONE ENCOUNTER
Spoke with pt. She cannot see letter in [employer]. She did not have fax # for her employer to we could fax it.  She is requesting if provider could mychart-message her the letter (as seen in 3/20/20 encounter from Dr. Lombardo).  She has printed it that way before.  Routing to provider to advise    CELIO Benites.

## 2020-05-12 NOTE — LETTER
May 12, 2020      Cornelia Rodriguez  [address]  [address]  [place] [address]        To Whom It May Concern,     Please excuse Larry from work May 11 due to illness. May return to work May 12 without restrictions.         Sincerely,        Naomi Cosby PA-C

## 2020-05-12 NOTE — PATIENT INSTRUCTIONS
Start omeprazole 20 mg daily 30 minutes before a meal.  Take zofran 8 mg three times a day as needed for nausea and vomiting.  Return urgently if any change in symptoms like abdominal pain, fever, persistent vomiting, diarrhea or other change in symptoms  Follow up with us Friday if symptoms not improving   At [facility], we strive to deliver an exceptional experience to you, every time we see you. If you receive a survey, please complete it as we do value your feedback.  If you have MyChart, you can expect to receive results automatically within 24 hours of their completion.  Your provider will send a note interpreting your results as well.   If you do not have MyChart, you should receive your results in about a week by mail.    Your care team:                            Family Medicine Internal Medicine   MD Nile Perkins MD Shantel Branch-Fleming, MD Katya Georgiev PA-C Megan Hill, APRN CNP    Maurisio Dexter, MD Pediatrics   Gene Evangelista, PAGalC  Ileana Connell, CNP MD Monika Rincon APRN CNP   MD Renay Womack MD Deborah Mielke, MD Kim Thein, APRN CNP      Clinic hours: Monday - Thursday 7 am-7 pm; Fridays 7 am-5 pm.   Urgent care: Monday - Friday 11 am-9 pm; Saturday and Sunday 9 am-5 pm.    Clinic: [phone]       [facility]: Monday - Thursday 8 am - 7 pm; Friday 8 am - 6 pm  [facility]: [phone]     Use [email or web address] for 24/7 diagnosis and treatment of dozens of conditions.

## 2020-05-12 NOTE — PROGRESS NOTES
"Cornelia Rodriguez is a 44 year old female who is being evaluated via a billable telephone visit.      The patient has been notified of following:     \"This telephone visit will be conducted via a call between you and your physician/provider. We have found that certain health care needs can be provided without the need for a physical exam.  This service lets us provide the care you need with a short phone conversation.  If a prescription is necessary we can send it directly to your pharmacy.  If lab work is needed we can place an order for that and you can then stop by our lab to have the test done at a later time.    Telephone visits are billed at different rates depending on your insurance coverage. During this emergency period, for some insurers they may be billed the same as an in-person visit.  Please reach out to your insurance provider with any questions.    If during the course of the call the physician/provider feels a telephone visit is not appropriate, you will not be charged for this service.\"    Patient has given verbal consent for Telephone visit?  Yes    What phone number would you like to be contacted at? [phone]    How would you like to obtain your AVS? MyChart    Subjective     Cornelia Rodriguez is a 44 year old female who presents to clinic today for the following health issues:    HPI  Gastrointestinal symptoms      Duration: one wek     Description:           REFLUX SYMPTOMS - nausea and vomitting      Intensity:  moderate    Accompanying signs and symptoms:  none    History  Previous {similar problem: YES  Previous evaluation:  EGD    Aggravating factors: none    Alleviating factors: has been off of pepcid since zantac recall and pepcid unavailable     Other Therapies tried: None       Started last week and went away and back. No heartburn.  Blood sugars not checked this am.  Below 120 for most part  Only has pain after vomiting- Threw up 3 times last week.  Nothing for reflux- since stopped " taking the zantac   To her knowledge has never tried prilosec   Scoped at [facility] 3 yrs ago- diagnosed with acid reflux and swollen stomach  Ate supper last noc approximately 530 and woke up 850 pm puking- went to bed around 6 pm - work nights.   Works for [employer]   Normal stools. No melena or hematochezia   Vomit red in color at times   No particular foods that make worse  First time happended. Lasagna.  2nd time chicken noodle soup  Hot dogs last night        Patient Active Problem List   Diagnosis     Acute recurrent maxillary sinusitis     Right chronic serous otitis media     Morbid obesity (H)     Migraine without aura and without status migrainosus, not intractable     Gastroesophageal reflux disease without esophagitis     Type 2 diabetes mellitus with hyperglycemia (H)     Allergic rhinitis, unspecified seasonality, unspecified trigger     Hyperlipidemia LDL goal <100     Allergic contact dermatitis due to adhesives     Back muscle spasm     History of Helicobacter pylori infection     Migraine     Seborrheic dermatitis of scalp     Type 2 diabetes mellitus (H)     Perennial allergic rhinitis     Hyperlipidemia     Abdominal pain     Low back pain     Occipital neuralgia of right side     Past Surgical History:   Procedure Laterality Date     CHOLECYSTECTOMY         Social History     Tobacco Use     Smoking status: Former Smoker     Packs/day: 0.00     Smokeless tobacco: Never Used     Tobacco comment: quit 3/2013   Substance Use Topics     Alcohol use: Yes     Comment: Occasionally     Family History   Problem Relation Age of Onset     Diabetes Maternal Grandmother      Glaucoma Maternal Grandmother      Diabetes Sister      Hypertension Sister      Glaucoma Mother      Macular Degeneration No family hx of          Current Outpatient Medications   Medication Sig Dispense Refill     acetaminophen (TYLENOL) 325 MG tablet        albuterol (PROVENTIL HFA) 108 (90 Base) MCG/ACT inhaler Inhale 2 puffs  into the lungs       blood glucose (NO BRAND SPECIFIED) test strip Use to test blood sugar 2x's times daily or as directed. 200 each 3     blood glucose monitoring (ACCU-CHEK MULTICLIX) lancets Use to test blood sugar 2 x times daily or as directed. 204 each 3     blood glucose monitoring (NO BRAND SPECIFIED) meter device kit Use to test blood sugar 2 times daily or as directed. 1 kit 0     calcium ascorbate 500 MG TABS Take 500 mg by mouth daily       cetirizine (ZYRTEC) 10 MG tablet Take 10 mg by mouth daily       dulaglutide (TRULICITY) 1.5 MG/0.5ML pen Inject 1.5 mg Subcutaneous every 7 days 6 mL 3     DULoxetine (CYMBALTA) 60 MG capsule Take 1 capsule (60 mg) by mouth daily 90 capsule 3     econazole nitrate 1 % external cream Apply topically daily To feet and toenails. 85 g 5     empagliflozin (JARDIANCE) 25 MG TABS tablet Take 1 tablet (25 mg) by mouth daily 90 tablet 3     famotidine (PEPCID) 20 MG tablet Take 1 tablet (20 mg) by mouth 2 times daily 60 tablet 5     glipiZIDE (GLUCOTROL XL) 10 MG 24 hr tablet Take 1 tablet (10 mg) by mouth daily Increase to 20 mg on non-work work days 180 tablet 1     insulin glargine (LANTUS SOLOSTAR) 100 UNIT/ML pen Inject 48 Units Subcutaneous 2 times daily (Patient taking differently: Inject 45 Units Subcutaneous 2 times daily ) 90 mL 3     insulin pen needle (BD ABSSEM U/F) 32G X 4 MM miscellaneous USE TWICE DAILY OR AS DIRECTED 200 each 3     ketoconazole (NIZORAL) 2 % external shampoo        methocarbamol (ROBAXIN) 750 MG tablet Take 1 tablet (750 mg) by mouth 3 times daily 60 tablet 1     montelukast (SINGULAIR) 10 MG tablet Take 1 tablet (10 mg) by mouth At Bedtime 90 tablet 3     Multiple Vitamins-Minerals (HM HAIR/SKIN/NAILS) TABS Take 1 tablet by mouth       multivitamin w/minerals (MULTI-VITAMIN) tablet Take 1 tablet by mouth                     rosuvastatin (CRESTOR) 10 MG tablet Take 1 tablet (10 mg) by mouth daily 90 tablet 3     sucralfate (CARAFATE) 1 GM  tablet Take 1 tablet by mouth as needed   5     SUMAtriptan (IMITREX) 100 MG tablet Take 100 mg by mouth as needed       BP Readings from Last 3 Encounters:   01/21/20 120/70   12/04/19 134/81   10/10/19 132/85    Wt Readings from Last 3 Encounters:   01/21/20 120.4 kg (265 lb 6.4 oz)   12/04/19 118.2 kg (260 lb 9.6 oz)   10/10/19 123.8 kg (273 lb)                    Reviewed and updated as needed this visit by Provider  Tobacco  Allergies  Meds  Problems  Med Hx  Surg Hx  Fam Hx  Soc Hx          Review of Systems   Constitutional, HEENT, cardiovascular, pulmonary, gi and gu systems are negative, except as otherwise noted.       Objective   Reported vitals:  There were no vitals taken for this visit.   healthy, alert and no distress  PSYCH: Alert and oriented times 3; coherent speech, normal   rate and volume, able to articulate logical thoughts, able   to abstract reason, no tangential thoughts, no hallucinations   or delusions  Her affect is normal and pleasant  RESP: No cough, no audible wheezing, able to talk in full sentences  Remainder of exam unable to be completed due to telephone visits    Diagnostic Test Results:  none         Assessment/Plan:  1. Gastroesophageal reflux disease without esophagitis  Trial of omeprazole and follow up with us if no improvement in symptoms over the next 3 days   - omeprazole (PRILOSEC) 20 MG DR capsule; Take 1 capsule (20 mg) by mouth daily  Dispense: 30 capsule; Refill: 1  - ondansetron (ZOFRAN) 8 MG tablet; Take 1 tablet (8 mg) by mouth every 8 hours as needed for nausea  Dispense: 10 tablet; Refill: 0    2. Nausea and vomiting, intractability of vomiting not specified, unspecified vomiting type  zofran as needed for nausea and vomiting   - ondansetron (ZOFRAN) 8 MG tablet; Take 1 tablet (8 mg) by mouth every 8 hours as needed for nausea  Dispense: 10 tablet; Refill: 0    No follow-ups on file.      Phone call duration:  9 minutes    Naomi Cosby,  NABIL

## 2020-05-12 NOTE — TELEPHONE ENCOUNTER
Letter by Naomi Cosby PA-C on 2020        [address] [address]  Phone: [phone]                  May 12, 2020        Cornelia Rodriguez  [address]  [facility] [address]           To Whom It May Concern,      Please excuse Larry from work May 11 due to illness. May return to work May 12 without restrictions.            Sincerely,           Naomi Cosby PA-C              Patient Name: Cornelia Rodriguez                      : 1975                  PAGE:                 Above letter sent to [facility]

## 2020-05-12 NOTE — TELEPHONE ENCOUNTER
Pt called because she did not receive a letter for work from her visit. It's in her chart, but pt cannot see it on [employer]. Please call her @ [phone]

## 2020-05-12 NOTE — TELEPHONE ENCOUNTER
Left message informing pt that letter was sent via [employer] and to let us know if she cannot view it.  Message with letter Last read by Cornelia Rodriguez at 3:47 PM on 5/12/2020.     CELIO Benites.

## 2020-05-15 ENCOUNTER — OFFICE VISIT (OUTPATIENT)
Dept: FAMILY MEDICINE | Facility: CLINIC | Age: 45
End: 2020-05-15
Payer: COMMERCIAL

## 2020-05-15 ENCOUNTER — ANCILLARY PROCEDURE (OUTPATIENT)
Dept: GENERAL RADIOLOGY | Facility: CLINIC | Age: 45
End: 2020-05-15
Attending: FAMILY MEDICINE
Payer: COMMERCIAL

## 2020-05-15 ENCOUNTER — MYC MEDICAL ADVICE (OUTPATIENT)
Dept: FAMILY MEDICINE | Facility: CLINIC | Age: 45
End: 2020-05-15

## 2020-05-15 VITALS
BODY MASS INDEX: 45.07 KG/M2 | HEART RATE: 98 BPM | OXYGEN SATURATION: 98 % | RESPIRATION RATE: 19 BRPM | TEMPERATURE: 98.6 F | HEIGHT: 64 IN | WEIGHT: 264 LBS | DIASTOLIC BLOOD PRESSURE: 86 MMHG | SYSTOLIC BLOOD PRESSURE: 131 MMHG

## 2020-05-15 DIAGNOSIS — R11.2 NAUSEA AND VOMITING, INTRACTABILITY OF VOMITING NOT SPECIFIED, UNSPECIFIED VOMITING TYPE: Primary | ICD-10-CM

## 2020-05-15 DIAGNOSIS — R11.2 NAUSEA AND VOMITING, INTRACTABILITY OF VOMITING NOT SPECIFIED, UNSPECIFIED VOMITING TYPE: ICD-10-CM

## 2020-05-15 DIAGNOSIS — E11.65 TYPE 2 DIABETES MELLITUS WITH HYPERGLYCEMIA, WITH LONG-TERM CURRENT USE OF INSULIN (H): ICD-10-CM

## 2020-05-15 DIAGNOSIS — K21.9 GASTROESOPHAGEAL REFLUX DISEASE WITHOUT ESOPHAGITIS: ICD-10-CM

## 2020-05-15 DIAGNOSIS — K59.00 CONSTIPATION, UNSPECIFIED CONSTIPATION TYPE: ICD-10-CM

## 2020-05-15 DIAGNOSIS — Z79.4 TYPE 2 DIABETES MELLITUS WITH HYPERGLYCEMIA, WITH LONG-TERM CURRENT USE OF INSULIN (H): ICD-10-CM

## 2020-05-15 LAB
ALBUMIN SERPL-MCNC: 3.9 G/DL (ref 3.4–5)
ALBUMIN UR-MCNC: NEGATIVE MG/DL
ALP SERPL-CCNC: 68 U/L (ref 40–150)
ALT SERPL W P-5'-P-CCNC: 43 U/L (ref 0–50)
ANION GAP SERPL CALCULATED.3IONS-SCNC: 6 MMOL/L (ref 3–14)
APPEARANCE UR: CLEAR
AST SERPL W P-5'-P-CCNC: 15 U/L (ref 0–45)
BASOPHILS # BLD AUTO: 0.1 10E9/L (ref 0–0.2)
BASOPHILS NFR BLD AUTO: 0.7 %
BILIRUB SERPL-MCNC: 0.3 MG/DL (ref 0.2–1.3)
BILIRUB UR QL STRIP: NEGATIVE
BUN SERPL-MCNC: 11 MG/DL (ref 7–30)
CALCIUM SERPL-MCNC: 8.9 MG/DL (ref 8.5–10.1)
CHLORIDE SERPL-SCNC: 105 MMOL/L (ref 94–109)
CO2 SERPL-SCNC: 28 MMOL/L (ref 20–32)
COLOR UR AUTO: YELLOW
CREAT SERPL-MCNC: 0.67 MG/DL (ref 0.52–1.04)
DIFFERENTIAL METHOD BLD: NORMAL
EOSINOPHIL # BLD AUTO: 0.2 10E9/L (ref 0–0.7)
EOSINOPHIL NFR BLD AUTO: 2.5 %
ERYTHROCYTE [DISTWIDTH] IN BLOOD BY AUTOMATED COUNT: 13.3 % (ref 10–15)
GFR SERPL CREATININE-BSD FRML MDRD: >90 ML/MIN/{1.73_M2}
GLUCOSE SERPL-MCNC: 105 MG/DL (ref 70–99)
GLUCOSE UR STRIP-MCNC: 500 MG/DL
HBA1C MFR BLD: 7.1 % (ref 0–5.6)
HCT VFR BLD AUTO: 43.9 % (ref 35–47)
HGB BLD-MCNC: 14.6 G/DL (ref 11.7–15.7)
HGB UR QL STRIP: NEGATIVE
KETONES UR STRIP-MCNC: NEGATIVE MG/DL
LEUKOCYTE ESTERASE UR QL STRIP: NEGATIVE
LIPASE SERPL-CCNC: 121 U/L (ref 73–393)
LYMPHOCYTES # BLD AUTO: 2.6 10E9/L (ref 0.8–5.3)
LYMPHOCYTES NFR BLD AUTO: 31.4 %
MCH RBC QN AUTO: 28.7 PG (ref 26.5–33)
MCHC RBC AUTO-ENTMCNC: 33.3 G/DL (ref 31.5–36.5)
MCV RBC AUTO: 86 FL (ref 78–100)
MONOCYTES # BLD AUTO: 0.6 10E9/L (ref 0–1.3)
MONOCYTES NFR BLD AUTO: 7.5 %
NEUTROPHILS # BLD AUTO: 4.7 10E9/L (ref 1.6–8.3)
NEUTROPHILS NFR BLD AUTO: 57.9 %
NITRATE UR QL: NEGATIVE
PH UR STRIP: 5 PH (ref 5–7)
PLATELET # BLD AUTO: 319 10E9/L (ref 150–450)
POTASSIUM SERPL-SCNC: 3.6 MMOL/L (ref 3.4–5.3)
PROT SERPL-MCNC: 7.4 G/DL (ref 6.8–8.8)
RBC # BLD AUTO: 5.08 10E12/L (ref 3.8–5.2)
SODIUM SERPL-SCNC: 139 MMOL/L (ref 133–144)
SOURCE: ABNORMAL
SP GR UR STRIP: 1.02 (ref 1–1.03)
TSH SERPL DL<=0.005 MIU/L-ACNC: 1.42 MU/L (ref 0.4–4)
UROBILINOGEN UR STRIP-ACNC: 0.2 EU/DL (ref 0.2–1)
WBC # BLD AUTO: 8.1 10E9/L (ref 4–11)

## 2020-05-15 PROCEDURE — 83690 ASSAY OF LIPASE: CPT | Performed by: FAMILY MEDICINE

## 2020-05-15 PROCEDURE — 81003 URINALYSIS AUTO W/O SCOPE: CPT | Performed by: FAMILY MEDICINE

## 2020-05-15 PROCEDURE — 36415 COLL VENOUS BLD VENIPUNCTURE: CPT | Performed by: FAMILY MEDICINE

## 2020-05-15 PROCEDURE — 99214 OFFICE O/P EST MOD 30 MIN: CPT | Performed by: FAMILY MEDICINE

## 2020-05-15 PROCEDURE — 80050 GENERAL HEALTH PANEL: CPT | Performed by: FAMILY MEDICINE

## 2020-05-15 PROCEDURE — 74019 RADEX ABDOMEN 2 VIEWS: CPT | Mod: FY

## 2020-05-15 PROCEDURE — 83036 HEMOGLOBIN GLYCOSYLATED A1C: CPT | Performed by: FAMILY MEDICINE

## 2020-05-15 ASSESSMENT — MIFFLIN-ST. JEOR: SCORE: 1826.47

## 2020-05-15 ASSESSMENT — PAIN SCALES - GENERAL: PAINLEVEL: MILD PAIN (3)

## 2020-05-15 NOTE — PROGRESS NOTES
Subjective     Cornelia Rodriguez is a 44 year old female who presents to clinic today for the following health issues:    HPI   Nausea/Vomitting  Onset: 3 weeks    Description:     Burning in chest: no    Intensity: mild, moderate and severe    Progression of Symptoms: same    Accompanying Signs & Symptoms:  Nausea: YES  Vomiting (bloody?): YES, not bloody  Abdominal Pain: no  Black-Tarry stools: no:  Bloody stools: no    History:   Previous ulcers: no    Precipitating factors:   Caffeine use: YES  Alcohol use: YES- rarely  NSAID/Aspirin use: no  Tobacco use: no  Worse with no particular food or drink.    Alleviating factors:  none    Therapies Tried and outcome:zofran    First episode: April 27-28, nausea and vomiting after pizza.   Next episode: May 4- 7, nausea and vomiting after lasagna  May 11th- hot dogs with buns, mustard, FF. 3 hours after started vomiting again for a few hours.     Was given zofran at virtual visit on 5/12/19 and helping to prevent vomiting, but nausea since then still lpresent. Also started on PPI. Had been off H2 blocker due to recall and inability to get pepcid  Since zofran have been very constipated (previously was stooling tid very regularly). Urge to go but feels like she can't. .   Last bm was the 12th.  Is able to pass gas without problem  No abd pain    Hx of upper abd pain that relates to gerd- hasn't had this now.     No fever, chills  Notes her temps go up to 99.3 and feels warm but think it may be a hot flash  Still get menses but irregular, every 3-4 months, very light (one pad per day), last for 14 days. That was early April.   Slight sneezing but thinks due to things blooming and allergies.   No cough except occ froggy throat/dry when first wake up and will cough a little to clear  Drinking 3-4 large bottles of water per day.     Sugars very good   During episodes sugars to 150's    Denies risk of pg. Not sexually active.       Patient Active Problem List   Diagnosis      Acute recurrent maxillary sinusitis     Right chronic serous otitis media     Morbid obesity (H)     Migraine without aura and without status migrainosus, not intractable     Gastroesophageal reflux disease without esophagitis     Type 2 diabetes mellitus with hyperglycemia (H)     Allergic rhinitis, unspecified seasonality, unspecified trigger     Hyperlipidemia LDL goal <100     Allergic contact dermatitis due to adhesives     Back muscle spasm     History of Helicobacter pylori infection     Migraine     Seborrheic dermatitis of scalp     Type 2 diabetes mellitus (H)     Perennial allergic rhinitis     Hyperlipidemia     Abdominal pain     Low back pain     Occipital neuralgia of right side     Past Surgical History:   Procedure Laterality Date     CHOLECYSTECTOMY  2005       Social History     Tobacco Use     Smoking status: Former Smoker     Packs/day: 0.00     Smokeless tobacco: Never Used     Tobacco comment: quit 3/2013   Substance Use Topics     Alcohol use: Yes     Comment: Occasionally     Family History   Problem Relation Age of Onset     Diabetes Maternal Grandmother      Glaucoma Maternal Grandmother      Diabetes Sister      Hypertension Sister      Glaucoma Mother      Macular Degeneration No family hx of          Current Outpatient Medications   Medication Sig Dispense Refill     acetaminophen (TYLENOL) 325 MG tablet        albuterol (PROVENTIL HFA) 108 (90 Base) MCG/ACT inhaler Inhale 2 puffs into the lungs       blood glucose (NO BRAND SPECIFIED) test strip Use to test blood sugar 2x's times daily or as directed. 200 each 3     blood glucose monitoring (ACCU-CHEK MULTICLIX) lancets Use to test blood sugar 2 x times daily or as directed. 204 each 3     blood glucose monitoring (NO BRAND SPECIFIED) meter device kit Use to test blood sugar 2 times daily or as directed. 1 kit 0     calcium ascorbate 500 MG TABS Take 500 mg by mouth daily       cetirizine (ZYRTEC) 10 MG tablet Take 10 mg by mouth  daily       dulaglutide (TRULICITY) 1.5 MG/0.5ML pen Inject 1.5 mg Subcutaneous every 7 days 6 mL 3     DULoxetine (CYMBALTA) 60 MG capsule Take 1 capsule (60 mg) by mouth daily 90 capsule 3     econazole nitrate 1 % external cream Apply topically daily To feet and toenails. 85 g 5     empagliflozin (JARDIANCE) 25 MG TABS tablet Take 1 tablet (25 mg) by mouth daily 90 tablet 3     glipiZIDE (GLUCOTROL XL) 10 MG 24 hr tablet Take 1 tablet (10 mg) by mouth daily Increase to 20 mg on non-work work days 180 tablet 1     insulin glargine (LANTUS SOLOSTAR) 100 UNIT/ML pen Inject 48 Units Subcutaneous 2 times daily (Patient taking differently: Inject 45 Units Subcutaneous 2 times daily ) 90 mL 3     insulin pen needle (BD BASSEM U/F) 32G X 4 MM miscellaneous USE TWICE DAILY OR AS DIRECTED 200 each 3     ketoconazole (NIZORAL) 2 % external shampoo        methocarbamol (ROBAXIN) 750 MG tablet Take 1 tablet (750 mg) by mouth 3 times daily 60 tablet 1     montelukast (SINGULAIR) 10 MG tablet Take 1 tablet (10 mg) by mouth At Bedtime 90 tablet 3     Multiple Vitamins-Minerals (HM HAIR/SKIN/NAILS) TABS Take 1 tablet by mouth       multivitamin w/minerals (MULTI-VITAMIN) tablet Take 1 tablet by mouth       omeprazole (PRILOSEC) 20 MG DR capsule Take 1 capsule (20 mg) by mouth daily 30 capsule 1     ondansetron (ZOFRAN) 8 MG tablet Take 1 tablet (8 mg) by mouth every 8 hours as needed for nausea 10 tablet 0     rosuvastatin (CRESTOR) 10 MG tablet Take 1 tablet (10 mg) by mouth daily 90 tablet 3     sucralfate (CARAFATE) 1 GM tablet Take 1 tablet by mouth as needed   5     SUMAtriptan (IMITREX) 100 MG tablet Take 100 mg by mouth as needed       famotidine (PEPCID) 20 MG tablet Take 1 tablet (20 mg) by mouth 2 times daily (Patient not taking: Reported on 5/15/2020) 60 tablet 5     Allergies   Allergen Reactions     Adhesive Tape Dermatitis     Benadryl [Diphenhydramine]      Codeine      Gabapentin      Ibuprofen      Metronidazole  "Fatigue     Headache     Penicillins      Ciprofloxacin Diarrhea, Nausea and Nausea and Vomiting     Cyclobenzaprine Nausea and Vomiting     Feels shakey       Ferrous Gluconate Rash     Nortriptyline Hives and Rash     Sumatriptan Nausea and Nausea and Vomiting       Reviewed and updated as needed this visit by Provider  Tobacco  Allergies  Meds  Problems  Med Hx  Surg Hx  Fam Hx         Review of Systems   Constitutional, HEENT, cardiovascular, pulmonary, gi and gu systems are negative, except as otherwise noted.      Objective    /86 (BP Location: Right arm, Patient Position: Chair, Cuff Size: Adult Large)   Pulse 98   Temp 98.6  F (37  C) (Oral)   Resp 19   Ht 1.632 m (5' 4.25\")   Wt 119.7 kg (264 lb)   LMP  (Exact Date)   SpO2 98%   Breastfeeding No   BMI 44.96 kg/m    Body mass index is 44.96 kg/m .  Physical Exam   GENERAL: healthy, alert and no distress  NECK: no adenopathy, no asymmetry, masses, or scars and thyroid normal to palpation  RESP: lungs clear to auscultation - no rales, rhonchi or wheezes  CV: regular rate and rhythm, normal S1 S2, no S3 or S4, no murmur, click or rub, no peripheral edema and peripheral pulses strong  ABDOMEN: soft, nontender, no hepatosplenomegaly, no masses and bowel sounds normal  MS: no gross musculoskeletal defects noted, no edema  PSYCH: mentation appears normal, affect normal/bright    Diagnostic Test Results:  Labs reviewed in Epic  ABD: xray with moderate stool throughout abd. no free air or obstruction        Assessment & Plan       ICD-10-CM    1. Nausea and vomiting, intractability of vomiting not specified, unspecified vomiting type  R11.2 Albumin Random Urine Quantitative with Creat Ratio     Comprehensive metabolic panel     CBC with platelets differential     TSH with free T4 reflex     UA reflex to Microscopic and Culture     Lipase     XR Abdomen 2 Views   2. Constipation, unspecified constipation type  K59.00    3. Gastroesophageal reflux " disease without esophagitis  K21.9    4. Type 2 diabetes mellitus with hyperglycemia, with long-term current use of insulin (H)  E11.65 Albumin Random Urine Quantitative with Creat Ratio    Z79.4 Hemoglobin A1c     CANCELED: FOOT EXAM     Intermittent vomiting episodes of unclear etiology.  Now persistent nausea and current significant constipation.  We will treat her constipation aggressively-see my chart message sent to patient with her x-ray results and plan for MiraLAX cleanout  Consider trial off Jardiance if persistent symptoms despite constipation management.  Continue PPI  Labs as noted  Follow-up next week if persistent symptoms  Reviewed red flag symptoms that would precipitate the need for routine, urgent or emergent f/u     Patient Instructions   For constipation:  Try dulcolax suppository or fleets enema if not moving bowels.  You can also use miralax (maybe start with this and try 1-2 doses. If this is not working, use the suppository and then enema if needed.     Stay omeprazole    Consider holding jardiance if nausea does not resolve with improving constipation.           Return in about 1 week (around 5/22/2020) for follow-up on symptoms. .    Sarah Lombardo MD  [facility]

## 2020-05-15 NOTE — PATIENT INSTRUCTIONS
For constipation:  Try dulcolax suppository or fleets enema if not moving bowels.  You can also use miralax (maybe start with this and try 1-2 doses. If this is not working, use the suppository and then enema if needed.     Stay omeprazole    Consider holding jardiance if nausea does not resolve with improving constipation.

## 2020-05-15 NOTE — LETTER
May 15, 2020      Cornelia Rodriguez  [address]  [address]  [facility] [address]        To Whom It May Concern,      Please excuse Cornelia Savage from work on 5/15/2020 due to illness. She may return to work on 5/18/20 if feeling better.           Sincerely,        Sarah Lombardo MD

## 2020-05-19 ENCOUNTER — MYC MEDICAL ADVICE (OUTPATIENT)
Dept: FAMILY MEDICINE | Facility: CLINIC | Age: 45
End: 2020-05-19

## 2020-05-22 ENCOUNTER — MYC MEDICAL ADVICE (OUTPATIENT)
Dept: FAMILY MEDICINE | Facility: CLINIC | Age: 45
End: 2020-05-22

## 2020-06-04 ENCOUNTER — MYC MEDICAL ADVICE (OUTPATIENT)
Dept: FAMILY MEDICINE | Facility: CLINIC | Age: 45
End: 2020-06-04

## 2020-06-04 DIAGNOSIS — L03.032 ONYCHIA OF TOE OF LEFT FOOT: ICD-10-CM

## 2020-06-04 DIAGNOSIS — B35.3 TINEA PEDIS OF BOTH FEET: ICD-10-CM

## 2020-06-04 RX ORDER — ECONAZOLE NITRATE 10 MG/G
CREAM TOPICAL
Qty: 85 G | Refills: 5 | Status: SHIPPED | OUTPATIENT
Start: 2020-06-04 | End: 2021-04-28

## 2020-06-04 NOTE — TELEPHONE ENCOUNTER
Needs face-to-face visit in the office next week with me for evaluation of vomiting.  Can see other provider if it does not work for her to be scheduled on my only day in office which is June 9

## 2020-06-04 NOTE — TELEPHONE ENCOUNTER
LOV 7/12/19 for Onychodystrophy and onychomycosis changes on the left hallux.   Will route to provider

## 2020-06-05 NOTE — TELEPHONE ENCOUNTER
Spoke with patient on the phone. She is not able to come into the clinic today but is able to see PCP on June 9th . Assisted her in scheduling. Please see her My Chart message as she questions on the diabetes meds prior to apt on June 9th. Told her if she develops worsening symptoms prior to apt she should report to UC and she verbalized understanding.          Tracy Aponte RN

## 2020-06-09 ENCOUNTER — OFFICE VISIT (OUTPATIENT)
Dept: FAMILY MEDICINE | Facility: CLINIC | Age: 45
End: 2020-06-09
Payer: COMMERCIAL

## 2020-06-09 ENCOUNTER — ANCILLARY PROCEDURE (OUTPATIENT)
Dept: GENERAL RADIOLOGY | Facility: CLINIC | Age: 45
End: 2020-06-09
Attending: FAMILY MEDICINE
Payer: COMMERCIAL

## 2020-06-09 VITALS
DIASTOLIC BLOOD PRESSURE: 87 MMHG | HEART RATE: 84 BPM | WEIGHT: 266 LBS | BODY MASS INDEX: 45.41 KG/M2 | SYSTOLIC BLOOD PRESSURE: 125 MMHG | OXYGEN SATURATION: 99 % | HEIGHT: 64 IN | TEMPERATURE: 98.6 F

## 2020-06-09 DIAGNOSIS — G43.009 MIGRAINE WITHOUT AURA AND WITHOUT STATUS MIGRAINOSUS, NOT INTRACTABLE: ICD-10-CM

## 2020-06-09 DIAGNOSIS — M54.81 OCCIPITAL NEURALGIA OF RIGHT SIDE: ICD-10-CM

## 2020-06-09 DIAGNOSIS — K21.9 GASTROESOPHAGEAL REFLUX DISEASE WITHOUT ESOPHAGITIS: ICD-10-CM

## 2020-06-09 DIAGNOSIS — R11.2 NAUSEA AND VOMITING, INTRACTABILITY OF VOMITING NOT SPECIFIED, UNSPECIFIED VOMITING TYPE: ICD-10-CM

## 2020-06-09 DIAGNOSIS — R11.2 NAUSEA AND VOMITING, INTRACTABILITY OF VOMITING NOT SPECIFIED, UNSPECIFIED VOMITING TYPE: Primary | ICD-10-CM

## 2020-06-09 LAB — HCG UR QL: NEGATIVE

## 2020-06-09 PROCEDURE — 81025 URINE PREGNANCY TEST: CPT | Performed by: FAMILY MEDICINE

## 2020-06-09 PROCEDURE — 74019 RADEX ABDOMEN 2 VIEWS: CPT | Mod: FY

## 2020-06-09 PROCEDURE — 99214 OFFICE O/P EST MOD 30 MIN: CPT | Performed by: FAMILY MEDICINE

## 2020-06-09 RX ORDER — DULOXETIN HYDROCHLORIDE 30 MG/1
30 CAPSULE, DELAYED RELEASE ORAL DAILY
Qty: 30 CAPSULE | Refills: 1 | Status: SHIPPED | OUTPATIENT
Start: 2020-06-09 | End: 2020-08-14

## 2020-06-09 RX ORDER — OMEPRAZOLE 40 MG/1
40 CAPSULE, DELAYED RELEASE ORAL DAILY
Qty: 30 CAPSULE | Refills: 1 | Status: SHIPPED | OUTPATIENT
Start: 2020-06-09 | End: 2020-08-14

## 2020-06-09 RX ORDER — SUCRALFATE 1 G/1
1 TABLET ORAL 4 TIMES DAILY PRN
Qty: 120 TABLET | Refills: 5 | Status: SHIPPED | OUTPATIENT
Start: 2020-06-09 | End: 2021-02-01

## 2020-06-09 ASSESSMENT — MIFFLIN-ST. JEOR: SCORE: 1835.54

## 2020-06-09 NOTE — PROGRESS NOTES
"Subjective     Cornelia Rodriguez is a 44 year old female who presents to clinic today for the following health issues:    HPI   Concern - Nausea and Vomiting   Onset: On and off for couple weeks     Description:   Patient presents with vomiting and nausea. Patient also has headaches     Intensity: moderate to severe nausea and headaches     Progression of Symptoms:  waxing and waning    Previous history of similar problem:   YES     Precipitating factors:   Worsened by: None     Alleviating factors:  Improved by: None     Therapies Tried and outcome: Omeprazole and Zantac with no relief Ozfran makes pt constipated     Notes her current sx's are \"hit or miss\"   Pizza sauce/canned tomatoe products will set off sx's.   In the last week sx's 3-4/7 days.   Sometimes will just be nausea. Sometimes will have recurrent vomiting for several hours.   Last vomiting was day stopped jardiance. Had upper abd pain that day. Once vomiting resolved, abd pain resolved.   Almost in constant state of feeling nauseas.   Not taking zofran due to constipation side effect  Bowels are \"normal\". stooling daily to every other day. But miss a day no hard stool/straining.   No fever, chills  Appetite is down but still eating. Drinking fluids okay. Feels she could drink more but overall is doing okay  Reports sx's of heartburn for her are burping/feeling of air in her chest. Is noting this daily. Take omeprazole daily. Does not seem to help  In this last year she has been off Zantac since October 2019.  This was transitioned to Pepcid but she could not get that prescription either and had not been taking a medication for many months.      Last virtual visit I had her stop her Jardiance due to the symptoms.  Today will be 6 days off the jardiance.  She does not note significant improvement in nausea since making this change, however she has not had any further vomiting.    This am sugar was 188. 2 hours after eating breakfast 124  Morning " 140-160's, improves after eating.     Starting to get occipital nerve/headache pain again. Does not seem to correlate necessarily with the nausea.     Only taking duloxetine 3 x's a week as she feels it makes her too sleepy but also made her feel wired and unable to sleep.. Was taking when got home from work. Taking like this since March. Above sx's started in May  Carbamazepine was making her ill and has been off this for some time    Today feeling well with no sx's.   LMP April. Menses are irregular.  She is not sexually active and adamantly denies risk of pregnancy.          Patient Active Problem List   Diagnosis     Acute recurrent maxillary sinusitis     Right chronic serous otitis media     Morbid obesity (H)     Migraine without aura and without status migrainosus, not intractable     Gastroesophageal reflux disease without esophagitis     Type 2 diabetes mellitus with hyperglycemia (H)     Allergic rhinitis, unspecified seasonality, unspecified trigger     Hyperlipidemia LDL goal <100     Allergic contact dermatitis due to adhesives     Back muscle spasm     History of Helicobacter pylori infection     Migraine     Seborrheic dermatitis of scalp     Type 2 diabetes mellitus (H)     Perennial allergic rhinitis     Hyperlipidemia     Abdominal pain     Low back pain     Occipital neuralgia of right side     Past Surgical History:   Procedure Laterality Date     CHOLECYSTECTOMY  2005       Social History     Tobacco Use     Smoking status: Former Smoker     Packs/day: 0.00     Smokeless tobacco: Never Used     Tobacco comment: quit 3/2013   Substance Use Topics     Alcohol use: Yes     Comment: Occasionally     Family History   Problem Relation Age of Onset     Diabetes Maternal Grandmother      Glaucoma Maternal Grandmother      Diabetes Sister      Hypertension Sister      Glaucoma Mother      Macular Degeneration No family hx of          Current Outpatient Medications   Medication Sig Dispense Refill      acetaminophen (TYLENOL) 325 MG tablet        albuterol (PROVENTIL HFA) 108 (90 Base) MCG/ACT inhaler Inhale 2 puffs into the lungs       blood glucose (NO BRAND SPECIFIED) test strip Use to test blood sugar 2x's times daily or as directed. 200 each 3     blood glucose monitoring (ACCU-CHEK MULTICLIX) lancets Use to test blood sugar 2 x times daily or as directed. 204 each 3     blood glucose monitoring (NO BRAND SPECIFIED) meter device kit Use to test blood sugar 2 times daily or as directed. 1 kit 0     calcium ascorbate 500 MG TABS Take 500 mg by mouth daily       cetirizine (ZYRTEC) 10 MG tablet Take 10 mg by mouth daily       dulaglutide (TRULICITY) 1.5 MG/0.5ML pen Inject 1.5 mg Subcutaneous every 7 days 6 mL 3     DULoxetine (CYMBALTA) 30 MG capsule Take 1 capsule (30 mg) by mouth daily 30 capsule 1     econazole nitrate 1 % external cream APPLY TO FEET AND TOENAILS DAILY 85 g 5     glipiZIDE (GLUCOTROL XL) 10 MG 24 hr tablet Take 1 tablet (10 mg) by mouth daily Increase to 20 mg on non-work work days 180 tablet 1     insulin glargine (LANTUS SOLOSTAR) 100 UNIT/ML pen Inject 48 Units Subcutaneous 2 times daily (Patient taking differently: Inject 45 Units Subcutaneous 2 times daily ) 90 mL 3     insulin pen needle (BD BASSEM U/F) 32G X 4 MM miscellaneous USE TWICE DAILY OR AS DIRECTED 200 each 3     ketoconazole (NIZORAL) 2 % external shampoo        methocarbamol (ROBAXIN) 750 MG tablet Take 1 tablet (750 mg) by mouth 3 times daily 60 tablet 1     montelukast (SINGULAIR) 10 MG tablet Take 1 tablet (10 mg) by mouth At Bedtime 90 tablet 3     Multiple Vitamins-Minerals (HM HAIR/SKIN/NAILS) TABS Take 1 tablet by mouth       multivitamin w/minerals (MULTI-VITAMIN) tablet Take 1 tablet by mouth       omeprazole (PRILOSEC) 40 MG DR capsule Take 1 capsule (40 mg) by mouth daily 30 capsule 1     rosuvastatin (CRESTOR) 10 MG tablet TAKE ONE TABLET BY MOUTH EVERY DAY 90 tablet 1     sucralfate (CARAFATE) 1 GM tablet Take 1  "tablet (1 g) by mouth 4 times daily as needed for nausea (take prior to meals) 120 tablet 5     SUMAtriptan (IMITREX) 100 MG tablet Take 100 mg by mouth as needed       empagliflozin (JARDIANCE) 25 MG TABS tablet Take 1 tablet (25 mg) by mouth daily 90 tablet 3     Allergies   Allergen Reactions     Adhesive Tape Dermatitis     Benadryl [Diphenhydramine]      Codeine      Gabapentin      Ibuprofen      Metronidazole Fatigue     Headache     Penicillins      Ciprofloxacin Diarrhea, Nausea and Nausea and Vomiting     Cyclobenzaprine Nausea and Vomiting     Feels shakey       Ferrous Gluconate Rash     Nortriptyline Hives and Rash     Sumatriptan Nausea and Nausea and Vomiting     Recent Labs   Lab Test 05/15/20  1609 01/21/20  0842 12/04/19  1323 09/19/19  1152  04/19/19  0757   A1C 7.1* 7.5*  --  8.6*   < >  --    LDL  --  44  --   --   --  65   HDL  --  46*  --   --   --  41*   TRIG  --  190*  --   --   --  108   ALT 43 41 58*  --    < > 100*   CR 0.67 0.67 0.79 0.64   < > 0.62   GFRESTIMATED >90 >90 90 >90   < > >90   GFRESTBLACK >90 >90 >90 >90   < > >90   POTASSIUM 3.6 4.0 3.3* 4.0   < > 4.0   TSH 1.42 1.82  --   --   --   --     < > = values in this interval not displayed.        Reviewed and updated as needed this visit by Provider  Tobacco  Allergies  Meds  Problems  Med Hx  Surg Hx  Fam Hx         Review of Systems         Objective    /87 (BP Location: Right arm, Patient Position: Chair, Cuff Size: Adult Large)   Pulse 84   Temp 98.6  F (37  C) (Oral)   Ht 1.632 m (5' 4.25\")   Wt 120.7 kg (266 lb)   SpO2 99%   Breastfeeding No   BMI 45.30 kg/m    Body mass index is 45.3 kg/m .  Physical Exam   GENERAL: healthy, alert and no distress  NECK: no adenopathy, no asymmetry, masses, or scars and thyroid normal to palpation  RESP: lungs clear to auscultation - no rales, rhonchi or wheezes  CV: regular rate and rhythm, normal S1 S2, no S3 or S4, no murmur, click or rub, no peripheral edema and " peripheral pulses strong  ABDOMEN: soft, nontender, no hepatosplenomegaly, no masses and bowel sounds normal  PSYCH: mentation appears normal, affect normal/bright    Results for orders placed or performed in visit on 06/09/20   XR Abdomen 2 Views     Status: None    Narrative    ABDOMEN TWO TO THREE VIEWS  6/9/2020 12:46 PM     HISTORY: Nausea and vomiting, intractability of vomiting not  specified, unspecified vomiting type.    COMPARISON: May 15, 2020    FINDINGS: Moderate amount of stool. No free air. There are no air  filled distended loops of small bowel. The colon is not distended. The  lung bases are unremarkable.      Impression    IMPRESSION: Nonobstructed bowel gas pattern.    CHI PEREA MD   Results for orders placed or performed in visit on 06/09/20   HCG Qual, Urine ([id number])     Status: None   Result Value Ref Range    HCG Qual Urine Negative NEG^Negative       ABD xray: Moderate stool throughout the colon otherwise no acute changes. Xray personally reviewed and evaluated by me     Diagnostic Test Results:  Labs reviewed in Epic  Component      Latest Ref Rng & Units 5/15/2020   WBC      4.0 - 11.0 10e9/L 8.1   RBC Count      3.8 - 5.2 10e12/L 5.08   Hemoglobin      11.7 - 15.7 g/dL 14.6   Hematocrit      35.0 - 47.0 % 43.9   MCV      78 - 100 fl 86   MCH      26.5 - 33.0 pg 28.7   MCHC      31.5 - 36.5 g/dL 33.3   RDW      10.0 - 15.0 % 13.3   Platelet Count      150 - 450 10e9/L 319   % Neutrophils      % 57.9   % Lymphocytes      % 31.4   % Monocytes      % 7.5   % Eosinophils      % 2.5   % Basophils      % 0.7   Absolute Neutrophil      1.6 - 8.3 10e9/L 4.7   Absolute Lymphocytes      0.8 - 5.3 10e9/L 2.6   Absolute Monocytes      0.0 - 1.3 10e9/L 0.6   Absolute Eosinophils      0.0 - 0.7 10e9/L 0.2   Absolute Basophils      0.0 - 0.2 10e9/L 0.1   Diff Method       Automated Method   Sodium      133 - 144 mmol/L 139   Potassium      3.4 - 5.3 mmol/L 3.6   Chloride      94 - 109 mmol/L 105    Carbon Dioxide      20 - 32 mmol/L 28   Anion Gap      3 - 14 mmol/L 6   Glucose      70 - 99 mg/dL 105 (H)   Urea Nitrogen      7 - 30 mg/dL 11   Creatinine      0.52 - 1.04 mg/dL 0.67   GFR Estimate      >60 mL/min/1.73:m2 >90   GFR Estimate If Black      >60 mL/min/1.73:m2 >90   Calcium      8.5 - 10.1 mg/dL 8.9   Bilirubin Total      0.2 - 1.3 mg/dL 0.3   Albumin      3.4 - 5.0 g/dL 3.9   Protein Total      6.8 - 8.8 g/dL 7.4   Alkaline Phosphatase      40 - 150 U/L 68   ALT      0 - 50 U/L 43   AST      0 - 45 U/L 15   Color Urine       Yellow   Appearance Urine       Clear   Glucose Urine      NEG:Negative mg/dL 500 (A)   Bilirubin Urine      NEG:Negative Negative   Ketones Urine      NEG:Negative mg/dL Negative   Specific Gravity Urine      1.003 - 1.035 1.025   Blood Urine      NEG:Negative Negative   pH Urine      5.0 - 7.0 pH 5.0   Protein Albumin Urine      NEG:Negative mg/dL Negative   Urobilinogen Urine      0.2 - 1.0 EU/dL 0.2   Nitrite Urine      NEG:Negative Negative   Leukocyte Esterase Urine      NEG:Negative Negative   Source       Midstream Urine   TSH      0.40 - 4.00 mU/L 1.42   Lipase      73 - 393 U/L 121   Hemoglobin A1C      0 - 5.6 % 7.1 (H)           Assessment & Plan       ICD-10-CM    1. Nausea and vomiting, intractability of vomiting not specified, unspecified vomiting type  R11.2 omeprazole (PRILOSEC) 40 MG DR capsule     sucralfate (CARAFATE) 1 GM tablet     XR Abdomen 2 Views     GASTROENTEROLOGY ADULT REF CONSULT ONLY     HCG Qual, Urine ([id number])   2. Migraine without aura and without status migrainosus, not intractable  G43.009 DULoxetine (CYMBALTA) 30 MG capsule   3. Gastroesophageal reflux disease without esophagitis  K21.9 omeprazole (PRILOSEC) 40 MG DR capsule     sucralfate (CARAFATE) 1 GM tablet     GASTROENTEROLOGY ADULT REF CONSULT ONLY     HCG Qual, Urine ([id number])   4. Occipital neuralgia of right side  M54.81 DULoxetine (CYMBALTA) 30 MG capsule     Unclear etiology  "of patient's symptoms.  It is interesting that she has been taking her Cymbalta infrequently and I do question if this is triggering some of her nausea and possibly even some of the vomiting.  Given patient has been taking the medicine this way due to side effects will trial a lower dose but have her take it daily.  We discussed the importance of continuous use to prevent side effects.  It is unclear how much Jardiance could be contributing.  If her symptoms improve with the Cymbalta change will resume Jardiance as this has been working well for her for glucose control.  Suspect her return of headaches is due to infrequent Cymbalta use as this medication has been for management of this.  Unclear how much heartburn is contributing to above.  Will increase PPI dose to 40 mg and have her restart Carafate.    We will have her follow-up with GI.  Consider CT abdomen if symptoms are persisting despite our plan above.    Work note is given for today.    Patient Instructions   Start taking cymbalta (duloxetine) EVERY DAY but decrease to the 30mg dose. Take in your morning time.     Restart carafate.   Increase omeprazole to 40 mg once daily  Schedule with GI. Please call [facility] in [place] at [phone] to schedule the visit.     Abdominal xray today. If unremarkable and your symptoms persist, we will want a CT scan.           BMI:   Estimated body mass index is 45.3 kg/m  as calculated from the following:    Height as of this encounter: 1.632 m (5' 4.25\").    Weight as of this encounter: 120.7 kg (266 lb).   Weight management plan: Patient has lost weight overall since starting Jardiance and would like to resume if able.  See above      Return in about 10 days (around 6/19/2020).    Sarah Lombardo MD  [facility]      "

## 2020-06-09 NOTE — LETTER
June 9, 2020      Cornelia Rodriguez  [id number]  [place] [id number]        To Whom It May Concern,      Cornelia Rodriguez is under my care. She was seen in the office today. Please excuse her from from work on 6/9/2020 due to medical issues. She may return to work 6/10/2020.           Sincerely,        Sarah Lombardo MD

## 2020-06-09 NOTE — PATIENT INSTRUCTIONS
Start taking cymbalta (duloxetine) EVERY DAY but decrease to the 30mg dose. Take in your morning time.     Restart carafate.   Increase omeprazole to 40 mg once daily  Schedule with GI. Please call [facility] in [place] at [phone] to schedule the visit.     Abdominal xray today. If unremarkable and your symptoms persist, we will want a CT scan.

## 2020-06-11 ENCOUNTER — MYC MEDICAL ADVICE (OUTPATIENT)
Dept: FAMILY MEDICINE | Facility: CLINIC | Age: 45
End: 2020-06-11

## 2020-07-29 ENCOUNTER — MYC MEDICAL ADVICE (OUTPATIENT)
Dept: FAMILY MEDICINE | Facility: CLINIC | Age: 45
End: 2020-07-29

## 2020-07-31 ENCOUNTER — MYC MEDICAL ADVICE (OUTPATIENT)
Dept: FAMILY MEDICINE | Facility: CLINIC | Age: 45
End: 2020-07-31

## 2020-08-11 ENCOUNTER — TELEPHONE (OUTPATIENT)
Dept: FAMILY MEDICINE | Facility: CLINIC | Age: 45
End: 2020-08-11

## 2020-08-11 DIAGNOSIS — K21.9 GASTROESOPHAGEAL REFLUX DISEASE WITHOUT ESOPHAGITIS: ICD-10-CM

## 2020-08-11 DIAGNOSIS — M54.81 OCCIPITAL NEURALGIA OF RIGHT SIDE: ICD-10-CM

## 2020-08-11 DIAGNOSIS — G43.009 MIGRAINE WITHOUT AURA AND WITHOUT STATUS MIGRAINOSUS, NOT INTRACTABLE: ICD-10-CM

## 2020-08-11 DIAGNOSIS — R11.2 NAUSEA AND VOMITING, INTRACTABILITY OF VOMITING NOT SPECIFIED, UNSPECIFIED VOMITING TYPE: ICD-10-CM

## 2020-08-11 DIAGNOSIS — J30.9 ALLERGIC RHINITIS, UNSPECIFIED SEASONALITY, UNSPECIFIED TRIGGER: ICD-10-CM

## 2020-08-14 RX ORDER — DULOXETIN HYDROCHLORIDE 30 MG/1
30 CAPSULE, DELAYED RELEASE ORAL DAILY
Qty: 30 CAPSULE | Refills: 0 | Status: SHIPPED | OUTPATIENT
Start: 2020-08-14 | End: 2020-09-08

## 2020-08-14 RX ORDER — MONTELUKAST SODIUM 10 MG/1
TABLET ORAL
Qty: 90 TABLET | Refills: 2 | Status: SHIPPED | OUTPATIENT
Start: 2020-08-14 | End: 2021-06-03

## 2020-08-14 RX ORDER — OMEPRAZOLE 40 MG/1
40 CAPSULE, DELAYED RELEASE ORAL DAILY
Qty: 30 CAPSULE | Refills: 1 | Status: SHIPPED | OUTPATIENT
Start: 2020-08-14 | End: 2020-09-08

## 2020-08-14 NOTE — TELEPHONE ENCOUNTER
Reason for Call:  Medication or medication refill:    Do you use a [facility]?  Name of the pharmacy and phone number for the current request:  [facility] [place] [phone]     Name of the medication requested:      omeprazole (PRILOSEC) 40 MG DR capsule [Pharmacy Med Name: OMEPRAZOLE 40MG CPDR]  30 capsule    Sig:  TAKE ONE CAPSULE BY MOUTH EVERY DAY    SHERICE:  No    DULoxetine (CYMBALTA) 30 MG capsule [Pharmacy Med Name: DULOXETINE HCL 30MG CPEP]  30 capsule    Sig:  TAKE ONE CAPSULE BY MOUTH EVERY DAY    SHERICE:  No    montelukast (SINGULAIR) 10 MG tablet [Pharmacy Med Name: MONTELUKAST SODIUM 10MG TABS]  90 tablet    Sig:  TAKE ONE TABLET BY MOUTH AT BEDTIME    SHERICE:  No        Other request: Patient is all out of medication    Can we leave a detailed message on this number? YES    Phone number patient can be reached at: Cell number on file:    Telephone Information:   Mobile [phone]       Best Time: anytime    Call taken on 8/14/2020 at 10:00 AM by Martine Ratliff

## 2020-08-14 NOTE — TELEPHONE ENCOUNTER
Prescription approved per [facility] Refill Protocol. - Montelukast    Would appreciate review of Rx length of refill as these were recently started in June and I can't identify when follow up is requested for these medications.     Routing to provider to review and advise.     Britany White RN  [facility]

## 2020-09-08 ENCOUNTER — VIRTUAL VISIT (OUTPATIENT)
Dept: FAMILY MEDICINE | Facility: CLINIC | Age: 45
End: 2020-09-08
Payer: COMMERCIAL

## 2020-09-08 DIAGNOSIS — G43.009 MIGRAINE WITHOUT AURA AND WITHOUT STATUS MIGRAINOSUS, NOT INTRACTABLE: ICD-10-CM

## 2020-09-08 DIAGNOSIS — K21.9 GASTROESOPHAGEAL REFLUX DISEASE WITHOUT ESOPHAGITIS: ICD-10-CM

## 2020-09-08 DIAGNOSIS — Z79.4 TYPE 2 DIABETES MELLITUS WITH HYPERGLYCEMIA, WITH LONG-TERM CURRENT USE OF INSULIN (H): ICD-10-CM

## 2020-09-08 DIAGNOSIS — M54.81 OCCIPITAL NEURALGIA OF RIGHT SIDE: ICD-10-CM

## 2020-09-08 DIAGNOSIS — R11.2 NAUSEA AND VOMITING, INTRACTABILITY OF VOMITING NOT SPECIFIED, UNSPECIFIED VOMITING TYPE: ICD-10-CM

## 2020-09-08 DIAGNOSIS — E78.5 HYPERLIPIDEMIA LDL GOAL <100: ICD-10-CM

## 2020-09-08 DIAGNOSIS — E11.65 TYPE 2 DIABETES MELLITUS WITH HYPERGLYCEMIA, WITH LONG-TERM CURRENT USE OF INSULIN (H): ICD-10-CM

## 2020-09-08 PROCEDURE — 99214 OFFICE O/P EST MOD 30 MIN: CPT | Mod: 95 | Performed by: FAMILY MEDICINE

## 2020-09-08 RX ORDER — OMEPRAZOLE 40 MG/1
40 CAPSULE, DELAYED RELEASE ORAL DAILY
Qty: 90 CAPSULE | Refills: 0 | Status: SHIPPED | OUTPATIENT
Start: 2020-09-08 | End: 2020-12-15

## 2020-09-08 RX ORDER — GLIPIZIDE 10 MG/1
10 TABLET, FILM COATED, EXTENDED RELEASE ORAL DAILY
Qty: 180 TABLET | Refills: 1 | Status: SHIPPED | OUTPATIENT
Start: 2020-09-08 | End: 2021-02-01

## 2020-09-08 RX ORDER — DULOXETIN HYDROCHLORIDE 30 MG/1
30 CAPSULE, DELAYED RELEASE ORAL DAILY
Qty: 90 CAPSULE | Refills: 1 | Status: SHIPPED | OUTPATIENT
Start: 2020-09-08 | End: 2021-02-01 | Stop reason: DRUGHIGH

## 2020-09-08 RX ORDER — ROSUVASTATIN CALCIUM 10 MG/1
10 TABLET, COATED ORAL DAILY
Qty: 90 TABLET | Refills: 1 | Status: SHIPPED | OUTPATIENT
Start: 2020-09-08 | End: 2021-02-01

## 2020-09-08 NOTE — PROGRESS NOTES
"Cornelia Rodriguez is a 44 year old female who is being evaluated via a billable video visit.      The patient has been notified of following:     \"This video visit will be conducted via a call between you and your physician/provider. We have found that certain health care needs can be provided without the need for an in-person physical exam.  This service lets us provide the care you need with a video conversation.  If a prescription is necessary we can send it directly to your pharmacy.  If lab work is needed we can place an order for that and you can then stop by our lab to have the test done at a later time.    Video visits are billed at different rates depending on your insurance coverage.  Please reach out to your insurance provider with any questions.    If during the course of the call the physician/provider feels a video visit is not appropriate, you will not be charged for this service.\"    Patient has given verbal consent for telephone visit? Yes  How would you like to obtain your AVS? MyChart  If you are dropped from the video visit, the video invite should be resent to: Send to e-mail at: [email or web address]  Will anyone else be joining your video visit? No    Subjective     Cornelia Rodriugez is a 44 year old female who presents today via video visit for the following health issues:    HPI    Refills of medication Cymbalta, Glipizide Omepraole & Crestor     Next visit in nov.       How many servings of fruits and vegetables do you eat daily?  2-3    On average, how many sweetened beverages do you drink each day (Examples: soda, juice, sweet tea, etc.  Do NOT count diet or artificially sweetened beverages)?   0    How many days per week do you exercise enough to make your heart beat faster? 5    How many minutes a day do you exercise enough to make your heart beat faster? 60 or more    How many days per week do you miss taking your medication? 0      Video Start Time: 8:34 am     Reviewed her previous " •  ASPIRIN 81 MG Oral Tab EC, TAKE 1 TABLET (81 MG TOTAL) BY MOUTH DAILY. , Disp: 90 tablet, Rfl: 3 symptoms of nausea and vomiting.  Symptoms resolved about 3 weeks after my last appointment with her.  We had decreased her Cymbalta dose and discussed taking it faithfully as she was very sporadic in her use of that medication and I was awfully suspicious this was causing her symptoms.  Jardiance had also been stopped prior to the visit for concern that could be contributing to.  She never restarted the Jardiance.  Also at last visit her PPI dose of omeprazole was increased from 20 to 40 mg    Taking the cymbalta every day now at 9:30. Still have occ occipital pain but overall it is much better.    Not checking glucose right now as machine not working and out of town.     Had been checking daily previous to her vacation and was running quite high 194-230. nonfasting 270-300    carafate caused nausea/vomiting and stopped that.     Review of Systems   Constitutional, HEENT, cardiovascular, pulmonary, gi and gu systems are negative, except as otherwise noted.      Objective           Vitals:  No vitals were obtained today due to virtual visit.    Physical Exam     GENERAL: Healthy, alert and no distress  EYES: Eyes grossly normal to inspection.  No discharge or erythema, or obvious scleral/conjunctival abnormalities.  RESP: No audible wheeze, cough, or visible cyanosis.  No visible retractions or increased work of breathing.    SKIN: Visible skin clear. No significant rash, abnormal pigmentation or lesions.  NEURO: Cranial nerves grossly intact.  Mentation and speech appropriate for age.  PSYCH: Mentation appears normal, affect normal/bright, judgement and insight intact, normal speech and appearance well-groomed.            Assessment & Plan     Type 2 diabetes mellitus with hyperglycemia, with long-term current use of insulin (H)  Significant hyperglycemia right now.  Patient is willing to retry Jardiance as I suspect her previous symptoms were more related to her sporadic use of the Cymbalta.  Jardiance had worked  well for her in the past to get good control of her sugars.  If she has return of nausea or vomiting she will update me.  Also recommend follow-up with diabetic ed for further med titration  - glipiZIDE (GLUCOTROL XL) 10 MG 24 hr tablet; Take 1 tablet (10 mg) by mouth daily Increase to 20 mg on non-work work days  - empagliflozin (JARDIANCE) 10 MG TABS tablet; Take 1 tablet (10 mg) by mouth daily  - Hemoglobin A1c; Future  - Comprehensive metabolic panel (BMP + Alb, Alk Phos, ALT, AST, Total. Bili, TP); Future  - Lipid panel reflex to direct LDL Fasting; Future  - Albumin Random Urine Quantitative with Creat Ratio; Future  - AMBULATORY ADULT DIABETES EDUCATOR REFERRAL; Future    Nausea and vomiting, intractability of vomiting not specified, unspecified vomiting type  As above  - omeprazole (PRILOSEC) 40 MG DR capsule; Take 1 capsule (40 mg) by mouth daily Due for virtual office visit prior to further refill    Gastroesophageal reflux disease without esophagitis  Controlled.  Unclear how much increase PPI dose in the past contributed to resolution of her nausea and vomiting.  We will have her continue on current dose for now given the restart of Jardiance but consider weaning down to 20 mg in the future  - omeprazole (PRILOSEC) 40 MG DR capsule; Take 1 capsule (40 mg) by mouth daily Due for virtual office visit prior to further refill    Migraine without aura and without status migrainosus, not intractable  Much better control.  May consider weaning up to 60 mg on a regular basis once we are clear on her diabetic meds.  - DULoxetine (CYMBALTA) 30 MG capsule; Take 1 capsule (30 mg) by mouth daily Due for virtual office visit prior to further refill    Occipital neuralgia of right side  As above  - DULoxetine (CYMBALTA) 30 MG capsule; Take 1 capsule (30 mg) by mouth daily Due for virtual office visit prior to further refill    Hyperlipidemia LDL goal <100  - rosuvastatin (CRESTOR) 10 MG tablet; Take 1 tablet (10 mg) by  mouth daily  - Comprehensive metabolic panel (BMP + Alb, Alk Phos, ALT, AST, Total. Bili, TP); Future  - Lipid panel reflex to direct LDL Fasting; Future       Patient Instructions   Restart jardiance at 10 mg once daily    Schedule with diabetic ed.     Schedule flu vaccine with pharmacy    Labs mid-November and visit with me few weeks after.       l      No follow-ups on file.    Sarah Lombardo MD  [facility]      Video-Visit Details    Type of service:  Video Visit    Video End Time:8:47am    Originating Location (pt. Location): Home    Distant Location (provider location):  [facility]     Platform used for Video Visit: Ludy    Total time: 13 min

## 2020-09-08 NOTE — PATIENT INSTRUCTIONS
Restart jardiance at 10 mg once daily    Schedule with diabetic ed.     Schedule flu vaccine with pharmacy    Labs mid-November and visit with me few weeks after.       l

## 2020-09-29 ENCOUNTER — MYC MEDICAL ADVICE (OUTPATIENT)
Dept: FAMILY MEDICINE | Facility: CLINIC | Age: 45
End: 2020-09-29

## 2020-09-29 DIAGNOSIS — Z79.4 TYPE 2 DIABETES MELLITUS WITH HYPERGLYCEMIA, WITH LONG-TERM CURRENT USE OF INSULIN (H): Primary | ICD-10-CM

## 2020-09-29 DIAGNOSIS — E11.65 TYPE 2 DIABETES MELLITUS WITH HYPERGLYCEMIA, WITH LONG-TERM CURRENT USE OF INSULIN (H): Primary | ICD-10-CM

## 2020-10-07 DIAGNOSIS — Z79.4 TYPE 2 DIABETES MELLITUS WITH HYPERGLYCEMIA, WITH LONG-TERM CURRENT USE OF INSULIN (H): ICD-10-CM

## 2020-10-07 DIAGNOSIS — E11.65 TYPE 2 DIABETES MELLITUS WITH HYPERGLYCEMIA, WITH LONG-TERM CURRENT USE OF INSULIN (H): ICD-10-CM

## 2020-10-12 RX ORDER — BLOOD SUGAR DIAGNOSTIC
STRIP MISCELLANEOUS
Qty: 200 EACH | Refills: 1 | Status: SHIPPED | OUTPATIENT
Start: 2020-10-12 | End: 2024-06-25

## 2020-10-12 NOTE — TELEPHONE ENCOUNTER
Prescription approved per [facility] Refill Protocol.  Britany White RN  [facility]

## 2020-11-16 ENCOUNTER — HEALTH MAINTENANCE LETTER (OUTPATIENT)
Age: 45
End: 2020-11-16

## 2020-11-20 DIAGNOSIS — E11.65 TYPE 2 DIABETES MELLITUS WITH HYPERGLYCEMIA, WITH LONG-TERM CURRENT USE OF INSULIN (H): ICD-10-CM

## 2020-11-20 DIAGNOSIS — Z79.4 TYPE 2 DIABETES MELLITUS WITH HYPERGLYCEMIA, WITH LONG-TERM CURRENT USE OF INSULIN (H): ICD-10-CM

## 2020-11-22 RX ORDER — PEN NEEDLE, DIABETIC 32GX 5/32"
NEEDLE, DISPOSABLE MISCELLANEOUS
Qty: 200 EACH | Refills: 1 | Status: SHIPPED | OUTPATIENT
Start: 2020-11-22 | End: 2021-06-21

## 2020-11-22 NOTE — TELEPHONE ENCOUNTER
Prescription approved per [facility] Refill Protocol.  Lien Rasheed RN

## 2020-12-10 ENCOUNTER — TELEPHONE (OUTPATIENT)
Dept: FAMILY MEDICINE | Facility: CLINIC | Age: 45
End: 2020-12-10

## 2020-12-10 DIAGNOSIS — Z79.4 TYPE 2 DIABETES MELLITUS WITH HYPERGLYCEMIA, WITH LONG-TERM CURRENT USE OF INSULIN (H): ICD-10-CM

## 2020-12-10 DIAGNOSIS — K21.9 GASTROESOPHAGEAL REFLUX DISEASE WITHOUT ESOPHAGITIS: ICD-10-CM

## 2020-12-10 DIAGNOSIS — R11.2 NAUSEA AND VOMITING, INTRACTABILITY OF VOMITING NOT SPECIFIED, UNSPECIFIED VOMITING TYPE: ICD-10-CM

## 2020-12-10 DIAGNOSIS — E11.65 TYPE 2 DIABETES MELLITUS WITH HYPERGLYCEMIA, WITH LONG-TERM CURRENT USE OF INSULIN (H): ICD-10-CM

## 2020-12-14 ENCOUNTER — TELEPHONE (OUTPATIENT)
Dept: FAMILY MEDICINE | Facility: CLINIC | Age: 45
End: 2020-12-14

## 2020-12-14 NOTE — TELEPHONE ENCOUNTER
Diabetes Education Scheduling Outreach #1:    Call to patient to schedule. Left message with phone number to call to schedule.    Plan for 2nd outreach attempt within 1 week.    Eloisa Hong  [place] [facility]  Diabetes and Nutrition Scheduling

## 2020-12-15 RX ORDER — OMEPRAZOLE 40 MG/1
40 CAPSULE, DELAYED RELEASE ORAL DAILY
Qty: 90 CAPSULE | Refills: 2 | Status: SHIPPED | OUTPATIENT
Start: 2020-12-15 | End: 2021-09-14

## 2020-12-15 NOTE — TELEPHONE ENCOUNTER
[facility] refill sent  Due for med check prior to further refills (virutal visit is ok) and fasting lab visit (orders already in place)  Please send reminder

## 2020-12-15 NOTE — TELEPHONE ENCOUNTER
"For Jardiance:  Routing refill request to provider for review/approval because:  Labs not current:  A1C    For omeprazole:  Prescription approved per [facility] Refill Protocol.            Requested Prescriptions   Pending Prescriptions Disp Refills     JARDIANCE 10 MG TABS tablet [Pharmacy Med Name: JARDIANCE 10MG TABS] 30 tablet 1     Sig: TAKE ONE TABLET BY MOUTH EVERY DAY       Sodium Glucose Co-Transport Inhibitor Agents Failed - 12/15/2020  7:20 AM        Failed - Patient has documented A1c within the specified period of time.     If HgbA1C is 8 or greater, it needs to be on file within the past 3 months.  If less than 8, must be on file within the past 6 months.     Recent Labs   Lab Test 05/15/20  1609   A1C 7.1*             Passed - No creatinine >1.4 or GFR <45 within the past 12 mos     Recent Labs   Lab Test 05/15/20  1609   GFRESTIMATED >90   GFRESTBLACK >90       Recent Labs   Lab Test 05/15/20  1609   CR 0.67             Passed - Medication is active on med list        Passed - Patient is age 18 or older        Passed - Patient is not pregnant        Passed - Patient has documented normal Potassium within the last 12 mos.     Recent Labs   Lab Test 05/15/20  1609   POTASSIUM 3.6             Passed - Patient has no positive pregnancy test within the past 12 mos.        Passed - Recent (6 mo) or future (30 days) visit within the authorizing provider's specialty     Patient had office visit in the last 6 months or has a visit in the next 30 days with authorizing provider or within the authorizing provider's specialty.  See \"Patient Info\" tab in inbasket, or \"Choose Columns\" in Meds & Orders section of the refill encounter.               omeprazole (PRILOSEC) 40 MG DR capsule [Pharmacy Med Name: OMEPRAZOLE 40MG CPDR] 90 capsule 0     Sig: TAKE ONE CAPSULE BY MOUTH EVERY DAY. *DUE FOR VIRTUAL OFFICE VISIT       PPI Protocol Passed - 12/15/2020  7:20 AM        Passed - Not on Clopidogrel (unless Pantoprazole " "ordered)        Passed - No diagnosis of osteoporosis on record        Passed - Recent (12 mo) or future (30 days) visit within the authorizing provider's specialty     Patient has had an office visit with the authorizing provider or a provider within the authorizing providers department within the previous 12 mos or has a future within next 30 days. See \"Patient Info\" tab in inbasket, or \"Choose Columns\" in Meds & Orders section of the refill encounter.              Passed - Medication is active on med list        Passed - Patient is age 18 or older        Passed - No active pregnacy on record        Passed - No positive pregnancy test in past 12 months             Charanjit Hall RN, BSN, PHN    "

## 2020-12-22 NOTE — TELEPHONE ENCOUNTER
Diabetes Education Scheduling Outreach #2:    Call to patient to schedule. Left message with phone number to call to schedule.    Eloisa Hong  [facility]  Diabetes and Nutrition Scheduling

## 2020-12-30 DIAGNOSIS — Z79.4 TYPE 2 DIABETES MELLITUS WITH HYPERGLYCEMIA, WITH LONG-TERM CURRENT USE OF INSULIN (H): ICD-10-CM

## 2020-12-30 DIAGNOSIS — E11.65 TYPE 2 DIABETES MELLITUS WITH HYPERGLYCEMIA, WITH LONG-TERM CURRENT USE OF INSULIN (H): ICD-10-CM

## 2021-01-04 NOTE — TELEPHONE ENCOUNTER
Routing refill request to provider for review/approval because:  Labs not current:  A1C    Day HERNANDEZN, RN

## 2021-01-05 RX ORDER — INSULIN GLARGINE 100 [IU]/ML
INJECTION, SOLUTION SUBCUTANEOUS
Qty: 105 ML | Refills: 3 | Status: SHIPPED | OUTPATIENT
Start: 2021-01-05 | End: 2022-01-14

## 2021-01-11 DIAGNOSIS — E78.5 HYPERLIPIDEMIA LDL GOAL <100: ICD-10-CM

## 2021-01-11 DIAGNOSIS — Z79.4 TYPE 2 DIABETES MELLITUS WITH HYPERGLYCEMIA, WITH LONG-TERM CURRENT USE OF INSULIN (H): ICD-10-CM

## 2021-01-11 DIAGNOSIS — E11.65 TYPE 2 DIABETES MELLITUS WITH HYPERGLYCEMIA, WITH LONG-TERM CURRENT USE OF INSULIN (H): ICD-10-CM

## 2021-01-11 LAB
ALBUMIN SERPL-MCNC: 4.1 G/DL (ref 3.4–5)
ALP SERPL-CCNC: 75 U/L (ref 40–150)
ALT SERPL W P-5'-P-CCNC: 59 U/L (ref 0–50)
ANION GAP SERPL CALCULATED.3IONS-SCNC: 4 MMOL/L (ref 3–14)
AST SERPL W P-5'-P-CCNC: 25 U/L (ref 0–45)
BILIRUB SERPL-MCNC: 0.5 MG/DL (ref 0.2–1.3)
BUN SERPL-MCNC: 12 MG/DL (ref 7–30)
CALCIUM SERPL-MCNC: 9.5 MG/DL (ref 8.5–10.1)
CHLORIDE SERPL-SCNC: 104 MMOL/L (ref 94–109)
CHOLEST SERPL-MCNC: 151 MG/DL
CO2 SERPL-SCNC: 30 MMOL/L (ref 20–32)
CREAT SERPL-MCNC: 0.69 MG/DL (ref 0.52–1.04)
CREAT UR-MCNC: 363 MG/DL
GFR SERPL CREATININE-BSD FRML MDRD: >90 ML/MIN/{1.73_M2}
GLUCOSE SERPL-MCNC: 126 MG/DL (ref 70–99)
HBA1C MFR BLD: 8.2 % (ref 0–5.6)
HDLC SERPL-MCNC: 46 MG/DL
LDLC SERPL CALC-MCNC: 76 MG/DL
MICROALBUMIN UR-MCNC: 68 MG/L
MICROALBUMIN/CREAT UR: 18.73 MG/G CR (ref 0–25)
NONHDLC SERPL-MCNC: 105 MG/DL
POTASSIUM SERPL-SCNC: 3.7 MMOL/L (ref 3.4–5.3)
PROT SERPL-MCNC: 7.5 G/DL (ref 6.8–8.8)
SODIUM SERPL-SCNC: 138 MMOL/L (ref 133–144)
TRIGL SERPL-MCNC: 145 MG/DL

## 2021-01-11 PROCEDURE — 82043 UR ALBUMIN QUANTITATIVE: CPT | Performed by: FAMILY MEDICINE

## 2021-01-11 PROCEDURE — 36415 COLL VENOUS BLD VENIPUNCTURE: CPT | Performed by: FAMILY MEDICINE

## 2021-01-11 PROCEDURE — 80053 COMPREHEN METABOLIC PANEL: CPT | Performed by: FAMILY MEDICINE

## 2021-01-11 PROCEDURE — 83036 HEMOGLOBIN GLYCOSYLATED A1C: CPT | Performed by: FAMILY MEDICINE

## 2021-01-11 PROCEDURE — 80061 LIPID PANEL: CPT | Performed by: FAMILY MEDICINE

## 2021-02-01 ENCOUNTER — OFFICE VISIT (OUTPATIENT)
Dept: FAMILY MEDICINE | Facility: CLINIC | Age: 46
End: 2021-02-01
Payer: COMMERCIAL

## 2021-02-01 VITALS
RESPIRATION RATE: 16 BRPM | DIASTOLIC BLOOD PRESSURE: 88 MMHG | WEIGHT: 264 LBS | OXYGEN SATURATION: 100 % | HEART RATE: 93 BPM | BODY MASS INDEX: 44.96 KG/M2 | TEMPERATURE: 98.2 F | SYSTOLIC BLOOD PRESSURE: 136 MMHG

## 2021-02-01 DIAGNOSIS — Z79.4 TYPE 2 DIABETES MELLITUS WITH HYPERGLYCEMIA, WITH LONG-TERM CURRENT USE OF INSULIN (H): ICD-10-CM

## 2021-02-01 DIAGNOSIS — E11.65 TYPE 2 DIABETES MELLITUS WITH HYPERGLYCEMIA, WITH LONG-TERM CURRENT USE OF INSULIN (H): ICD-10-CM

## 2021-02-01 DIAGNOSIS — E78.5 HYPERLIPIDEMIA LDL GOAL <100: ICD-10-CM

## 2021-02-01 DIAGNOSIS — M54.81 OCCIPITAL NEURALGIA OF RIGHT SIDE: Primary | ICD-10-CM

## 2021-02-01 PROBLEM — R10.9 ABDOMINAL PAIN: Status: RESOLVED | Noted: 2017-06-15 | Resolved: 2021-02-01

## 2021-02-01 PROBLEM — K76.0 NON-ALCOHOLIC FATTY LIVER DISEASE: Status: ACTIVE | Noted: 2021-02-01

## 2021-02-01 PROCEDURE — 99214 OFFICE O/P EST MOD 30 MIN: CPT | Performed by: FAMILY MEDICINE

## 2021-02-01 PROCEDURE — 99207 PR FOOT EXAM NO CHARGE: CPT | Performed by: FAMILY MEDICINE

## 2021-02-01 RX ORDER — ROSUVASTATIN CALCIUM 10 MG/1
10 TABLET, COATED ORAL DAILY
Qty: 90 TABLET | Refills: 1 | Status: SHIPPED | OUTPATIENT
Start: 2021-02-01 | End: 2021-09-14

## 2021-02-01 RX ORDER — GLIPIZIDE 10 MG/1
10 TABLET, FILM COATED, EXTENDED RELEASE ORAL DAILY
Qty: 180 TABLET | Refills: 1 | Status: SHIPPED | OUTPATIENT
Start: 2021-02-01 | End: 2021-09-29

## 2021-02-01 RX ORDER — DULOXETIN HYDROCHLORIDE 60 MG/1
60 CAPSULE, DELAYED RELEASE ORAL DAILY
Qty: 90 CAPSULE | Refills: 1 | Status: SHIPPED | OUTPATIENT
Start: 2021-02-01 | End: 2021-08-24

## 2021-02-01 RX ORDER — DULAGLUTIDE 1.5 MG/.5ML
1.5 INJECTION, SOLUTION SUBCUTANEOUS
Qty: 6 ML | Refills: 3 | Status: SHIPPED | OUTPATIENT
Start: 2021-02-01 | End: 2021-04-14

## 2021-02-01 NOTE — PATIENT INSTRUCTIONS
Increase jardiance to 25 mg daily.     Increase lantus to 50 units twice daily.     Schedule with diabetic education.    Iplease call [phone] for [facility]           Increase cymbalta to 60 mg once daily    Schedule eye exam    Please call [facility] in [place] at [phone] to schedule mammogram.     We will likely start a blood pressures medicine that will help protect your kidneys next visit- lisinopril

## 2021-02-01 NOTE — PROGRESS NOTES
"  Assessment & Plan     Type 2 diabetes mellitus with hyperglycemia, with long-term current use of insulin (H)  Not controlled. See instructions. F/u with diabetic ed (referral already in place). May need further med adjustment if sugars not improving with this.   - empagliflozin (JARDIANCE) 25 MG TABS tablet; Take 1 tablet (25 mg) by mouth daily  - FOOT EXAM  - glipiZIDE (GLUCOTROL XL) 10 MG 24 hr tablet; Take 1 tablet (10 mg) by mouth daily Increase to 20 mg on non-work work days  - dulaglutide (TRULICITY) 1.5 MG/0.5ML pen; Inject 1.5 mg Subcutaneous every 7 days  - Hemoglobin A1c; Future  - **Basic metabolic panel FUTURE anytime; Future    Hyperlipidemia LDL goal <100  On statin  - rosuvastatin (CRESTOR) 10 MG tablet; Take 1 tablet (10 mg) by mouth daily    Occipital neuralgia of right side  Worsening. Will trial pushing up cymbalta now that she is taking more reguarly and no longer having the AE from missed doses. F/u with neurology if ongoing issues  - NEUROLOGY ADULT REFERRAL  - DULoxetine (CYMBALTA) 60 MG capsule; Take 1 capsule (60 mg) by mouth daily      BMI:   Estimated body mass index is 44.96 kg/m  as calculated from the following:    Height as of 6/9/20: 1.632 m (5' 4.25\").    Weight as of this encounter: 119.7 kg (264 lb).   Weight management plan: Discussed healthy diet and exercise guidelines      Patient Instructions     Increase jardiance to 25 mg daily.     Increase lantus to 50 units twice daily.     Schedule with diabetic education.    Iplease call [phone] for [facility]           Increase cymbalta to 60 mg once daily    Schedule eye exam    Please call [facility] in [place] at [phone] to schedule mammogram.     We will likely start a blood pressures medicine that will help protect your kidneys next visit- lisinopril          Return in about 2 months (around 4/13/2021) for using a phone visit with labs few days prior..    Sarah Lombardo MD  [facility] " [facility]     Cornelia Savage is a 45 year old who presents to clinic today for the following health issues     HPI       Diabetes Follow-up      How often are you checking your blood sugar? Tries to check at least twice a day, some days she does not check    What concerns do you have today about your diabetes? None     Do you have any of these symptoms? (Select all that apply)  No numbness or tingling in feet.  No redness, sores or blisters on feet.  No complaints of excessive thirst.  No reports of blurry vision.  No significant changes to weight.    Have you had a diabetic eye exam in the last 12 months? No    Had stopped the jardiance for a while but resumed at the low dose.   No side effects restarting the med.     Morning fasting varies. Find pain can cause some of her hyperglycemia.   130-140 when not in pain. When in pain higher 200's.     Checks blood sugar again about 4-5 hours after eating.   160. But on high pain days 260-270.     Admits some days better than others with diet.   Trying to try moderation with sugary foods. Has a sweet tooth.   Only drinking sugar free beverages.  No hypoglycemia.   Taking 20 mg every day of glipizide.   lantus 48 units twice daily.     Taking the cymbalta consistently now. Only missed one day recently. Ready to go up on doses. Since she made this change no further n/v/abd pain      BP Readings from Last 2 Encounters:   02/01/21 (!) 143/90   06/09/20 125/87     Hemoglobin A1C (%)   Date Value   01/11/2021 8.2 (H)   05/15/2020 7.1 (H)     LDL Cholesterol Calculated (mg/dL)   Date Value   01/11/2021 76   01/21/2020 44         Hyperlipidemia Follow-Up      Are you regularly taking any medication or supplement to lower your cholesterol?   Yes- rosuvastatin    Are you having muscle aches or other side effects that you think could be caused by your cholesterol lowering medication?  No      How many servings of fruits and vegetables do you eat daily?   2-3    On average, how many sweetened beverages do you drink each day (Examples: soda, juice, sweet tea, etc.  Do NOT count diet or artificially sweetened beverages)?   0    How many days per week do you exercise enough to make your heart beat faster? Work is physically active, sometimes patient goes out for walks    How many minutes a day do you exercise enough to make your heart beat faster?     How many days per week do you miss taking your medication? 0        Review of Systems   Constitutional, HEENT, cardiovascular, pulmonary, gi and gu systems are negative, except as otherwise noted.      Objective    BP (!) 143/90   Pulse 93   Temp 98.2  F (36.8  C) (Oral)   Resp 16   Wt 119.7 kg (264 lb)   SpO2 100%   BMI 44.96 kg/m    Body mass index is 44.96 kg/m .  Physical Exam   GENERAL: healthy, alert and no distress  NECK: no adenopathy, no asymmetry, masses, or scars and thyroid normal to palpation  RESP: lungs clear to auscultation - no rales, rhonchi or wheezes  CV: regular rate and rhythm, normal S1 S2, no S3 or S4, no murmur, click or rub, no peripheral edema and peripheral pulses strong  ABDOMEN: soft, nontender, no hepatosplenomegaly, no masses and bowel sounds normal  MS: no gross musculoskeletal defects noted, no edema  PSYCH: mentation appears normal, affect normal/bright  Diabetic foot exam: normal DP and PT pulses, no trophic changes or ulcerative lesions, normal sensory exam and normal monofilament exam

## 2021-02-03 ENCOUNTER — TELEPHONE (OUTPATIENT)
Dept: NEUROLOGY | Facility: CLINIC | Age: 46
End: 2021-02-03

## 2021-02-03 NOTE — TELEPHONE ENCOUNTER
[facility] Call Center    Phone Message    May a detailed message be left on voicemail: yes     Reason for Call: Other: Patient calling to request a occipital nerve block. Please call to discuss thank you.      Action Taken: Message routed to:  [facility] ([facility] neurology    Travel Screening: Not Applicable

## 2021-02-04 ENCOUNTER — ANCILLARY PROCEDURE (OUTPATIENT)
Dept: MAMMOGRAPHY | Facility: CLINIC | Age: 46
End: 2021-02-04
Attending: FAMILY MEDICINE
Payer: COMMERCIAL

## 2021-02-04 DIAGNOSIS — Z12.31 SCREENING MAMMOGRAM, ENCOUNTER FOR: ICD-10-CM

## 2021-02-04 PROCEDURE — 77067 SCR MAMMO BI INCL CAD: CPT | Mod: GC | Performed by: RADIOLOGY

## 2021-02-04 NOTE — TELEPHONE ENCOUNTER
I called pt to offer her occipital nerve block appointment tomorrow 2/5 at 8:30am with Dr. Rutledge. Pt accepted.     Jessica ALAN

## 2021-02-05 ENCOUNTER — OFFICE VISIT (OUTPATIENT)
Dept: NEUROLOGY | Facility: CLINIC | Age: 46
End: 2021-02-05
Payer: COMMERCIAL

## 2021-02-05 DIAGNOSIS — M54.81 OCCIPITAL NEURALGIA OF RIGHT SIDE: Primary | ICD-10-CM

## 2021-02-05 PROCEDURE — 64405 NJX AA&/STRD GR OCPL NRV: CPT | Mod: RT | Performed by: PSYCHIATRY & NEUROLOGY

## 2021-02-05 PROCEDURE — 64450 NJX AA&/STRD OTHER PN/BRANCH: CPT | Mod: RT | Performed by: PSYCHIATRY & NEUROLOGY

## 2021-02-05 RX ORDER — BUPIVACAINE HYDROCHLORIDE 5 MG/ML
4 INJECTION, SOLUTION EPIDURAL; INTRACAUDAL ONCE
Status: COMPLETED | OUTPATIENT
Start: 2021-02-05 | End: 2021-02-05

## 2021-02-05 RX ORDER — TRIAMCINOLONE ACETONIDE 40 MG/ML
20 INJECTION, SUSPENSION INTRA-ARTICULAR; INTRAMUSCULAR ONCE
Status: COMPLETED | OUTPATIENT
Start: 2021-02-05 | End: 2021-02-05

## 2021-02-05 RX ADMIN — TRIAMCINOLONE ACETONIDE 20 MG: 40 INJECTION, SUSPENSION INTRA-ARTICULAR; INTRAMUSCULAR at 09:10

## 2021-02-05 RX ADMIN — BUPIVACAINE HYDROCHLORIDE 20 MG: 5 INJECTION, SOLUTION EPIDURAL; INTRACAUDAL at 09:08

## 2021-02-05 NOTE — PROGRESS NOTES
[facility]  Neurology Procedure  02/05/21     Cornelia Rodriguez MRN# [id number]   YOB: 1975 Age: 45 year old            Occipital Nerve Block Procedure Note   DIAGNOSIS  Encounter Diagnosis   Name Primary?     Occipital neuralgia of right side Yes        PROCEDURE   right, Greater and Lesser occipital nerve blocks.      INFORMED CONSENT  I discussed the risks, benefits, alternatives, and the necessity of other members of the healthcare team participating in the procedure with the patient.  The patient understood and wished to proceed with the procedure.    PROCEDURAL PAUSE   Patient identity, procedure to be performed, correct side and site, patient position, availability of equipment, and special requirements were verified.      PROCEDURE DETAILS   Ms. Rodriguez was placed in the sitting position with her head and neck flexed.  Landmarks were palpated.  The area was prepped with ChloraPrep and sterile technique was used.  A 1.50 inch 25 gauge sterile needle was introduced 1/3 of the distance lateral from the occipital protuberance to the mastoid process on the right side.  After negative aspiration for blood, a solution containing 20 mg of triamcinolone acetonide diluted in a 1:2 mixture of 1% lidocaine and 0.5% bupivacaine for a total volume of 6 mL was injected in a fan-like fashion along the nuchal ridge between the occipital protuberance and mastoid process.     Ms. Rodriguez tolerated the procedure well, and there were no apparent complications.  After appropriate observation, she was dismissed in good condition under her own power.    COMMENTS  Ms. Rodriguez received a total of 20 mg of triamcinolone acetonide.  Her pain was 4/10 in severity prior to the procedure, and 0/10 following the procedure.    Lidocaine 1%, lot number [id number], expiration date 01/24  Bupivacaine 0.5%, lot number [id number], expiration date 11/23  Triamcinolone acetonide, lot number  [id number], expiration date 09/2022    I am Herminio Howell, PGY4 neurology, I helped doing the procedure under Dr. Rutledge's supervision     Herminio Howell  Neurology resident   Pager: [phone]    Physician Attestation   I, Carolann Rutledge MD, saw this patient and agree with the findings and plan of care as documented in the note.      Items personally reviewed/procedural attestation: I was present for and supervised the entire procedure.    Carolann Rutledge MD

## 2021-02-05 NOTE — LETTER
2/5/2021       RE: Cornelia Rodriguez  [address]  [place] [facility] [address]     Dear Colleague,    Thank you for referring your patient, Cornelia Rodriguez, to the [facility] NEUROLOGY CLINIC [place] at [facility]. Please see a copy of my visit note below.    [facility]  Neurology Procedure  02/05/21     Cornelia Rodriguez MRN# [id number]   YOB: 1975 Age: 45 year old            Occipital Nerve Block Procedure Note   DIAGNOSIS  Encounter Diagnosis   Name Primary?     Occipital neuralgia of right side Yes        PROCEDURE   right, Greater and Lesser occipital nerve blocks.      INFORMED CONSENT  I discussed the risks, benefits, alternatives, and the necessity of other members of the healthcare team participating in the procedure with the patient.  The patient understood and wished to proceed with the procedure.    PROCEDURAL PAUSE   Patient identity, procedure to be performed, correct side and site, patient position, availability of equipment, and special requirements were verified.      PROCEDURE DETAILS   Ms. Rodriguez was placed in the sitting position with her head and neck flexed.  Landmarks were palpated.  The area was prepped with ChloraPrep and sterile technique was used.  A 1.50 inch 25 gauge sterile needle was introduced 1/3 of the distance lateral from the occipital protuberance to the mastoid process on the right side.  After negative aspiration for blood, a solution containing 20 mg of triamcinolone acetonide diluted in a 1:2 mixture of 1% lidocaine and 0.5% bupivacaine for a total volume of 6 mL was injected in a fan-like fashion along the nuchal ridge between the occipital protuberance and mastoid process.     Ms. Rodriguez tolerated the procedure well, and there were no apparent complications.  After appropriate observation, she was dismissed in good condition under her own  power.    COMMENTS  Ms. Rodriguez received a total of 20 mg of triamcinolone acetonide.  Her pain was 4/10 in severity prior to the procedure, and 0/10 following the procedure.    Lidocaine 1%, lot number [id number], expiration date 01/24  Bupivacaine 0.5%, lot number [id number], expiration date 11/23  Triamcinolone acetonide, lot number [id number], expiration date 09/2022    I am Herminio Howell, PGY4 neurology, I helped doing the procedure under Dr. Rutledge's supervision     Herminio Howell  Neurology resident   Pager: [phone]    Physician Attestation   I, Carolann Rutledge MD, saw this patient and agree with the findings and plan of care as documented in the note.      Items personally reviewed/procedural attestation: I was present for and supervised the entire procedure.    Carolann Rutledge MD

## 2021-03-29 ENCOUNTER — IMMUNIZATION (OUTPATIENT)
Dept: PEDIATRICS | Facility: CLINIC | Age: 46
End: 2021-03-29
Payer: COMMERCIAL

## 2021-03-29 PROCEDURE — 0001A PR COVID VAC PFIZER DIL RECON 30 MCG/0.3 ML IM: CPT

## 2021-03-29 PROCEDURE — 91300 PR COVID VAC PFIZER DIL RECON 30 MCG/0.3 ML IM: CPT

## 2021-04-12 DIAGNOSIS — Z79.4 TYPE 2 DIABETES MELLITUS WITH HYPERGLYCEMIA, WITH LONG-TERM CURRENT USE OF INSULIN (H): ICD-10-CM

## 2021-04-12 DIAGNOSIS — E11.65 TYPE 2 DIABETES MELLITUS WITH HYPERGLYCEMIA, WITH LONG-TERM CURRENT USE OF INSULIN (H): ICD-10-CM

## 2021-04-12 LAB
ANION GAP SERPL CALCULATED.3IONS-SCNC: 5 MMOL/L (ref 3–14)
BUN SERPL-MCNC: 13 MG/DL (ref 7–30)
CALCIUM SERPL-MCNC: 9.1 MG/DL (ref 8.5–10.1)
CHLORIDE SERPL-SCNC: 103 MMOL/L (ref 94–109)
CO2 SERPL-SCNC: 29 MMOL/L (ref 20–32)
CREAT SERPL-MCNC: 0.71 MG/DL (ref 0.52–1.04)
GFR SERPL CREATININE-BSD FRML MDRD: >90 ML/MIN/{1.73_M2}
GLUCOSE SERPL-MCNC: 208 MG/DL (ref 70–99)
HBA1C MFR BLD: 7.5 % (ref 0–5.6)
POTASSIUM SERPL-SCNC: 3.8 MMOL/L (ref 3.4–5.3)
SODIUM SERPL-SCNC: 137 MMOL/L (ref 133–144)

## 2021-04-12 PROCEDURE — 80048 BASIC METABOLIC PNL TOTAL CA: CPT | Performed by: FAMILY MEDICINE

## 2021-04-12 PROCEDURE — 83036 HEMOGLOBIN GLYCOSYLATED A1C: CPT | Performed by: FAMILY MEDICINE

## 2021-04-12 PROCEDURE — 36415 COLL VENOUS BLD VENIPUNCTURE: CPT | Performed by: FAMILY MEDICINE

## 2021-04-14 ENCOUNTER — VIRTUAL VISIT (OUTPATIENT)
Dept: FAMILY MEDICINE | Facility: CLINIC | Age: 46
End: 2021-04-14
Payer: COMMERCIAL

## 2021-04-14 DIAGNOSIS — M54.81 OCCIPITAL NEURALGIA OF RIGHT SIDE: ICD-10-CM

## 2021-04-14 DIAGNOSIS — Z79.4 TYPE 2 DIABETES MELLITUS WITH HYPERGLYCEMIA, WITH LONG-TERM CURRENT USE OF INSULIN (H): Primary | ICD-10-CM

## 2021-04-14 DIAGNOSIS — E11.65 TYPE 2 DIABETES MELLITUS WITH HYPERGLYCEMIA, WITH LONG-TERM CURRENT USE OF INSULIN (H): Primary | ICD-10-CM

## 2021-04-14 DIAGNOSIS — G43.009 MIGRAINE WITHOUT AURA AND WITHOUT STATUS MIGRAINOSUS, NOT INTRACTABLE: ICD-10-CM

## 2021-04-14 PROCEDURE — 99214 OFFICE O/P EST MOD 30 MIN: CPT | Mod: 95 | Performed by: FAMILY MEDICINE

## 2021-04-14 RX ORDER — LISINOPRIL 10 MG/1
10 TABLET ORAL DAILY
Qty: 30 TABLET | Refills: 1 | Status: SHIPPED | OUTPATIENT
Start: 2021-04-14 | End: 2021-04-23

## 2021-04-14 NOTE — PATIENT INSTRUCTIONS
[facility] call  [facility] [place] at [phone] to schedule eye exam    Increase Trulicity dose to 3 mg weekly    Lisinopril 10 mg daily for kidney health  Schedule lab only visit in 2-3 weeks.     Next a1C lab test in 3 months (visit at that time if it's still high, otherwise will plan for visit is 6 months if doing well)        .

## 2021-04-14 NOTE — PROGRESS NOTES
Cornelia Savage is a 45 year old who is being evaluated via a billable telephone visit.      What phone number would you like to be contacted at? [phone]  How would you like to obtain your AVS? MyChart    Assessment & Plan     Type 2 diabetes mellitus with hyperglycemia, with long-term current use of insulin (H)  Improving control but still A1c above 7.  Will trial pushing up her Trulicity dose from 1.5 mg to 3 mg. Reviewed timing of taking, onset, benefits, monitoring and typicall and severe AE of the medication.  We will also add ACE inhibitor for renal protection.  She does not have microalbuminuria or formally diagnosed hypertension but blood pressure has been borderline high.  Reviewed small risk of angioedema and recommended follow-up labs in 2 to 3 weeks with med changes.  Recheck A1c in 3 months  - Dulaglutide 3 MG/0.5ML SOPN; Inject 0.5 mLs Subcutaneous every 7 days  - lisinopril (ZESTRIL) 10 MG tablet; Take 1 tablet (10 mg) by mouth daily  - Hemoglobin A1c; Future  - Basic metabolic panel  (Ca, Cl, CO2, Creat, Gluc, K, Na, BUN); Future    Occipital neuralgia of right side  Migraine without aura and without status migrainosus, not intractable  Headaches have been much improved.  She is tolerating the increase Cymbalta dose and would like to continue.      Patient Instructions   Please call [facility] in [place] at [phone] to schedule eye exam    Increase Trulicity dose to 3 mg weekly    Lisinopril 10 mg daily for kidney health  Schedule lab only visit in 2-3 weeks.     Next a1C lab test in 3 months (visit at that time if it's still high, otherwise will plan for visit is 6 months if doing well)        .         Return in about 2 weeks (around 4/28/2021) for lab only visit.    Sarah Lombardo MD  [facility]   Cornelia Savage is a 45 year old who presents for the following health issues     HPI     Patient would like to discuss recent lab results and would like  to discuss potentially adding medication discussed at previous visit.      HOME glucose readings have been normal.   Check when getting up for work (fasting 3-4 hours): 150-160   after work and prior to bed (2 hours after eating: 160-170.     Jardiance (increased to 25 mg in Feb)-tolerating  lantus 50 units bid-increased from 48 units twice daily.   Glipizide 20 mg every day now.     Feeling well otherwise.   Next Monday is going in for 2nd covid vaccine.    Last visit cymbalta dose was increased from 30 mg to 60 mg. Some sick feeling with that initially but resolved. Only few headaches and stretching many times can help. Also s/p trigger point injection with neuro which is helpful..             Objective           Vitals:  No vitals were obtained today due to virtual visit.    Physical Exam   healthy, alert and no distress  PSYCH: Alert and oriented times 3; coherent speech, normal   rate and volume, able to articulate logical thoughts, able   to abstract reason, no tangential thoughts, no hallucinations   or delusions  Her affect is normal and pleasant  RESP: No cough, no audible wheezing, able to talk in full sentences  Remainder of exam unable to be completed due to telephone visits      Component      Latest Ref Rng & Units 4/12/2021   Sodium      133 - 144 mmol/L 137   Potassium      3.4 - 5.3 mmol/L 3.8   Chloride      94 - 109 mmol/L 103   Carbon Dioxide      20 - 32 mmol/L 29   Anion Gap      3 - 14 mmol/L 5   Glucose      70 - 99 mg/dL 208 (H)   Urea Nitrogen      7 - 30 mg/dL 13   Creatinine      0.52 - 1.04 mg/dL 0.71   GFR Estimate      >60 mL/min/1.73:m2 >90   GFR Estimate If Black      >60 mL/min/1.73:m2 >90   Calcium      8.5 - 10.1 mg/dL 9.1   Hemoglobin A1C      0 - 5.6 % 7.5 (H)             Phone call duration: 15 minutes

## 2021-04-19 ENCOUNTER — IMMUNIZATION (OUTPATIENT)
Dept: PEDIATRICS | Facility: CLINIC | Age: 46
End: 2021-04-19
Attending: INTERNAL MEDICINE
Payer: COMMERCIAL

## 2021-04-19 PROCEDURE — 91300 PR COVID VAC PFIZER DIL RECON 30 MCG/0.3 ML IM: CPT

## 2021-04-19 PROCEDURE — 0002A PR COVID VAC PFIZER DIL RECON 30 MCG/0.3 ML IM: CPT

## 2021-04-22 ENCOUNTER — MYC MEDICAL ADVICE (OUTPATIENT)
Dept: FAMILY MEDICINE | Facility: CLINIC | Age: 46
End: 2021-04-22

## 2021-04-22 DIAGNOSIS — E11.65 TYPE 2 DIABETES MELLITUS WITH HYPERGLYCEMIA, WITH LONG-TERM CURRENT USE OF INSULIN (H): ICD-10-CM

## 2021-04-22 DIAGNOSIS — Z79.4 TYPE 2 DIABETES MELLITUS WITH HYPERGLYCEMIA, WITH LONG-TERM CURRENT USE OF INSULIN (H): ICD-10-CM

## 2021-04-22 NOTE — TELEPHONE ENCOUNTER
This writer attempted to contact Cornelia Savage on 04/22/21      Reason for call triage and left message.      If patient calls back:   Registered Nurse called. Follow Triage Call workflow, Eber Castillo, DAYANNA

## 2021-04-23 RX ORDER — LISINOPRIL 10 MG/1
5 TABLET ORAL DAILY
Qty: 30 TABLET | Refills: 1 | COMMUNITY
Start: 2021-04-23 | End: 2022-01-13 | Stop reason: SINTOL

## 2021-04-23 NOTE — TELEPHONE ENCOUNTER
"Per patient, she started Lisinopril 1 week ago  She works overnight shift and takes the med in the AM before going to bed  About 1 hour after taking Lisinopril, she would feel sleepy, tired, and as if she was \"drunk\"  Symptoms would last until she goes to sleep  BG would also fluctuate between , \"it would spike after not eating anything for 4-5 hours\"  She also received her 2nd COVID19 vaccine on 4/19/2021 and is not sure if some of the symptoms were related to the shot  She took herself off Lisinopril since yesterday and has felt normal since  Denies any further symptoms/problems since stopping the Lisinopril   BG last night was 156 about 2-3 hours after supper but she did admit to having a sugared soda  She does not have a BP monitor at home to check BP    Advised that if symptoms return or she has any questions, she can call the clinic back.  Also advised that urgent care is available over the weekend if needed    Please advise on Lisinopril      Anai Pelaez RN  [facility]    "

## 2021-04-23 NOTE — TELEPHONE ENCOUNTER
DME For blood pressure monitor faxed to [facility], [place] - [address] [place] [address], fax [phone]

## 2021-04-26 ENCOUNTER — MYC MEDICAL ADVICE (OUTPATIENT)
Dept: FAMILY MEDICINE | Facility: CLINIC | Age: 46
End: 2021-04-26

## 2021-04-26 DIAGNOSIS — B35.3 TINEA PEDIS OF BOTH FEET: ICD-10-CM

## 2021-04-26 DIAGNOSIS — L03.032 ONYCHIA OF TOE OF LEFT FOOT: ICD-10-CM

## 2021-04-26 NOTE — TELEPHONE ENCOUNTER
Routing to provider to review and advise, see MyC message below.  No documentation of recommended BP range in previous visit or problem list.  General BP goal is typically < 140/90, unclear if provider would want same goal for this patient or recommends different range.  Thank you.    Manuela Jiang RN  [facility]

## 2021-04-28 RX ORDER — ECONAZOLE NITRATE 10 MG/G
CREAM TOPICAL
Qty: 85 G | Refills: 5 | Status: SHIPPED | OUTPATIENT
Start: 2021-04-28 | End: 2022-04-06

## 2021-06-21 DIAGNOSIS — Z79.4 TYPE 2 DIABETES MELLITUS WITH HYPERGLYCEMIA, WITH LONG-TERM CURRENT USE OF INSULIN (H): ICD-10-CM

## 2021-06-21 DIAGNOSIS — E11.65 TYPE 2 DIABETES MELLITUS WITH HYPERGLYCEMIA, WITH LONG-TERM CURRENT USE OF INSULIN (H): ICD-10-CM

## 2021-06-21 RX ORDER — PEN NEEDLE, DIABETIC 32GX 5/32"
NEEDLE, DISPOSABLE MISCELLANEOUS
Qty: 200 EACH | Refills: 1 | Status: SHIPPED | OUTPATIENT
Start: 2021-06-21 | End: 2021-11-26

## 2021-08-02 ENCOUNTER — OFFICE VISIT (OUTPATIENT)
Dept: NEUROLOGY | Facility: CLINIC | Age: 46
End: 2021-08-02
Payer: COMMERCIAL

## 2021-08-02 DIAGNOSIS — M54.81 OCCIPITAL NEURALGIA OF RIGHT SIDE: Primary | ICD-10-CM

## 2021-08-02 PROCEDURE — 64450 NJX AA&/STRD OTHER PN/BRANCH: CPT | Mod: RT | Performed by: PSYCHIATRY & NEUROLOGY

## 2021-08-02 PROCEDURE — 64405 NJX AA&/STRD GR OCPL NRV: CPT | Mod: RT | Performed by: PSYCHIATRY & NEUROLOGY

## 2021-08-02 RX ORDER — BUPIVACAINE HYDROCHLORIDE 5 MG/ML
4 INJECTION, SOLUTION EPIDURAL; INTRACAUDAL ONCE
Status: COMPLETED | OUTPATIENT
Start: 2021-08-02 | End: 2021-08-02

## 2021-08-02 RX ORDER — TRIAMCINOLONE ACETONIDE 40 MG/ML
20 INJECTION, SUSPENSION INTRA-ARTICULAR; INTRAMUSCULAR ONCE
Status: COMPLETED | OUTPATIENT
Start: 2021-08-02 | End: 2021-08-02

## 2021-08-02 RX ADMIN — TRIAMCINOLONE ACETONIDE 20 MG: 40 INJECTION, SUSPENSION INTRA-ARTICULAR; INTRAMUSCULAR at 08:44

## 2021-08-02 RX ADMIN — BUPIVACAINE HYDROCHLORIDE 20 MG: 5 INJECTION, SOLUTION EPIDURAL; INTRACAUDAL at 08:42

## 2021-08-02 NOTE — LETTER
8/2/2021       RE: Cornelia Rodriguez  [address]  [place] [address]     Dear Colleague,    Thank you for referring your patient, Cornelia Rodriguez, to the [facility] NEUROLOGY CLINIC [place] at [facility]. Please see a copy of my visit note below.    [facility]  Neurology Procedure  08/02/21     Cornelia Rodriguez MRN# [id number]   YOB: 1975 Age: 45 year old            Occipital Nerve Block Procedure Note   DIAGNOSIS  Encounter Diagnosis   Name Primary?     Occipital neuralgia of right side Yes      PROCEDURE   right, Greater and Lesser occipital nerve blocks.      INFORMED CONSENT  I discussed the risks, benefits, alternatives, and the necessity of other members of the healthcare team participating in the procedure with the patient.  The patient understood and wished to proceed with the procedure.    PROCEDURAL PAUSE   Patient identity, procedure to be performed, correct side and site, patient position, availability of equipment, and special requirements were verified.      PROCEDURE DETAILS   Ms. Rodriguez was placed in the sitting position with her head and neck flexed.  Landmarks were palpated.  The area was prepped with ChloraPrep and sterile technique was used.  A 1.50 inch 25 gauge sterile needle was introduced 1/3 of the distance lateral from the occipital protuberance to the mastoid process on the right side.  After negative aspiration for blood, a solution containing 20 mg of triamcinolone acetonide diluted in a 1:2 mixture of 1% lidocaine and 0.5% bupivacaine for a total volume of 6 mL was injected in a fan-like fashion along the nuchal ridge between the occipital protuberance and mastoid process.     Ms. Rodriguez tolerated the procedure well, and there were no apparent complications.  After appropriate observation, she was dismissed in good condition under her own  power.    COMMENTS  Ms. Rodriguez received a total of 20 mg of triamcinolone acetonide.  Her pain was 0/10 in severity prior to the procedure, and numb following the procedure.    Lidocaine 1%, lot number [id number], expiration date 6/2024  Bupivacaine 0.5%, lot number [id number], expiration date 11/2023  Triamcinolone acetonide, lot number [id number], expiration date 12/2022          Again, thank you for allowing me to participate in the care of your patient.      Sincerely,    Carolann Rutledge MD

## 2021-08-02 NOTE — PROGRESS NOTES
[facility]  Neurology Procedure  08/02/21     Cornelia Rodriguez MRN# [id number]   YOB: 1975 Age: 45 year old            Occipital Nerve Block Procedure Note   DIAGNOSIS  Encounter Diagnosis   Name Primary?     Occipital neuralgia of right side Yes      PROCEDURE   right, Greater and Lesser occipital nerve blocks.      INFORMED CONSENT  I discussed the risks, benefits, alternatives, and the necessity of other members of the healthcare team participating in the procedure with the patient.  The patient understood and wished to proceed with the procedure.    PROCEDURAL PAUSE   Patient identity, procedure to be performed, correct side and site, patient position, availability of equipment, and special requirements were verified.      PROCEDURE DETAILS   Ms. Rodriguez was placed in the sitting position with her head and neck flexed.  Landmarks were palpated.  The area was prepped with ChloraPrep and sterile technique was used.  A 1.50 inch 25 gauge sterile needle was introduced 1/3 of the distance lateral from the occipital protuberance to the mastoid process on the right side.  After negative aspiration for blood, a solution containing 20 mg of triamcinolone acetonide diluted in a 1:2 mixture of 1% lidocaine and 0.5% bupivacaine for a total volume of 6 mL was injected in a fan-like fashion along the nuchal ridge between the occipital protuberance and mastoid process.     Ms. Rodriguez tolerated the procedure well, and there were no apparent complications.  After appropriate observation, she was dismissed in good condition under her own power.    COMMENTS  Ms. Rodriguez received a total of 20 mg of triamcinolone acetonide.  Her pain was 0/10 in severity prior to the procedure, and numb following the procedure.    Lidocaine 1%, lot number [id number], expiration date 6/2024  Bupivacaine 0.5%, lot number [id number], expiration date 11/2023  Triamcinolone acetonide, lot number  [id number], expiration date 12/2022

## 2021-08-11 ENCOUNTER — TRANSFERRED RECORDS (OUTPATIENT)
Dept: HEALTH INFORMATION MANAGEMENT | Facility: CLINIC | Age: 46
End: 2021-08-11

## 2021-08-23 DIAGNOSIS — M54.81 OCCIPITAL NEURALGIA OF RIGHT SIDE: ICD-10-CM

## 2021-08-24 RX ORDER — DULOXETIN HYDROCHLORIDE 60 MG/1
CAPSULE, DELAYED RELEASE ORAL
Qty: 90 CAPSULE | Refills: 1 | Status: SHIPPED | OUTPATIENT
Start: 2021-08-24 | End: 2022-03-10

## 2021-09-12 ENCOUNTER — HEALTH MAINTENANCE LETTER (OUTPATIENT)
Age: 46
End: 2021-09-12

## 2021-09-13 DIAGNOSIS — R11.2 NAUSEA AND VOMITING, INTRACTABILITY OF VOMITING NOT SPECIFIED, UNSPECIFIED VOMITING TYPE: ICD-10-CM

## 2021-09-13 DIAGNOSIS — K21.9 GASTROESOPHAGEAL REFLUX DISEASE WITHOUT ESOPHAGITIS: ICD-10-CM

## 2021-09-13 DIAGNOSIS — E78.5 HYPERLIPIDEMIA LDL GOAL <100: ICD-10-CM

## 2021-09-14 RX ORDER — OMEPRAZOLE 40 MG/1
CAPSULE, DELAYED RELEASE ORAL
Qty: 90 CAPSULE | Refills: 2 | Status: SHIPPED | OUTPATIENT
Start: 2021-09-14 | End: 2022-01-13

## 2021-09-14 RX ORDER — ROSUVASTATIN CALCIUM 10 MG/1
TABLET, COATED ORAL
Qty: 90 TABLET | Refills: 1 | Status: SHIPPED | OUTPATIENT
Start: 2021-09-14 | End: 2022-03-10

## 2021-09-29 DIAGNOSIS — Z79.4 TYPE 2 DIABETES MELLITUS WITH HYPERGLYCEMIA, WITH LONG-TERM CURRENT USE OF INSULIN (H): ICD-10-CM

## 2021-09-29 DIAGNOSIS — E11.65 TYPE 2 DIABETES MELLITUS WITH HYPERGLYCEMIA, WITH LONG-TERM CURRENT USE OF INSULIN (H): ICD-10-CM

## 2021-09-29 RX ORDER — DULAGLUTIDE 3 MG/.5ML
INJECTION, SOLUTION SUBCUTANEOUS
Qty: 6 ML | Refills: 0 | Status: SHIPPED | OUTPATIENT
Start: 2021-09-29 | End: 2021-12-09

## 2021-09-29 RX ORDER — GLIPIZIDE 10 MG/1
TABLET, FILM COATED, EXTENDED RELEASE ORAL
Qty: 180 TABLET | Refills: 0 | Status: SHIPPED | OUTPATIENT
Start: 2021-09-29 | End: 2022-01-12

## 2021-10-25 ENCOUNTER — OFFICE VISIT (OUTPATIENT)
Dept: NEUROLOGY | Facility: CLINIC | Age: 46
End: 2021-10-25
Payer: COMMERCIAL

## 2021-10-25 DIAGNOSIS — M54.81 OCCIPITAL NEURALGIA OF RIGHT SIDE: Primary | ICD-10-CM

## 2021-10-25 PROCEDURE — 64405 NJX AA&/STRD GR OCPL NRV: CPT | Mod: RT | Performed by: PSYCHIATRY & NEUROLOGY

## 2021-10-25 PROCEDURE — 64450 NJX AA&/STRD OTHER PN/BRANCH: CPT | Mod: RT | Performed by: PSYCHIATRY & NEUROLOGY

## 2021-10-25 RX ORDER — BUPIVACAINE HYDROCHLORIDE 5 MG/ML
4 INJECTION, SOLUTION EPIDURAL; INTRACAUDAL ONCE
Status: COMPLETED | OUTPATIENT
Start: 2021-10-25 | End: 2021-10-25

## 2021-10-25 RX ORDER — TRIAMCINOLONE ACETONIDE 40 MG/ML
20 INJECTION, SUSPENSION INTRA-ARTICULAR; INTRAMUSCULAR ONCE
Status: COMPLETED | OUTPATIENT
Start: 2021-10-25 | End: 2021-10-25

## 2021-10-25 RX ADMIN — BUPIVACAINE HYDROCHLORIDE 20 MG: 5 INJECTION, SOLUTION EPIDURAL; INTRACAUDAL at 07:45

## 2021-10-25 RX ADMIN — TRIAMCINOLONE ACETONIDE 20 MG: 40 INJECTION, SUSPENSION INTRA-ARTICULAR; INTRAMUSCULAR at 07:46

## 2021-10-25 NOTE — LETTER
10/25/2021       RE: Cornelia Rodriguez  [address]  [facility] [address]     Dear Colleague,    Thank you for referring your patient, Cornelia Rodriguez, to the [facility] [place] at [facility]. Please see a copy of my visit note below.    [facility]  Neurology Procedure  10/25/21     Cornelia Rodriguez MRN# [id number]   YOB: 1975 Age: 45 year old            Occipital Nerve Block Procedure Note   DIAGNOSIS  Encounter Diagnosis   Name Primary?     Occipital neuralgia of right side Yes      PROCEDURE   right, Greater and Lesser occipital nerve blocks.      INFORMED CONSENT  I discussed the risks, benefits, alternatives, and the necessity of other members of the healthcare team participating in the procedure with the patient.  The patient understood and wished to proceed with the procedure.    PROCEDURAL PAUSE   Patient identity, procedure to be performed, correct side and site, patient position, availability of equipment, and special requirements were verified.      PROCEDURE DETAILS   Ms. Rodriguez was placed in the sitting position with her head and neck flexed.  Landmarks were palpated.  The area was prepped with ChloraPrep and sterile technique was used.  A 1.50 inch 25 gauge sterile needle was introduced 1/3 of the distance lateral from the occipital protuberance to the mastoid process on the right side.  After negative aspiration for blood, a solution containing 20 mg of triamcinolone acetonide diluted in a 1:2 mixture of 1% lidocaine and 0.5% bupivacaine for a total volume of 6 mL was injected in a fan-like fashion along the nuchal ridge between the occipital protuberance and mastoid process.     Ms. Rodriguez tolerated the procedure well, and there were no apparent complications.  After appropriate observation, she was dismissed in good condition under her own  power.    COMMENTS  Ms. Rodriguez received a total of 20 mg of triamcinolone acetonide.  She had numbness over the right occipital distribution following the procedure.            Again, thank you for allowing me to participate in the care of your patient.      Sincerely,    Carolann Rutledge MD

## 2021-10-25 NOTE — PROGRESS NOTES
[facility]  Neurology Procedure  10/25/21     Cornelia Rodriguez MRN# [id number]   YOB: 1975 Age: 45 year old            Occipital Nerve Block Procedure Note   DIAGNOSIS  Encounter Diagnosis   Name Primary?     Occipital neuralgia of right side Yes      PROCEDURE   right, Greater and Lesser occipital nerve blocks.      INFORMED CONSENT  I discussed the risks, benefits, alternatives, and the necessity of other members of the healthcare team participating in the procedure with the patient.  The patient understood and wished to proceed with the procedure.    PROCEDURAL PAUSE   Patient identity, procedure to be performed, correct side and site, patient position, availability of equipment, and special requirements were verified.      PROCEDURE DETAILS   Ms. Rodriguez was placed in the sitting position with her head and neck flexed.  Landmarks were palpated.  The area was prepped with ChloraPrep and sterile technique was used.  A 1.50 inch 25 gauge sterile needle was introduced 1/3 of the distance lateral from the occipital protuberance to the mastoid process on the right side.  After negative aspiration for blood, a solution containing 20 mg of triamcinolone acetonide diluted in a 1:2 mixture of 1% lidocaine and 0.5% bupivacaine for a total volume of 6 mL was injected in a fan-like fashion along the nuchal ridge between the occipital protuberance and mastoid process.     Ms. Rodriguez tolerated the procedure well, and there were no apparent complications.  After appropriate observation, she was dismissed in good condition under her own power.    COMMENTS  Ms. Rodriguez received a total of 20 mg of triamcinolone acetonide.  She had numbness over the right occipital distribution following the procedure.

## 2021-11-07 ENCOUNTER — HEALTH MAINTENANCE LETTER (OUTPATIENT)
Age: 46
End: 2021-11-07

## 2022-01-02 ENCOUNTER — HEALTH MAINTENANCE LETTER (OUTPATIENT)
Age: 47
End: 2022-01-02

## 2022-01-10 ENCOUNTER — TELEPHONE (OUTPATIENT)
Dept: FAMILY MEDICINE | Facility: CLINIC | Age: 47
End: 2022-01-10
Payer: COMMERCIAL

## 2022-01-10 DIAGNOSIS — E11.65 TYPE 2 DIABETES MELLITUS WITH HYPERGLYCEMIA, WITH LONG-TERM CURRENT USE OF INSULIN (H): ICD-10-CM

## 2022-01-10 DIAGNOSIS — Z79.4 TYPE 2 DIABETES MELLITUS WITH HYPERGLYCEMIA, WITH LONG-TERM CURRENT USE OF INSULIN (H): ICD-10-CM

## 2022-01-12 RX ORDER — GLIPIZIDE 10 MG/1
10 TABLET, FILM COATED, EXTENDED RELEASE ORAL DAILY
Qty: 60 TABLET | Refills: 0 | Status: SHIPPED | OUTPATIENT
Start: 2022-01-12 | End: 2022-02-08

## 2022-01-12 NOTE — TELEPHONE ENCOUNTER
"Routing refill request to provider for review/approval because:  Requested Prescriptions   Pending Prescriptions Disp Refills    glipiZIDE (GLUCOTROL XL) 10 MG 24 hr tablet [Pharmacy Med Name: GLIPIZIDE ER 10MG TB24] 180 tablet 0     Sig: TAKE ONE TABLET BY MOUTH EVERY DAY INCREASE TO 20 MG ON NON-WORK DAYS        Sulfonylurea Agents Failed - 1/10/2022 12:28 PM        Failed - Patient has documented A1c within the specified period of time.     If HgbA1C is 8 or greater, it needs to be on file within the past 3 months.  If less than 8, must be on file within the past 6 months.     Recent Labs   Lab Test 04/12/21  0704   A1C 7.5*             Failed - Recent (6 mo) or future (30 days) visit within the authorizing provider's specialty     Patient had office visit in the last 6 months or has a visit in the next 30 days with authorizing provider or within the authorizing provider's specialty.  See \"Patient Info\" tab in inbasket, or \"Choose Columns\" in Meds & Orders section of the refill encounter.            Passed - Medication is active on med list        Passed - Patient is age 18 or older        Passed - No active pregnancy on record        Passed - Patient has a recent creatinine (normal) within the past 12 mos.     Recent Labs   Lab Test 04/12/21  0704 03/27/19  1325 03/01/19  0916   CR 0.71   < >  --    CREAT  --   --  0.5*    < > = values in this interval not displayed.       Ok to refill medication if creatinine is low          Passed - Patient has not had a positive pregnancy test within the past 12 mos.                Jeanie HERNANDEZN, RN        "

## 2022-01-13 ENCOUNTER — VIRTUAL VISIT (OUTPATIENT)
Dept: FAMILY MEDICINE | Facility: CLINIC | Age: 47
End: 2022-01-13
Payer: COMMERCIAL

## 2022-01-13 DIAGNOSIS — Z79.4 TYPE 2 DIABETES MELLITUS WITHOUT COMPLICATION, WITH LONG-TERM CURRENT USE OF INSULIN (H): Primary | ICD-10-CM

## 2022-01-13 DIAGNOSIS — E66.01 MORBID OBESITY (H): ICD-10-CM

## 2022-01-13 DIAGNOSIS — K21.9 GASTROESOPHAGEAL REFLUX DISEASE WITHOUT ESOPHAGITIS: ICD-10-CM

## 2022-01-13 DIAGNOSIS — E11.9 TYPE 2 DIABETES MELLITUS WITHOUT COMPLICATION, WITH LONG-TERM CURRENT USE OF INSULIN (H): Primary | ICD-10-CM

## 2022-01-13 DIAGNOSIS — K76.0 NON-ALCOHOLIC FATTY LIVER DISEASE: ICD-10-CM

## 2022-01-13 DIAGNOSIS — G43.009 MIGRAINE WITHOUT AURA AND WITHOUT STATUS MIGRAINOSUS, NOT INTRACTABLE: ICD-10-CM

## 2022-01-13 PROCEDURE — 99214 OFFICE O/P EST MOD 30 MIN: CPT | Mod: 95 | Performed by: FAMILY MEDICINE

## 2022-01-13 RX ORDER — LOSARTAN POTASSIUM 25 MG/1
12.5 TABLET ORAL DAILY
Qty: 15 TABLET | Refills: 1 | Status: SHIPPED | OUTPATIENT
Start: 2022-01-13 | End: 2022-03-10

## 2022-01-13 NOTE — PROGRESS NOTES
Cornelia Savage is a 46 year old who is being evaluated via a billable telephone visit.      What phone number would you like to be contacted at? [phone]  How would you like to obtain your AVS? MyChart    Assessment & Plan     Type 2 diabetes mellitus without complication, with long-term current use of insulin (H)  Good reported control. Adjust meds based on a1c. Reviewed hypoglycia protection  - blood glucose (NO BRAND SPECIFIED) test strip; Use to test blood sugar 1 times daily or as directed.  - Hemoglobin A1c; Future  - Comprehensive metabolic panel (BMP + Alb, Alk Phos, ALT, AST, Total. Bili, TP); Future  - Albumin Random Urine Quantitative with Creat Ratio; Future  - losartan (COZAAR) 25 MG tablet; Take 0.5 tablets (12.5 mg) by mouth daily  - Basic metabolic panel  (Ca, Cl, CO2, Creat, Gluc, K, Na, BUN); Future  - Adult Eye Referral; Future  - TSH with free T4 reflex; Future    Gastroesophageal reflux disease without esophagitis  Doing well. Will wean from 40 mg to 20 mg daily on PPI and hopfully slowly off from there. Reviewed weaning plan and patient voiced understanding.   - omeprazole (PRILOSEC) 20 MG DR capsule; Take 1 capsule (20 mg) by mouth daily Wean as able    Migraine without aura and without status migrainosus, not intractable  Follows with neuro. Occipital neuralgia and getting injections.     Non-alcoholic fatty liver disease  - Comprehensive metabolic panel (BMP + Alb, Alk Phos, ALT, AST, Total. Bili, TP); Future    Morbid obesity (H)  - Hemoglobin A1c; Future  - Comprehensive metabolic panel (BMP + Alb, Alk Phos, ALT, AST, Total. Bili, TP); Future      Patient Instructions   Please call  [facility] in [place] at [phone] to schedule with ophthalmologist    Reduced prilosec to 20 mg daily. After a month or so if no reflux can start taking med every other day. If that goes well, take every 3 days and eventually off. .     Mammogram is due in February.     Schedule medical assistant only visit  for influenza and covid booster vaccines.     Start losartan for kidney protection. Plan for labs to recheck kidney panel 3 weeks after starting this prescription        Return in about 6 months (around 7/13/2022) for Follow up, diabetes visit.    Sarah Lombardo MD  [facility]   Cornelia Savage is a 46 year old who presents for the following health issues     HPI   Overall doing well  Feels sugars are under control with occ spikes which are usually explainable    Ran out of test strips (countour next)  this week   115-135 FBG. 135 usually few hours after eating.   Low glucose levels- few times but due to not eating. Dropped to 71.   Carry ventura and ikes candy with her to get glucose up if needed. .     Not taking lisinopril as was getting light-headed from the medication.   Bought blood pressure meter.     - eye exam still due. Ophthalmology retired.   - no neuruopathy sx's.   - feet are cold. Most of her life. No skin changes.     No recent gerd sx's. Ready to trial lower PPI dose.   Notes she went two weeks without med as forget she ran out and had only minor reflux    Headaches- follows with neuro   Had major headache after dentist visit this week.   Has next injection later this month.   Uses aleve or tylenol as first line headache. Sleep also helps. Has not used imitrex recently.             Objective           Vitals:  No vitals were obtained today due to virtual visit.    Physical Exam   healthy, alert and no distress  PSYCH: Alert and oriented times 3; coherent speech, normal   rate and volume, able to articulate logical thoughts, able   to abstract reason, no tangential thoughts, no hallucinations   or delusions  Her affect is normal and pleasant  RESP: No cough, no audible wheezing, able to talk in full sentences  Remainder of exam unable to be completed due to telephone visits              Phone call duration: 21 minutes

## 2022-01-13 NOTE — PATIENT INSTRUCTIONS
Please call  [facility] in [place] at [phone] to schedule with ophthalmologist    Reduced prilosec to 20 mg daily. After a month or so if no reflux can start taking med every other day. If that goes well, take every 3 days and eventually off. .     Mammogram is due in February.     Schedule medical assistant only visit for influenza and covid booster vaccines.     Start losartan for kidney protection. Plan for labs to recheck kidney panel 3 weeks after starting this prescription

## 2022-01-13 NOTE — TELEPHONE ENCOUNTER
Called patient to schedule appointment and a lab. Patient answered and we were able to create a virtual med check and a lab for Monday at the [facility]. Thank you.

## 2022-01-13 NOTE — TELEPHONE ENCOUNTER
Lyndsay refill sent  Due for diabetic med check with labs prior to further refills (virutal visit is ok)  Please send reminder

## 2022-01-14 DIAGNOSIS — Z79.4 TYPE 2 DIABETES MELLITUS WITH HYPERGLYCEMIA, WITH LONG-TERM CURRENT USE OF INSULIN (H): ICD-10-CM

## 2022-01-14 DIAGNOSIS — E11.65 TYPE 2 DIABETES MELLITUS WITH HYPERGLYCEMIA, WITH LONG-TERM CURRENT USE OF INSULIN (H): ICD-10-CM

## 2022-01-14 RX ORDER — INSULIN GLARGINE 100 [IU]/ML
INJECTION, SOLUTION SUBCUTANEOUS
Qty: 105 ML | Refills: 3 | Status: SHIPPED | OUTPATIENT
Start: 2022-01-14 | End: 2023-02-17

## 2022-01-14 NOTE — TELEPHONE ENCOUNTER
"Routing refill request to provider for review/approval because:  Labs not current:  a1c.    Requested Prescriptions   Pending Prescriptions Disp Refills    LANTUS SOLOSTAR 100 UNIT/ML soln [Pharmacy Med Name: LANTUS SOLOSTAR 100UNIT/ML SOPN] 105 mL 3     Sig: INJECT 48 UNITS SUBCUTANEOUSLY TWO TIMES A DAY        Long Acting Insulin Protocol Failed - 1/14/2022  9:44 AM        Failed - HgbA1C in past 3 or 6 months     If HgbA1C is 8 or greater, it needs to be on file within the past 3 months.  If less than 8, must be on file within the past 6 months.     Recent Labs   Lab Test 04/12/21  0704   A1C 7.5*             Failed - Recent (6 mo) or future (30 days) visit within the authorizing provider's specialty     Patient had office visit in the last 6 months or has a visit in the next 30 days with authorizing provider or within the authorizing provider's specialty.  See \"Patient Info\" tab in inbasket, or \"Choose Columns\" in Meds & Orders section of the refill encounter.            Passed - Serum creatinine on file in past 12 months     Recent Labs   Lab Test 04/12/21  0704 03/27/19  1325 03/01/19  0916   CR 0.71   < >  --    CREAT  --   --  0.5*    < > = values in this interval not displayed.       Ok to refill medication if creatinine is low          Passed - Medication is active on med list        Passed - Patient is age 18 or older                  "

## 2022-01-17 ENCOUNTER — LAB (OUTPATIENT)
Dept: LAB | Facility: CLINIC | Age: 47
End: 2022-01-17
Payer: COMMERCIAL

## 2022-01-17 DIAGNOSIS — Z79.4 TYPE 2 DIABETES MELLITUS WITHOUT COMPLICATION, WITH LONG-TERM CURRENT USE OF INSULIN (H): ICD-10-CM

## 2022-01-17 DIAGNOSIS — E66.01 MORBID OBESITY (H): ICD-10-CM

## 2022-01-17 DIAGNOSIS — K76.0 NON-ALCOHOLIC FATTY LIVER DISEASE: ICD-10-CM

## 2022-01-17 DIAGNOSIS — E11.9 TYPE 2 DIABETES MELLITUS WITHOUT COMPLICATION, WITH LONG-TERM CURRENT USE OF INSULIN (H): ICD-10-CM

## 2022-01-17 LAB
ALBUMIN SERPL-MCNC: 3.8 G/DL (ref 3.4–5)
ALP SERPL-CCNC: 81 U/L (ref 40–150)
ALT SERPL W P-5'-P-CCNC: 33 U/L (ref 0–50)
ANION GAP SERPL CALCULATED.3IONS-SCNC: 5 MMOL/L (ref 3–14)
AST SERPL W P-5'-P-CCNC: 14 U/L (ref 0–45)
BILIRUB SERPL-MCNC: 0.4 MG/DL (ref 0.2–1.3)
BUN SERPL-MCNC: 14 MG/DL (ref 7–30)
CALCIUM SERPL-MCNC: 9.2 MG/DL (ref 8.5–10.1)
CHLORIDE BLD-SCNC: 104 MMOL/L (ref 94–109)
CO2 SERPL-SCNC: 31 MMOL/L (ref 20–32)
CREAT SERPL-MCNC: 0.71 MG/DL (ref 0.52–1.04)
CREAT UR-MCNC: 137 MG/DL
GFR SERPL CREATININE-BSD FRML MDRD: >90 ML/MIN/1.73M2
GLUCOSE BLD-MCNC: 123 MG/DL (ref 70–99)
HBA1C MFR BLD: 6.8 % (ref 0–5.6)
MICROALBUMIN UR-MCNC: 12 MG/L
MICROALBUMIN/CREAT UR: 8.76 MG/G CR (ref 0–25)
POTASSIUM BLD-SCNC: 4.1 MMOL/L (ref 3.4–5.3)
PROT SERPL-MCNC: 7.3 G/DL (ref 6.8–8.8)
SODIUM SERPL-SCNC: 140 MMOL/L (ref 133–144)
TSH SERPL DL<=0.005 MIU/L-ACNC: 0.95 MU/L (ref 0.4–4)

## 2022-01-17 PROCEDURE — 36415 COLL VENOUS BLD VENIPUNCTURE: CPT

## 2022-01-17 PROCEDURE — 84443 ASSAY THYROID STIM HORMONE: CPT

## 2022-01-17 PROCEDURE — 83036 HEMOGLOBIN GLYCOSYLATED A1C: CPT

## 2022-01-17 PROCEDURE — 80053 COMPREHEN METABOLIC PANEL: CPT

## 2022-01-17 PROCEDURE — 82043 UR ALBUMIN QUANTITATIVE: CPT

## 2022-01-18 NOTE — RESULT ENCOUNTER NOTE
Cornelia Savage,  It was a pleasure talking with you again.   The A1C diabetes test was excellent showing overall good diabetes control.   The thyroid test was normal.   The kidney, liver and electrolyte panel was normal.   The urine protein level was normal.   Please continue your current treatments and meds.   Please MyChart or call if you have any concerns or questions.   Sincerely,  Sarah Lombardo MD

## 2022-01-24 ENCOUNTER — OFFICE VISIT (OUTPATIENT)
Dept: NEUROLOGY | Facility: CLINIC | Age: 47
End: 2022-01-24
Payer: COMMERCIAL

## 2022-01-24 DIAGNOSIS — M54.81 BILATERAL OCCIPITAL NEURALGIA: Primary | ICD-10-CM

## 2022-01-24 PROCEDURE — 64450 NJX AA&/STRD OTHER PN/BRANCH: CPT | Mod: 50 | Performed by: PSYCHIATRY & NEUROLOGY

## 2022-01-24 PROCEDURE — 64405 NJX AA&/STRD GR OCPL NRV: CPT | Mod: 50 | Performed by: PSYCHIATRY & NEUROLOGY

## 2022-01-24 RX ORDER — TRIAMCINOLONE ACETONIDE 40 MG/ML
40 INJECTION, SUSPENSION INTRA-ARTICULAR; INTRAMUSCULAR ONCE
Status: COMPLETED | OUTPATIENT
Start: 2022-01-24 | End: 2022-01-24

## 2022-01-24 RX ORDER — BUPIVACAINE HYDROCHLORIDE 5 MG/ML
8 INJECTION, SOLUTION EPIDURAL; INTRACAUDAL ONCE
Status: COMPLETED | OUTPATIENT
Start: 2022-01-24 | End: 2022-01-24

## 2022-01-24 RX ADMIN — BUPIVACAINE HYDROCHLORIDE 40 MG: 5 INJECTION, SOLUTION EPIDURAL; INTRACAUDAL at 08:33

## 2022-01-24 RX ADMIN — TRIAMCINOLONE ACETONIDE 40 MG: 40 INJECTION, SUSPENSION INTRA-ARTICULAR; INTRAMUSCULAR at 08:34

## 2022-01-24 NOTE — PROGRESS NOTES
[facility]  Neurology Procedure  01/24/22     Cornelia Rodriguez MRN# [id number]   YOB: 1975 Age: 46 year old            Occipital Nerve Block Procedure Note   DIAGNOSIS  Encounter Diagnosis   Name Primary?     Bilateral occipital neuralgia Yes        PROCEDURE   bilateral, Greater and Lesser occipital nerve blocks.      INFORMED CONSENT  I discussed the risks, benefits, alternatives, and the necessity of other members of the healthcare team participating in the procedure with the patient.  The patient understood and wished to proceed with the procedure.    PROCEDURAL PAUSE   Patient identity, procedure to be performed, correct side and site, patient position, availability of equipment, and special requirements were verified.      PROCEDURE DETAILS   Ms. Rodriguez was placed in the sitting position with her head and neck flexed.  Landmarks were palpated.  The area was prepped with ChloraPrep and sterile technique was used.  A 1.50 inch 25 gauge sterile needle was introduced 1/3 of the distance lateral from the occipital protuberance to the mastoid process on the left side.  After negative aspiration for blood, a solution containing 20 mg of triamcinolone acetonide diluted in a 1:2 mixture of 1% lidocaine and 0.5% bupivacaine for a total volume of 6 mL was injected in a fan-like fashion along the nuchal ridge between the occipital protuberance and mastoid process. The procedure was repeated in the exact same way on the opposite side with the same injectate.     Ms. Rodriguez tolerated the procedure well, and there were no apparent complications.  After appropriate observation, she was dismissed in good condition under her own power.    COMMENTS  Ms. Rodriguez received a total of 40 mg of triamcinolone acetonide.  She had numbness in the occipital distributions bilaterally after the procedure.

## 2022-01-24 NOTE — LETTER
1/24/2022       RE: Cornelia Rodriguez  [address]  [facility] [address]     Dear Colleague,    Thank you for referring your patient, Cornelia Rodriguez, to the [facility] [place] at [facility]. Please see a copy of my visit note below.    [facility]  Neurology Procedure  01/24/22     Cornelia Rodriguez MRN# [id number]   YOB: 1975 Age: 46 year old            Occipital Nerve Block Procedure Note   DIAGNOSIS  Encounter Diagnosis   Name Primary?     Bilateral occipital neuralgia Yes        PROCEDURE   bilateral, Greater and Lesser occipital nerve blocks.      INFORMED CONSENT  I discussed the risks, benefits, alternatives, and the necessity of other members of the healthcare team participating in the procedure with the patient.  The patient understood and wished to proceed with the procedure.    PROCEDURAL PAUSE   Patient identity, procedure to be performed, correct side and site, patient position, availability of equipment, and special requirements were verified.      PROCEDURE DETAILS   Ms. Rodriguez was placed in the sitting position with her head and neck flexed.  Landmarks were palpated.  The area was prepped with ChloraPrep and sterile technique was used.  A 1.50 inch 25 gauge sterile needle was introduced 1/3 of the distance lateral from the occipital protuberance to the mastoid process on the left side.  After negative aspiration for blood, a solution containing 20 mg of triamcinolone acetonide diluted in a 1:2 mixture of 1% lidocaine and 0.5% bupivacaine for a total volume of 6 mL was injected in a fan-like fashion along the nuchal ridge between the occipital protuberance and mastoid process. The procedure was repeated in the exact same way on the opposite side with the same injectate.     Ms. Rodriguez tolerated the procedure well, and there were no apparent complications.   After appropriate observation, she was dismissed in good condition under her own power.    COMMENTS  Ms. Rodriguez received a total of 40 mg of triamcinolone acetonide.  She had numbness in the occipital distributions bilaterally after the procedure.        Again, thank you for allowing me to participate in the care of your patient.      Sincerely,    Carolann Rutledge MD

## 2022-01-28 ENCOUNTER — OFFICE VISIT (OUTPATIENT)
Dept: OPTOMETRY | Facility: CLINIC | Age: 47
End: 2022-01-28
Attending: FAMILY MEDICINE
Payer: COMMERCIAL

## 2022-01-28 DIAGNOSIS — H52.4 PRESBYOPIA: ICD-10-CM

## 2022-01-28 DIAGNOSIS — Z79.4 TYPE 2 DIABETES MELLITUS WITHOUT COMPLICATION, WITH LONG-TERM CURRENT USE OF INSULIN (H): Primary | ICD-10-CM

## 2022-01-28 DIAGNOSIS — E11.9 TYPE 2 DIABETES MELLITUS WITHOUT COMPLICATION, WITH LONG-TERM CURRENT USE OF INSULIN (H): Primary | ICD-10-CM

## 2022-01-28 PROCEDURE — 92004 COMPRE OPH EXAM NEW PT 1/>: CPT | Performed by: OPTOMETRIST

## 2022-01-28 PROCEDURE — 92015 DETERMINE REFRACTIVE STATE: CPT | Performed by: OPTOMETRIST

## 2022-01-28 ASSESSMENT — REFRACTION_MANIFEST
OD_SPHERE: -0.25
OD_ADD: +1.50
OS_AXIS: 075
OS_ADD: +1.50
OS_CYLINDER: +0.25
OS_SPHERE: +0.50
OD_AXIS: 115
OD_CYLINDER: +1.75

## 2022-01-28 ASSESSMENT — CUP TO DISC RATIO
OD_RATIO: 0.1
OS_RATIO: 0.1

## 2022-01-28 ASSESSMENT — VISUAL ACUITY
OD_CC+: -1
METHOD: SNELLEN - LINEAR
CORRECTION_TYPE: GLASSES
METHOD_MR: OPPOSITE SIGNS CORRECT
OS_CC: 20/40
OD_CC: 20/30

## 2022-01-28 ASSESSMENT — TONOMETRY
OD_IOP_MMHG: 05
IOP_METHOD: ICARE
OS_IOP_MMHG: 07

## 2022-01-28 ASSESSMENT — SLIT LAMP EXAM - LIDS
COMMENTS: NORMAL
COMMENTS: NORMAL

## 2022-01-28 ASSESSMENT — REFRACTION_WEARINGRX
OD_SPHERE: +0.50
SPECS_TYPE: SVL
OD_AXIS: 114
OS_SPHERE: +0.50
OD_CYLINDER: +2.00
OS_CYLINDER: +1.75
OS_AXIS: 073

## 2022-01-28 ASSESSMENT — EXTERNAL EXAM - LEFT EYE: OS_EXAM: NORMAL

## 2022-01-28 ASSESSMENT — CONF VISUAL FIELD
OS_NORMAL: 1
METHOD: COUNTING FINGERS
OD_NORMAL: 1

## 2022-01-28 ASSESSMENT — EXTERNAL EXAM - RIGHT EYE: OD_EXAM: NORMAL

## 2022-01-28 NOTE — LETTER
1/28/2022         RE: Cornelia Rodriguez  [address]  [facility] [place] [address]        Dear Colleague,    Thank you for referring your patient, Cornelia Rodriguez, to the [facility]. Please see a copy of my visit note below.    Assessment/Plan  (E11.9,  Z79.4) Type 2 diabetes mellitus without complication, with long-term current use of insulin (H)  (primary encounter diagnosis)  Comment: Without retinopathy  Plan: Discussed findings with patient. Encouraged continued blood glucose, pressure, and lipid control. Patient should continue following recommendations of primary care provider. Patient should plan on returning to clinic annually for a dilated eye exam but was encouraged to return to clinic sooner with new flashes/floaters or other vision changes.     (H52.4) Presbyopia  Plan: REFRACTION [[id number]]        Discussed findings with patient. New spectacle prescription dispensed to patient. Patient is welcome to return to clinic with prolonged adaptation difficulties.       Complete documentation of historical and exam elements from today's encounter can  be found in the full encounter summary report (not reduplicated in this progress  note). I personally obtained the chief complaint(s) and history of present illness. I  confirmed and edited as necessary the review of systems, past medical/surgical  history, family history, social history, and examination findings as documented by  others; and I examined the patient myself. I personally reviewed the relevant tests,  images, and reports as documented above. I formulated and edited as necessary the  assessment and plan and discussed the findings and management plan with the  patient and family.    Earnest Jose OD       Again, thank you for allowing me to participate in the care of your patient.        Sincerely,        Earnest Jose OD

## 2022-01-28 NOTE — PROGRESS NOTES
Assessment/Plan  (E11.9,  Z79.4) Type 2 diabetes mellitus without complication, with long-term current use of insulin (H)  (primary encounter diagnosis)  Comment: Without retinopathy  Plan: Discussed findings with patient. Encouraged continued blood glucose, pressure, and lipid control. Patient should continue following recommendations of primary care provider. Patient should plan on returning to clinic annually for a dilated eye exam but was encouraged to return to clinic sooner with new flashes/floaters or other vision changes.     (H52.4) Presbyopia  Plan: REFRACTION [[id number]]        Discussed findings with patient. New spectacle prescription dispensed to patient. Patient is welcome to return to clinic with prolonged adaptation difficulties.       Complete documentation of historical and exam elements from today's encounter can  be found in the full encounter summary report (not reduplicated in this progress  note). I personally obtained the chief complaint(s) and history of present illness. I  confirmed and edited as necessary the review of systems, past medical/surgical  history, family history, social history, and examination findings as documented by  others; and I examined the patient myself. I personally reviewed the relevant tests,  images, and reports as documented above. I formulated and edited as necessary the  assessment and plan and discussed the findings and management plan with the  patient and family.    Earnest Jose OD

## 2022-01-28 NOTE — NURSING NOTE
Chief Complaints and History of Present Illnesses   Patient presents with     Diabetic Eye Exam Follow Up       Chief Complaint(s) and History of Present Illness(es)     Diabetic Eye Exam Follow Up     Diabetes Type: Type 2              Comments     Patient here for annual diabetic eye exam. Patient does lots of work on a computer and feels that her vision can fluctuate and her eyes get tired easily.  Glasses SV RX 2 yrs old.   No Pain, No Flashes, No Floaters.     Diabetic, Type 2  Lab Results       Component                Value               Date                       A1C                      6.8                 01/17/2022                 A1C                      7.5                 04/12/2021                 A1C                      8.2                 01/11/2021                 A1C                      7.1                 05/15/2020                 A1C                      7.5                 01/21/2020                 A1C                      8.6                 09/19/2019                          Tyrone Erickson, Ophthalmic Assistant

## 2022-02-07 ENCOUNTER — LAB (OUTPATIENT)
Dept: LAB | Facility: CLINIC | Age: 47
End: 2022-02-07
Payer: COMMERCIAL

## 2022-02-07 DIAGNOSIS — Z79.4 TYPE 2 DIABETES MELLITUS WITH HYPERGLYCEMIA, WITH LONG-TERM CURRENT USE OF INSULIN (H): ICD-10-CM

## 2022-02-07 DIAGNOSIS — E11.65 TYPE 2 DIABETES MELLITUS WITH HYPERGLYCEMIA, WITH LONG-TERM CURRENT USE OF INSULIN (H): ICD-10-CM

## 2022-02-07 LAB
ANION GAP SERPL CALCULATED.3IONS-SCNC: 2 MMOL/L (ref 3–14)
BUN SERPL-MCNC: 15 MG/DL (ref 7–30)
CALCIUM SERPL-MCNC: 9.4 MG/DL (ref 8.5–10.1)
CHLORIDE BLD-SCNC: 106 MMOL/L (ref 94–109)
CO2 SERPL-SCNC: 31 MMOL/L (ref 20–32)
CREAT SERPL-MCNC: 0.78 MG/DL (ref 0.52–1.04)
GFR SERPL CREATININE-BSD FRML MDRD: >90 ML/MIN/1.73M2
GLUCOSE BLD-MCNC: 120 MG/DL (ref 70–99)
POTASSIUM BLD-SCNC: 4.5 MMOL/L (ref 3.4–5.3)
SODIUM SERPL-SCNC: 139 MMOL/L (ref 133–144)

## 2022-02-07 PROCEDURE — 36415 COLL VENOUS BLD VENIPUNCTURE: CPT

## 2022-02-07 PROCEDURE — 80048 BASIC METABOLIC PNL TOTAL CA: CPT

## 2022-02-07 NOTE — RESULT ENCOUNTER NOTE
Cornelia,  It was a pleasure to talk with you recently.   The kidney and electrolyte panel looks good indicating the kidneys are tolerating the new medication without problem.   Please MyChart or call if you have any concerns or questions.   Sincerely,  Sarah Lobmardo MD

## 2022-02-08 RX ORDER — PEN NEEDLE, DIABETIC 32GX 5/32"
NEEDLE, DISPOSABLE MISCELLANEOUS
Qty: 200 EACH | Refills: 1 | Status: SHIPPED | OUTPATIENT
Start: 2022-02-08 | End: 2022-09-19

## 2022-02-08 RX ORDER — EMPAGLIFLOZIN 25 MG/1
TABLET, FILM COATED ORAL
Qty: 90 TABLET | Refills: 0 | Status: SHIPPED | OUTPATIENT
Start: 2022-02-08 | End: 2022-05-03

## 2022-02-08 RX ORDER — DULAGLUTIDE 3 MG/.5ML
INJECTION, SOLUTION SUBCUTANEOUS
Qty: 2 ML | Refills: 0 | Status: SHIPPED | OUTPATIENT
Start: 2022-02-08 | End: 2022-03-10

## 2022-02-08 RX ORDER — GLIPIZIDE 10 MG/1
TABLET, FILM COATED, EXTENDED RELEASE ORAL
Qty: 90 TABLET | Refills: 0 | Status: SHIPPED | OUTPATIENT
Start: 2022-02-08 | End: 2022-03-10

## 2022-03-09 DIAGNOSIS — Z79.4 TYPE 2 DIABETES MELLITUS WITHOUT COMPLICATION, WITH LONG-TERM CURRENT USE OF INSULIN (H): ICD-10-CM

## 2022-03-09 DIAGNOSIS — E11.65 TYPE 2 DIABETES MELLITUS WITH HYPERGLYCEMIA, WITH LONG-TERM CURRENT USE OF INSULIN (H): ICD-10-CM

## 2022-03-09 DIAGNOSIS — J30.9 ALLERGIC RHINITIS, UNSPECIFIED SEASONALITY, UNSPECIFIED TRIGGER: ICD-10-CM

## 2022-03-09 DIAGNOSIS — E78.5 HYPERLIPIDEMIA LDL GOAL <100: ICD-10-CM

## 2022-03-09 DIAGNOSIS — E11.9 TYPE 2 DIABETES MELLITUS WITHOUT COMPLICATION, WITH LONG-TERM CURRENT USE OF INSULIN (H): ICD-10-CM

## 2022-03-09 DIAGNOSIS — Z79.4 TYPE 2 DIABETES MELLITUS WITH HYPERGLYCEMIA, WITH LONG-TERM CURRENT USE OF INSULIN (H): ICD-10-CM

## 2022-03-09 DIAGNOSIS — M54.81 OCCIPITAL NEURALGIA OF RIGHT SIDE: ICD-10-CM

## 2022-03-10 RX ORDER — LOSARTAN POTASSIUM 25 MG/1
TABLET ORAL
Qty: 15 TABLET | Refills: 1 | Status: SHIPPED | OUTPATIENT
Start: 2022-03-10 | End: 2022-06-15

## 2022-03-10 RX ORDER — DULAGLUTIDE 3 MG/.5ML
INJECTION, SOLUTION SUBCUTANEOUS
Qty: 2 ML | Refills: 3 | Status: SHIPPED | OUTPATIENT
Start: 2022-03-10 | End: 2022-05-03

## 2022-03-10 RX ORDER — ROSUVASTATIN CALCIUM 10 MG/1
TABLET, COATED ORAL
Qty: 90 TABLET | Refills: 0 | Status: SHIPPED | OUTPATIENT
Start: 2022-03-10 | End: 2022-06-15

## 2022-03-10 RX ORDER — DULOXETIN HYDROCHLORIDE 60 MG/1
CAPSULE, DELAYED RELEASE ORAL
Qty: 90 CAPSULE | Refills: 1 | Status: SHIPPED | OUTPATIENT
Start: 2022-03-10 | End: 2022-09-19

## 2022-03-10 RX ORDER — MONTELUKAST SODIUM 10 MG/1
TABLET ORAL
Qty: 90 TABLET | Refills: 2 | Status: SHIPPED | OUTPATIENT
Start: 2022-03-10 | End: 2023-05-18

## 2022-03-10 RX ORDER — GLIPIZIDE 10 MG/1
TABLET, FILM COATED, EXTENDED RELEASE ORAL
Qty: 180 TABLET | Refills: 3 | Status: SHIPPED | OUTPATIENT
Start: 2022-03-10 | End: 2023-03-07

## 2022-03-10 NOTE — TELEPHONE ENCOUNTER
"Routing refill request to provider for review/approval because:  Labs not current:  creatinine, LDL  BP not current    Requested Prescriptions   Pending Prescriptions Disp Refills    losartan (COZAAR) 25 MG tablet [Pharmacy Med Name: LOSARTAN POTASSIUM 25MG TABS] 15 tablet 1     Sig: TAKE ONE-HALF TABLET BY MOUTH EVERY DAY .        Angiotensin-II Receptors Failed - 3/9/2022  7:30 AM        Failed - Last blood pressure under 140/90 in past 12 months       BP Readings from Last 3 Encounters:   02/01/21 136/88   06/09/20 125/87   05/15/20 131/86                 Failed - Normal serum creatinine on file in past 12 months     Recent Labs   Lab Test 02/07/22  0830 03/27/19  1325 03/01/19  0916   CR 0.78   < >  --    CREAT  --   --  0.5*    < > = values in this interval not displayed.       Ok to refill medication if creatinine is low          Passed - Recent (12 mo) or future (30 days) visit within the authorizing provider's specialty     Patient has had an office visit with the authorizing provider or a provider within the authorizing providers department within the previous 12 mos or has a future within next 30 days. See \"Patient Info\" tab in inbasket, or \"Choose Columns\" in Meds & Orders section of the refill encounter.              Passed - Medication is active on med list        Passed - Patient is age 18 or older        Passed - No active pregnancy on record        Passed - Normal serum potassium on file in past 12 months       Recent Labs   Lab Test 02/07/22 0830   POTASSIUM 4.5                    Passed - No positive pregnancy test in past 12 months           glipiZIDE (GLUCOTROL XL) 10 MG 24 hr tablet [Pharmacy Med Name: GLIPIZIDE ER 10MG TB24] 60 tablet 0     Sig: TAKE ONE TABLET BY MOUTH EVERY DAY INCREASE TO 20 MG ON NON-WORK DAYS        Sulfonylurea Agents Failed - 3/9/2022  7:30 AM        Failed - Patient has a recent creatinine (normal) within the past 12 mos.     Recent Labs   Lab Test 02/07/22 0830 " "03/27/19  1325 03/01/19  0916   CR 0.78   < >  --    CREAT  --   --  0.5*    < > = values in this interval not displayed.       Ok to refill medication if creatinine is low          Passed - Patient has documented A1c within the specified period of time.     If HgbA1C is 8 or greater, it needs to be on file within the past 3 months.  If less than 8, must be on file within the past 6 months.     Recent Labs   Lab Test 01/17/22  0721   A1C 6.8*             Passed - Medication is active on med list        Passed - Patient is age 18 or older        Passed - No active pregnancy on record        Passed - Patient has not had a positive pregnancy test within the past 12 mos.        Passed - Recent (6 mo) or future (30 days) visit within the authorizing provider's specialty     Patient had office visit in the last 6 months or has a visit in the next 30 days with authorizing provider or within the authorizing provider's specialty.  See \"Patient Info\" tab in [employer]works in [employer], or \"Choose Columns\" in Meds & Orders section of the refill encounter.               rosuvastatin (CRESTOR) 10 MG tablet [Pharmacy Med Name: ROSUVASTATIN CALCIUM 10MG TABS] 90 tablet 1     Sig: TAKE ONE TABLET BY MOUTH EVERY DAY        Statins Protocol Failed - 3/9/2022  7:30 AM        Failed - LDL on file in past 12 months     Recent Labs   Lab Test 01/11/21  1134   LDL 76               Passed - No abnormal creatine kinase in past 12 months     No lab results found.             Passed - Recent (12 mo) or future (30 days) visit within the authorizing provider's specialty     Patient has had an office visit with the authorizing provider or a provider within the authorizing providers department within the previous 12 mos or has a future within next 30 days. See \"Patient Info\" tab in [employer], or \"Choose Columns\" in Meds & Orders section of the refill encounter.              Passed - Medication is active on med list        Passed - Patient is age 18 or older        " "Passed - No active pregnancy on record        Passed - No positive pregnancy test in past 12 months           TRULICITY 3 MG/0.5ML SOPN [Pharmacy Med Name: TRULICITY 3MG/0.5ML SOPN] 2 mL 0     Sig: INJECT 0.5ML UNDER THE SKIN EVERY 7 DAYS        GLP-1 Agonists Protocol Failed - 3/9/2022  7:30 AM        Failed - Normal serum creatinine on file in past 12 months     Recent Labs   Lab Test 02/07/22  0830 03/27/19  1325 03/01/19  0916   CR 0.78   < >  --    CREAT  --   --  0.5*    < > = values in this interval not displayed.       Ok to refill medication if creatinine is low          Passed - HgbA1C in past 3 or 6 months     If HgbA1C is 8 or greater, it needs to be on file within the past 3 months.  If less than 8, must be on file within the past 6 months.     Recent Labs   Lab Test 01/17/22  0721   A1C 6.8*             Passed - Medication is active on med list        Passed - Patient is age 18 or older        Passed - No active pregnancy on record        Passed - No positive pregnancy test in past 12 months        Passed - Recent (6 mo) or future (30 days) visit within the authorizing provider's specialty     Patient had office visit in the last 6 months or has a visit in the next 30 days with authorizing provider.  See \"Patient Info\" tab in inbasket, or \"Choose Columns\" in Meds & Orders section of the refill encounter.               DULoxetine (CYMBALTA) 60 MG capsule [Pharmacy Med Name: DULOXETINE HCL 60MG CPEP] 90 capsule 1     Sig: TAKE ONE CAPSULE BY MOUTH EVERY DAY        Serotonin-Norepinephrine Reuptake Inhibitors  Failed - 3/9/2022  7:30 AM        Failed - Blood pressure under 140/90 in past 12 months       BP Readings from Last 3 Encounters:   02/01/21 136/88   06/09/20 125/87   05/15/20 131/86                 Passed - Recent (12 mo) or future (30 days) visit within the authorizing provider's specialty     Patient has had an office visit with the authorizing provider or a provider within the authorizing " "providers department within the previous 12 mos or has a future within next 30 days. See \"Patient Info\" tab in inbasket, or \"Choose Columns\" in Meds & Orders section of the refill encounter.              Passed - Medication is active on med list        Passed - Patient is age 18 or older        Passed - No active pregnancy on record        Passed - No positive pregnancy test in past 12 months          Signed Prescriptions Disp Refills    montelukast (SINGULAIR) 10 MG tablet 90 tablet 2     Sig: TAKE ONE TABLET BY MOUTH AT BEDTIME        Leukotriene Inhibitors Protocol Passed - 3/9/2022  7:30 AM        Passed - Patient is age 12 or older     If patient is under 16, ok to refill using age based dosing.           Passed - Recent (12 mo) or future (30 days) visit within the authorizing provider's specialty     Patient has had an office visit with the authorizing provider or a provider within the authorizing providers department within the previous 12 mos or has a future within next 30 days. See \"Patient Info\" tab in inbasket, or \"Choose Columns\" in Meds & Orders section of the refill encounter.              Passed - Medication is active on med list            Brianda Leger RN, BSN  [facility]        "

## 2022-04-05 DIAGNOSIS — B35.3 TINEA PEDIS OF BOTH FEET: ICD-10-CM

## 2022-04-05 DIAGNOSIS — L03.032 ONYCHIA OF TOE OF LEFT FOOT: ICD-10-CM

## 2022-04-06 RX ORDER — ECONAZOLE NITRATE 10 MG/G
CREAM TOPICAL
Qty: 85 G | Refills: 5 | Status: SHIPPED | OUTPATIENT
Start: 2022-04-06

## 2022-04-24 ENCOUNTER — HEALTH MAINTENANCE LETTER (OUTPATIENT)
Age: 47
End: 2022-04-24

## 2022-04-25 ENCOUNTER — OFFICE VISIT (OUTPATIENT)
Dept: NEUROLOGY | Facility: CLINIC | Age: 47
End: 2022-04-25
Payer: COMMERCIAL

## 2022-04-25 DIAGNOSIS — M54.81 BILATERAL OCCIPITAL NEURALGIA: Primary | ICD-10-CM

## 2022-04-25 PROCEDURE — 64405 NJX AA&/STRD GR OCPL NRV: CPT | Mod: 50 | Performed by: PSYCHIATRY & NEUROLOGY

## 2022-04-25 PROCEDURE — 64450 NJX AA&/STRD OTHER PN/BRANCH: CPT | Mod: 50 | Performed by: PSYCHIATRY & NEUROLOGY

## 2022-04-25 RX ORDER — BUPIVACAINE HYDROCHLORIDE 5 MG/ML
8 INJECTION, SOLUTION EPIDURAL; INTRACAUDAL ONCE
Status: COMPLETED | OUTPATIENT
Start: 2022-04-25 | End: 2022-04-25

## 2022-04-25 RX ORDER — TRIAMCINOLONE ACETONIDE 40 MG/ML
40 INJECTION, SUSPENSION INTRA-ARTICULAR; INTRAMUSCULAR ONCE
Status: COMPLETED | OUTPATIENT
Start: 2022-04-25 | End: 2022-04-25

## 2022-04-25 RX ADMIN — TRIAMCINOLONE ACETONIDE 40 MG: 40 INJECTION, SUSPENSION INTRA-ARTICULAR; INTRAMUSCULAR at 10:40

## 2022-04-25 RX ADMIN — BUPIVACAINE HYDROCHLORIDE 40 MG: 5 INJECTION, SOLUTION EPIDURAL; INTRACAUDAL at 10:37

## 2022-04-25 NOTE — PROGRESS NOTES
[facility]  Neurology Procedure  04/25/22     Cornelia Rodriguez MRN# [id number]   YOB: 1975 Age: 46 year old            Occipital Nerve Block Procedure Note   DIAGNOSIS  Encounter Diagnosis   Name Primary?     Bilateral occipital neuralgia Yes        PROCEDURE   bilateral, Greater and Lesser occipital nerve blocks.      INFORMED CONSENT  I discussed the risks, benefits, alternatives, and the necessity of other members of the healthcare team participating in the procedure with the patient.  The patient understood and wished to proceed with the procedure.    PROCEDURAL PAUSE   Patient identity, procedure to be performed, correct side and site, patient position, availability of equipment, and special requirements were verified.      PROCEDURE DETAILS   Ms. Rodriguez was placed in the sitting position with her head and neck flexed.  Landmarks were palpated.  The area was prepped with ChloraPrep and sterile technique was used.  A 1.50 inch 25 gauge sterile needle was introduced 1/3 of the distance lateral from the occipital protuberance to the mastoid process on the left side.  After negative aspiration for blood, a solution containing 20 mg of triamcinolone acetonide diluted in a 1:2 mixture of 1% lidocaine and 0.5% bupivacaine for a total volume of 6 mL was injected in a fan-like fashion along the nuchal ridge between the occipital protuberance and mastoid process. The procedure was repeated in the exact same way on the opposite side with the same injectate.     Ms. Rodriguez tolerated the procedure well, and there were no apparent complications.  After appropriate observation, she was dismissed in good condition under her own power.    COMMENTS  Ms. Rodriguez received a total of 40 mg of triamcinolone acetonide.  Her pain was 4/10 in severity prior to the procedure, and NUMB/10 following the procedure.

## 2022-04-25 NOTE — LETTER
4/25/2022       RE: Cornelia Rodriguez  [address]  [place] [address]     Dear Colleague,    Thank you for referring your patient, Cornelia Rodriguez, to the [facility]OLOG[facility] [place] at [facility]. Please see a copy of my visit note below.    [facility]  Neurology Procedure  04/25/22     Cornelia Rodriguez MRN# [id number]   YOB: 1975 Age: 46 year old            Occipital Nerve Block Procedure Note   DIAGNOSIS  Encounter Diagnosis   Name Primary?     Bilateral occipital neuralgia Yes        PROCEDURE   bilateral, Greater and Lesser occipital nerve blocks.      INFORMED CONSENT  I discussed the risks, benefits, alternatives, and the necessity of other members of the healthcare team participating in the procedure with the patient.  The patient understood and wished to proceed with the procedure.    PROCEDURAL PAUSE   Patient identity, procedure to be performed, correct side and site, patient position, availability of equipment, and special requirements were verified.      PROCEDURE DETAILS   Ms. Rodriguez was placed in the sitting position with her head and neck flexed.  Landmarks were palpated.  The area was prepped with ChloraPrep and sterile technique was used.  A 1.50 inch 25 gauge sterile needle was introduced 1/3 of the distance lateral from the occipital protuberance to the mastoid process on the left side.  After negative aspiration for blood, a solution containing 20 mg of triamcinolone acetonide diluted in a 1:2 mixture of 1% lidocaine and 0.5% bupivacaine for a total volume of 6 mL was injected in a fan-like fashion along the nuchal ridge between the occipital protuberance and mastoid process. The procedure was repeated in the exact same way on the opposite side with the same injectate.     Ms. Rodriguez tolerated the procedure well, and there were no apparent complications.   After appropriate observation, she was dismissed in good condition under her own power.    COMMENTS  Ms. Rodriguez received a total of 40 mg of triamcinolone acetonide.  Her pain was 4/10 in severity prior to the procedure, and NUMB/10 following the procedure.      Sincerely,    Carolann Rutledge MD

## 2022-05-01 DIAGNOSIS — Z79.4 TYPE 2 DIABETES MELLITUS WITH HYPERGLYCEMIA, WITH LONG-TERM CURRENT USE OF INSULIN (H): ICD-10-CM

## 2022-05-01 DIAGNOSIS — J30.9 ALLERGIC RHINITIS, UNSPECIFIED SEASONALITY, UNSPECIFIED TRIGGER: ICD-10-CM

## 2022-05-01 DIAGNOSIS — E11.65 TYPE 2 DIABETES MELLITUS WITH HYPERGLYCEMIA, WITH LONG-TERM CURRENT USE OF INSULIN (H): ICD-10-CM

## 2022-05-03 RX ORDER — EMPAGLIFLOZIN 25 MG/1
TABLET, FILM COATED ORAL
Qty: 90 TABLET | Refills: 1 | Status: SHIPPED | OUTPATIENT
Start: 2022-05-03 | End: 2022-11-04

## 2022-05-03 RX ORDER — MONTELUKAST SODIUM 10 MG/1
TABLET ORAL
Qty: 90 TABLET | Refills: 2 | OUTPATIENT
Start: 2022-05-03

## 2022-05-03 RX ORDER — DULAGLUTIDE 3 MG/.5ML
INJECTION, SOLUTION SUBCUTANEOUS
Qty: 6 ML | Refills: 1 | Status: SHIPPED | OUTPATIENT
Start: 2022-05-03 | End: 2022-11-04

## 2022-06-13 ENCOUNTER — TELEPHONE (OUTPATIENT)
Dept: FAMILY MEDICINE | Facility: CLINIC | Age: 47
End: 2022-06-13
Payer: COMMERCIAL

## 2022-06-13 DIAGNOSIS — E78.5 HYPERLIPIDEMIA LDL GOAL <100: ICD-10-CM

## 2022-06-13 DIAGNOSIS — E11.9 TYPE 2 DIABETES MELLITUS WITHOUT COMPLICATION, WITH LONG-TERM CURRENT USE OF INSULIN (H): ICD-10-CM

## 2022-06-13 DIAGNOSIS — Z79.4 TYPE 2 DIABETES MELLITUS WITHOUT COMPLICATION, WITH LONG-TERM CURRENT USE OF INSULIN (H): ICD-10-CM

## 2022-06-15 RX ORDER — LOSARTAN POTASSIUM 25 MG/1
TABLET ORAL
Qty: 45 TABLET | Refills: 1 | Status: SHIPPED | OUTPATIENT
Start: 2022-06-15 | End: 2023-02-17

## 2022-06-15 RX ORDER — ROSUVASTATIN CALCIUM 10 MG/1
TABLET, COATED ORAL
Qty: 90 TABLET | Refills: 0 | Status: SHIPPED | OUTPATIENT
Start: 2022-06-15 | End: 2022-11-04

## 2022-06-15 NOTE — TELEPHONE ENCOUNTER
Sent My Chart message to patient as reminder to set up visit.     Brianda Leger RN, BSN  [facility]

## 2022-06-15 NOTE — TELEPHONE ENCOUNTER
"Requested Prescriptions   Pending Prescriptions Disp Refills    losartan (COZAAR) 25 MG tablet [Pharmacy Med Name: LOSARTAN POTASSIUM 25MG TABS] 15 tablet 1     Sig: TAKE ONE-HALF TABLET BY MOUTH EVERY DAY .        Angiotensin-II Receptors Failed - 6/13/2022 12:41 PM        Failed - Last blood pressure under 140/90 in past 12 months       BP Readings from Last 3 Encounters:   02/01/21 136/88   06/09/20 125/87   05/15/20 131/86                 Passed - Recent (12 mo) or future (30 days) visit within the authorizing provider's specialty     Patient has had an office visit with the authorizing provider or a provider within the authorizing providers department within the previous 12 mos or has a future within next 30 days. See \"Patient Info\" tab in inbasket, or \"Choose Columns\" in Meds & Orders section of the refill encounter.              Passed - Medication is active on med list        Passed - Patient is age 18 or older        Passed - No active pregnancy on record        Passed - Normal serum creatinine on file in past 12 months     Recent Labs   Lab Test 02/07/22  0830 03/27/19  1325 03/01/19  0916   CR 0.78   < >  --    CREAT  --   --  0.5*    < > = values in this interval not displayed.       Ok to refill medication if creatinine is low          Passed - Normal serum potassium on file in past 12 months       Recent Labs   Lab Test 02/07/22  0830   POTASSIUM 4.5                    Passed - No positive pregnancy test in past 12 months           rosuvastatin (CRESTOR) 10 MG tablet [Pharmacy Med Name: ROSUVASTATIN CALCIUM 10MG TABS] 90 tablet 0     Sig: TAKE ONE TABLET BY MOUTH EVERY DAY        Statins Protocol Failed - 6/13/2022 12:41 PM        Failed - LDL on file in past 12 months     Recent Labs   Lab Test 01/11/21  1134   LDL 76               Passed - No abnormal creatine kinase in past 12 months     No lab results found.             Passed - Recent (12 mo) or future (30 days) visit within the authorizing " "provider's specialty     Patient has had an office visit with the authorizing provider or a provider within the authorizing providers department within the previous 12 mos or has a future within next 30 days. See \"Patient Info\" tab in inbasket, or \"Choose Columns\" in Meds & Orders section of the refill encounter.              Passed - Medication is active on med list        Passed - Patient is age 18 or older        Passed - No active pregnancy on record        Passed - No positive pregnancy test in past 12 months              "

## 2022-07-08 ENCOUNTER — TELEPHONE (OUTPATIENT)
Dept: OPTOMETRY | Facility: CLINIC | Age: 47
End: 2022-07-08

## 2022-07-08 NOTE — TELEPHONE ENCOUNTER
Left Voicemail (2nd Attempt) for the patient to call back and schedule the following:    Appointment type: return   Provider: Dr. Jose  Return date: 1/28/2023  Specialty phone number: [phone]  Additonal Notes: yearly eye exam    Chrissy kline Procedure   Orthopedics, Podiatry, Sports Medicine, ENT/Eye Specialties  [facility]   [phone]

## 2022-08-01 ENCOUNTER — OFFICE VISIT (OUTPATIENT)
Dept: NEUROLOGY | Facility: CLINIC | Age: 47
End: 2022-08-01
Payer: COMMERCIAL

## 2022-08-01 DIAGNOSIS — M54.81 BILATERAL OCCIPITAL NEURALGIA: Primary | ICD-10-CM

## 2022-08-01 PROCEDURE — 64450 NJX AA&/STRD OTHER PN/BRANCH: CPT | Mod: 50 | Performed by: PSYCHIATRY & NEUROLOGY

## 2022-08-01 PROCEDURE — 64405 NJX AA&/STRD GR OCPL NRV: CPT | Mod: 50 | Performed by: PSYCHIATRY & NEUROLOGY

## 2022-08-01 RX ORDER — TRIAMCINOLONE ACETONIDE 40 MG/ML
40 INJECTION, SUSPENSION INTRA-ARTICULAR; INTRAMUSCULAR ONCE
Status: COMPLETED | OUTPATIENT
Start: 2022-08-01 | End: 2022-08-01

## 2022-08-01 RX ORDER — BUPIVACAINE HYDROCHLORIDE 5 MG/ML
8 INJECTION, SOLUTION EPIDURAL; INTRACAUDAL ONCE
Status: COMPLETED | OUTPATIENT
Start: 2022-08-01 | End: 2022-08-01

## 2022-08-01 RX ADMIN — BUPIVACAINE HYDROCHLORIDE 40 MG: 5 INJECTION, SOLUTION EPIDURAL; INTRACAUDAL at 13:41

## 2022-08-01 RX ADMIN — TRIAMCINOLONE ACETONIDE 40 MG: 40 INJECTION, SUSPENSION INTRA-ARTICULAR; INTRAMUSCULAR at 13:43

## 2022-08-01 NOTE — PROGRESS NOTES
[facility]  Neurology Procedure  08/01/22     Cornelia Rodriguez MRN# [id number]   YOB: 1975 Age: 46 year old            Occipital Nerve Block Procedure Note   DIAGNOSIS  Encounter Diagnosis   Name Primary?     Bilateral occipital neuralgia Yes      PROCEDURE   bilateral, Greater and Lesser occipital nerve blocks.      INFORMED CONSENT  I discussed the risks, benefits, alternatives, and the necessity of other members of the healthcare team participating in the procedure with the patient.  The patient understood and wished to proceed with the procedure.    PROCEDURAL PAUSE   Patient identity, procedure to be performed, correct side and site, patient position, availability of equipment, and special requirements were verified.      PROCEDURE DETAILS   Ms. Rodriguez was placed in the sitting position with her head and neck flexed.  Landmarks were palpated.  The area was prepped with ChloraPrep and sterile technique was used.  A 1.50 inch 25 gauge sterile needle was introduced 1/3 of the distance lateral from the occipital protuberance to the mastoid process on the left side.  After negative aspiration for blood, a solution containing 20 mg of triamcinolone acetonide diluted in a 1:2 mixture of 1% lidocaine and 0.5% bupivacaine for a total volume of 6 mL was injected in a fan-like fashion along the nuchal ridge between the occipital protuberance and mastoid process. The procedure was repeated in the exact same way on the opposite side with the same injectate.     Ms. Rodriguez tolerated the procedure well, and there were no apparent complications.  After appropriate observation, she was dismissed in good condition under her own power.    COMMENTS  Ms. Rodriguez received a total of 40 mg of triamcinolone acetonide.  Her pain was 5/10 in severity prior to the procedure, and numbness/10 following the procedure.    ONBs are helpful for 9-10 weeks.

## 2022-08-01 NOTE — LETTER
8/1/2022       RE: Cornelia Rodriguez  [address]  [place] [facility] [address]     Dear Colleague,    Thank you for referring your patient, Cornelia Rodriguez, to the [facility]UR[facility] [place] at [facility]. Please see a copy of my visit note below.    [facility]  Neurology Procedure  08/01/22     Cornelia Rodriguez MRN# [id number]   YOB: 1975 Age: 46 year old            Occipital Nerve Block Procedure Note   DIAGNOSIS  Encounter Diagnosis   Name Primary?     Bilateral occipital neuralgia Yes      PROCEDURE   bilateral, Greater and Lesser occipital nerve blocks.      INFORMED CONSENT  I discussed the risks, benefits, alternatives, and the necessity of other members of the healthcare team participating in the procedure with the patient.  The patient understood and wished to proceed with the procedure.    PROCEDURAL PAUSE   Patient identity, procedure to be performed, correct side and site, patient position, availability of equipment, and special requirements were verified.      PROCEDURE DETAILS   Ms. Rodriguez was placed in the sitting position with her head and neck flexed.  Landmarks were palpated.  The area was prepped with ChloraPrep and sterile technique was used.  A 1.50 inch 25 gauge sterile needle was introduced 1/3 of the distance lateral from the occipital protuberance to the mastoid process on the left side.  After negative aspiration for blood, a solution containing 20 mg of triamcinolone acetonide diluted in a 1:2 mixture of 1% lidocaine and 0.5% bupivacaine for a total volume of 6 mL was injected in a fan-like fashion along the nuchal ridge between the occipital protuberance and mastoid process. The procedure was repeated in the exact same way on the opposite side with the same injectate.     Ms. Rodriguez tolerated the procedure well, and there were no apparent complications.  After  appropriate observation, she was dismissed in good condition under her own power.    COMMENTS  Ms. Rodriguez received a total of 40 mg of triamcinolone acetonide.  Her pain was 5/10 in severity prior to the procedure, and numbness/10 following the procedure.    ONBs are helpful for 9-10 weeks.          Sincerely,    Carolann Rutledge MD

## 2022-08-14 ENCOUNTER — HEALTH MAINTENANCE LETTER (OUTPATIENT)
Age: 47
End: 2022-08-14

## 2022-09-19 ENCOUNTER — TELEPHONE (OUTPATIENT)
Dept: FAMILY MEDICINE | Facility: CLINIC | Age: 47
End: 2022-09-19

## 2022-09-19 DIAGNOSIS — Z79.4 TYPE 2 DIABETES MELLITUS WITH HYPERGLYCEMIA, WITH LONG-TERM CURRENT USE OF INSULIN (H): ICD-10-CM

## 2022-09-19 DIAGNOSIS — M54.81 OCCIPITAL NEURALGIA OF RIGHT SIDE: ICD-10-CM

## 2022-09-19 DIAGNOSIS — E11.65 TYPE 2 DIABETES MELLITUS WITH HYPERGLYCEMIA, WITH LONG-TERM CURRENT USE OF INSULIN (H): ICD-10-CM

## 2022-09-19 RX ORDER — DULOXETIN HYDROCHLORIDE 60 MG/1
CAPSULE, DELAYED RELEASE ORAL
Qty: 90 CAPSULE | Refills: 1 | Status: SHIPPED | OUTPATIENT
Start: 2022-09-19 | End: 2023-03-20

## 2022-09-19 RX ORDER — PEN NEEDLE, DIABETIC 32GX 5/32"
NEEDLE, DISPOSABLE MISCELLANEOUS
Qty: 200 EACH | Refills: 3 | Status: SHIPPED | OUTPATIENT
Start: 2022-09-19 | End: 2023-10-03

## 2022-09-19 NOTE — TELEPHONE ENCOUNTER
Lyndsay refill sent  Due for med check prior to further refills (virutal visit is ok)  Please send reminder

## 2022-10-17 ENCOUNTER — VIRTUAL VISIT (OUTPATIENT)
Dept: FAMILY MEDICINE | Facility: CLINIC | Age: 47
End: 2022-10-17
Payer: COMMERCIAL

## 2022-10-17 DIAGNOSIS — K21.9 GASTROESOPHAGEAL REFLUX DISEASE WITHOUT ESOPHAGITIS: ICD-10-CM

## 2022-10-17 DIAGNOSIS — E78.5 HYPERLIPIDEMIA LDL GOAL <100: ICD-10-CM

## 2022-10-17 DIAGNOSIS — Z79.4 TYPE 2 DIABETES MELLITUS WITH HYPOGLYCEMIA WITHOUT COMA, WITH LONG-TERM CURRENT USE OF INSULIN (H): Primary | ICD-10-CM

## 2022-10-17 DIAGNOSIS — Z12.11 SCREEN FOR COLON CANCER: ICD-10-CM

## 2022-10-17 DIAGNOSIS — Z13.220 SCREENING FOR HYPERLIPIDEMIA: ICD-10-CM

## 2022-10-17 DIAGNOSIS — G43.009 MIGRAINE WITHOUT AURA AND WITHOUT STATUS MIGRAINOSUS, NOT INTRACTABLE: ICD-10-CM

## 2022-10-17 DIAGNOSIS — Z13.220 SCREENING CHOLESTEROL LEVEL: ICD-10-CM

## 2022-10-17 DIAGNOSIS — J30.9 ALLERGIC RHINITIS, UNSPECIFIED SEASONALITY, UNSPECIFIED TRIGGER: ICD-10-CM

## 2022-10-17 DIAGNOSIS — K76.0 NON-ALCOHOLIC FATTY LIVER DISEASE: ICD-10-CM

## 2022-10-17 DIAGNOSIS — E11.649 TYPE 2 DIABETES MELLITUS WITH HYPOGLYCEMIA WITHOUT COMA, WITH LONG-TERM CURRENT USE OF INSULIN (H): Primary | ICD-10-CM

## 2022-10-17 DIAGNOSIS — E66.01 MORBID OBESITY (H): ICD-10-CM

## 2022-10-17 DIAGNOSIS — Z12.31 SCREENING MAMMOGRAM, ENCOUNTER FOR: ICD-10-CM

## 2022-10-17 PROCEDURE — 99214 OFFICE O/P EST MOD 30 MIN: CPT | Mod: 95 | Performed by: FAMILY MEDICINE

## 2022-10-17 NOTE — LETTER
November 30, 2022      Cornelia Rodriguez  [address]  [place] [address]        Dear ,    We are writing to inform you of your test results.    Cornelia,  Looks like the cologuard sample wasn't able to be processed and needs to be repeated. Hopefully the company will contact you and send out a new test kit to complete again. Please let me know if you don't get this.   Please MyChart or call if you have any concerns or questions.   Sincerely,    Resulted Orders   COLOGUARD(EXACT SCIENCES)   Result Value Ref Range    COLOGUARD-ABSTRACT Sample Could Not Be Processed 3 Negative      Comment:      The stool exceeds the allowable weight (300 grams). The patient will be contacted to initiate a new sample collection.    TEST DESCRIPTION: Composite algorithmic analysis of stool DNA-biomarkers with hemoglobin immunoassay.   Quantitative values of individual biomarkers are not reportable and are not associated with individual biomarker result reference ranges. Cologuard is intended for colorectal cancer screening of adults of either sex, 45 years or older, who are at average-risk for colorectal cancer (CRC). Cologuard has been approved for use by the U.S. FDA. The performance of Cologuard was established in a cross sectional study of average-risk adults aged 50-84. Cologuard performance in patients ages 45 to 49 years was estimated by sub-group analysis of near-age groups. Colonoscopies performed for a positive result may find as the most clinically significant lesion: colorectal cancer [4.0%], advanced adenoma (including sessile serrated polyps greater than or equal to 1cm diameter)  [20%] or non- advanced adenoma [31%]; or no colorectal neoplasia [45%]. These estimates are derived from a prospective cross-sectional screening study of 10,000 individuals at average risk for colorectal cancer who were screened with both Cologuard and colonoscopy. (Kasie Guido al, N Engl J Med 2014;370(14):9950-8815.)  Cologuard may produce a false negative or false positive result (no colorectal cancer or precancerous polyp present at colonoscopy follow up). A negative Cologuard test result does not guarantee the absence of CRC or advanced adenoma (pre-cancer). The current Cologuard screening interval is every 3 years. (American Cancer Society and U.S. Multi-Society Task Force). Cologuard performance data in a 10,000 patient pivotal study using colonoscopy as the reference method can be accessed at the following location: [email or web address]/results. Additional description of the Cologuard test process, warnings and precautions can be found at www.cologuard.com.         If you have any questions or concerns, please call the clinic at the number listed above.       Sincerely,      Sarah Lombardo MD

## 2022-10-17 NOTE — PATIENT INSTRUCTIONS
Schedule fasting lab visit, mammogram  Complete cologuard for colon cancer screening    Vaccines eligible for:  Pneumococcal (Prevnar 20)  Covid 19 bivalent booster.

## 2022-10-17 NOTE — PROGRESS NOTES
Cornelia Savage is a 46 year old who is being evaluated via a billable telephone visit.      What phone number would you like to be contacted at? [phone]  How would you like to obtain your AVS? MyChart    Assessment & Plan     Type 2 diabetes mellitus with hypoglycemia without coma, with long-term current use of insulin (H)  Overall very well controlled based on reported home glucose. Few episodes of known etiology mild hypoglcyemia. Will get a1c. If low, consider drop of sulfonyurea dose to help minimize hypoglycemia  O/w continue same meds    Non-alcoholic fatty liver disease  Morbid obesity (H)  Discussed healthy diet/exercise/wt     Gastroesophageal reflux disease without esophagitis  Well controlled on just intermittent PPI    Hyperlipidemia LDL goal <100  On statin    Migraine without aura and without status migrainosus, not intractable  Controlled with regular occipital injections. Plan per neuro    Allergic rhinitis, unspecified seasonality, unspecified trigger  Managed with singulair/antihistamine and vik pot    Screen for colon cancer  Reviewed options. She prefers cologuard testing.   - COLOGUARD([employer])    Screening mammogram, encounter for  Reminder given to schedule  - MA SCREENING DIGITAL BILAT - Future  (s+30); Future    Screening cholesterol level  - Lipid panel reflex to direct LDL Fasting; Future    Future hmpo lab orders placed         Return in about 6 months (around 4/17/2023) for diabetes visit, in person.     Patient Instructions   Schedule fasting lab visit, mammogram  Complete cologuard for colon cancer screening    Vaccines eligible for:  Pneumococcal (Prevnar 20)  Covid 19 bivalent booster.       Sarah Lombardo MD  [facility]   Cornelia Savage is a 46 year old, presenting for the following health issues:  Recheck Medication      HPI     Diabetes Follow-up    How often are you checking your blood sugar? Two times daily  Blood sugar  testing frequency justification:  Adjustment of medication(s)  What time of day are you checking your blood sugars (select all that apply)?  Before meals  Have you had any blood sugars above 200?  No  Have you had any blood sugars below 70?  Yes     What symptoms do you notice when your blood sugar is low?  Weak    What concerns do you have today about your diabetes? None     Do you have any of these symptoms? (Select all that apply)  No numbness or tingling in feet.  No redness, sores or blisters on feet.  No complaints of excessive thirst.  No reports of blurry vision.  No significant changes to weight.              Hyperlipidemia Follow-Up      Are you regularly taking any medication or supplement to lower your cholesterol?   Yes- crestor    Are you having muscle aches or other side effects that you think could be caused by your cholesterol lowering medication?  No    Hypertension Follow-up      Do you check your blood pressure regularly outside of the clinic? No     Are you following a low salt diet? Yes    Are your blood pressures ever more than 140 on the top number (systolic) OR more   than 90 on the bottom number (diastolic), for example 140/90? No    BP Readings from Last 2 Encounters:   02/01/21 136/88   06/09/20 125/87     Hemoglobin A1C (%)   Date Value   01/17/2022 6.8 (H)   04/12/2021 7.5 (H)   01/11/2021 8.2 (H)     LDL Cholesterol Calculated (mg/dL)   Date Value   01/11/2021 76   01/21/2020 44         How many servings of fruits and vegetables do you eat daily?  2-3    On average, how many sweetened beverages do you drink each day (Examples: soda, juice, sweet tea, etc.  Do NOT count diet or artificially sweetened beverages)?   0    How many days per week do you exercise enough to make your heart beat faster? 3 or less    How many minutes a day do you exercise enough to make your heart beat faster? 9 or less  How many days per week do you miss taking your medication? 1    What makes it hard for you  to take your medications?  remembering to take    Doing really well. No concerns   Working with neurologist for nerve blocks for occipital neuralgia.     gerd- no longer using daily. Takes it 2 x's a week and occ prn if eats trigger food.     DM2- gluc 180-135. Few episodes < 70 (68) when forgot to eat after taking meds. Easily corrected.   Continue on same dosing of lantus and glipizide.   Wt stable but jeans getting looser in buttock/thighs. Feel like loosing fat and gaining muscle tone. No formal exercise but very active with [employer] job (walking/bending/twisting). Eating more fruit/veggies.    HTN- not checking very often. Usually in normal range on cuff. Thinks 119/80 for last bp reading.     Allergies- at baseline. singulair and zyrtec. Also using vik pot.               Objective    Vitals - Patient Reported  Systolic (Patient Reported): 119  Diastolic (Patient Reported): 80  Pain Score: No Pain (0)      Vitals:  No vitals were obtained today due to virtual visit.    Physical Exam   healthy, alert and no distress  PSYCH: Alert and oriented times 3; coherent speech, normal   rate and volume, able to articulate logical thoughts, able   to abstract reason, no tangential thoughts, no hallucinations   or delusions  Her affect is normal and pleasant  RESP: No cough, no audible wheezing, able to talk in full sentences  Remainder of exam unable to be completed due to telephone visits              Phone call duration: 14 minutes

## 2022-10-31 ENCOUNTER — OFFICE VISIT (OUTPATIENT)
Dept: NEUROLOGY | Facility: CLINIC | Age: 47
End: 2022-10-31
Payer: COMMERCIAL

## 2022-10-31 DIAGNOSIS — M54.81 BILATERAL OCCIPITAL NEURALGIA: Primary | ICD-10-CM

## 2022-10-31 PROCEDURE — 64405 NJX AA&/STRD GR OCPL NRV: CPT | Mod: 50 | Performed by: PSYCHIATRY & NEUROLOGY

## 2022-10-31 PROCEDURE — 64450 NJX AA&/STRD OTHER PN/BRANCH: CPT | Mod: 50 | Performed by: PSYCHIATRY & NEUROLOGY

## 2022-10-31 RX ORDER — TRIAMCINOLONE ACETONIDE 40 MG/ML
40 INJECTION, SUSPENSION INTRA-ARTICULAR; INTRAMUSCULAR ONCE
Status: COMPLETED | OUTPATIENT
Start: 2022-10-31 | End: 2022-10-31

## 2022-10-31 RX ORDER — BUPIVACAINE HYDROCHLORIDE 5 MG/ML
8 INJECTION, SOLUTION PERINEURAL ONCE
Status: COMPLETED | OUTPATIENT
Start: 2022-10-31 | End: 2022-10-31

## 2022-10-31 RX ADMIN — BUPIVACAINE HYDROCHLORIDE 40 MG: 5 INJECTION, SOLUTION PERINEURAL at 14:41

## 2022-10-31 RX ADMIN — TRIAMCINOLONE ACETONIDE 40 MG: 40 INJECTION, SUSPENSION INTRA-ARTICULAR; INTRAMUSCULAR at 14:41

## 2022-10-31 NOTE — PROGRESS NOTES
[facility]  Neurology Procedure  10/31/22     oCrnelia Rodriguez MRN# [id number]   YOB: 1975 Age: 46 year old            Occipital Nerve Block Procedure Note   DIAGNOSIS  Encounter Diagnosis   Name Primary?     Bilateral occipital neuralgia Yes        PROCEDURE   bilateral, Greater and Lesser occipital nerve blocks.      INFORMED CONSENT  I discussed the risks, benefits, alternatives, and the necessity of other members of the healthcare team participating in the procedure with the patient.  The patient understood and wished to proceed with the procedure.    PROCEDURAL PAUSE   Patient identity, procedure to be performed, correct side and site, patient position, availability of equipment, and special requirements were verified.      PROCEDURE DETAILS   Ms. Rodriguez was placed in the sitting position with her head and neck flexed.  Landmarks were palpated.  The area was prepped with ChloraPrep and sterile technique was used.  A 1.50 inch 25 gauge sterile needle was introduced 1/3 of the distance lateral from the occipital protuberance to the mastoid process on the left side.  After negative aspiration for blood, a solution containing 20 mg of triamcinolone acetonide diluted in a 1:2 mixture of 1% lidocaine and 0.5% bupivacaine for a total volume of 6 mL was injected in a fan-like fashion along the nuchal ridge between the occipital protuberance and mastoid process. The procedure was repeated in the exact same way on the opposite side with the same injectate.     Ms. Rodriguez tolerated the procedure well, and there were no apparent complications.  After appropriate observation, she was dismissed in good condition under her own power.    COMMENTS  Ms. Rodriguez received a total of 40 mg of triamcinolone acetonide.  Her pain was 0/10 in severity prior to the procedure, and numb/10 following the procedure. She reports good relief of headaches for 2-2.5 months with each nerve block.    Carolann Rutledge MD  Headache  Neurology

## 2022-10-31 NOTE — LETTER
10/31/2022       RE: Cornelia Rodriguez  [address]  [facility] [facility] [place] [address]     Dear Colleague,    Thank you for referring your patient, Cornelia Rodriguez, to the [facility] [place] [facility]. Please see a copy of my visit note below.    [facility]  Neurology Procedure  10/31/22     Cornelia Rodriguez MRN# [id number]   YOB: 1975 Age: 46 year old            Occipital Nerve Block Procedure Note   DIAGNOSIS  Encounter Diagnosis   Name Primary?     Bilateral occipital neuralgia Yes        PROCEDURE   bilateral, Greater and Lesser occipital nerve blocks.      INFORMED CONSENT  I discussed the risks, benefits, alternatives, and the necessity of other members of the healthcare team participating in the procedure with the patient.  The patient understood and wished to proceed with the procedure.    PROCEDURAL PAUSE   Patient identity, procedure to be performed, correct side and site, patient position, availability of equipment, and special requirements were verified.      PROCEDURE DETAILS   Ms. Rodriguez was placed in the sitting position with her head and neck flexed.  Landmarks were palpated.  The area was prepped with ChloraPrep and sterile technique was used.  A 1.50 inch 25 gauge sterile needle was introduced 1/3 of the distance lateral from the occipital protuberance to the mastoid process on the left side.  After negative aspiration for blood, a solution containing 20 mg of triamcinolone acetonide diluted in a 1:2 mixture of 1% lidocaine and 0.5% bupivacaine for a total volume of 6 mL was injected in a fan-like fashion along the nuchal ridge between the occipital protuberance and mastoid process. The procedure was repeated in the exact same way on the opposite side with the same injectate.     Ms. Rodriguez tolerated the procedure well, and there were no apparent complications.  After appropriate observation, she was  dismissed in good condition under her own power.    COMMENTS  Ms. Rodriguez received a total of 40 mg of triamcinolone acetonide.  Her pain was 0/10 in severity prior to the procedure, and numb/10 following the procedure. She reports good relief of headaches for 2-2.5 months with each nerve block.    Carolann Rutledge MD  Headache Neurology

## 2022-11-15 ENCOUNTER — TELEPHONE (OUTPATIENT)
Dept: FAMILY MEDICINE | Facility: CLINIC | Age: 47
End: 2022-11-15

## 2022-11-15 DIAGNOSIS — Z79.4 TYPE 2 DIABETES MELLITUS WITH HYPERGLYCEMIA, WITH LONG-TERM CURRENT USE OF INSULIN (H): ICD-10-CM

## 2022-11-15 DIAGNOSIS — E11.65 TYPE 2 DIABETES MELLITUS WITH HYPERGLYCEMIA, WITH LONG-TERM CURRENT USE OF INSULIN (H): ICD-10-CM

## 2022-11-15 RX ORDER — DULAGLUTIDE 3 MG/.5ML
INJECTION, SOLUTION SUBCUTANEOUS
Qty: 6 ML | Refills: 0 | Status: CANCELLED | OUTPATIENT
Start: 2022-11-15

## 2022-11-15 NOTE — TELEPHONE ENCOUNTER
Pt calling because her trulicity that was sent 11/4 is on back order.    Pt is requesting for an alternative to be sent.    States that she does not know of other pharmacies that may have it in stock.        Clara Redd RN  [facility]

## 2022-11-15 NOTE — TELEPHONE ENCOUNTER
Sent in bydureon as alternative to the Trulicity. Continue with once weekly.   She should f/u with us with visit if issues with the transition

## 2022-11-20 LAB — NONINV COLON CA DNA+OCC BLD SCRN STL QL: NORMAL

## 2022-11-21 NOTE — RESULT ENCOUNTER NOTE
Cornelia,  Looks like the [facility] sample wasn't able to be processed and needs to be repeated. Hopefully the company will contact you and send out a new test kit to complete again. Please let me know if you don't get this.   Please MyChart or call if you have any concerns or questions.   Sincerely,  Sarah Lombardo MD

## 2022-11-28 ENCOUNTER — LAB (OUTPATIENT)
Dept: LAB | Facility: CLINIC | Age: 47
End: 2022-11-28
Payer: COMMERCIAL

## 2022-11-28 DIAGNOSIS — E11.65 TYPE 2 DIABETES MELLITUS WITH HYPERGLYCEMIA (H): ICD-10-CM

## 2022-11-28 DIAGNOSIS — Z13.220 SCREENING CHOLESTEROL LEVEL: ICD-10-CM

## 2022-11-28 LAB
ANION GAP SERPL CALCULATED.3IONS-SCNC: 5 MMOL/L (ref 3–14)
BUN SERPL-MCNC: 14 MG/DL (ref 7–30)
CALCIUM SERPL-MCNC: 10 MG/DL (ref 8.5–10.1)
CHLORIDE BLD-SCNC: 103 MMOL/L (ref 94–109)
CHOLEST SERPL-MCNC: 181 MG/DL
CO2 SERPL-SCNC: 29 MMOL/L (ref 20–32)
CREAT SERPL-MCNC: 0.74 MG/DL (ref 0.52–1.04)
FASTING STATUS PATIENT QL REPORTED: YES
GFR SERPL CREATININE-BSD FRML MDRD: >90 ML/MIN/1.73M2
GLUCOSE BLD-MCNC: 68 MG/DL (ref 70–99)
HBA1C MFR BLD: 6.5 % (ref 0–5.6)
HDLC SERPL-MCNC: 53 MG/DL
LDLC SERPL CALC-MCNC: 101 MG/DL
NONHDLC SERPL-MCNC: 128 MG/DL
POTASSIUM BLD-SCNC: 4.3 MMOL/L (ref 3.4–5.3)
SODIUM SERPL-SCNC: 137 MMOL/L (ref 133–144)
TRIGL SERPL-MCNC: 134 MG/DL

## 2022-11-28 PROCEDURE — 83036 HEMOGLOBIN GLYCOSYLATED A1C: CPT

## 2022-11-28 PROCEDURE — 36415 COLL VENOUS BLD VENIPUNCTURE: CPT

## 2022-11-28 PROCEDURE — 80061 LIPID PANEL: CPT

## 2022-11-28 PROCEDURE — 80048 BASIC METABOLIC PNL TOTAL CA: CPT

## 2022-11-28 NOTE — LETTER
December 6, 2022      Cornelia Rodriguez  [address]  [place] [address]        Dear ,    We are writing to inform you of your test results.    Thanks for coming in for the labs.  Your A1c shows excellent overall diabetes control.  Please continue your current medications  The kidney and electrolyte panel was normal except your glucose was slightly low at the time of the blood draw (I suspect from fasting).  Please make sure to always have a quick access glucose supply to correct low blood sugars quickly.  Your cholesterol shows the HDL (good cholesterol) is improved but the LDL (bad cholesterol) has jumped up and is above goal range now.  Have you been taking your Crestor on a regular basis?  If not, please start.  If you have been taking it regularly, I would propose a dose increase to 20 mg to get better control of your cholesterol.  Please let me know either way via [facility].  Please MyChart or call if you have any concerns or questions.     Resulted Orders   Lipid panel reflex to direct LDL Fasting   Result Value Ref Range    Cholesterol 181 <200 mg/dL    Triglycerides 134 <150 mg/dL    Direct Measure HDL 53 >=50 mg/dL    LDL Cholesterol Calculated 101 (H) <=100 mg/dL    Non HDL Cholesterol 128 <130 mg/dL    Patient Fasting > 8hrs? Yes     Narrative    Cholesterol  Desirable:  <200 mg/dL    Triglycerides  Normal:  Less than 150 mg/dL  Borderline High:  150-199 mg/dL  High:  200-499 mg/dL  Very High:  Greater than or equal to 500 mg/dL    Direct Measure HDL  Female:  Greater than or equal to 50 mg/dL   Male:  Greater than or equal to 40 mg/dL    LDL Cholesterol  Desirable:  <100mg/dL  Above Desirable:  100-129 mg/dL   Borderline High:  130-159 mg/dL   High:  160-189 mg/dL   Very High:  >= 190 mg/dL    Non HDL Cholesterol  Desirable:  130 mg/dL  Above Desirable:  130-159 mg/dL  Borderline High:  160-189 mg/dL  High:  190-219 mg/dL  Very High:  Greater than or equal to 220 mg/dL    HEMOGLOBIN A1C   Result Value Ref Range    Hemoglobin A1C 6.5 (H) 0.0 - 5.6 %      Comment:      Normal <5.7%   Prediabetes 5.7-6.4%    Diabetes 6.5% or higher     Note: Adopted from ADA consensus guidelines.       If you have any questions or concerns, please call the clinic at the number listed above.       Sincerely,      Sarah Lombardo MD

## 2022-11-29 NOTE — RESULT ENCOUNTER NOTE
Cornelia,  Thanks for coming in for the labs.  Your A1c shows excellent overall diabetes control.  Please continue your current medications  The kidney and electrolyte panel was normal except your glucose was slightly low at the time of the blood draw (I suspect from fasting).  Please make sure to always have a quick access glucose supply to correct low blood sugars quickly.  Your cholesterol shows the HDL (good cholesterol) is improved but the LDL (bad cholesterol) has jumped up and is above goal range now.  Have you been taking your Crestor on a regular basis?  If not, please start.  If you have been taking it regularly, I would propose a dose increase to 20 mg to get better control of your cholesterol.  Please let me know either way via MyChart.  Please MyChart or call if you have any concerns or questions.   Sincerely,  Sarah Lombardo MD

## 2022-11-30 ENCOUNTER — TELEPHONE (OUTPATIENT)
Dept: FAMILY MEDICINE | Facility: CLINIC | Age: 47
End: 2022-11-30

## 2022-11-30 NOTE — TELEPHONE ENCOUNTER
Pt's results printed and will be mailed to Pt.  finished on 11/30      Elsy ALVARADO Visit Facilitator

## 2022-12-08 ENCOUNTER — IMMUNIZATION (OUTPATIENT)
Dept: FAMILY MEDICINE | Facility: CLINIC | Age: 47
End: 2022-12-08
Payer: COMMERCIAL

## 2022-12-08 DIAGNOSIS — Z23 HIGH PRIORITY FOR 2019-NCOV VACCINE: Primary | ICD-10-CM

## 2022-12-08 DIAGNOSIS — Z23 NEED FOR VACCINATION: ICD-10-CM

## 2022-12-08 PROCEDURE — 99207 PR NO CHARGE NURSE ONLY: CPT

## 2022-12-08 PROCEDURE — 91312 COVID-19 VACCINE BIVALENT BOOSTER 12+ (PFIZER): CPT

## 2022-12-08 PROCEDURE — 90471 IMMUNIZATION ADMIN: CPT

## 2022-12-08 PROCEDURE — 0124A COVID-19 VACCINE BIVALENT BOOSTER 12+ (PFIZER): CPT

## 2022-12-08 PROCEDURE — 90677 PCV20 VACCINE IM: CPT

## 2023-01-23 ENCOUNTER — OFFICE VISIT (OUTPATIENT)
Dept: NEUROLOGY | Facility: CLINIC | Age: 48
End: 2023-01-23
Payer: COMMERCIAL

## 2023-01-23 DIAGNOSIS — M54.81 BILATERAL OCCIPITAL NEURALGIA: Primary | ICD-10-CM

## 2023-01-23 PROCEDURE — 64450 NJX AA&/STRD OTHER PN/BRANCH: CPT | Mod: 50 | Performed by: PSYCHIATRY & NEUROLOGY

## 2023-01-23 PROCEDURE — 64405 NJX AA&/STRD GR OCPL NRV: CPT | Mod: 50 | Performed by: PSYCHIATRY & NEUROLOGY

## 2023-01-23 RX ORDER — BUPIVACAINE HYDROCHLORIDE 5 MG/ML
8 INJECTION, SOLUTION PERINEURAL ONCE
Status: COMPLETED | OUTPATIENT
Start: 2023-01-23 | End: 2023-01-23

## 2023-01-23 RX ORDER — TRIAMCINOLONE ACETONIDE 40 MG/ML
40 INJECTION, SUSPENSION INTRA-ARTICULAR; INTRAMUSCULAR ONCE
Status: COMPLETED | OUTPATIENT
Start: 2023-01-23 | End: 2023-01-23

## 2023-01-23 RX ADMIN — BUPIVACAINE HYDROCHLORIDE 40 MG: 5 INJECTION, SOLUTION PERINEURAL at 12:44

## 2023-01-23 RX ADMIN — TRIAMCINOLONE ACETONIDE 40 MG: 40 INJECTION, SUSPENSION INTRA-ARTICULAR; INTRAMUSCULAR at 12:47

## 2023-01-23 NOTE — LETTER
1/23/2023       RE: Cornelia Rodriguez  [address]  [place] [address]     Dear Colleague,    Thank you for referring your patient, Cornelia Rodriguez, to the [facility] NEUROLOGY CLINIC [place] at [facility]. Please see a copy of my visit note below.    [facility]  Neurology Procedure  01/23/23     Cornelia Rodriguez MRN# [id number]   YOB: 1975 Age: 47 year old            Occipital Nerve Block Procedure Note   DIAGNOSIS  Encounter Diagnosis   Name Primary?     Bilateral occipital neuralgia Yes        PROCEDURE   bilateral, Greater and Lesser occipital nerve blocks.      INFORMED CONSENT  I discussed the risks, benefits, alternatives, and the necessity of other members of the healthcare team participating in the procedure with the patient.  The patient understood and wished to proceed with the procedure.    PROCEDURAL PAUSE   Patient identity, procedure to be performed, correct side and site, patient position, availability of equipment, and special requirements were verified.      PROCEDURE DETAILS   Ms. Rodriguez was placed in the sitting position with her head and neck flexed.  Landmarks were palpated.  The area was prepped with ChloraPrep and sterile technique was used.  A 1.50 inch 25 gauge sterile needle was introduced 1/3 of the distance lateral from the occipital protuberance to the mastoid process on the left side.  After negative aspiration for blood, a solution containing 20 mg of triamcinolone acetonide diluted in a 1:2 mixture of 1% lidocaine and 0.5% bupivacaine for a total volume of 6 mL was injected in a fan-like fashion along the nuchal ridge between the occipital protuberance and mastoid process. The procedure was repeated in the exact same way on the opposite side with the same injectate.     Ms. Rodriguez tolerated the procedure well, and there were no apparent complications.  After appropriate observation, she was  dismissed in good condition under her own power.    COMMENTS  Ms. Rodriguez received a total of 40 mg of triamcinolone acetonide.  Her pain was mild/10 in severity prior to the procedure, and numb/10 following the procedure. She had 10 weeks of relief after the previous injections.    Carolann Rutledge MD  Headache Neurology

## 2023-01-23 NOTE — PROGRESS NOTES
[facility]  Neurology Procedure  01/23/23     Cornelia Rodriguez MRN# [id number]   YOB: 1975 Age: 47 year old            Occipital Nerve Block Procedure Note   DIAGNOSIS  Encounter Diagnosis   Name Primary?     Bilateral occipital neuralgia Yes        PROCEDURE   bilateral, Greater and Lesser occipital nerve blocks.      INFORMED CONSENT  I discussed the risks, benefits, alternatives, and the necessity of other members of the healthcare team participating in the procedure with the patient.  The patient understood and wished to proceed with the procedure.    PROCEDURAL PAUSE   Patient identity, procedure to be performed, correct side and site, patient position, availability of equipment, and special requirements were verified.      PROCEDURE DETAILS   Ms. Rodriguez was placed in the sitting position with her head and neck flexed.  Landmarks were palpated.  The area was prepped with ChloraPrep and sterile technique was used.  A 1.50 inch 25 gauge sterile needle was introduced 1/3 of the distance lateral from the occipital protuberance to the mastoid process on the left side.  After negative aspiration for blood, a solution containing 20 mg of triamcinolone acetonide diluted in a 1:2 mixture of 1% lidocaine and 0.5% bupivacaine for a total volume of 6 mL was injected in a fan-like fashion along the nuchal ridge between the occipital protuberance and mastoid process. The procedure was repeated in the exact same way on the opposite side with the same injectate.     Ms. Rodriguez tolerated the procedure well, and there were no apparent complications.  After appropriate observation, she was dismissed in good condition under her own power.    COMMENTS  Ms. Rodriguez received a total of 40 mg of triamcinolone acetonide.  Her pain was mild/10 in severity prior to the procedure, and numb/10 following the procedure. She had 10 weeks of relief after the previous injections.    Carolann Rutledge MD  Headache Neurology

## 2023-02-17 DIAGNOSIS — Z79.4 TYPE 2 DIABETES MELLITUS WITH HYPERGLYCEMIA, WITH LONG-TERM CURRENT USE OF INSULIN (H): ICD-10-CM

## 2023-02-17 DIAGNOSIS — E11.9 TYPE 2 DIABETES MELLITUS WITHOUT COMPLICATION, WITH LONG-TERM CURRENT USE OF INSULIN (H): ICD-10-CM

## 2023-02-17 DIAGNOSIS — E78.5 HYPERLIPIDEMIA LDL GOAL <100: ICD-10-CM

## 2023-02-17 DIAGNOSIS — E11.65 TYPE 2 DIABETES MELLITUS WITH HYPERGLYCEMIA, WITH LONG-TERM CURRENT USE OF INSULIN (H): ICD-10-CM

## 2023-02-17 DIAGNOSIS — Z79.4 TYPE 2 DIABETES MELLITUS WITHOUT COMPLICATION, WITH LONG-TERM CURRENT USE OF INSULIN (H): ICD-10-CM

## 2023-02-17 RX ORDER — EMPAGLIFLOZIN 25 MG/1
TABLET, FILM COATED ORAL
Qty: 90 TABLET | Refills: 0 | Status: SHIPPED | OUTPATIENT
Start: 2023-02-17 | End: 2023-05-18

## 2023-02-17 RX ORDER — DULAGLUTIDE 3 MG/.5ML
INJECTION, SOLUTION SUBCUTANEOUS
Qty: 6 ML | Refills: 0 | Status: SHIPPED | OUTPATIENT
Start: 2023-02-17 | End: 2023-05-18

## 2023-02-17 RX ORDER — LOSARTAN POTASSIUM 25 MG/1
TABLET ORAL
Qty: 45 TABLET | Refills: 1 | Status: SHIPPED | OUTPATIENT
Start: 2023-02-17 | End: 2023-09-18

## 2023-02-17 RX ORDER — INSULIN GLARGINE 100 [IU]/ML
INJECTION, SOLUTION SUBCUTANEOUS
Qty: 105 ML | Refills: 3 | Status: SHIPPED | OUTPATIENT
Start: 2023-02-17 | End: 2024-04-29

## 2023-02-17 RX ORDER — ROSUVASTATIN CALCIUM 10 MG/1
TABLET, COATED ORAL
Qty: 90 TABLET | Refills: 0 | Status: SHIPPED | OUTPATIENT
Start: 2023-02-17 | End: 2023-05-18

## 2023-03-20 DIAGNOSIS — M54.81 OCCIPITAL NEURALGIA OF RIGHT SIDE: ICD-10-CM

## 2023-03-20 RX ORDER — DULOXETIN HYDROCHLORIDE 60 MG/1
CAPSULE, DELAYED RELEASE ORAL
Qty: 90 CAPSULE | Refills: 1 | Status: SHIPPED | OUTPATIENT
Start: 2023-03-20 | End: 2023-09-18

## 2023-04-09 ENCOUNTER — HEALTH MAINTENANCE LETTER (OUTPATIENT)
Age: 48
End: 2023-04-09

## 2023-04-17 ENCOUNTER — OFFICE VISIT (OUTPATIENT)
Dept: NEUROLOGY | Facility: CLINIC | Age: 48
End: 2023-04-17
Payer: COMMERCIAL

## 2023-04-17 DIAGNOSIS — M54.81 BILATERAL OCCIPITAL NEURALGIA: Primary | ICD-10-CM

## 2023-04-17 PROCEDURE — 64405 NJX AA&/STRD GR OCPL NRV: CPT | Mod: 50 | Performed by: PSYCHIATRY & NEUROLOGY

## 2023-04-17 PROCEDURE — 64450 NJX AA&/STRD OTHER PN/BRANCH: CPT | Mod: 50 | Performed by: PSYCHIATRY & NEUROLOGY

## 2023-04-17 RX ORDER — DULOXETIN HYDROCHLORIDE 30 MG/1
30 CAPSULE, DELAYED RELEASE ORAL DAILY
Qty: 30 CAPSULE | Refills: 11 | Status: SHIPPED | OUTPATIENT
Start: 2023-04-17 | End: 2024-05-14

## 2023-04-17 RX ORDER — BUPIVACAINE HYDROCHLORIDE 5 MG/ML
8 INJECTION, SOLUTION PERINEURAL ONCE
Status: COMPLETED | OUTPATIENT
Start: 2023-04-17 | End: 2023-04-17

## 2023-04-17 RX ORDER — TRIAMCINOLONE ACETONIDE 40 MG/ML
40 INJECTION, SUSPENSION INTRA-ARTICULAR; INTRAMUSCULAR ONCE
Status: COMPLETED | OUTPATIENT
Start: 2023-04-17 | End: 2023-04-17

## 2023-04-17 RX ADMIN — BUPIVACAINE HYDROCHLORIDE 40 MG: 5 INJECTION, SOLUTION PERINEURAL at 13:06

## 2023-04-17 RX ADMIN — TRIAMCINOLONE ACETONIDE 40 MG: 40 INJECTION, SUSPENSION INTRA-ARTICULAR; INTRAMUSCULAR at 13:08

## 2023-04-17 NOTE — PROGRESS NOTES
[facility]  Neurology Procedure  04/17/23     Cornelia Rodriguez MRN# [id number]   YOB: 1975 Age: 47 year old            Occipital Nerve Block Procedure Note   DIAGNOSIS  Encounter Diagnosis   Name Primary?     Bilateral occipital neuralgia Yes        PROCEDURE   bilateral, Greater and Lesser occipital nerve blocks.      INFORMED CONSENT  I discussed the risks, benefits, alternatives, and the necessity of other members of the healthcare team participating in the procedure with the patient.  The patient understood and wished to proceed with the procedure.    PROCEDURAL PAUSE   Patient identity, procedure to be performed, correct side and site, patient position, availability of equipment, and special requirements were verified.      PROCEDURE DETAILS   Ms. Rodriguez was placed in the sitting position with her head and neck flexed.  Landmarks were palpated.  The area was prepped with ChloraPrep and sterile technique was used.  A 1.50 inch 25 gauge sterile needle was introduced 1/3 of the distance lateral from the occipital protuberance to the mastoid process on the left side.  After negative aspiration for blood, a solution containing 20 mg of triamcinolone acetonide diluted in a 1:2 mixture of 1% lidocaine and 0.5% bupivacaine for a total volume of 6 mL was injected in a fan-like fashion along the nuchal ridge between the occipital protuberance and mastoid process. The procedure was repeated in the exact same way on the opposite side with the same injectate.     Ms. Rodriguez tolerated the procedure well, and there were no apparent complications.  After appropriate observation, she was dismissed in good condition under her own power.    COMMENTS  Ms. Rodriguez received a total of 40 mg of triamcinolone acetonide.  Her pain was numb following the procedure.    She continues to report significantly less pain in the occipital nerve distribution for months after each injection.    Carolann Rutledge MD  Headache  Neurology

## 2023-04-17 NOTE — LETTER
4/17/2023       RE: Cornelia Rodriguez  [address]  [place] [address]       Dear Colleague,    Thank you for referring your patient, Cornelia Rodriguez, to the [facility] NEUROLOGY CLINIC [place] [facility]. Please see a copy of my visit note below.    [facility]  Neurology Procedure  04/17/23     Cornelia Rodriguez MRN# [id number]   YOB: 1975 Age: 47 year old            Occipital Nerve Block Procedure Note   DIAGNOSIS  Encounter Diagnosis   Name Primary?    Bilateral occipital neuralgia Yes        PROCEDURE   bilateral, Greater and Lesser occipital nerve blocks.      INFORMED CONSENT  I discussed the risks, benefits, alternatives, and the necessity of other members of the healthcare team participating in the procedure with the patient.  The patient understood and wished to proceed with the procedure.    PROCEDURAL PAUSE   Patient identity, procedure to be performed, correct side and site, patient position, availability of equipment, and special requirements were verified.      PROCEDURE DETAILS   Ms. Rodriguez was placed in the sitting position with her head and neck flexed.  Landmarks were palpated.  The area was prepped with ChloraPrep and sterile technique was used.  A 1.50 inch 25 gauge sterile needle was introduced 1/3 of the distance lateral from the occipital protuberance to the mastoid process on the left side.  After negative aspiration for blood, a solution containing 20 mg of triamcinolone acetonide diluted in a 1:2 mixture of 1% lidocaine and 0.5% bupivacaine for a total volume of 6 mL was injected in a fan-like fashion along the nuchal ridge between the occipital protuberance and mastoid process. The procedure was repeated in the exact same way on the opposite side with the same injectate.     Ms. Rodriguez tolerated the procedure well, and there were no apparent complications.  After appropriate observation, she was  dismissed in good condition under her own power.    COMMENTS  Ms. Rodriguez received a total of 40 mg of triamcinolone acetonide.  Her pain was numb following the procedure.    She continues to report significantly less pain in the occipital nerve distribution for months after each injection.          Again, thank you for allowing me to participate in the care of your patient.      Sincerely,    Carolann Rutledge MD

## 2023-06-01 ENCOUNTER — TELEPHONE (OUTPATIENT)
Dept: FAMILY MEDICINE | Facility: CLINIC | Age: 48
End: 2023-06-01
Payer: COMMERCIAL

## 2023-06-01 ENCOUNTER — HEALTH MAINTENANCE LETTER (OUTPATIENT)
Age: 48
End: 2023-06-01

## 2023-06-01 DIAGNOSIS — Z79.4 TYPE 2 DIABETES MELLITUS WITH HYPERGLYCEMIA, WITH LONG-TERM CURRENT USE OF INSULIN (H): ICD-10-CM

## 2023-06-01 DIAGNOSIS — E11.65 TYPE 2 DIABETES MELLITUS WITH HYPERGLYCEMIA, WITH LONG-TERM CURRENT USE OF INSULIN (H): ICD-10-CM

## 2023-06-02 RX ORDER — GLIPIZIDE 10 MG/1
TABLET, FILM COATED, EXTENDED RELEASE ORAL
Qty: 180 TABLET | Refills: 0 | Status: SHIPPED | OUTPATIENT
Start: 2023-06-02 | End: 2023-09-18

## 2023-06-13 ENCOUNTER — VIRTUAL VISIT (OUTPATIENT)
Dept: FAMILY MEDICINE | Facility: CLINIC | Age: 48
End: 2023-06-13
Payer: COMMERCIAL

## 2023-06-13 DIAGNOSIS — K76.0 NON-ALCOHOLIC FATTY LIVER DISEASE: ICD-10-CM

## 2023-06-13 DIAGNOSIS — E78.5 HYPERLIPIDEMIA LDL GOAL <100: ICD-10-CM

## 2023-06-13 DIAGNOSIS — Z12.11 SCREEN FOR COLON CANCER: ICD-10-CM

## 2023-06-13 DIAGNOSIS — Z79.4 TYPE 2 DIABETES MELLITUS WITH HYPERGLYCEMIA, WITH LONG-TERM CURRENT USE OF INSULIN (H): Primary | ICD-10-CM

## 2023-06-13 DIAGNOSIS — E11.65 TYPE 2 DIABETES MELLITUS WITH HYPERGLYCEMIA, WITH LONG-TERM CURRENT USE OF INSULIN (H): Primary | ICD-10-CM

## 2023-06-13 DIAGNOSIS — K21.9 GASTROESOPHAGEAL REFLUX DISEASE WITHOUT ESOPHAGITIS: ICD-10-CM

## 2023-06-13 PROCEDURE — 99214 OFFICE O/P EST MOD 30 MIN: CPT | Mod: 95 | Performed by: FAMILY MEDICINE

## 2023-06-13 NOTE — PATIENT INSTRUCTIONS
Schedule non-fasting diabetic labs.     You are due for your pap smear. Please schedule physical this year

## 2023-06-13 NOTE — PROGRESS NOTES
Cornelia Savage is a 47 year old who is being evaluated via a billable telephone visit.      What phone number would you like to be contacted at? [phone]  How would you like to obtain your AVS? Candi    Distant Location (provider location):  Off-site    Assessment & Plan     Type 2 diabetes mellitus with hyperglycemia (H)  Reported good control. Continue current meds. Schedule labs to get updated a1c    Hyperlipidemia LDL goal <100  On statin, utd on lipids    Non-alcoholic fatty liver disease  Monitoring lft's    Gastroesophageal reflux disease without esophagitis  Improved! Off regular ppi. Still has omeprazole on hand for flares.     Screen for colon cancer  Needs to repeat testing. First specimen was not completed correctly  - COLOGUARD([employer]); Future    She will schedule cpe/pap as due     Patient Instructions   Schedule non-fasting diabetic labs.     You are due for your pap smear. Please schedule physical this year        Sarah Lombardo MD  [facility]   Cornelia Savage is a 47 year old, presenting for the following health issues:  Medication Problem (Check and refill for all )         View : No data to display.              History of Present Illness       Reason for visit:  Med check    She eats 2-3 servings of fruits and vegetables daily.She consumes 0 sweetened beverage(s) daily.She exercises with enough effort to increase her heart rate 60 or more minutes per day.  She exercises with enough effort to increase her heart rate 5 days per week.   She is taking medications regularly.       DM: gluc   Few episodes of hypoglycemia due to not eating after taking meds (69). No surprise hypglycemia  Two tabs a day of the glipizide consistently   Jardiance, lantus, Trulicity and lantus insulin  No uti/vag yeast infxn  Drinking sugar free flavored water from [employer]. Staying hydrated.   No neuropathy sx's  Eye exam 1/23. Vision works. Got bifocals.     HTN:  120-140/70-90. Losartan 12/2 tablet     Dr. Rutledge - taking duloxetine 30 mg in am and 60 mg at hs for nerve issue.     No longer taking omeprazole. No gerd/    cologuard- tried one sample but bad sample.             Objective           Vitals:  No vitals were obtained today due to virtual visit.    Physical Exam   healthy, alert and no distress  PSYCH: Alert and oriented times 3; coherent speech, normal   rate and volume, able to articulate logical thoughts, able   to abstract reason, no tangential thoughts, no hallucinations   or delusions  Her affect is normal and pleasant  RESP: No cough, no audible wheezing, able to talk in full sentences  Remainder of exam unable to be completed due to telephone visits                Phone call duration: 13 minutes

## 2023-06-20 ENCOUNTER — LAB (OUTPATIENT)
Dept: FAMILY MEDICINE | Facility: CLINIC | Age: 48
End: 2023-06-20
Payer: COMMERCIAL

## 2023-06-20 DIAGNOSIS — Z12.11 SCREEN FOR COLON CANCER: ICD-10-CM

## 2023-06-23 ENCOUNTER — LAB (OUTPATIENT)
Dept: LAB | Facility: CLINIC | Age: 48
End: 2023-06-23
Payer: COMMERCIAL

## 2023-06-23 DIAGNOSIS — E11.65 TYPE 2 DIABETES MELLITUS WITH HYPERGLYCEMIA (H): ICD-10-CM

## 2023-06-23 LAB
ALBUMIN SERPL BCG-MCNC: 4.6 G/DL (ref 3.5–5.2)
ALP SERPL-CCNC: 74 U/L (ref 35–104)
ALT SERPL W P-5'-P-CCNC: 26 U/L (ref 0–50)
ANION GAP SERPL CALCULATED.3IONS-SCNC: 12 MMOL/L (ref 7–15)
AST SERPL W P-5'-P-CCNC: 23 U/L (ref 0–45)
BILIRUB SERPL-MCNC: 0.4 MG/DL
BUN SERPL-MCNC: 15.5 MG/DL (ref 6–20)
CALCIUM SERPL-MCNC: 9.9 MG/DL (ref 8.6–10)
CHLORIDE SERPL-SCNC: 103 MMOL/L (ref 98–107)
CREAT SERPL-MCNC: 0.88 MG/DL (ref 0.51–0.95)
CREAT UR-MCNC: 223 MG/DL
DEPRECATED HCO3 PLAS-SCNC: 25 MMOL/L (ref 22–29)
GFR SERPL CREATININE-BSD FRML MDRD: 80 ML/MIN/1.73M2
GLUCOSE SERPL-MCNC: 123 MG/DL (ref 70–99)
HBA1C MFR BLD: 6.6 % (ref 0–5.6)
MICROALBUMIN UR-MCNC: <12 MG/L
MICROALBUMIN/CREAT UR: NORMAL MG/G{CREAT}
POTASSIUM SERPL-SCNC: 4.1 MMOL/L (ref 3.4–5.3)
PROT SERPL-MCNC: 7.5 G/DL (ref 6.4–8.3)
SODIUM SERPL-SCNC: 140 MMOL/L (ref 136–145)
TSH SERPL DL<=0.005 MIU/L-ACNC: 1.07 UIU/ML (ref 0.3–4.2)

## 2023-06-23 PROCEDURE — 36415 COLL VENOUS BLD VENIPUNCTURE: CPT

## 2023-06-23 PROCEDURE — 83036 HEMOGLOBIN GLYCOSYLATED A1C: CPT

## 2023-06-23 PROCEDURE — 82570 ASSAY OF URINE CREATININE: CPT

## 2023-06-23 PROCEDURE — 82043 UR ALBUMIN QUANTITATIVE: CPT

## 2023-06-23 PROCEDURE — 80053 COMPREHEN METABOLIC PANEL: CPT

## 2023-06-23 PROCEDURE — 84443 ASSAY THYROID STIM HORMONE: CPT

## 2023-06-23 NOTE — RESULT ENCOUNTER NOTE
Dear Cornelia Lombardo is out of the office and I am reviewing your results.   Your urine does not show excess protein.  Please call or MyChart my office with any questions or concerns.   Naomi Cosby, PAC

## 2023-06-30 ENCOUNTER — ANCILLARY PROCEDURE (OUTPATIENT)
Dept: MAMMOGRAPHY | Facility: CLINIC | Age: 48
End: 2023-06-30
Attending: FAMILY MEDICINE
Payer: COMMERCIAL

## 2023-06-30 DIAGNOSIS — Z12.31 SCREENING MAMMOGRAM, ENCOUNTER FOR: ICD-10-CM

## 2023-06-30 PROCEDURE — 77067 SCR MAMMO BI INCL CAD: CPT | Mod: GC

## 2023-07-10 ENCOUNTER — OFFICE VISIT (OUTPATIENT)
Dept: NEUROLOGY | Facility: CLINIC | Age: 48
End: 2023-07-10
Payer: COMMERCIAL

## 2023-07-10 DIAGNOSIS — M54.81 BILATERAL OCCIPITAL NEURALGIA: Primary | ICD-10-CM

## 2023-07-10 PROCEDURE — 64405 NJX AA&/STRD GR OCPL NRV: CPT | Mod: 51 | Performed by: PSYCHIATRY & NEUROLOGY

## 2023-07-10 PROCEDURE — 64450 NJX AA&/STRD OTHER PN/BRANCH: CPT | Mod: RT | Performed by: PSYCHIATRY & NEUROLOGY

## 2023-07-10 RX ORDER — TRIAMCINOLONE ACETONIDE 40 MG/ML
40 INJECTION, SUSPENSION INTRA-ARTICULAR; INTRAMUSCULAR ONCE
Status: COMPLETED | OUTPATIENT
Start: 2023-07-10 | End: 2023-07-10

## 2023-07-10 RX ORDER — BUPIVACAINE HYDROCHLORIDE 5 MG/ML
8 INJECTION, SOLUTION PERINEURAL ONCE
Status: COMPLETED | OUTPATIENT
Start: 2023-07-10 | End: 2023-07-10

## 2023-07-10 RX ADMIN — TRIAMCINOLONE ACETONIDE 40 MG: 40 INJECTION, SUSPENSION INTRA-ARTICULAR; INTRAMUSCULAR at 12:53

## 2023-07-10 RX ADMIN — BUPIVACAINE HYDROCHLORIDE 40 MG: 5 INJECTION, SOLUTION PERINEURAL at 12:52

## 2023-07-10 NOTE — LETTER
7/10/2023       RE: Cornelia Rodriguez  [address]  [place] [address]     Dear Colleague,    Thank you for referring your patient, Cornelia Rodriguez, to the [facility] NEUROLOGY CLINIC [place] at [facility]. Please see a copy of my visit note below.    [facility]  Neurology Procedure  07/10/23     Cornelia Rodriguez MRN# [id number]   YOB: 1975 Age: 47 year old            Occipital Nerve Block Procedure Note   DIAGNOSIS  Encounter Diagnosis   Name Primary?    Occipital neuralgia of right side Yes        PROCEDURE   bilateral, Greater and Lesser occipital nerve blocks.      INFORMED CONSENT  I discussed the risks, benefits, alternatives, and the necessity of other members of the healthcare team participating in the procedure with the patient.  The patient understood and wished to proceed with the procedure.    PROCEDURAL PAUSE   Patient identity, procedure to be performed, correct side and site, patient position, availability of equipment, and special requirements were verified.      PROCEDURE DETAILS   Ms. Rodriguez was placed in the sitting position with her head and neck flexed.  Landmarks were palpated.  The area was prepped with ChloraPrep and sterile technique was used.  A 1.50 inch 25 gauge sterile needle was introduced 1/3 of the distance lateral from the occipital protuberance to the mastoid process on the left side.  After negative aspiration for blood, a solution containing 20 mg of triamcinolone acetonide diluted in a 1:2 mixture of 1% lidocaine and 0.5% bupivacaine for a total volume of 6 mL was injected in a fan-like fashion along the nuchal ridge between the occipital protuberance and mastoid process.  The procedure was repeated in the exact same way on the opposite side with the same injectate.     Ms. Rodriguez tolerated the procedure well, and there were no apparent complications.  After appropriate observation, she was  dismissed in good condition under her own power.    COMMENTS  Ms. Rodriguez received a total of 40 mg of triamcinolone acetonide.  Her pain was 2/10 in severity prior to the procedure, and numb following the procedure. Between ONB's and duloxetine, pain is well controlled.          Again, thank you for allowing me to participate in the care of your patient.      Sincerely,    Carolann Rutledge MD

## 2023-07-10 NOTE — PROGRESS NOTES
[facility]  Neurology Procedure  07/10/23     Cornelia Rodriguez MRN# [id number]   YOB: 1975 Age: 47 year old            Occipital Nerve Block Procedure Note   DIAGNOSIS  Encounter Diagnosis   Name Primary?     Occipital neuralgia of right side Yes        PROCEDURE   bilateral, Greater and Lesser occipital nerve blocks.      INFORMED CONSENT  I discussed the risks, benefits, alternatives, and the necessity of other members of the healthcare team participating in the procedure with the patient.  The patient understood and wished to proceed with the procedure.    PROCEDURAL PAUSE   Patient identity, procedure to be performed, correct side and site, patient position, availability of equipment, and special requirements were verified.      PROCEDURE DETAILS   Ms. Rodriguez was placed in the sitting position with her head and neck flexed.  Landmarks were palpated.  The area was prepped with ChloraPrep and sterile technique was used.  A 1.50 inch 25 gauge sterile needle was introduced 1/3 of the distance lateral from the occipital protuberance to the mastoid process on the left side.  After negative aspiration for blood, a solution containing 20 mg of triamcinolone acetonide diluted in a 1:2 mixture of 1% lidocaine and 0.5% bupivacaine for a total volume of 6 mL was injected in a fan-like fashion along the nuchal ridge between the occipital protuberance and mastoid process.  The procedure was repeated in the exact same way on the opposite side with the same injectate.     Ms. Rodriguez tolerated the procedure well, and there were no apparent complications.  After appropriate observation, she was dismissed in good condition under her own power.    COMMENTS  Ms. Rodriguez received a total of 40 mg of triamcinolone acetonide.  Her pain was 2/10 in severity prior to the procedure, and numb following the procedure. Between ONB's and duloxetine, pain is well controlled.    Carolann Rutledge MD  Headache Neurology

## 2023-07-26 DIAGNOSIS — Z79.4 TYPE 2 DIABETES MELLITUS WITH HYPERGLYCEMIA, WITH LONG-TERM CURRENT USE OF INSULIN (H): ICD-10-CM

## 2023-07-26 DIAGNOSIS — E11.65 TYPE 2 DIABETES MELLITUS WITH HYPERGLYCEMIA, WITH LONG-TERM CURRENT USE OF INSULIN (H): ICD-10-CM

## 2023-07-26 NOTE — TELEPHONE ENCOUNTER
Patient calling in requesting refills for Contour Next test strips and accu-chek multiclix lancets. Patient checking BGs BID. Patient is down to one test strip and needing refill ASAP    Meds pended, routing to refill group      DAVID McphersonN, RN  [facility]

## 2023-09-07 DIAGNOSIS — J30.9 ALLERGIC RHINITIS, UNSPECIFIED SEASONALITY, UNSPECIFIED TRIGGER: ICD-10-CM

## 2023-09-07 DIAGNOSIS — E11.65 TYPE 2 DIABETES MELLITUS WITH HYPERGLYCEMIA, WITH LONG-TERM CURRENT USE OF INSULIN (H): ICD-10-CM

## 2023-09-07 DIAGNOSIS — Z79.4 TYPE 2 DIABETES MELLITUS WITH HYPERGLYCEMIA, WITH LONG-TERM CURRENT USE OF INSULIN (H): ICD-10-CM

## 2023-09-07 DIAGNOSIS — E78.5 HYPERLIPIDEMIA LDL GOAL <100: ICD-10-CM

## 2023-09-08 RX ORDER — ROSUVASTATIN CALCIUM 10 MG/1
TABLET, COATED ORAL
Qty: 90 TABLET | Refills: 0 | Status: SHIPPED | OUTPATIENT
Start: 2023-09-08 | End: 2023-11-13

## 2023-09-08 RX ORDER — MONTELUKAST SODIUM 10 MG/1
TABLET ORAL
Qty: 90 TABLET | Refills: 0 | Status: SHIPPED | OUTPATIENT
Start: 2023-09-08 | End: 2023-11-13

## 2023-09-08 RX ORDER — EMPAGLIFLOZIN 25 MG/1
TABLET, FILM COATED ORAL
Qty: 90 TABLET | Refills: 0 | Status: SHIPPED | OUTPATIENT
Start: 2023-09-08 | End: 2023-11-13

## 2023-09-18 ENCOUNTER — OFFICE VISIT (OUTPATIENT)
Dept: FAMILY MEDICINE | Facility: CLINIC | Age: 48
End: 2023-09-18
Payer: COMMERCIAL

## 2023-09-18 VITALS
DIASTOLIC BLOOD PRESSURE: 82 MMHG | RESPIRATION RATE: 15 BRPM | SYSTOLIC BLOOD PRESSURE: 128 MMHG | BODY MASS INDEX: 41.59 KG/M2 | HEIGHT: 64 IN | HEART RATE: 98 BPM | OXYGEN SATURATION: 97 % | TEMPERATURE: 98.3 F | WEIGHT: 243.6 LBS

## 2023-09-18 DIAGNOSIS — E28.39 ESTROGEN DEFICIENCY: ICD-10-CM

## 2023-09-18 DIAGNOSIS — E66.01 MORBID OBESITY (H): ICD-10-CM

## 2023-09-18 DIAGNOSIS — Z12.4 CERVICAL CANCER SCREENING: ICD-10-CM

## 2023-09-18 DIAGNOSIS — K21.9 GASTROESOPHAGEAL REFLUX DISEASE WITHOUT ESOPHAGITIS: ICD-10-CM

## 2023-09-18 DIAGNOSIS — K76.0 NON-ALCOHOLIC FATTY LIVER DISEASE: ICD-10-CM

## 2023-09-18 DIAGNOSIS — R09.81 NASAL CONGESTION: ICD-10-CM

## 2023-09-18 DIAGNOSIS — Z00.00 ROUTINE GENERAL MEDICAL EXAMINATION AT A HEALTH CARE FACILITY: Primary | ICD-10-CM

## 2023-09-18 DIAGNOSIS — M54.81 OCCIPITAL NEURALGIA OF RIGHT SIDE: ICD-10-CM

## 2023-09-18 DIAGNOSIS — E11.9 TYPE 2 DIABETES MELLITUS WITHOUT COMPLICATION, WITH LONG-TERM CURRENT USE OF INSULIN (H): ICD-10-CM

## 2023-09-18 DIAGNOSIS — Z79.4 TYPE 2 DIABETES MELLITUS WITHOUT COMPLICATION, WITH LONG-TERM CURRENT USE OF INSULIN (H): ICD-10-CM

## 2023-09-18 DIAGNOSIS — R06.83 SNORING: ICD-10-CM

## 2023-09-18 DIAGNOSIS — N91.2 AMENORRHEA: ICD-10-CM

## 2023-09-18 PROCEDURE — 99214 OFFICE O/P EST MOD 30 MIN: CPT | Mod: 25 | Performed by: FAMILY MEDICINE

## 2023-09-18 PROCEDURE — 99396 PREV VISIT EST AGE 40-64: CPT | Performed by: FAMILY MEDICINE

## 2023-09-18 PROCEDURE — 87624 HPV HI-RISK TYP POOLED RSLT: CPT | Performed by: FAMILY MEDICINE

## 2023-09-18 PROCEDURE — G0145 SCR C/V CYTO,THINLAYER,RESCR: HCPCS | Performed by: FAMILY MEDICINE

## 2023-09-18 RX ORDER — LOSARTAN POTASSIUM 25 MG/1
25 TABLET ORAL DAILY
Qty: 90 TABLET | Refills: 3 | Status: SHIPPED | OUTPATIENT
Start: 2023-09-18 | End: 2024-08-21

## 2023-09-18 RX ORDER — DULAGLUTIDE 4.5 MG/.5ML
4.5 INJECTION, SOLUTION SUBCUTANEOUS WEEKLY
Qty: 6 ML | Refills: 1 | Status: SHIPPED | OUTPATIENT
Start: 2023-09-18 | End: 2024-03-04

## 2023-09-18 RX ORDER — DULOXETIN HYDROCHLORIDE 60 MG/1
60 CAPSULE, DELAYED RELEASE ORAL DAILY
Qty: 90 CAPSULE | Refills: 3 | Status: SHIPPED | OUTPATIENT
Start: 2023-09-18

## 2023-09-18 RX ORDER — FLUTICASONE PROPIONATE 50 MCG
2 SPRAY, SUSPENSION (ML) NASAL DAILY
Qty: 16 G | Refills: 11 | Status: SHIPPED | OUTPATIENT
Start: 2023-09-18

## 2023-09-18 ASSESSMENT — ENCOUNTER SYMPTOMS
COUGH: 0
MYALGIAS: 0
ABDOMINAL PAIN: 0
BREAST MASS: 0
NERVOUS/ANXIOUS: 0
HEMATURIA: 0
DIZZINESS: 0
ARTHRALGIAS: 1
WEAKNESS: 0
NAUSEA: 0
FEVER: 0
FREQUENCY: 0
SHORTNESS OF BREATH: 0
HEADACHES: 0
PALPITATIONS: 0
CONSTIPATION: 0
JOINT SWELLING: 0
PARESTHESIAS: 1
CHILLS: 0
DIARRHEA: 0
HEMATOCHEZIA: 0
DYSURIA: 0
EYE PAIN: 0
HEARTBURN: 0
SORE THROAT: 0

## 2023-09-18 ASSESSMENT — PAIN SCALES - GENERAL: PAINLEVEL: NO PAIN (0)

## 2023-09-18 NOTE — PATIENT INSTRUCTIONS
Stop glipizide  Increase trulicity to 4.5 mg weekly  Start nasal spray  Increase losartan to full tablet.   Diabetes visit in 3 months.       Preventive Health Recommendations  Female Ages 40 to 49    Yearly exam:   See your health care provider every year in order to  Review health changes.   Discuss preventive care.    Review your medicines if your doctor prescribed any.    Get a Pap test every three years (unless you have an abnormal result and your provider advises testing more often).    If you get Pap tests with HPV test, you only need to test every 5 years, unless you have an abnormal result. You do not need a Pap test if your uterus was removed (hysterectomy) and you have not had cancer.    You should be tested each year for STDs (sexually transmitted diseases), if you're at risk.   Ask your doctor if you should have a mammogram.    Have a colonoscopy (test for colon cancer) if someone in your family has had colon cancer or polyps before age 50.     Have a cholesterol test every 5 years.     Have a diabetes test (fasting glucose) after age 45. If you are at risk for diabetes, you should have this test every 3 years.    Shots: Get a flu shot each year. Get a tetanus shot every 10 years.     Nutrition:   Eat at least 5 servings of fruits and vegetables each day.  Eat whole-grain bread, whole-wheat pasta and brown rice instead of white grains and rice.  Get adequate Calcium and Vitamin D.      Lifestyle  Exercise at least 150 minutes a week (an average of 30 minutes a day, 5 days a week). This will help you control your weight and prevent disease.  Limit alcohol to one drink per day.  No smoking.   Wear sunscreen to prevent skin cancer.  See your dentist every six months for an exam and cleaning.

## 2023-09-18 NOTE — PROGRESS NOTES
SUBJECTIVE:   CC: Cornelia Savage is an 47 year old who presents for preventive health visit.       6/13/2023     7:21 AM   Additional Questions   Roomed by [employer]       Healthy Habits:     Getting at least 3 servings of Calcium per day:  Yes    Bi-annual eye exam:  Yes    Dental care twice a year:  Yes    Sleep apnea or symptoms of sleep apnea:  Excessive snoring    Diet:  Low salt and Diabetic    Frequency of exercise:  2-3 days/week    Duration of exercise:  15-30 minutes    Taking medications regularly:  Yes    Medication side effects:  Other    Additional concerns today:  Yes    Losing weight with trulicity. No n/v/constipation  Still sweet tooth     Had low glucose yesterday. Missed breakfast. Not sure how long was going but felt poorly. Improved after eating.   Not typically having low glucose.   Insurance doesn't cover cgm unless type 1 dm or on a pump.   Glucose prior to work (post prandial 1 hr) 150-160  After work- 4 hours after lunch     Last menses 18 mo ago and again  12 mo ago.   + hot flashing, always hot last 1.5 years.     Has cologuard at home but she's not sure how to collect without urine (does at the same time).     Not needing PPI any onger    Will get flu vaccine through [facility]    Headaches much better control. Not needing imitrex.     Have you ever done Advance Care Planning? (For example, a Health Directive, POLST, or a discussion with a medical provider or your loved ones about your wishes): No, advance care planning information given to patient to review.  Patient declined advance care planning discussion at this time.    Social History     Tobacco Use    Smoking status: Former     Packs/day: 1.50     Years: 25.00     Pack years: 37.50     Types: Cigarettes     Start date: 1/1/1989     Quit date: 3/31/2013     Years since quitting: 10.4    Smokeless tobacco: Never    Tobacco comments:     quit 3/2013   Substance Use Topics    Alcohol use: Yes     Comment: Occasionally            9/18/2023     8:01 AM   Alcohol Use   Prescreen: >3 drinks/day or >7 drinks/week? No     Reviewed orders with patient.  Reviewed health maintenance and updated orders accordingly - Yes      Breast Cancer Screening:  Any new diagnosis of family breast, ovarian, or bowel cancer? No    FHS-7:       6/30/2023     9:59 AM   Breast CA Risk Assessment (FHS-7)   Did any of your first-degree relatives have breast or ovarian cancer? No   Did any of your relatives have bilateral breast cancer? No   Did any man in your family have breast cancer? No   Did any woman in your family have breast and ovarian cancer? No   Did any woman in your family have breast cancer before age 50 y? No   Do you have 2 or more relatives with breast and/or ovarian cancer? No   Do you have 2 or more relatives with breast and/or bowel cancer? No       Mammogram Screening: Recommended annual mammography  Pertinent mammograms are reviewed under the imaging tab.    History of abnormal Pap smear: NO - age 30-65 PAP every 5 years with negative HPV co-testing recommended     Reviewed and updated as needed this visit by clinical staff                  Reviewed and updated as needed this visit by Provider                     Review of Systems   Constitutional:  Negative for chills and fever.   HENT:  Positive for congestion and ear pain. Negative for hearing loss and sore throat.    Eyes:  Negative for pain and visual disturbance.   Respiratory:  Negative for cough and shortness of breath.    Cardiovascular:  Negative for chest pain, palpitations and peripheral edema.   Gastrointestinal:  Negative for abdominal pain, constipation, diarrhea, heartburn, hematochezia and nausea.   Breasts:  Negative for tenderness, breast mass and discharge.   Genitourinary:  Negative for dysuria, frequency, genital sores, hematuria, pelvic pain, urgency, vaginal bleeding and vaginal discharge.   Musculoskeletal:  Positive for arthralgias. Negative for joint swelling and  "myalgias.   Skin:  Negative for rash.   Neurological:  Positive for paresthesias. Negative for dizziness, weakness and headaches.   Psychiatric/Behavioral:  Negative for mood changes. The patient is not nervous/anxious.      Arthritis knee, carpal tunnel in wrists. (Braces when flare but generally not bad)  Right nasal congestion and right ear plugged. Left ear drainage.      OBJECTIVE:   /82   Pulse 98   Temp 98.3  F (36.8  C) (Oral)   Resp 15   Ht 1.613 m (5' 3.5\")   Wt 110.5 kg (243 lb 9.6 oz)   SpO2 97%   BMI 42.47 kg/m    Physical Exam  GENERAL: healthy, alert and no distress  EYES: Eyes grossly normal to inspection, PERRL and conjunctivae and sclerae normal  HENT: normal cephalic/atraumatic, ear canals and TM's normal, nasal mucosa edematous and shiny turbinate versus polyp seen in the right, oropharynx clear, and oral mucous membranes moist  NECK: no adenopathy, no asymmetry, masses, or scars and thyroid normal to palpation  RESP: lungs clear to auscultation - no rales, rhonchi or wheezes  BREAST: normal without masses, tenderness or nipple discharge and no palpable axillary masses or adenopathy  CV: regular rate and rhythm, normal S1 S2, no S3 or S4, no murmur, click or rub, no peripheral edema and peripheral pulses strong  ABDOMEN: soft, nontender, no hepatosplenomegaly, no masses and bowel sounds normal  MS: no gross musculoskeletal defects noted, no edema  SKIN: no suspicious lesions or rashes .  Tiny linear ulceration just anterior to left tragus  NEURO: Normal strength and tone, mentation intact and speech normal  PSYCH: mentation appears normal, affect normal/bright    Diagnostic Test Results:  Labs reviewed in Epic    ASSESSMENT/PLAN:   (Z00.00) Routine general medical examination at a health care facility  (primary encounter diagnosis)      (E11.9,  Z79.4) Type 2 diabetes mellitus without complication, with long-term current use of insulin (H)  Comment: Has been under good control " recently.  Continues to lose weight with the Trulicity and has tolerated this medication well.  Risk for hypoglycemia . I do think she could go without the sulfonylurea at this point we will DC the glipizide and increase Trulicity to 4.5 mg she would also like to trial pushing losartan up to a full dose is reasonable to get her blood pressure in a more ideal range.  Plan: Dulaglutide (TRULICITY) 4.5 MG/0.5ML SOPN,         losartan (COZAAR) 25 MG tablet           (Z12.4) Cervical cancer screening  Comment:  Plan: Pap Screen with HPV - recommended age 30 - 65         years            (N91.2) Amenorrhea  Comment: 1 full year since last cycle.  Likely menopausal  Plan: Follicle stimulating hormone, CANCELED:         Follicle stimulating hormone            (E28.39) Estrogen deficiency  Comment:   Plan: DX Hip/Pelvis/Spine            (R09.81) Nasal congestion  Comment: Significant congestion on exam  Plan: fluticasone (FLONASE) 50 MCG/ACT nasal spray        We will trial a course of Flonase.  Consider referral to ENT if persisting symptoms as they do wonder if there could be a nasal polyp on the right.  Also for tiny ulceration on the external pinna, we discussed using Aquaphor daily until healed.  If persistent may want to consider some mupirocin and referral to ENT and/or Derm.    (R06.83) Snoring  Comment: Possibly from her nasal congestion but she certainly is at risk for sleep apnea given her BMI  Plan: Adult Sleep Eval & Management          Referral        We will get her evaluated through our sleep clinic    (E66.01) Morbid obesity (H)  Comment: She is losing weight nicely with her current medications of the Trulicity and Jardiance.  Plan: We will push out Trulicity to max dosing.  Continue to work on healthy diet and exercise encouraged    (M54.81) Occipital neuralgia of right side  Comment: Overall controlled with Cymbalta and injections  Plan: DULoxetine (CYMBALTA) 60 MG capsule           (K76.0)  Non-alcoholic fatty liver disease  Comment: Working on weight loss as above   Plan: Continue to monitor liver test yearly we will get CBC with next liver panel to complete fib 4 scoring    (K21.9) Gastroesophageal reflux disease without esophagitis  Comment: This has been much improved and she is off her PPI  Plan: Continue to monitor    Patient has been advised of split billing requirements and indicates understanding: Yes      COUNSELING:  Reviewed preventive health counseling, as reflected in patient instructions       Regular exercise       Healthy diet/nutrition       Osteoporosis prevention/bone health       Safe sex practices/STD prevention       Colorectal Cancer Screening       ([place])menopause management        She reports that she quit smoking about 10 years ago. Her smoking use included cigarettes. She started smoking about 34 years ago. She has a 37.50 pack-year smoking history. She has never used smokeless tobacco.          Sarah Lombardo MD  [facility]

## 2023-09-20 LAB
BKR LAB AP GYN ADEQUACY: NORMAL
BKR LAB AP GYN INTERPRETATION: NORMAL
BKR LAB AP HPV REFLEX: NORMAL
BKR LAB AP PREVIOUS ABNORMAL: NORMAL
PATH REPORT.COMMENTS IMP SPEC: NORMAL
PATH REPORT.COMMENTS IMP SPEC: NORMAL
PATH REPORT.RELEVANT HX SPEC: NORMAL

## 2023-09-22 LAB
HUMAN PAPILLOMA VIRUS 16 DNA: NEGATIVE
HUMAN PAPILLOMA VIRUS 18 DNA: NEGATIVE
HUMAN PAPILLOMA VIRUS FINAL DIAGNOSIS: NORMAL
HUMAN PAPILLOMA VIRUS OTHER HR: NEGATIVE

## 2023-10-03 DIAGNOSIS — E11.65 TYPE 2 DIABETES MELLITUS WITH HYPERGLYCEMIA, WITH LONG-TERM CURRENT USE OF INSULIN (H): ICD-10-CM

## 2023-10-03 DIAGNOSIS — Z79.4 TYPE 2 DIABETES MELLITUS WITH HYPERGLYCEMIA, WITH LONG-TERM CURRENT USE OF INSULIN (H): ICD-10-CM

## 2023-10-03 RX ORDER — PEN NEEDLE, DIABETIC 32GX 5/32"
NEEDLE, DISPOSABLE MISCELLANEOUS
Qty: 200 EACH | Refills: 1 | Status: SHIPPED | OUTPATIENT
Start: 2023-10-03 | End: 2024-05-13

## 2023-10-09 ENCOUNTER — OFFICE VISIT (OUTPATIENT)
Dept: NEUROLOGY | Facility: CLINIC | Age: 48
End: 2023-10-09
Payer: COMMERCIAL

## 2023-10-09 DIAGNOSIS — M54.81 BILATERAL OCCIPITAL NEURALGIA: Primary | ICD-10-CM

## 2023-10-09 PROCEDURE — 64405 NJX AA&/STRD GR OCPL NRV: CPT | Mod: 50 | Performed by: PSYCHIATRY & NEUROLOGY

## 2023-10-09 PROCEDURE — 64450 NJX AA&/STRD OTHER PN/BRANCH: CPT | Mod: 50 | Performed by: PSYCHIATRY & NEUROLOGY

## 2023-10-09 RX ORDER — TRIAMCINOLONE ACETONIDE 40 MG/ML
40 INJECTION, SUSPENSION INTRA-ARTICULAR; INTRAMUSCULAR ONCE
Status: COMPLETED | OUTPATIENT
Start: 2023-10-09 | End: 2023-10-09

## 2023-10-09 RX ORDER — BUPIVACAINE HYDROCHLORIDE 5 MG/ML
8 INJECTION, SOLUTION EPIDURAL; INTRACAUDAL ONCE
Status: COMPLETED | OUTPATIENT
Start: 2023-10-09 | End: 2023-10-09

## 2023-10-09 RX ADMIN — BUPIVACAINE HYDROCHLORIDE 40 MG: 5 INJECTION, SOLUTION EPIDURAL; INTRACAUDAL at 13:28

## 2023-10-09 RX ADMIN — TRIAMCINOLONE ACETONIDE 40 MG: 40 INJECTION, SUSPENSION INTRA-ARTICULAR; INTRAMUSCULAR at 13:29

## 2023-10-09 NOTE — PROGRESS NOTES
[facility]  Neurology Procedure  10/09/23     Cornelia Rodriguez MRN# [id number]   YOB: 1975 Age: 47 year old            Occipital Nerve Block Procedure Note   DIAGNOSIS  Encounter Diagnosis   Name Primary?    Bilateral occipital neuralgia Yes        PROCEDURE   bilateral, Greater and Lesser occipital nerve blocks.      INFORMED CONSENT  I discussed the risks, benefits, alternatives, and the necessity of other members of the healthcare team participating in the procedure with the patient.  The patient understood and wished to proceed with the procedure.    PROCEDURAL PAUSE   Patient identity, procedure to be performed, correct side and site, patient position, availability of equipment, and special requirements were verified.      PROCEDURE DETAILS   Ms. Rodriguez was placed in the sitting position with her head and neck flexed.  Landmarks were palpated.  The area was prepped with ChloraPrep and sterile technique was used.  A 1.50 inch 25 gauge sterile needle was introduced 1/3 of the distance lateral from the occipital protuberance to the mastoid process on the left side.  After negative aspiration for blood, a solution containing 20 mg of triamcinolone acetonide diluted in a 1:2 mixture of 1% lidocaine and 0.5% bupivacaine for a total volume of 6 mL was injected in a fan-like fashion along the nuchal ridge between the occipital protuberance and mastoid process. The procedure was repeated in the exact same way on the opposite side with the same injectate.     Ms. Rodriguez tolerated the procedure well, and there were no apparent complications.  After appropriate observation, she was dismissed in good condition under her own power.    COMMENTS  Ms. Rodriguez received a total of 40 mg of triamcinolone acetonide.  Her pain was 5/10 in severity prior to the procedure, and numb following the procedure.    Carolann Rutledge MD  Headache Neurology

## 2023-10-09 NOTE — LETTER
10/9/2023       RE: Cornelia Rodriguez  [address]  [place] [address]     Dear Colleague,    Thank you for referring your patient, Cornelia Rodriguez, to the [facility] NEUROLOGY CLINIC [place] at . Please see a copy of my visit note below.    [facility]  Neurology Procedure  10/09/23     Cornelia Rodriguez MRN# [id number]   YOB: 1975 Age: 47 year old            Occipital Nerve Block Procedure Note   DIAGNOSIS  Encounter Diagnosis   Name Primary?    Bilateral occipital neuralgia Yes        PROCEDURE   bilateral, Greater and Lesser occipital nerve blocks.      INFORMED CONSENT  I discussed the risks, benefits, alternatives, and the necessity of other members of the healthcare team participating in the procedure with the patient.  The patient understood and wished to proceed with the procedure.    PROCEDURAL PAUSE   Patient identity, procedure to be performed, correct side and site, patient position, availability of equipment, and special requirements were verified.      PROCEDURE DETAILS   Ms. Rodriguez was placed in the sitting position with her head and neck flexed.  Landmarks were palpated.  The area was prepped with ChloraPrep and sterile technique was used.  A 1.50 inch 25 gauge sterile needle was introduced 1/3 of the distance lateral from the occipital protuberance to the mastoid process on the left side.  After negative aspiration for blood, a solution containing 20 mg of triamcinolone acetonide diluted in a 1:2 mixture of 1% lidocaine and 0.5% bupivacaine for a total volume of 6 mL was injected in a fan-like fashion along the nuchal ridge between the occipital protuberance and mastoid process. The procedure was repeated in the exact same way on the opposite side with the same injectate.     Ms. Rodriguez tolerated the procedure well, and there were no apparent complications.  After appropriate observation, she was  dismissed in good condition under her own power.    COMMENTS  Ms. Rodriguez received a total of 40 mg of triamcinolone acetonide.  Her pain was 5/10 in severity prior to the procedure, and numb following the procedure.          Again, thank you for allowing me to participate in the care of your patient.      Sincerely,    Carolann Rutledge MD

## 2023-11-11 DIAGNOSIS — Z79.4 TYPE 2 DIABETES MELLITUS WITH HYPERGLYCEMIA, WITH LONG-TERM CURRENT USE OF INSULIN (H): ICD-10-CM

## 2023-11-11 DIAGNOSIS — J30.9 ALLERGIC RHINITIS, UNSPECIFIED SEASONALITY, UNSPECIFIED TRIGGER: ICD-10-CM

## 2023-11-11 DIAGNOSIS — E78.5 HYPERLIPIDEMIA LDL GOAL <100: ICD-10-CM

## 2023-11-11 DIAGNOSIS — E11.65 TYPE 2 DIABETES MELLITUS WITH HYPERGLYCEMIA, WITH LONG-TERM CURRENT USE OF INSULIN (H): ICD-10-CM

## 2023-11-13 RX ORDER — ROSUVASTATIN CALCIUM 10 MG/1
TABLET, COATED ORAL
Qty: 90 TABLET | Refills: 0 | Status: SHIPPED | OUTPATIENT
Start: 2023-11-13 | End: 2024-03-04

## 2023-11-13 RX ORDER — MONTELUKAST SODIUM 10 MG/1
TABLET ORAL
Qty: 90 TABLET | Refills: 0 | Status: SHIPPED | OUTPATIENT
Start: 2023-11-13 | End: 2024-03-04

## 2023-11-13 RX ORDER — EMPAGLIFLOZIN 25 MG/1
TABLET, FILM COATED ORAL
Qty: 90 TABLET | Refills: 0 | Status: SHIPPED | OUTPATIENT
Start: 2023-11-13 | End: 2024-02-26

## 2023-12-01 DIAGNOSIS — M54.81 BILATERAL OCCIPITAL NEURALGIA: ICD-10-CM

## 2023-12-01 DIAGNOSIS — M47.812 CERVICAL SPONDYLOSIS WITHOUT MYELOPATHY: Primary | ICD-10-CM

## 2023-12-04 ENCOUNTER — OFFICE VISIT (OUTPATIENT)
Dept: PHYSICAL MEDICINE AND REHAB | Facility: CLINIC | Age: 48
End: 2023-12-04
Payer: COMMERCIAL

## 2023-12-04 VITALS
DIASTOLIC BLOOD PRESSURE: 84 MMHG | BODY MASS INDEX: 40.56 KG/M2 | HEART RATE: 97 BPM | SYSTOLIC BLOOD PRESSURE: 162 MMHG | HEIGHT: 64 IN | WEIGHT: 237.6 LBS | OXYGEN SATURATION: 97 %

## 2023-12-04 DIAGNOSIS — M50.30 DEGENERATION OF CERVICAL INTERVERTEBRAL DISC: ICD-10-CM

## 2023-12-04 DIAGNOSIS — M54.12 CERVICAL RADICULOPATHY: Primary | ICD-10-CM

## 2023-12-04 DIAGNOSIS — M54.81 BILATERAL OCCIPITAL NEURALGIA: ICD-10-CM

## 2023-12-04 DIAGNOSIS — M47.812 CERVICAL SPONDYLOSIS WITHOUT MYELOPATHY: ICD-10-CM

## 2023-12-04 PROCEDURE — 99204 OFFICE O/P NEW MOD 45 MIN: CPT | Performed by: STUDENT IN AN ORGANIZED HEALTH CARE EDUCATION/TRAINING PROGRAM

## 2023-12-04 RX ORDER — MULTIPLE VITAMINS W/ MINERALS TAB 9MG-400MCG
1 TAB ORAL DAILY
COMMUNITY

## 2023-12-04 RX ORDER — ASCORBIC ACID 500 MG
500 TABLET ORAL
COMMUNITY

## 2023-12-04 ASSESSMENT — PAIN SCALES - GENERAL: PAINLEVEL: SEVERE PAIN (6)

## 2023-12-04 NOTE — PROGRESS NOTES
ASSESSMENT:  Cornelia Rodriguez is a 48 year old female presents for consultation at the request of Carolann Rutledge who presents today for new patient evaluation of :         Diagnoses and all orders for this visit:  Cervical radiculopathy  -     MR Cervical Spine w/o Contrast; Future  Cervical spondylosis without myelopathy  -     Spine  Referral  Bilateral occipital neuralgia  -     Spine  Referral  Degeneration of cervical intervertebral disc  -     MR Cervical Spine w/o Contrast; Future       Patient is neurologically intact on exam. No myelopathic or red flag symptoms.    Patient presents with right sided neck pain and radicular symptoms going down entire arm with numbness from elbow to fingers. She has a history of chronic neck pain and occipital neuralgia but feels symptoms are worsening. In light of her worsening symptoms and paresthesias in the arm we will proceed with MRI to better evaluate neural elements and follow up once MRI is completed. Patient in agreement with this plan, and was advised sometimes insurance requires PT before an MRI can be approved.    PLAN:  Reviewed spine anatomy and disease process. Discussed diagnosis and treatment options with the patient today. A shared decision making model was used. The patient's values and choices were respected. The following represents what was discussed and decided upon by the provider and the patient.    1. DIAGNOSTIC TESTS  MRI of cervical spine was ordered for further characterization of soft tissues and/or neural compression    2. PHYSICAL THERAPY  Patient is already performing a home program, and was encouraged to continue doing so.  Discussed the importance of core strengthening, ROM, stretching exercises with the patient and how each of these entities is important in decreasing pain.  Explained to the patient that the purpose of physical therapy is to teach the patient a home exercise program.  These exercises need to be  performed every day in order to decrease pain and prevent future occurrences of pain.  Likened it to brushing one's teeth.      3. MEDICATIONS:  Discussed multiple medication options today with patient. Discussed risks, side effects, and proper use of medications. Patient verbalized understanding.  No new medications ordered at this visit.    4. INTERVENTIONS:  Patient requires further imaging to assess what interventional options may be best for them.    5. OTHER REFERRALS:  No other referrals at this time.    6. FOLLOW-UP  In 4-6 weeks to review imaging results.  Patient advised to contact our office for earlier appointment if symptoms worsening or not improving, or any side effects are noticed.    Advised patient to call the Spine Center if symptoms worsen or you have problems controlling bladder and bowel function.   ______________________________________________________________________    SUBJECTIVE:   Cornelia Rodriguez  is a 48 year old female who presents today for new patient evaluation.    Patient reports she has had chronic neck pain for close to 5 years but symptoms have been worsening more recently. Pain is in the right side of the neck and radiates down the right shoulder to the right arm. From the elbow down to all the fingers she has tingling and numbness at times. She reports a previous history of occipital neuralgia managed with occipital nerve blocks. She most recently had one in October 2023 and it was not beneficial so she was advised to come to the spine center for further evaluation.    Neck pain worsens with bending and rightward rotation. She has been trying medication and PT home program which has not been beneficial.    No prior neck surgeries, no weakness of hands.    -Treatment to Date: as above      Oswestry (CHARLOTTE) Questionnaire        10/2/2019     3:00 PM   OSWESTRY DISABILITY INDEX   Count 10   Sum 3   Oswestry Score (%) 6 %       Neck Disability Index:      5/3/2019    12:00 PM   Neck  Disability Index (  Dameon BLAKE. and Magdaleno ANSARI. 1991. All rights reserved.; used with permission)   SECTION 1 - PAIN INTENSITY 0   SECTION 2 - PERSONAL CARE 0   SECTION 3 - LIFTING 0   SECTION 4 - READING 0   SECTION 5 - HEADACHES 0   SECTION 6 - CONCENTRATION 0   SECTION 7 - WORK 0   SECTION 8 - DRIVING 0   SECTION 9 - SLEEPING 0   SECTION 10 - RECREATION 0   Count 10   Sum 0   Raw Score: /50 0   Neck Disability Index Score: (%) 0 %              -Medications:    Current Outpatient Medications   Medication    albuterol (PROAIR HFA/PROVENTIL HFA/VENTOLIN HFA) 108 (90 Base) MCG/ACT inhaler    blood glucose (ACCU-CHEK SMARTVIEW) test strip    blood glucose (NO BRAND SPECIFIED) test strip    blood glucose monitoring (ACCU-CHEK MULTICLIX) lancets    blood glucose monitoring (NO BRAND SPECIFIED) meter device kit    calcium ascorbate 500 MG TABS    cetirizine (ZYRTEC) 10 MG tablet    Dulaglutide (TRULICITY) 4.5 MG/0.5ML SOPN    DULoxetine (CYMBALTA) 30 MG capsule    DULoxetine (CYMBALTA) 60 MG capsule    econazole nitrate 1 % external cream    fluticasone (FLONASE) 50 MCG/ACT nasal spray    insulin pen needle (BD PEN NEEDLE BASSEM 2ND GEN) 32G X 4 MM miscellaneous    JARDIANCE 25 MG TABS tablet    LANTUS SOLOSTAR 100 UNIT/ML soln    losartan (COZAAR) 25 MG tablet    montelukast (SINGULAIR) 10 MG tablet    Multiple Vitamins-Minerals (HM HAIR/SKIN/NAILS) TABS    multivitamin w/minerals (MULTI-VITAMIN) tablet    multivitamin w/minerals (THERA-VIT-M) tablet    rosuvastatin (CRESTOR) 10 MG tablet    SUMAtriptan (IMITREX) 100 MG tablet    vitamin C (ASCORBIC ACID) 500 MG tablet    acetaminophen (TYLENOL) 325 MG tablet     No current facility-administered medications for this visit.       Allergies   Allergen Reactions    Adhesive Tape Dermatitis    Benadryl [Diphenhydramine]     Codeine     Gabapentin     Ibuprofen     Metronidazole Fatigue     Headache    Penicillins     Ciprofloxacin Diarrhea, Nausea and Nausea and Vomiting     "Cyclobenzaprine Nausea and Vomiting     Feels shakey      Ferrous Gluconate Rash    Nortriptyline Hives and Rash    Sumatriptan Nausea and Nausea and Vomiting       Past Medical History:   Diagnosis Date    Diabetes (H)         Patient Active Problem List   Diagnosis    Morbid obesity (H)    Migraine without aura and without status migrainosus, not intractable    Gastroesophageal reflux disease without esophagitis    Type 2 diabetes mellitus with hyperglycemia (H)    Allergic rhinitis, unspecified seasonality, unspecified trigger    Hyperlipidemia LDL goal <100    Allergic contact dermatitis due to adhesives    History of Helicobacter pylori infection    Migraine    Seborrheic dermatitis of scalp    Perennial allergic rhinitis    Low back pain    Occipital neuralgia of right side    Non-alcoholic fatty liver disease       Past Surgical History:   Procedure Laterality Date    BIOPSY      CHOLECYSTECTOMY  2005       Family History   Problem Relation Age of Onset    Glaucoma Mother     Hypertension Sister     Diabetes Sister     Lung Cancer Sister         smoker    Diabetes Maternal Grandmother     Glaucoma Maternal Grandmother     Macular Degeneration No family hx of        Reviewed past medical, surgical, and family history with patient found on new patient intake packet located in EMR Media tab.     SOCIAL HX: Reviewed    ROS: Specifically negative for bowel/bladder dysfunction, balance changes, headache, dizziness, foot drop, fevers, chills, appetite changes, nausea/vomiting, unexplained weight loss. Otherwise 13 systems reviewed are negative. Please see the patient's intake questionnaire from today for details.    OBJECTIVE:  BP (!) 162/84 (BP Location: Left arm, Patient Position: Sitting, Cuff Size: Adult Large)   Pulse 97   Ht 5' 4\" (1.626 m)   Wt 237 lb 9.6 oz (107.8 kg)   SpO2 97%   BMI 40.78 kg/m      PHYSICAL EXAMINATION:  --CONSTITUTIONAL: Vital signs as above. No acute distress. The patient is well " nourished and well groomed.  --PSYCHIATRIC: The patient is awake, alert, oriented to person, place, time and answering questions appropriately with clear speech. Appropriate mood and affect   --RESPIRATORY: Normal rhythm and effort. No abnormal accessory muscle breathing patterns noted.   --GROSS MOTOR: Easily arises from a seated position.  --CERVICAL SPINE: Inspection reveals no evidence of deformity. Range of motion is not limited in cervical flexion, extension, lateral rotation. Mild tenderness to palpation cervical paraspinals, no tenderness in spinous processes.  Spurling maneuver positive on right side.  --SHOULDERS: Full range of motion bilaterally. Negative empty can.  --UPPER EXTREMITY MOTOR TESTING:  Wrist flexion left 5/5, right 5/5  Wrist extension left 5/5, right 5/5  Pronators left 5/5, right 5/5  Biceps left 5/5, right 5/5   Triceps left 5/5, right 5/5   Shoulder abduction left 5/5, right 5/5   left 5/5, right 5/5  --NEUROLOGIC: Sensation to upper extremities is intact bilaterally.  Negative Marie's bilaterally.    --VASCULAR: Warm upper limbs bilaterally. Capillary refill in the upper extremities is less than 1 second.    RESULTS: Available medical records from [facility] and any other outside records were reviewed today.     Imaging:  Available relevant imaging was personally reviewed and interpreted today. The images were shown to the patient and the findings were explained using a spine model.    MRI Cervical Spine 9/12/19:    Impression:   Mild-to-moderate degenerative disease of the cervical spine,  particularly severe at C6-C7 where there is moderate to severe  narrowing of the right neural foramen and moderate narrowing of the  spinal canal.

## 2023-12-04 NOTE — PATIENT INSTRUCTIONS
~Spine Center Scheduling #[phone].  ~Please call our [facility] Spine Nurse Navigation #[phone] with any questions or concerns about your treatment plan, if symptoms worsen and you would like to be seen urgently, or if you have problems controlling bladder and bowel function.  ~For any future flareups or new symptoms, recommend follow-up in clinic or contact the nurse navigator line.  ~Please note that any My Chart messages may take multiple days for a response due to the high volume of patients seen in clinic.  Anything sent Friday night or after will be answered the following week when able.     Imaging has been ordered. Radiology will call you to schedule. Please call below if you do not hear from them in the next couple of days.     [facility] Radiology Scheduling    Please call [phone] to schedule your image(s) (select option #1). There are 3 different locations, see below.     [facility]  [address] [place] [address] Imaging - [place]  [address], [address]   [facility] [address]    [place] [id number] [place] [address]

## 2023-12-04 NOTE — LETTER
12/4/2023         RE: Cornelia Rodriguez  [address]  [facility] [facility] [id number]        Dear Colleague,    Thank you for referring your patient, Cornelia Rodriguez, to the [facility]. Please see a copy of my visit note below.    ASSESSMENT:  Cornelia Rodriguez is a 48 year old female presents for consultation at the request of Carolann Rutledge who presents today for new patient evaluation of :         Diagnoses and all orders for this visit:  Cervical radiculopathy  -     MR Cervical Spine w/o Contrast; Future  Cervical spondylosis without myelopathy  -     Spine  Referral  Bilateral occipital neuralgia  -     Spine  Referral  Degeneration of cervical intervertebral disc  -     MR Cervical Spine w/o Contrast; Future       Patient is neurologically intact on exam. No myelopathic or red flag symptoms.    Patient presents with right sided neck pain and radicular symptoms going down entire arm with numbness from elbow to fingers. She has a history of chronic neck pain and occipital neuralgia but feels symptoms are worsening. In light of her worsening symptoms and paresthesias in the arm we will proceed with MRI to better evaluate neural elements and follow up once MRI is completed. Patient in agreement with this plan, and was advised sometimes insurance requires PT before an MRI can be approved.    PLAN:  Reviewed spine anatomy and disease process. Discussed diagnosis and treatment options with the patient today. A shared decision making model was used. The patient's values and choices were respected. The following represents what was discussed and decided upon by the provider and the patient.    1. DIAGNOSTIC TESTS  MRI of cervical spine was ordered for further characterization of soft tissues and/or neural compression    2. PHYSICAL THERAPY  Patient is already performing a home program, and was encouraged to continue doing so.  Discussed the importance of core  strengthening, ROM, stretching exercises with the patient and how each of these entities is important in decreasing pain.  Explained to the patient that the purpose of physical therapy is to teach the patient a home exercise program.  These exercises need to be performed every day in order to decrease pain and prevent future occurrences of pain.  Likened it to brushing one's teeth.      3. MEDICATIONS:  Discussed multiple medication options today with patient. Discussed risks, side effects, and proper use of medications. Patient verbalized understanding.  No new medications ordered at this visit.    4. INTERVENTIONS:  Patient requires further imaging to assess what interventional options may be best for them.    5. OTHER REFERRALS:  No other referrals at this time.    6. FOLLOW-UP  In 4-6 weeks to review imaging results.  Patient advised to contact our office for earlier appointment if symptoms worsening or not improving, or any side effects are noticed.    Advised patient to call the Spine Center if symptoms worsen or you have problems controlling bladder and bowel function.   ______________________________________________________________________    SUBJECTIVE:   Cornelia Rodriguez  is a 48 year old female who presents today for new patient evaluation.    Patient reports she has had chronic neck pain for close to 5 years but symptoms have been worsening more recently. Pain is in the right side of the neck and radiates down the right shoulder to the right arm. From the elbow down to all the fingers she has tingling and numbness at times. She reports a previous history of occipital neuralgia managed with occipital nerve blocks. She most recently had one in October 2023 and it was not beneficial so she was advised to come to the spine center for further evaluation.    Neck pain worsens with bending and rightward rotation. She has been trying medication and PT home program which has not been beneficial.    No prior neck  surgeries, no weakness of hands.    -Treatment to Date: as above      Oswestry (CHARLOTTE) Questionnaire        10/2/2019     3:00 PM   OSWESTRY DISABILITY INDEX   Count 10   Sum 3   Oswestry Score (%) 6 %       Neck Disability Index:      5/3/2019    12:00 PM   Neck Disability Index (  Dameon LUGO and Magdaleno STRICKLAND 1991. All rights reserved.; used with permission)   SECTION 1 - PAIN INTENSITY 0   SECTION 2 - PERSONAL CARE 0   SECTION 3 - LIFTING 0   SECTION 4 - READING 0   SECTION 5 - HEADACHES 0   SECTION 6 - CONCENTRATION 0   SECTION 7 - WORK 0   SECTION 8 - DRIVING 0   SECTION 9 - SLEEPING 0   SECTION 10 - RECREATION 0   Count 10   Sum 0   Raw Score: /50 0   Neck Disability Index Score: (%) 0 %              -Medications:    Current Outpatient Medications   Medication     albuterol (PROAIR HFA/PROVENTIL HFA/VENTOLIN HFA) 108 (90 Base) MCG/ACT inhaler     blood glucose (ACCU-CHEK SMARTVIEW) test strip     blood glucose (NO BRAND SPECIFIED) test strip     blood glucose monitoring (ACCU-CHEK MULTICLIX) lancets     blood glucose monitoring (NO BRAND SPECIFIED) meter device kit     calcium ascorbate 500 MG TABS     cetirizine (ZYRTEC) 10 MG tablet     Dulaglutide (TRULICITY) 4.5 MG/0.5ML SOPN     DULoxetine (CYMBALTA) 30 MG capsule     DULoxetine (CYMBALTA) 60 MG capsule     econazole nitrate 1 % external cream     fluticasone (FLONASE) 50 MCG/ACT nasal spray     insulin pen needle (BD PEN NEEDLE BASSEM 2ND GEN) 32G X 4 MM miscellaneous     JARDIANCE 25 MG TABS tablet     LANTUS SOLOSTAR 100 UNIT/ML soln     losartan (COZAAR) 25 MG tablet     montelukast (SINGULAIR) 10 MG tablet     Multiple Vitamins-Minerals (HM HAIR/SKIN/NAILS) TABS     multivitamin w/minerals (MULTI-VITAMIN) tablet     multivitamin w/minerals (THERA-VIT-M) tablet     rosuvastatin (CRESTOR) 10 MG tablet     SUMAtriptan (IMITREX) 100 MG tablet     vitamin C (ASCORBIC ACID) 500 MG tablet     acetaminophen (TYLENOL) 325 MG tablet     No current facility-administered  medications for this visit.       Allergies   Allergen Reactions     Adhesive Tape Dermatitis     Benadryl [Diphenhydramine]      Codeine      Gabapentin      Ibuprofen      Metronidazole Fatigue     Headache     Penicillins      Ciprofloxacin Diarrhea, Nausea and Nausea and Vomiting     Cyclobenzaprine Nausea and Vomiting     Feels shakey       Ferrous Gluconate Rash     Nortriptyline Hives and Rash     Sumatriptan Nausea and Nausea and Vomiting       Past Medical History:   Diagnosis Date     Diabetes (H)         Patient Active Problem List   Diagnosis     Morbid obesity (H)     Migraine without aura and without status migrainosus, not intractable     Gastroesophageal reflux disease without esophagitis     Type 2 diabetes mellitus with hyperglycemia (H)     Allergic rhinitis, unspecified seasonality, unspecified trigger     Hyperlipidemia LDL goal <100     Allergic contact dermatitis due to adhesives     History of Helicobacter pylori infection     Migraine     Seborrheic dermatitis of scalp     Perennial allergic rhinitis     Low back pain     Occipital neuralgia of right side     Non-alcoholic fatty liver disease       Past Surgical History:   Procedure Laterality Date     BIOPSY       CHOLECYSTECTOMY  2005       Family History   Problem Relation Age of Onset     Glaucoma Mother      Hypertension Sister      Diabetes Sister      Lung Cancer Sister         smoker     Diabetes Maternal Grandmother      Glaucoma Maternal Grandmother      Macular Degeneration No family hx of        Reviewed past medical, surgical, and family history with patient found on new patient intake packet located in EMR Media tab.     SOCIAL HX: Reviewed    ROS: Specifically negative for bowel/bladder dysfunction, balance changes, headache, dizziness, foot drop, fevers, chills, appetite changes, nausea/vomiting, unexplained weight loss. Otherwise 13 systems reviewed are negative. Please see the patient's intake questionnaire from today for  "details.    OBJECTIVE:  BP (!) 162/84 (BP Location: Left arm, Patient Position: Sitting, Cuff Size: Adult Large)   Pulse 97   Ht 5' 4\" (1.626 m)   Wt 237 lb 9.6 oz (107.8 kg)   SpO2 97%   BMI 40.78 kg/m      PHYSICAL EXAMINATION:  --CONSTITUTIONAL: Vital signs as above. No acute distress. The patient is well nourished and well groomed.  --PSYCHIATRIC: The patient is awake, alert, oriented to person, place, time and answering questions appropriately with clear speech. Appropriate mood and affect   --RESPIRATORY: Normal rhythm and effort. No abnormal accessory muscle breathing patterns noted.   --GROSS MOTOR: Easily arises from a seated position.  --CERVICAL SPINE: Inspection reveals no evidence of deformity. Range of motion is not limited in cervical flexion, extension, lateral rotation. Mild tenderness to palpation cervical paraspinals, no tenderness in spinous processes.  Spurling maneuver positive on right side.  --SHOULDERS: Full range of motion bilaterally. Negative empty can.  --UPPER EXTREMITY MOTOR TESTING:  Wrist flexion left 5/5, right 5/5  Wrist extension left 5/5, right 5/5  Pronators left 5/5, right 5/5  Biceps left 5/5, right 5/5   Triceps left 5/5, right 5/5   Shoulder abduction left 5/5, right 5/5   left 5/5, right 5/5  --NEUROLOGIC: Sensation to upper extremities is intact bilaterally.  Negative Marie's bilaterally.    --VASCULAR: Warm upper limbs bilaterally. Capillary refill in the upper extremities is less than 1 second.    RESULTS: Available medical records from [facility] and any other outside records were reviewed today.     Imaging:  Available relevant imaging was personally reviewed and interpreted today. The images were shown to the patient and the findings were explained using a spine model.    MRI Cervical Spine 9/12/19:    Impression:   Mild-to-moderate degenerative disease of the cervical spine,  particularly severe at C6-C7 where there is moderate to severe  narrowing " of the right neural foramen and moderate narrowing of the  spinal canal.       Again, thank you for allowing me to participate in the care of your patient.        Sincerely,        Mychal Rodríguez MD

## 2023-12-06 ENCOUNTER — HOSPITAL ENCOUNTER (OUTPATIENT)
Dept: MRI IMAGING | Facility: HOSPITAL | Age: 48
Discharge: HOME | End: 2023-12-06
Attending: STUDENT IN AN ORGANIZED HEALTH CARE EDUCATION/TRAINING PROGRAM | Admitting: STUDENT IN AN ORGANIZED HEALTH CARE EDUCATION/TRAINING PROGRAM
Payer: COMMERCIAL

## 2023-12-06 ENCOUNTER — TELEPHONE (OUTPATIENT)
Dept: PHYSICAL MEDICINE AND REHAB | Facility: CLINIC | Age: 48
End: 2023-12-06
Payer: COMMERCIAL

## 2023-12-06 DIAGNOSIS — M50.30 DEGENERATION OF CERVICAL INTERVERTEBRAL DISC: ICD-10-CM

## 2023-12-06 DIAGNOSIS — M54.12 CERVICAL RADICULOPATHY: ICD-10-CM

## 2023-12-06 PROCEDURE — 72141 MRI NECK SPINE W/O DYE: CPT

## 2023-12-06 NOTE — TELEPHONE ENCOUNTER
----- Message from IRMA Elam CNP sent at 12/6/2023  2:01 PM CST -----  Please let Cornelia Savage know that I reviewed her cervical spine MRI images as Dr Rodríguez is out of the office this week. She has a right sided disc herniation at C6-7 which is causing severe narrowing around the nerve on the right side. It looks similar, maybe a little progressed compared to what it looked like 2019. This likely explains her right arm symptoms. I would recommend making a follow up appointment with Dr Rodríguez to discuss treatment options in more detail.

## 2023-12-06 NOTE — TELEPHONE ENCOUNTER
Phone call to patient to review results and covering provider's recommendations. Results given and explained. Discussed recommendation to return for follow up with [facility] to review images and treatment options further with [facility]. Stated understanding. Transferred to scheduling to make appointment.

## 2023-12-12 ENCOUNTER — TELEPHONE (OUTPATIENT)
Dept: PHYSICAL MEDICINE AND REHAB | Facility: CLINIC | Age: 48
End: 2023-12-12
Payer: COMMERCIAL

## 2023-12-12 DIAGNOSIS — M48.02 FORAMINAL STENOSIS OF CERVICAL REGION: ICD-10-CM

## 2023-12-12 DIAGNOSIS — M54.12 CERVICAL RADICULOPATHY: Primary | ICD-10-CM

## 2023-12-12 NOTE — TELEPHONE ENCOUNTER
"\"I did review her chart and her MRI findings that she does have significant right foraminal stenosis at C5-6.  Recommend trialing a C7-T1 interlaminar epidural steroid injection to see if we can calm down her symptoms.  I did place an order for the injection and she can go ahead and schedule this with Dr. Marcos ANG.\"     Call placed to pt with recommendations. Pt stated understanding and would like to proceed. Injection requirements reviewed. Pt is diabetic. Scheduled patient for 1st avail with Dr. Rodríguez on 12/18. Message sent to PA team about urgent auth.   "

## 2023-12-12 NOTE — TELEPHONE ENCOUNTER
"PSP:  Dr. Rodríguez  Last clinic visit:  12/4/23  Reason for call: Worsening symptoms  Clinical information:  Call received from pt. Pt reports since she was last seen on 12/4/23 her symptoms have gradually been worsening and today it is at its worst. Pt endorses a continuous stabbing pain from her right side of her neck down her arm to her hand. She has numbness/tingling in her right hand and does feel her hand/arm is weak, \"I can hardly lift anything\".   Pt has tried ice (makes it worse) and heat. She takes Cymbalta 60 mg daily. She states most over the counter medications irritate her stomach.   Pt had cervical spine MRI and has appt with Dr. Rodríguez to review MRI on 12/29. Pt inquiring about any recommendations in the meantime.    Advice given to patient: Informed pt that Dr. Rodríguez is out of clinic. Will send to covering provider and will call with a response. Detailed message ok upon call back.   Provider to address: Please advise  "

## 2023-12-19 ENCOUNTER — RADIOLOGY INJECTION OFFICE VISIT (OUTPATIENT)
Dept: PHYSICAL MEDICINE AND REHAB | Facility: CLINIC | Age: 48
End: 2023-12-19
Attending: NURSE PRACTITIONER
Payer: COMMERCIAL

## 2023-12-19 VITALS
WEIGHT: 235.1 LBS | BODY MASS INDEX: 40.14 KG/M2 | RESPIRATION RATE: 22 BRPM | HEART RATE: 103 BPM | SYSTOLIC BLOOD PRESSURE: 110 MMHG | TEMPERATURE: 98.5 F | OXYGEN SATURATION: 98 % | HEIGHT: 64 IN | DIASTOLIC BLOOD PRESSURE: 84 MMHG

## 2023-12-19 DIAGNOSIS — M54.12 CERVICAL RADICULOPATHY: ICD-10-CM

## 2023-12-19 DIAGNOSIS — E11.9 DIABETES MELLITUS (H): Primary | ICD-10-CM

## 2023-12-19 DIAGNOSIS — M48.02 FORAMINAL STENOSIS OF CERVICAL REGION: ICD-10-CM

## 2023-12-19 LAB — GLUCOSE SERPL-MCNC: 284 MG/DL (ref 70–99)

## 2023-12-19 PROCEDURE — 62321 NJX INTERLAMINAR CRV/THRC: CPT | Performed by: STUDENT IN AN ORGANIZED HEALTH CARE EDUCATION/TRAINING PROGRAM

## 2023-12-19 PROCEDURE — 82962 GLUCOSE BLOOD TEST: CPT | Performed by: STUDENT IN AN ORGANIZED HEALTH CARE EDUCATION/TRAINING PROGRAM

## 2023-12-19 RX ORDER — LIDOCAINE HYDROCHLORIDE 10 MG/ML
INJECTION, SOLUTION EPIDURAL; INFILTRATION; INTRACAUDAL; PERINEURAL
Status: COMPLETED | OUTPATIENT
Start: 2023-12-19 | End: 2023-12-19

## 2023-12-19 RX ORDER — DEXAMETHASONE SODIUM PHOSPHATE 10 MG/ML
INJECTION, SOLUTION INTRAMUSCULAR; INTRAVENOUS
Status: COMPLETED | OUTPATIENT
Start: 2023-12-19 | End: 2023-12-19

## 2023-12-19 RX ADMIN — LIDOCAINE HYDROCHLORIDE 1 ML: 10 INJECTION, SOLUTION EPIDURAL; INFILTRATION; INTRACAUDAL; PERINEURAL at 13:23

## 2023-12-19 RX ADMIN — DEXAMETHASONE SODIUM PHOSPHATE 10 MG: 10 INJECTION, SOLUTION INTRAMUSCULAR; INTRAVENOUS at 13:23

## 2023-12-19 ASSESSMENT — PAIN SCALES - GENERAL
PAINLEVEL: SEVERE PAIN (7)
PAINLEVEL: NO PAIN (0)

## 2023-12-19 NOTE — PATIENT INSTRUCTIONS
Please be advised that your blood glucose levels may be increased for the next 5-7 days as a result of the steroid you received with your injection today.  It is recommended that you monitor your blood glucose levels closely over the next week and direct any additional questions regarding your blood glucose management to your primary doctor.       DISCHARGE INSTRUCTIONS    During office hours (8:00 a.m.- 4:00 p.m.) questions or concerns may be answered  by calling Spine Center Navigation Nurses at  [phone].  Messages received after hours will be returned the following business day.      In the case of an emergency, please dial 911 or seek assistance at the nearest Emergency Room/Urgent Care facility.     All Patients:    You may experience an increase in your symptoms for the first 2 days (It may take anywhere between 2 days- 2 weeks for the steroid to have maximum effect).    You may use ice on the injection site, as frequently as 20 minutes each hour if needed.    You may take your pain medicine.    You may continue taking your regular medication after your injection. If you have had a Medial Branch Block you may resume pain medication once your pain diary is completed.    You may shower. No swimming, tub bath or hot tub for 48 hours.  You may remove your bandaid/bandage as soon as you are home.    You may resume light activities, as tolerated.    Resume your usual diet as tolerated.    It is strongly advised that you do not drive for 1-3 hours post injection.    If you have had oral sedation:  Do not drive for 8 hours post injection.      If you have had IV sedation:  Do not drive for 24 hours post injection.  Do not operate hazardous machinery or make important personal/business decisions for 24 hours.      POSSIBLE STEROID SIDE EFFECTS (If steroid/cortisone was used for your procedure)    -If you experience these symptoms, it should only last for a short period    Swelling of the legs              Skin  redness (flushing)     Mouth (oral) irritation   Blood sugar (glucose) levels            Sweats                    Mood changes  Headache  Sleeplessness  Weakened immune system for up to 14 days, which could increase the risk of catracho the COVID-19 virus and/or experiencing more severe symptoms of the disease, if exposed.  Decreased effectiveness of the flu vaccine if given within 2 weeks of the steroid.         POSSIBLE PROCEDURE SIDE EFFECTS  -Call the Spine Center if you are concerned  Increased Pain           Increased numbness/tingling      Nausea/Vomiting          Bruising/bleeding at site      Redness or swelling                                              Difficulty walking      Weakness           Fever greater than 100.5    *In the event of a severe headache after an epidural steroid injection that is relieved by lying down, please call the [facility] to speak with a clinical staff member*

## 2024-01-17 NOTE — PROGRESS NOTES
Assessment:     Diagnoses and all orders for this visit:  Cervical radiculopathy  -     EMG; Future  -     pregabalin (LYRICA) 50 MG capsule; Take 1 capsule (50 mg) by mouth 2 times daily  -     Physical Therapy Referral; Future  Foraminal stenosis of cervical region  -     Physical Therapy Referral; Future  Degeneration of cervical intervertebral disc  -     Physical Therapy Referral; Future  Muscle weakness of right upper extremity  -     EMG; Future  -     Physical Therapy Referral; Future     Cornelia Rodriguez is a 48 year old y.o. female with past medical history significant for type 2 diabetes mellitus, hyperlipidemia, GERD, morbid obesity, migraine, occipital neuralgia on the right, migraine without aura, who presents today for follow-up regarding:     -Right-sided neck pain with right cervical radiculopathy with numbness and tingling into the fingers.  Post C7-T1 IL OPAL 12/19/2023.    New patient of Dr. Rodríguez's 12/4/2023.    Plan:     A shared decision making plan was used.  The patient's values and choices were respected. Prior medical records were reviewed today. The following represents what was discussed and decided upon by the provider and the patient.     -DIAGNOSTIC TESTS: Images were personally reviewed and interpreted.   -- Ordered right upper extremity EMG to evaluate right upper extremity weakness. Discussed with patient if there is active radiculopathy occurring I would recommend surgical opinion.  -- Cervical spine MRI 12/6/2023 with RIGHT disc osteophyte complex at C6-7 with severe right foraminal stenosis, mild left.  C5-6 moderate bilateral foraminal stenosis.  C4-5 mild right foraminal stenosis.    -INTERVENTIONS: No further injection recommendations at this time.    -MEDICATIONS: Prescribed Lyrica 50 mg 1 capsule twice daily as tolerated for radiculopathy.  Discussed side effects of medications and proper use. Patient verbalized understanding.    -PHYSICAL THERAPY: Referral to physical  therapy placed to address cervical core strengthening and nerve glides as long as EMG does not show active radiculopathy.  Discussed the importance of core strengthening, ROM, stretching exercises with the patient and how each of these entities is important in decreasing pain.  Explained to the patient that the purpose of physical therapy is to teach the patient a home exercise program.  These exercises need to be performed every day in order to decrease pain and prevent future occurrences of pain.        -PATIENT EDUCATION: Total time of 46 minutes, on the day of service, spent with the patient, reviewing the chart, placing orders, and documenting.     -FOLLOW UP: Follow-up  Advised to contact clinic if symptoms worsen or change.    Subjective:     Cornelia Rodriguez is a 48 year old female who presents today for follow-up regarding ongoing right-sided neck pain in which she received about 40% relief with recent C7-T1 IL OPAL.  She does report that her arm symptoms have improved but her neck and Po scapular and shoulder pain has not improved unfortunately.  Patient does report right upper extremity weakness as well, she is right-hand dominant, reports that her weakness is worse when her pain is worse however.  Otherwise denies any left arm symptoms.  Denies any recent trips or falls or balance changes.  Denies bowel or bladder loss control.    No myelopathic symptoms.     Treatment to Date:     C7-T1 IL OPAL with Dr. Rodríguez 12/19/2023 with overall 40% relief.     Medications:  Cymbalta-no benefit with neck and arm pain however    Failed medications:  Gabapentin with side effects  Acetaminophen  Tiger balm    Patient Active Problem List   Diagnosis    Morbid obesity (H)    Migraine without aura and without status migrainosus, not intractable    Gastroesophageal reflux disease without esophagitis    Type 2 diabetes mellitus with hyperglycemia (H)    Allergic rhinitis, unspecified seasonality, unspecified trigger     Hyperlipidemia LDL goal <100    Allergic contact dermatitis due to adhesives    History of Helicobacter pylori infection    Migraine    Seborrheic dermatitis of scalp    Perennial allergic rhinitis    Low back pain    Occipital neuralgia of right side    Non-alcoholic fatty liver disease       Current Outpatient Medications   Medication    acetaminophen (TYLENOL) 325 MG tablet    albuterol (PROAIR HFA/PROVENTIL HFA/VENTOLIN HFA) 108 (90 Base) MCG/ACT inhaler    blood glucose (ACCU-CHEK SMARTVIEW) test strip    blood glucose (NO BRAND SPECIFIED) test strip    blood glucose monitoring (ACCU-CHEK MULTICLIX) lancets    blood glucose monitoring (NO BRAND SPECIFIED) meter device kit    Dulaglutide (TRULICITY) 4.5 MG/0.5ML SOPN    DULoxetine (CYMBALTA) 30 MG capsule    DULoxetine (CYMBALTA) 60 MG capsule    econazole nitrate 1 % external cream    fluticasone (FLONASE) 50 MCG/ACT nasal spray    insulin pen needle (BD PEN NEEDLE BASSEM 2ND GEN) 32G X 4 MM miscellaneous    JARDIANCE 25 MG TABS tablet    LANTUS SOLOSTAR 100 UNIT/ML soln    losartan (COZAAR) 25 MG tablet    montelukast (SINGULAIR) 10 MG tablet    Multiple Vitamins-Minerals (HM HAIR/SKIN/NAILS) TABS    multivitamin w/minerals (MULTI-VITAMIN) tablet    multivitamin w/minerals (THERA-VIT-M) tablet    pregabalin (LYRICA) 50 MG capsule    rosuvastatin (CRESTOR) 10 MG tablet    SUMAtriptan (IMITREX) 100 MG tablet    vitamin C (ASCORBIC ACID) 500 MG tablet    calcium ascorbate 500 MG TABS    cetirizine (ZYRTEC) 10 MG tablet     No current facility-administered medications for this visit.       Allergies   Allergen Reactions    Adhesive Tape Dermatitis    Benadryl [Diphenhydramine]     Codeine     Gabapentin     Ibuprofen     Metronidazole Fatigue     Headache    Penicillins     Ciprofloxacin Diarrhea, Nausea and Nausea and Vomiting    Cyclobenzaprine Nausea and Vomiting     Feels shakey      Ferrous Gluconate Rash    Nortriptyline Hives and Rash    Sumatriptan Nausea  and Nausea and Vomiting       Past Medical History:   Diagnosis Date    Diabetes (H)         Review of Systems  ROS: Specifically negative for bowel/bladder dysfunction, balance changes, headache, dizziness, foot drop, fevers, chills, appetite changes, nausea/vomiting, unexplained weight loss. Otherwise 13 systems reviewed are negative. Please see the patient's intake questionnaire from today for details.    Reviewed Social, Family, Past Medical and Past Surgical history with patient, no significant changes noted since prior visit.     Objective:     BP (!) 144/89   Pulse 110     PHYSICAL EXAMINATION:   --CONSTITUTIONAL: Vital signs as above. No acute distress. The patient is well nourished and well groomed.  --PSYCHIATRIC:  The patient is awake, alert, oriented to person, place, time and answering questions appropriately with clear speech. Appropriate mood and affect   --HEENT: Sclera are non-injected. Extraocular muscles are intact.   --SKIN: Skin over the face, bilateral upper extremities, and posterior torso is clean, dry, intact without rashes.  --RESPIRATORY: Normal rhythm and effort. No abnormal accessory muscle breathing patterns noted.   --GROSS MOTOR: Easily arises from a seated position.   --CERVICAL SPINE: Inspection reveals no evidence of deformity.  --UPPER EXTREMITY MOTOR TESTING:  Wrist flexion left 5/5, right 5/5  Wrist extension left 5/5, right 5/5  Biceps left 5/5, right 5/5   Triceps left 5/5, right 5/5   Shoulder abduction left 5/5, right 5/5  --NEUROLOGIC: CN III-XII are grossly intact. 2/4 symmetric biceps, brachioradialis, triceps reflexes bilaterally. Sensation to upper extremities is intact. Negative Marie's bilaterally.   --VASCULAR: Warm upper limbs bilaterally.     Imaging: Spine imaging was reviewed today. The images were shown to the patient and the findings were explained using a spine model.      Cervical spine MRI reviewed

## 2024-01-22 ENCOUNTER — OFFICE VISIT (OUTPATIENT)
Dept: PHYSICAL MEDICINE AND REHAB | Facility: CLINIC | Age: 49
End: 2024-01-22
Payer: COMMERCIAL

## 2024-01-22 VITALS — SYSTOLIC BLOOD PRESSURE: 144 MMHG | HEART RATE: 110 BPM | DIASTOLIC BLOOD PRESSURE: 89 MMHG

## 2024-01-22 DIAGNOSIS — M48.02 FORAMINAL STENOSIS OF CERVICAL REGION: ICD-10-CM

## 2024-01-22 DIAGNOSIS — M62.81 MUSCLE WEAKNESS OF RIGHT UPPER EXTREMITY: ICD-10-CM

## 2024-01-22 DIAGNOSIS — M50.30 DEGENERATION OF CERVICAL INTERVERTEBRAL DISC: ICD-10-CM

## 2024-01-22 DIAGNOSIS — M54.12 CERVICAL RADICULOPATHY: Primary | ICD-10-CM

## 2024-01-22 PROCEDURE — 99215 OFFICE O/P EST HI 40 MIN: CPT | Performed by: NURSE PRACTITIONER

## 2024-01-22 RX ORDER — PREGABALIN 50 MG/1
50 CAPSULE ORAL 2 TIMES DAILY
Qty: 60 CAPSULE | Refills: 3 | Status: SHIPPED | OUTPATIENT
Start: 2024-01-22 | End: 2024-02-16

## 2024-01-22 ASSESSMENT — PAIN SCALES - GENERAL: PAINLEVEL: SEVERE PAIN (7)

## 2024-01-22 NOTE — PATIENT INSTRUCTIONS
~Spine Center Scheduling #[phone].  ~Please call our [facility] Spine Nurse Navigation #[phone] with any questions or concerns about your treatment plan, if symptoms worsen and you would like to be seen urgently, or if you have problems controlling bladder and bowel function.  ~For any future flareups or new symptoms, recommend follow-up in clinic or contact the nurse navigator line.  ~Please note that any My Chart messages may take multiple days for a response due to the high volume of patients seen in clinic.  Anything sent Thursday night or after will be answered the following week when able, as Jenn Espino CNP does not work in clinic on Fridays.   ~Jenn Espino CNP is at the [facility] on the first and third Tuesdays of the month only, otherwise primarily at the [facility].        ~You have been referred for Physical Therapy to [facility]. They will call you to schedule an appointment.      Scheduling phone number is [phone] for [facility] [place], [place], or [place] location.  If you have not heard from the scheduling office within 2 business days, please call [phone] for ALL other locations.    Discussed the importance of core strengthening, ROM, stretching exercises and how each of these entities is important in decreasing pain and improving long term spine health.  The purpose of physical therapy is to teach you an individualized home exercise program.  These exercises need to be performed every day in order to decrease pain and prevent future occurrences of pain.

## 2024-01-22 NOTE — LETTER
January 22, 2024      Cornelia Rodriguez  [id number]  [place] [id number]        To Whom It May Concern:    Cornelia Rodriguez was seen in our clinic. She may return to work with the following: 10 pound lifting limit in general, including limiting 10 pounds of push pull and overhead lifting.  Restrictions for 4 weeks through 2/26/2024.  Will revisit further workability needs at that time.      Sincerely,      Jenn Espino

## 2024-01-22 NOTE — Clinical Note
1/22/2024         RE: Cornelia Rodriguez  [address]  [facility] [address]        Dear Colleague,    Thank you for referring your patient, Cornelia Rodriguez, to the [facility]. Please see a copy of my visit note below.      Assessment:     Diagnoses and all orders for this visit:  Cervical radiculopathy  Foraminal stenosis of cervical region  Degeneration of cervical intervertebral disc     Cornelia Rodriguez is a 48 year old y.o. female with past medical history significant for type 2 diabetes mellitus, hyperlipidemia, GERD, morbid obesity, migraine, occipital neuralgia on the right, migraine without aura, who presents today for follow-up regarding:     -Right-sided neck pain with right cervical radiculopathy with numbness and tingling into the fingers.  Post C7-T1 IL OPAL 12/19/2023.    New patient of Dr. Rodríguez's 12/4/2023.    Plan:     A shared decision making plan was used.  The patient's values and choices were respected. Prior medical records were reviewed today. The following represents what was discussed and decided upon by the provider and the patient.     -DIAGNOSTIC TESTS: Images were personally reviewed and interpreted.   --***   -- Cervical spine MRI 12/6/2023 with RIGHT disc osteophyte complex at C6-7 with severe right foraminal stenosis, mild left.  C5-6 moderate bilateral foraminal stenosis.  C4-5 mild right foraminal stenosis.    -INTERVENTIONS: ***    -MEDICATIONS: ***   Discussed side effects of medications and proper use. Patient verbalized understanding.    -PHYSICAL THERAPY: ***  Discussed the importance of core strengthening, ROM, stretching exercises with the patient and how each of these entities is important in decreasing pain.  Explained to the patient that the purpose of physical therapy is to teach the patient a home exercise program.  These exercises need to be performed every day in order to decrease pain and prevent future occurrences of pain.         -PATIENT EDUCATION: Total time of *** minutes, on the day of service, spent with the patient, reviewing the chart, placing orders, and documenting.     -FOLLOW UP: ***   Advised to contact clinic if symptoms worsen or change.    Subjective:     Cornelia Rodriguez is a 48 year old female who presents today for follow-up regarding ***    No myelopathic symptoms.     Treatment to Date:     C7-T1 IL OPAL with Dr. Rodríguez 12/19/2023 with ***.     Medications:  Cymbalta    Patient Active Problem List   Diagnosis    Morbid obesity (H)    Migraine without aura and without status migrainosus, not intractable    Gastroesophageal reflux disease without esophagitis    Type 2 diabetes mellitus with hyperglycemia (H)    Allergic rhinitis, unspecified seasonality, unspecified trigger    Hyperlipidemia LDL goal <100    Allergic contact dermatitis due to adhesives    History of Helicobacter pylori infection    Migraine    Seborrheic dermatitis of scalp    Perennial allergic rhinitis    Low back pain    Occipital neuralgia of right side    Non-alcoholic fatty liver disease       Current Outpatient Medications   Medication    acetaminophen (TYLENOL) 325 MG tablet    albuterol (PROAIR HFA/PROVENTIL HFA/VENTOLIN HFA) 108 (90 Base) MCG/ACT inhaler    blood glucose (ACCU-CHEK SMARTVIEW) test strip    blood glucose (NO BRAND SPECIFIED) test strip    blood glucose monitoring (ACCU-CHEK MULTICLIX) lancets    blood glucose monitoring (NO BRAND SPECIFIED) meter device kit    calcium ascorbate 500 MG TABS    cetirizine (ZYRTEC) 10 MG tablet    Dulaglutide (TRULICITY) 4.5 MG/0.5ML SOPN    DULoxetine (CYMBALTA) 30 MG capsule    DULoxetine (CYMBALTA) 60 MG capsule    econazole nitrate 1 % external cream    fluticasone (FLONASE) 50 MCG/ACT nasal spray    insulin pen needle (BD PEN NEEDLE BASSEM 2ND GEN) 32G X 4 MM miscellaneous    JARDIANCE 25 MG TABS tablet    LANTUS SOLOSTAR 100 UNIT/ML soln    losartan (COZAAR) 25 MG tablet    montelukast  (SINGULAIR) 10 MG tablet    Multiple Vitamins-Minerals (HM HAIR/SKIN/NAILS) TABS    multivitamin w/minerals (MULTI-VITAMIN) tablet    multivitamin w/minerals (THERA-VIT-M) tablet    rosuvastatin (CRESTOR) 10 MG tablet    SUMAtriptan (IMITREX) 100 MG tablet    vitamin C (ASCORBIC ACID) 500 MG tablet     No current facility-administered medications for this visit.       Allergies   Allergen Reactions    Adhesive Tape Dermatitis    Benadryl [Diphenhydramine]     Codeine     Gabapentin     Ibuprofen     Metronidazole Fatigue     Headache    Penicillins     Ciprofloxacin Diarrhea, Nausea and Nausea and Vomiting    Cyclobenzaprine Nausea and Vomiting     Feels shakey      Ferrous Gluconate Rash    Nortriptyline Hives and Rash    Sumatriptan Nausea and Nausea and Vomiting       Past Medical History:   Diagnosis Date    Diabetes (H)         Review of Systems  ROS: *** Specifically negative for bowel/bladder dysfunction, balance changes, headache, dizziness, foot drop, fevers, chills, appetite changes, nausea/vomiting, unexplained weight loss. Otherwise 13 systems reviewed are negative. Please see the patient's intake questionnaire from today for details.    Reviewed Social, Family, Past Medical and Past Surgical history with patient, no significant changes noted since prior visit.     Objective:     There were no vitals taken for this visit.    PHYSICAL EXAMINATION:   --CONSTITUTIONAL: Vital signs as above. No acute distress. The patient is well nourished and well groomed.  --PSYCHIATRIC:  The patient is awake, alert, oriented to person, place, time and answering questions appropriately with clear speech. Appropriate mood and affect   --HEENT: Sclera are non-injected. Extraocular muscles are intact.   --SKIN: Skin over the face, bilateral upper extremities, and posterior torso is clean, dry, intact without rashes.  --RESPIRATORY: Normal rhythm and effort. No abnormal accessory muscle breathing patterns noted.   --GROSS  MOTOR: Easily arises from a seated position.   --CERVICAL SPINE: Inspection reveals no evidence of deformity. Range of motion is not limited in cervical flexion, extension, lateral rotation. No tenderness to palpation cervical spine.    --SHOULDERS: Full range of motion bilaterally: abduction, flexion, cross chest movement internal/external rotation. Negative empty can.  --UPPER EXTREMITY MOTOR TESTING:  Wrist flexion left 5/5, right 5/5  Wrist extension left 5/5, right 5/5  Biceps left 5/5, right 5/5   Triceps left 5/5, right 5/5   Shoulder abduction left 5/5, right 5/5   left 5/5, right 5/5  --NEUROLOGIC: CN III-XII are grossly intact. 2/4 symmetric biceps, brachioradialis, triceps reflexes bilaterally. Sensation to upper extremities is intact. Negative Marie's bilaterally.   --VASCULAR: Warm upper limbs bilaterally.     Imaging: Spine imaging was reviewed today. The images were shown to the patient and the findings were explained using a spine model.    Pain Interlaminar Epidural Strd Inj Cervical    Result Date: 12/19/2023  CERVICAL INTERLAMINAR EPIDURAL STEROID INJECTION WITH FLUOROSCOPIC GUIDANCE Performed on: 12/19/23 Pre Procedure Diagnosis:  NECK PAIN , Cervical radiculitis Post Procedure Diagnosis:  Same Procedure Performed:  Cervical Interlaminar Epidural Steroid Injection with Fluoroscopic Guidance Clinical Scenario:  As per office notes Anesthesia/Fluids:  As per intra-procedure documentation Vital Signs:  As per intra-procedure documentation Level Injected:  C7-T1 Narrative: Cornelia Rodriguez is a 48 year old female who presents today for a cervical ILESI.  The patient complains of neck pain and radicular arm pain.  MRI was reviewed prior to case. The procedure of epidural steroid injection was discussed in detail along with the attendant risks, including but not limited to:  back pain, subdural puncture with a subsequent headache (possible need for epidural blood patch), nerve injury, infection,  "bleeding, epidural hematoma (with need for surgical evacuation), allergic reaction,  worsening of pain along with risk of stroke, paralysis and death. Potential benefits including improvements in pain, function, and sleep were also discussed. Alternatives were offered. The patient was given an opportunity to ask any questions and clarify their understanding. The patient decided to proceed and signed informed consent. The patient denies any symptoms of an active infection and denies taking antibiotics. The patient denies taking any prescription blood thinning medications, or if prescribed they have been held . The patient denies any allergies to iodine or iodine contrast. Patient is diabetic, and their blood sugar was tested prior to procedure. It was noted to be under 300mg/dL. The proceduralist wore sterile gloves and a mask for the procedure, and all other personnel in the room wore a mask. The patient was placed in a prone position.  A procedural pause was performed to confirm the patient's name and  as well as the site and side of the injection.  The neck was prepped and draped in usual sterile fashion.  After anesthetizing the skin with 1ml of 1% lidocaine, a 3.5\",20 guage Touhy needle was introduced in the midline at the above noted interspace under fluoroscopic guidance and via loss of resistance technique with preservative free saline. Final approach was performed in the contralateral oblique projection. After entering the epidural space and aspiration was negative for blood or CSF,  1 ml Omnipaque-300 was injected slowly into the epidural space.  Fluoroscopy confirmed epidural flow in multiple views.  Subsequently, 10 mg of dexamethasone was slowly injected into the patient's epidural space.  Radiographs were obtained for documentation purposes. Pre-procedure pain score:  7/10 Post-procedure pain score: 0/10 The patient tolerated the procedure well.  The patient was instructed to call if any " questions/concerns arise after the procedure.  After a short period of observation the patient was discharged.                     Assessment:     Diagnoses and all orders for this visit:  Cervical radiculopathy  -     EMG; Future  -     pregabalin (LYRICA) 50 MG capsule; Take 1 capsule (50 mg) by mouth 2 times daily  -     Physical Therapy Referral; Future  Foraminal stenosis of cervical region  -     Physical Therapy Referral; Future  Degeneration of cervical intervertebral disc  -     Physical Therapy Referral; Future  Muscle weakness of right upper extremity  -     EMG; Future  -     Physical Therapy Referral; Future     Cornelia Rodriguez is a 48 year old y.o. female with past medical history significant for type 2 diabetes mellitus, hyperlipidemia, GERD, morbid obesity, migraine, occipital neuralgia on the right, migraine without aura, who presents today for follow-up regarding:     -Right-sided neck pain with right cervical radiculopathy with numbness and tingling into the fingers.  Post C7-T1 IL OPAL 12/19/2023.    New patient of Dr. Rodríguez's 12/4/2023.    Plan:     A shared decision making plan was used.  The patient's values and choices were respected. Prior medical records were reviewed today. The following represents what was discussed and decided upon by the provider and the patient.     -DIAGNOSTIC TESTS: Images were personally reviewed and interpreted.   -- Ordered right upper extremity EMG to evaluate right upper extremity weakness. Discussed with patient if there is active radiculopathy occurring I would recommend surgical opinion.  -- Cervical spine MRI 12/6/2023 with RIGHT disc osteophyte complex at C6-7 with severe right foraminal stenosis, mild left.  C5-6 moderate bilateral foraminal stenosis.  C4-5 mild right foraminal stenosis.    -INTERVENTIONS: No further injection recommendations at this time.    -MEDICATIONS: Prescribed Lyrica 50 mg 1 capsule twice daily as tolerated for  radiculopathy.  Discussed side effects of medications and proper use. Patient verbalized understanding.    -PHYSICAL THERAPY: Referral to physical therapy placed to address cervical core strengthening and nerve glides as long as EMG does not show active radiculopathy.  Discussed the importance of core strengthening, ROM, stretching exercises with the patient and how each of these entities is important in decreasing pain.  Explained to the patient that the purpose of physical therapy is to teach the patient a home exercise program.  These exercises need to be performed every day in order to decrease pain and prevent future occurrences of pain.        -PATIENT EDUCATION: Total time of 46 minutes, on the day of service, spent with the patient, reviewing the chart, placing orders, and documenting.     -FOLLOW UP: Follow-up  Advised to contact clinic if symptoms worsen or change.    Subjective:     Cornelia Rodriguez is a 48 year old female who presents today for follow-up regarding ***    No myelopathic symptoms.     Treatment to Date:     C7-T1 IL OPAL with Dr. Rodríguez 12/19/2023 with ***.     Medications:  Cymbalta    Patient Active Problem List   Diagnosis     Morbid obesity (H)     Migraine without aura and without status migrainosus, not intractable     Gastroesophageal reflux disease without esophagitis     Type 2 diabetes mellitus with hyperglycemia (H)     Allergic rhinitis, unspecified seasonality, unspecified trigger     Hyperlipidemia LDL goal <100     Allergic contact dermatitis due to adhesives     History of Helicobacter pylori infection     Migraine     Seborrheic dermatitis of scalp     Perennial allergic rhinitis     Low back pain     Occipital neuralgia of right side     Non-alcoholic fatty liver disease       Current Outpatient Medications   Medication     acetaminophen (TYLENOL) 325 MG tablet     albuterol (PROAIR HFA/PROVENTIL HFA/VENTOLIN HFA) 108 (90 Base) MCG/ACT inhaler     blood glucose (ACCU-CHEK  SMARTVIEW) test strip     blood glucose (NO BRAND SPECIFIED) test strip     blood glucose monitoring (ACCU-CHEK MULTICLIX) lancets     blood glucose monitoring (NO BRAND SPECIFIED) meter device kit     Dulaglutide (TRULICITY) 4.5 MG/0.5ML SOPN     DULoxetine (CYMBALTA) 30 MG capsule     DULoxetine (CYMBALTA) 60 MG capsule     econazole nitrate 1 % external cream     fluticasone (FLONASE) 50 MCG/ACT nasal spray     insulin pen needle (BD PEN NEEDLE BASSEM 2ND GEN) 32G X 4 MM miscellaneous     JARDIANCE 25 MG TABS tablet     LANTUS SOLOSTAR 100 UNIT/ML soln     losartan (COZAAR) 25 MG tablet     montelukast (SINGULAIR) 10 MG tablet     Multiple Vitamins-Minerals (HM HAIR/SKIN/NAILS) TABS     multivitamin w/minerals (MULTI-VITAMIN) tablet     multivitamin w/minerals (THERA-VIT-M) tablet     pregabalin (LYRICA) 50 MG capsule     rosuvastatin (CRESTOR) 10 MG tablet     SUMAtriptan (IMITREX) 100 MG tablet     vitamin C (ASCORBIC ACID) 500 MG tablet     calcium ascorbate 500 MG TABS     cetirizine (ZYRTEC) 10 MG tablet     No current facility-administered medications for this visit.       Allergies   Allergen Reactions     Adhesive Tape Dermatitis     Benadryl [Diphenhydramine]      Codeine      Gabapentin      Ibuprofen      Metronidazole Fatigue     Headache     Penicillins      Ciprofloxacin Diarrhea, Nausea and Nausea and Vomiting     Cyclobenzaprine Nausea and Vomiting     Feels shakey       Ferrous Gluconate Rash     Nortriptyline Hives and Rash     Sumatriptan Nausea and Nausea and Vomiting       Past Medical History:   Diagnosis Date     Diabetes (H)         Review of Systems  ROS: *** Specifically negative for bowel/bladder dysfunction, balance changes, headache, dizziness, foot drop, fevers, chills, appetite changes, nausea/vomiting, unexplained weight loss. Otherwise 13 systems reviewed are negative. Please see the patient's intake questionnaire from today for details.    Reviewed Social, Family, Past Medical and  Past Surgical history with patient, no significant changes noted since prior visit.     Objective:     BP (!) 144/89   Pulse 110     PHYSICAL EXAMINATION:   --CONSTITUTIONAL: Vital signs as above. No acute distress. The patient is well nourished and well groomed.  --PSYCHIATRIC:  The patient is awake, alert, oriented to person, place, time and answering questions appropriately with clear speech. Appropriate mood and affect   --HEENT: Sclera are non-injected. Extraocular muscles are intact.   --SKIN: Skin over the face, bilateral upper extremities, and posterior torso is clean, dry, intact without rashes.  --RESPIRATORY: Normal rhythm and effort. No abnormal accessory muscle breathing patterns noted.   --GROSS MOTOR: Easily arises from a seated position.   --CERVICAL SPINE: Inspection reveals no evidence of deformity. Range of motion is not limited in cervical flexion, extension, lateral rotation. No tenderness to palpation cervical spine.    --SHOULDERS: Full range of motion bilaterally: abduction, flexion, cross chest movement internal/external rotation. Negative empty can.  --UPPER EXTREMITY MOTOR TESTING:  Wrist flexion left 5/5, right 5/5  Wrist extension left 5/5, right 5/5  Biceps left 5/5, right 5/5   Triceps left 5/5, right 5/5   Shoulder abduction left 5/5, right 5/5   left 5/5, right 5/5  --NEUROLOGIC: CN III-XII are grossly intact. 2/4 symmetric biceps, brachioradialis, triceps reflexes bilaterally. Sensation to upper extremities is intact. Negative Marie's bilaterally.   --VASCULAR: Warm upper limbs bilaterally.     Imaging: Spine imaging was reviewed today. The images were shown to the patient and the findings were explained using a spine model.    Pain Interlaminar Epidural Strd Inj Cervical    Result Date: 12/19/2023  CERVICAL INTERLAMINAR EPIDURAL STEROID INJECTION WITH FLUOROSCOPIC GUIDANCE Performed on: 12/19/23 Pre Procedure Diagnosis:  NECK PAIN , Cervical radiculitis Post Procedure  "Diagnosis:  Same Procedure Performed:  Cervical Interlaminar Epidural Steroid Injection with Fluoroscopic Guidance Clinical Scenario:  As per office notes Anesthesia/Fluids:  As per intra-procedure documentation Vital Signs:  As per intra-procedure documentation Level Injected:  C7-T1 Narrative: Cornelia Rodriguez is a 48 year old female who presents today for a cervical ILESI.  The patient complains of neck pain and radicular arm pain.  MRI was reviewed prior to case. The procedure of epidural steroid injection was discussed in detail along with the attendant risks, including but not limited to:  back pain, subdural puncture with a subsequent headache (possible need for epidural blood patch), nerve injury, infection, bleeding, epidural hematoma (with need for surgical evacuation), allergic reaction,  worsening of pain along with risk of stroke, paralysis and death. Potential benefits including improvements in pain, function, and sleep were also discussed. Alternatives were offered. The patient was given an opportunity to ask any questions and clarify their understanding. The patient decided to proceed and signed informed consent. The patient denies any symptoms of an active infection and denies taking antibiotics. The patient denies taking any prescription blood thinning medications, or if prescribed they have been held . The patient denies any allergies to iodine or iodine contrast. Patient is diabetic, and their blood sugar was tested prior to procedure. It was noted to be under 300mg/dL. The proceduralist wore sterile gloves and a mask for the procedure, and all other personnel in the room wore a mask. The patient was placed in a prone position.  A procedural pause was performed to confirm the patient's name and  as well as the site and side of the injection.  The neck was prepped and draped in usual sterile fashion.  After anesthetizing the skin with 1ml of 1% lidocaine, a 3.5\",20 guage Touhy needle was " introduced in the midline at the above noted interspace under fluoroscopic guidance and via loss of resistance technique with preservative free saline. Final approach was performed in the contralateral oblique projection. After entering the epidural space and aspiration was negative for blood or CSF,  1 ml Omnipaque-300 was injected slowly into the epidural space.  Fluoroscopy confirmed epidural flow in multiple views.  Subsequently, 10 mg of dexamethasone was slowly injected into the patient's epidural space.  Radiographs were obtained for documentation purposes. Pre-procedure pain score:  7/10 Post-procedure pain score: 0/10 The patient tolerated the procedure well.  The patient was instructed to call if any questions/concerns arise after the procedure.  After a short period of observation the patient was discharged.                     Again, thank you for allowing me to participate in the care of your patient.        Sincerely,        Jenn Espino, CNP

## 2024-01-28 ENCOUNTER — HEALTH MAINTENANCE LETTER (OUTPATIENT)
Age: 49
End: 2024-01-28

## 2024-01-29 ENCOUNTER — OFFICE VISIT (OUTPATIENT)
Dept: PHYSICAL MEDICINE AND REHAB | Facility: CLINIC | Age: 49
End: 2024-01-29
Attending: NURSE PRACTITIONER
Payer: COMMERCIAL

## 2024-01-29 DIAGNOSIS — M62.81 MUSCLE WEAKNESS OF RIGHT UPPER EXTREMITY: ICD-10-CM

## 2024-01-29 DIAGNOSIS — M54.12 CERVICAL RADICULOPATHY: ICD-10-CM

## 2024-01-29 DIAGNOSIS — G56.01 CARPAL TUNNEL SYNDROME OF RIGHT WRIST: Primary | ICD-10-CM

## 2024-01-29 PROCEDURE — 95886 MUSC TEST DONE W/N TEST COMP: CPT | Mod: RT | Performed by: PHYSICAL MEDICINE & REHABILITATION

## 2024-01-29 PROCEDURE — 95909 NRV CNDJ TST 5-6 STUDIES: CPT | Performed by: PHYSICAL MEDICINE & REHABILITATION

## 2024-01-29 NOTE — PROGRESS NOTES
[facility]  [address] [place] [address] [address]  [place] [address]  Office: [phone] Fax: [phone]    Electromyography and Nerve Conduction Study Report        Indication:   Patient presents at the request of Jenn Espino for right upper extremity EMG.  She has right-sided cervical spine pain with diffuse right arm pain and paresthesias to all fingers of the right hand.  No specific weakness.  On exam she has normal sensation to light touch through the upper extremities, symmetric 2+ biceps, triceps, brachioradialis reflexes with negative Freddie's, and normal muscle strength throughout the major muscle groups of the bilateral upper extremities.      Pt Exam Discussion (Communication Barriers):  Electromyography and nerve conduction testing, including associated discomfort, risks, benefits, and alternatives was discussed with the patient prior to the procedure.  No learning/ communication barriers; patient verbalized understanding of procedure.  Informed consent was obtained.           Pt Assessment:  Testing was successfully completed; patient tolerated testing well.       Blood Thinners: None Skin Temperature: Warmed 31.3                   EMG/NCS  results:     Nerve Conduction Studies  Motor Sites      Segment Distal Latency Neg. Amp CV F-Latency F-Estimate Comment   Site  (ms) (mV) (m/s) (ms) (ms)    Right Median (APB) Motor   Wrist Wrist-APB *6.0 6.5       Elbow Elbow-Wrist 11.5 5.8 *41      Right Ulnar (ADM) Motor   Wrist  2.5 14.2       Bel Elbow Bel Elbow-Wrist 6.4 11.8 58      Ab Elbow Ab Elbow-Wrist 7.9 11.6 60        Sensory Sites      Onset Lat Peak Lat Amp CV Comment   Site (ms) (ms) ( V) (m/s)    Right Median Anti Sensory   Wrist-Dig II 4.1 *4.9 16 32    Right Median-Ulnar Palmar Sensory        Median   Palm-Wrist 2.7 *3.4 25 30         Ulnar   Palm-Wrist 1.35 1.63 35 59    Right Radial Sensory   Forearm-Wrist 1.40 1.95 45 71    Right Ulnar Anti Sensory    Wrist-Dig V 1.93 2.4 37 57        NCS Waveforms:    Motor         Sensory                  Electromyography     Side Muscle Nerve Root Ins Act Fibs Psw Fasc Recrt Dur Amp Poly Comment   Right Deltoid Axillary C5-6 Nml Nml Nml Nml Nml Nml Nml 0    Right Triceps Radial C6-7-8 Nml Nml Nml Nml Nml Nml Nml 0    Right PronatorTeres Median C6-7 Nml Nml Nml Nml Nml Nml Nml 0    Right 1stDorInt Ulnar C8-T1 Nml Nml Nml Nml Nml Nml Nml 0    Right Abd Poll Brev Median C8-T1 Nml Nml Nml Nml Nml Nml Nml 0          Comment NCS: Abnormal study:    1.   Prolonged latency borderline amplitude right median SNAP.  2.  Prolonged latency and reduced amplitude right median transcarpal study.  3.  Prolonged right median CMAP latency and slowed conduction velocity.  4.  Prolonged right median versus ulnar transcarpal latency comparison.    5.  Normal right ulnar and radial SNAPs, ulnar transcarpal study, and ulnar CMAP.      Comment EMG: Normal study.  1.  Normal needle EMG right upper extremity.    Interpretation: Abnormal study: There is electrodiagnostic evidence of:    1.  Right median neuropathy at the wrist consistent with entrapment in the carpal tunnel, moderate in severity.    2. There is no electrodiagnostic evidence of cervical radiculopathy, brachial plexopathy, or ulnar neuropathy in the right upper extremity.    The testing was completed in its entirety by the physician.      It was our pleasure caring for your patient today, if there any questions or concerns please do not hesitate to contact us.

## 2024-01-29 NOTE — LETTER
1/29/2024         RE: Cornelia Rodriguez  [address]  [facility] [address]        Dear Colleague,    Thank you for referring your patient, Cornelia Rodriguez, to the [facility]. Please see a copy of my visit note below.    [facility]  [address] [place] [address] [address]  [place] [address]  Office: [phone] Fax: [phone]    Electromyography and Nerve Conduction Study Report        Indication:   Patient presents at the request of Jenn Espino for right upper extremity EMG.  She has right-sided cervical spine pain with diffuse right arm pain and paresthesias to all fingers of the right hand.  No specific weakness.  On exam she has normal sensation to light touch through the upper extremities, symmetric 2+ biceps, triceps, brachioradialis reflexes with negative Freddie's, and normal muscle strength throughout the major muscle groups of the bilateral upper extremities.      Pt Exam Discussion (Communication Barriers):  Electromyography and nerve conduction testing, including associated discomfort, risks, benefits, and alternatives was discussed with the patient prior to the procedure.  No learning/ communication barriers; patient verbalized understanding of procedure.  Informed consent was obtained.           Pt Assessment:  Testing was successfully completed; patient tolerated testing well.       Blood Thinners: None Skin Temperature: Warmed 31.3                   EMG/NCS  results:     Nerve Conduction Studies  Motor Sites      Segment Distal Latency Neg. Amp CV F-Latency F-Estimate Comment   Site  (ms) (mV) (m/s) (ms) (ms)    Right Median (APB) Motor   Wrist Wrist-APB *6.0 6.5       Elbow Elbow-Wrist 11.5 5.8 *41      Right Ulnar (ADM) Motor   Wrist  2.5 14.2       Bel Elbow Bel Elbow-Wrist 6.4 11.8 58      Ab Elbow Ab Elbow-Wrist 7.9 11.6 60        Sensory Sites      Onset Lat Peak Lat Amp CV Comment   Site (ms) (ms) ( V) (m/s)     Right Median Anti Sensory   Wrist-Dig II 4.1 *4.9 16 32    Right Median-Ulnar Palmar Sensory        Median   Palm-Wrist 2.7 *3.4 25 30         Ulnar   Palm-Wrist 1.35 1.63 35 59    Right Radial Sensory   Forearm-Wrist 1.40 1.95 45 71    Right Ulnar Anti Sensory   Wrist-Dig V 1.93 2.4 37 57        NCS Waveforms:    Motor         Sensory                  Electromyography     Side Muscle Nerve Root Ins Act Fibs Psw Fasc Recrt Dur Amp Poly Comment   Right Deltoid Axillary C5-6 Nml Nml Nml Nml Nml Nml Nml 0    Right Triceps Radial C6-7-8 Nml Nml Nml Nml Nml Nml Nml 0    Right PronatorTeres Median C6-7 Nml Nml Nml Nml Nml Nml Nml 0    Right 1stDorInt Ulnar C8-T1 Nml Nml Nml Nml Nml Nml Nml 0    Right Abd Poll Brev Median C8-T1 Nml Nml Nml Nml Nml Nml Nml 0          Comment NCS: Abnormal study:    1.   Prolonged latency borderline amplitude right median SNAP.  2.  Prolonged latency and reduced amplitude right median transcarpal study.  3.  Prolonged right median CMAP latency and slowed conduction velocity.  4.  Prolonged right median versus ulnar transcarpal latency comparison.    5.  Normal right ulnar and radial SNAPs, ulnar transcarpal study, and ulnar CMAP.      Comment EMG: Normal study.  1.  Normal needle EMG right upper extremity.    Interpretation: Abnormal study: There is electrodiagnostic evidence of:    1.  Right median neuropathy at the wrist consistent with entrapment in the carpal tunnel, moderate in severity.    2. There is no electrodiagnostic evidence of cervical radiculopathy, brachial plexopathy, or ulnar neuropathy in the right upper extremity.    The testing was completed in its entirety by the physician.      It was our pleasure caring for your patient today, if there any questions or concerns please do not hesitate to contact us.    Again, thank you for allowing me to participate in the care of your patient.        Sincerely,        Leroy Gottlieb DO

## 2024-02-16 ENCOUNTER — OFFICE VISIT (OUTPATIENT)
Dept: PHYSICAL MEDICINE AND REHAB | Facility: CLINIC | Age: 49
End: 2024-02-16
Payer: COMMERCIAL

## 2024-02-16 VITALS — DIASTOLIC BLOOD PRESSURE: 82 MMHG | HEART RATE: 95 BPM | SYSTOLIC BLOOD PRESSURE: 141 MMHG | OXYGEN SATURATION: 98 %

## 2024-02-16 DIAGNOSIS — M79.18 CERVICAL MYOFASCIAL PAIN SYNDROME: ICD-10-CM

## 2024-02-16 DIAGNOSIS — M50.30 DEGENERATION OF CERVICAL INTERVERTEBRAL DISC: ICD-10-CM

## 2024-02-16 DIAGNOSIS — M54.12 CERVICAL RADICULOPATHY: Primary | ICD-10-CM

## 2024-02-16 PROCEDURE — 20552 NJX 1/MLT TRIGGER POINT 1/2: CPT | Performed by: STUDENT IN AN ORGANIZED HEALTH CARE EDUCATION/TRAINING PROGRAM

## 2024-02-16 PROCEDURE — 99214 OFFICE O/P EST MOD 30 MIN: CPT | Mod: 25 | Performed by: STUDENT IN AN ORGANIZED HEALTH CARE EDUCATION/TRAINING PROGRAM

## 2024-02-16 ASSESSMENT — PAIN SCALES - GENERAL: PAINLEVEL: SEVERE PAIN (7)

## 2024-02-16 NOTE — LETTER
2/16/2024         RE: Cornelia Rodriguez  [address]  [facility] [address]        Dear Colleague,    Thank you for referring your patient, Cornelia Rodriguez, to the [facility] SPINE [facility] NEUROSURGERY. Please see a copy of my visit note below.    ASSESSMENT:  Cornelia Rodriguez is a 48 year old female presents for consultation at the request of No ref. provider found who presents today for follow-up evaluation of :    Diagnoses and all orders for this visit:  Cervical radiculopathy  Degeneration of cervical intervertebral disc  Cervical myofascial pain syndrome  -     lidocaine 1 % 3 mL     Patient is neurologically intact on exam. No myelopathic or red flag symptoms.    Patient reports she is no longer having significant radiating symptoms after the cervical epidural injection performed on 12/19/2023.  Her lower neck symptoms have also improved since the injection.  She has 1 remaining focus of pain which is in the upper cervical/lower occipital region on the right side, consistent with a suboccipital trigger point.  The distribution of pain does not strongly suggest occipital neuralgia though this could be involved as well.  Will plan for right suboccipital trigger point injection in clinic today, and will also provide patient with exercises to target the cervical musculature.    PLAN:  Reviewed spine anatomy and disease process. Discussed diagnosis and treatment options with the patient today. A shared decision making model was used. The patient's values and choices were respected. The following represents what was discussed and decided upon by the provider and the patient.    1. DIAGNOSTIC TESTS  No new imaging orders at this time.    2. PHYSICAL THERAPY  Patient is already performing a home program, and was encouraged to continue doing so.  Discussed the importance of core strengthening, ROM, stretching exercises with the patient and how each of these entities is important in decreasing pain.   Explained to the patient that the purpose of physical therapy is to teach the patient a home exercise program.  These exercises need to be performed every day in order to decrease pain and prevent future occurrences of pain.  Likened it to brushing one's teeth.      3. MEDICATIONS:  Discussed multiple medication options today with patient. Discussed risks, side effects, and proper use of medications. Patient verbalized understanding.  No new medications ordered at this visit.  Will stop Lyrica since patient was having nausea with it    4. INTERVENTIONS:  Right suboccipital trigger point injection in clinic today (see separate note)  Benefits of trigger point injection were reviewed including potential improvements in pain, function, and sleep. Potential risks also reviewed, including but not limited to bleeding, bruising, infection, increased or non-improvement of pain, injury to local structures. After reviewing the risks, benefits, and alternatives, the patient was given an opportunity to ask questions. All questions were addressed, and the patient wishes to move forward with the selected injection.    5. OTHER REFERRALS:  No other referrals at this time.    6. FOLLOW-UP  In approximately 2-4 weeks to assess response to injection.  Patient advised to contact our office for earlier appointment if symptoms worsening or not improving, or any side effects are noticed.    Advised patient to call the Spine Center if symptoms worsen or you have problems controlling bladder and bowel function.   ______________________________________________________________________    SUBJECTIVE:   Cornelia Rodriguez  is a 48 year old female who presents today for follow-up evaluation.    Patient reports that since the cervical OPAL performed on 12/19/2023, she has had improvement of her lower neck pain as well as pain going down the right arm to the hand.  She is not having any significant numbness in the right lower arm as well either.  She  does still occasionally get numbness in the right hand, but she is aware of her carpal tunnel syndrome diagnosis and has a brace that she uses as needed.  No new numbness or weakness since the injection.  She reports that the Lyrica that she was started on at last visit has caused nausea so she stopped using it at the recommendation of her pharmacist.    At present she only has 1 focus of pain and it is in the right upper cervical/lower occipital area.  Pain is very focal in that region and does not significantly refer out into the shoulder or along the occipital nerve distribution.  Pain worsens with movement of the neck particularly upward to the right.    No prior neck surgeries    -Treatment to Date: As above    12/19/23 - Rodríguez - Cervical ILESI - 40-50% improvement of neck pain, arm pain resolved  2/16/24 - Rodríguez - Right suboccipital TPI - done today      Oswestry (CHARLOTTE) Questionnaire        10/2/2019     3:00 PM   OSWESTRY DISABILITY INDEX   Count 10   Sum 3   Oswestry Score (%) 6 %       Neck Disability Index:      5/3/2019    12:00 PM   Neck Disability Index (  [employer]. 1991. All rights reserved.; used with permission)   SECTION 1 - PAIN INTENSITY 0   SECTION 2 - PERSONAL CARE 0   SECTION 3 - LIFTING 0   SECTION 4 - READING 0   SECTION 5 - HEADACHES 0   SECTION 6 - CONCENTRATION 0   SECTION 7 - WORK 0   SECTION 8 - DRIVING 0   SECTION 9 - SLEEPING 0   SECTION 10 - RECREATION 0   Count 10   Sum 0   Raw Score: /50 0   Neck Disability Index Score: (%) 0 %              -Medications:    Current Outpatient Medications   Medication     acetaminophen (TYLENOL) 325 MG tablet     albuterol (PROAIR HFA/PROVENTIL HFA/VENTOLIN HFA) 108 (90 Base) MCG/ACT inhaler     blood glucose (ACCU-CHEK SMARTVIEW) test strip     blood glucose (NO BRAND SPECIFIED) test strip     blood glucose monitoring (ACCU-CHEK MULTICLIX) lancets     blood glucose monitoring (NO BRAND SPECIFIED) meter device kit     calcium ascorbate  500 MG TABS     cetirizine (ZYRTEC) 10 MG tablet     Dulaglutide (TRULICITY) 4.5 MG/0.5ML SOPN     DULoxetine (CYMBALTA) 30 MG capsule     DULoxetine (CYMBALTA) 60 MG capsule     econazole nitrate 1 % external cream     fluticasone (FLONASE) 50 MCG/ACT nasal spray     insulin pen needle (BD PEN NEEDLE BASSEM 2ND GEN) 32G X 4 MM miscellaneous     JARDIANCE 25 MG TABS tablet     LANTUS SOLOSTAR 100 UNIT/ML soln     losartan (COZAAR) 25 MG tablet     montelukast (SINGULAIR) 10 MG tablet     Multiple Vitamins-Minerals (HM HAIR/SKIN/NAILS) TABS     multivitamin w/minerals (MULTI-VITAMIN) tablet     multivitamin w/minerals (THERA-VIT-M) tablet     rosuvastatin (CRESTOR) 10 MG tablet     SUMAtriptan (IMITREX) 100 MG tablet     vitamin C (ASCORBIC ACID) 500 MG tablet     No current facility-administered medications for this visit.       Allergies   Allergen Reactions     Adhesive Tape Dermatitis     Benadryl [Diphenhydramine]      Codeine      Gabapentin      Ibuprofen      Metronidazole Fatigue     Headache     Penicillins      Ciprofloxacin Diarrhea, Nausea and Nausea and Vomiting     Cyclobenzaprine Nausea and Vomiting     Feels shakey       Ferrous Gluconate Rash     Nortriptyline Hives and Rash     Sumatriptan Nausea and Nausea and Vomiting       Past Medical History:   Diagnosis Date     Diabetes (H)         Patient Active Problem List   Diagnosis     Morbid obesity (H)     Migraine without aura and without status migrainosus, not intractable     Gastroesophageal reflux disease without esophagitis     Type 2 diabetes mellitus with hyperglycemia (H)     Allergic rhinitis, unspecified seasonality, unspecified trigger     Hyperlipidemia LDL goal <100     Allergic contact dermatitis due to adhesives     History of Helicobacter pylori infection     Migraine     Seborrheic dermatitis of scalp     Perennial allergic rhinitis     Low back pain     Occipital neuralgia of right side     Non-alcoholic fatty liver disease        Past Surgical History:   Procedure Laterality Date     BIOPSY       CHOLECYSTECTOMY  2005       Family History   Problem Relation Age of Onset     Glaucoma Mother      Hypertension Sister      Diabetes Sister      Lung Cancer Sister         smoker     Diabetes Maternal Grandmother      Glaucoma Maternal Grandmother      Macular Degeneration No family hx of        Reviewed past medical, surgical, and family history with patient found on new patient intake packet located in EMR Media tab.     SOCIAL HX: Reviewed    ROS: Specifically negative for bowel/bladder dysfunction, balance changes, headache, dizziness, foot drop, fevers, chills, appetite changes, nausea/vomiting, unexplained weight loss. Otherwise 13 systems reviewed are negative. Please see the patient's intake questionnaire from today for details.    OBJECTIVE:  BP (!) 141/82   Pulse 95   SpO2 98%     PHYSICAL EXAMINATION:  --CONSTITUTIONAL: Vital signs as above. No acute distress. The patient is well nourished and well groomed.  --PSYCHIATRIC: The patient is awake, alert, oriented to person, place, time and answering questions appropriately with clear speech. Appropriate mood and affect   --RESPIRATORY: Normal rhythm and effort. No abnormal accessory muscle breathing patterns noted.   --GROSS MOTOR: Easily arises from a seated position.  --CERVICAL SPINE: Inspection reveals no evidence of deformity. Range of motion is not limited in cervical flexion, extension, lateral rotation. Mild tenderness to palpation cervical paraspinals, no tenderness in spinous processes.  Has a focal area of myofascial tension with palpable muscle knot and trigger point in the right suboccipital region.  Spurling maneuver negative bilaterally.  --SHOULDERS: Full range of motion bilaterally. Negative empty can.  --UPPER EXTREMITY MOTOR TESTING:  Wrist flexion left 5/5, right 5/5  Wrist extension left 5/5, right 5/5  Pronators left 5/5, right 5/5  Biceps left 5/5, right 5/5    Triceps left 5/5, right 5/5   Shoulder abduction left 5/5, right 5/5   left 5/5, right 5/5  --NEUROLOGIC: Sensation to upper extremities is intact bilaterally.  Negative Marie's bilaterally.    --VASCULAR: Warm upper limbs bilaterally. Capillary refill in the upper extremities is less than 1 second.    RESULTS: Available medical records from [facility] and any other outside records were reviewed today.     Imaging:  Available relevant imaging was personally reviewed and interpreted today. The images were shown to the patient and the findings were explained using a spine model.    12/6/2023 MRI cervical spine:  IMPRESSION:  1.  Mild cervical spondylosis, worse at C6-C7 where there is mild canal stenosis and right ventral cord flattening.  2.  Multilevel foraminal stenoses, as above.     Procedure:  Trigger point injection  Referring provider: No ref. provider found    Pre Procedure Diagnosis:  Myofascial pain  Post Procedure Diagnosis:  Same  Procedure Performed:  Trigger point injection  Clinical Scenario:  As per office notes  Vital Signs:  As per rooming/preprocedure documentation  Side Injected: Right suboccipital    Patient presents with myofascial pain involving the identified sites above. This was confirmed with the patient. After discussing the risks, benefits, and alternatives to the procedure, the patient expressed understanding and wished to proceed. The patient signed a written consent and the target areas were marked with a sterile marking pen.    The patient was placed in the sitting position.  A procedural pause was conducted to verify:  correct patient identity, procedure to be performed and as applicable, correct side and site, correct patient position okay.  A simple surgical tray was used.  The right upper neck was palpated to confirm the presence of trigger points and next, the area was prepared with a ChloraPrep scrub.  Thereafter, a 1.5 inch 27-gauge needle was advanced into the  above noted muscles one at a time. After negative aspiration for blood, approximately 0.5-1ml of 1% lidocaine was injected into the muscle layer, and then dry needling was performed in the muscle layer. Following the injection of all sites using this technique, the needle was withdrawn.  The patient tolerated the procedure well and there were no apparent complications.  After an appropriate amount of observation, the patient was dismissed from the clinic in good condition under their own power.    The patient will follow up with their referring provider in approximately 2-4 weeks. Patient was provided with our clinic contact information and guidance on post-procedural care, as well as advised to contact us in the event of any new side effect.      Again, thank you for allowing me to participate in the care of your patient.        Sincerely,        Mychal Rodríguez MD

## 2024-02-16 NOTE — PATIENT INSTRUCTIONS
~Spine Center Scheduling #[phone].  ~Please call our [facility]ine Nurse Navigation #[phone] with any questions or concerns about your treatment plan, if symptoms worsen and you would like to be seen urgently, or if you have problems controlling bladder and bowel function.  ~For any future flareups or new symptoms, recommend follow-up in clinic or contact the nurse navigator line.  ~Please note that any My Chart messages may take multiple days for a response due to the high volume of patients seen in clinic.  Anything sent Friday night or after will be answered the following week when able.

## 2024-02-16 NOTE — PROGRESS NOTES
ASSESSMENT:  Cornelia Rodriguez is a 48 year old female presents for consultation at the request of No ref. provider found who presents today for follow-up evaluation of :    Diagnoses and all orders for this visit:  Cervical radiculopathy  Degeneration of cervical intervertebral disc  Cervical myofascial pain syndrome  -     lidocaine 1 % 3 mL     Patient is neurologically intact on exam. No myelopathic or red flag symptoms.    Patient reports she is no longer having significant radiating symptoms after the cervical epidural injection performed on 12/19/2023.  Her lower neck symptoms have also improved since the injection.  She has 1 remaining focus of pain which is in the upper cervical/lower occipital region on the right side, consistent with a suboccipital trigger point.  The distribution of pain does not strongly suggest occipital neuralgia though this could be involved as well.  Will plan for right suboccipital trigger point injection in clinic today, and will also provide patient with exercises to target the cervical musculature.    PLAN:  Reviewed spine anatomy and disease process. Discussed diagnosis and treatment options with the patient today. A shared decision making model was used. The patient's values and choices were respected. The following represents what was discussed and decided upon by the provider and the patient.    1. DIAGNOSTIC TESTS  No new imaging orders at this time.    2. PHYSICAL THERAPY  Patient is already performing a home program, and was encouraged to continue doing so.  Discussed the importance of core strengthening, ROM, stretching exercises with the patient and how each of these entities is important in decreasing pain.  Explained to the patient that the purpose of physical therapy is to teach the patient a home exercise program.  These exercises need to be performed every day in order to decrease pain and prevent future occurrences of pain.  Likened it to brushing one's teeth.       3. MEDICATIONS:  Discussed multiple medication options today with patient. Discussed risks, side effects, and proper use of medications. Patient verbalized understanding.  No new medications ordered at this visit.  Will stop Lyrica since patient was having nausea with it    4. INTERVENTIONS:  Right suboccipital trigger point injection in clinic today (see separate note)  Benefits of trigger point injection were reviewed including potential improvements in pain, function, and sleep. Potential risks also reviewed, including but not limited to bleeding, bruising, infection, increased or non-improvement of pain, injury to local structures. After reviewing the risks, benefits, and alternatives, the patient was given an opportunity to ask questions. All questions were addressed, and the patient wishes to move forward with the selected injection.    5. OTHER REFERRALS:  No other referrals at this time.    6. FOLLOW-UP  In approximately 2-4 weeks to assess response to injection.  Patient advised to contact our office for earlier appointment if symptoms worsening or not improving, or any side effects are noticed.    Advised patient to call the [facility] if symptoms worsen or you have problems controlling bladder and bowel function.   ______________________________________________________________________    SUBJECTIVE:   Cornelia Rodriguez  is a 48 year old female who presents today for follow-up evaluation.    Patient reports that since the cervical OPAL performed on 12/19/2023, she has had improvement of her lower neck pain as well as pain going down the right arm to the hand.  She is not having any significant numbness in the right lower arm as well either.  She does still occasionally get numbness in the right hand, but she is aware of her carpal tunnel syndrome diagnosis and has a brace that she uses as needed.  No new numbness or weakness since the injection.  She reports that the Lyrica that she was started on at  last visit has caused nausea so she stopped using it at the recommendation of her pharmacist.    At present she only has 1 focus of pain and it is in the right upper cervical/lower occipital area.  Pain is very focal in that region and does not significantly refer out into the shoulder or along the occipital nerve distribution.  Pain worsens with movement of the neck particularly upward to the right.    No prior neck surgeries    -Treatment to Date: As above    12/19/23 - Rodríguez - Cervical ILESI - 40-50% improvement of neck pain, arm pain resolved  2/16/24 - Rodríguez - Right suboccipital TPI - done today      Oswestry (CHARLOTTE) Questionnaire        10/2/2019     3:00 PM   OSWESTRY DISABILITY INDEX   Count 10   Sum 3   Oswestry Score (%) 6 %       Neck Disability Index:      5/3/2019    12:00 PM   Neck Disability Index (  Dameon BLAKE. and Magdaleno ANSARI. 1991. All rights reserved.; used with permission)   SECTION 1 - PAIN INTENSITY 0   SECTION 2 - PERSONAL CARE 0   SECTION 3 - LIFTING 0   SECTION 4 - READING 0   SECTION 5 - HEADACHES 0   SECTION 6 - CONCENTRATION 0   SECTION 7 - WORK 0   SECTION 8 - DRIVING 0   SECTION 9 - SLEEPING 0   SECTION 10 - RECREATION 0   Count 10   Sum 0   Raw Score: /50 0   Neck Disability Index Score: (%) 0 %              -Medications:    Current Outpatient Medications   Medication    acetaminophen (TYLENOL) 325 MG tablet    albuterol (PROAIR HFA/PROVENTIL HFA/VENTOLIN HFA) 108 (90 Base) MCG/ACT inhaler    blood glucose (ACCU-CHEK SMARTVIEW) test strip    blood glucose (NO BRAND SPECIFIED) test strip    blood glucose monitoring (ACCU-CHEK MULTICLIX) lancets    blood glucose monitoring (NO BRAND SPECIFIED) meter device kit    calcium ascorbate 500 MG TABS    cetirizine (ZYRTEC) 10 MG tablet    Dulaglutide (TRULICITY) 4.5 MG/0.5ML SOPN    DULoxetine (CYMBALTA) 30 MG capsule    DULoxetine (CYMBALTA) 60 MG capsule    econazole nitrate 1 % external cream    fluticasone (FLONASE) 50 MCG/ACT nasal spray     insulin pen needle (BD PEN NEEDLE BASSEM 2ND GEN) 32G X 4 MM miscellaneous    JARDIANCE 25 MG TABS tablet    LANTUS SOLOSTAR 100 UNIT/ML soln    losartan (COZAAR) 25 MG tablet    montelukast (SINGULAIR) 10 MG tablet    Multiple Vitamins-Minerals (HM HAIR/SKIN/NAILS) TABS    multivitamin w/minerals (MULTI-VITAMIN) tablet    multivitamin w/minerals (THERA-VIT-M) tablet    rosuvastatin (CRESTOR) 10 MG tablet    SUMAtriptan (IMITREX) 100 MG tablet    vitamin C (ASCORBIC ACID) 500 MG tablet     No current facility-administered medications for this visit.       Allergies   Allergen Reactions    Adhesive Tape Dermatitis    Benadryl [Diphenhydramine]     Codeine     Gabapentin     Ibuprofen     Metronidazole Fatigue     Headache    Penicillins     Ciprofloxacin Diarrhea, Nausea and Nausea and Vomiting    Cyclobenzaprine Nausea and Vomiting     Feels shakey      Ferrous Gluconate Rash    Nortriptyline Hives and Rash    Sumatriptan Nausea and Nausea and Vomiting       Past Medical History:   Diagnosis Date    Diabetes (H)         Patient Active Problem List   Diagnosis    Morbid obesity (H)    Migraine without aura and without status migrainosus, not intractable    Gastroesophageal reflux disease without esophagitis    Type 2 diabetes mellitus with hyperglycemia (H)    Allergic rhinitis, unspecified seasonality, unspecified trigger    Hyperlipidemia LDL goal <100    Allergic contact dermatitis due to adhesives    History of Helicobacter pylori infection    Migraine    Seborrheic dermatitis of scalp    Perennial allergic rhinitis    Low back pain    Occipital neuralgia of right side    Non-alcoholic fatty liver disease       Past Surgical History:   Procedure Laterality Date    BIOPSY      CHOLECYSTECTOMY  2005       Family History   Problem Relation Age of Onset    Glaucoma Mother     Hypertension Sister     Diabetes Sister     Lung Cancer Sister         smoker    Diabetes Maternal Grandmother     Glaucoma Maternal Grandmother      Macular Degeneration No family hx of        Reviewed past medical, surgical, and family history with patient found on new patient intake packet located in EMR Media tab.     SOCIAL HX: Reviewed    ROS: Specifically negative for bowel/bladder dysfunction, balance changes, headache, dizziness, foot drop, fevers, chills, appetite changes, nausea/vomiting, unexplained weight loss. Otherwise 13 systems reviewed are negative. Please see the patient's intake questionnaire from today for details.    OBJECTIVE:  BP (!) 141/82   Pulse 95   SpO2 98%     PHYSICAL EXAMINATION:  --CONSTITUTIONAL: Vital signs as above. No acute distress. The patient is well nourished and well groomed.  --PSYCHIATRIC: The patient is awake, alert, oriented to person, place, time and answering questions appropriately with clear speech. Appropriate mood and affect   --RESPIRATORY: Normal rhythm and effort. No abnormal accessory muscle breathing patterns noted.   --GROSS MOTOR: Easily arises from a seated position.  --CERVICAL SPINE: Inspection reveals no evidence of deformity. Range of motion is not limited in cervical flexion, extension, lateral rotation. Mild tenderness to palpation cervical paraspinals, no tenderness in spinous processes.  Has a focal area of myofascial tension with palpable muscle knot and trigger point in the right suboccipital region.  Spurling maneuver negative bilaterally.  --SHOULDERS: Full range of motion bilaterally. Negative empty can.  --UPPER EXTREMITY MOTOR TESTING:  Wrist flexion left 5/5, right 5/5  Wrist extension left 5/5, right 5/5  Pronators left 5/5, right 5/5  Biceps left 5/5, right 5/5   Triceps left 5/5, right 5/5   Shoulder abduction left 5/5, right 5/5   left 5/5, right 5/5  --NEUROLOGIC: Sensation to upper extremities is intact bilaterally.  Negative Marie's bilaterally.    --VASCULAR: Warm upper limbs bilaterally. Capillary refill in the upper extremities is less than 1 second.    RESULTS:  Available medical records from [facility] and any other outside records were reviewed today.     Imaging:  Available relevant imaging was personally reviewed and interpreted today. The images were shown to the patient and the findings were explained using a spine model.    12/6/2023 MRI cervical spine:  IMPRESSION:  1.  Mild cervical spondylosis, worse at C6-C7 where there is mild canal stenosis and right ventral cord flattening.  2.  Multilevel foraminal stenoses, as above.

## 2024-02-16 NOTE — PROGRESS NOTES
Procedure:  Trigger point injection  Referring provider: No ref. provider found    Pre Procedure Diagnosis:  Myofascial pain  Post Procedure Diagnosis:  Same  Procedure Performed:  Trigger point injection  Clinical Scenario:  As per office notes  Vital Signs:  As per rooming/preprocedure documentation  Side Injected: Right suboccipital    Patient presents with myofascial pain involving the identified sites above. This was confirmed with the patient. After discussing the risks, benefits, and alternatives to the procedure, the patient expressed understanding and wished to proceed. The patient signed a written consent and the target areas were marked with a sterile marking pen.    The patient was placed in the sitting position.  A procedural pause was conducted to verify:  correct patient identity, procedure to be performed and as applicable, correct side and site, correct patient position okay.  A simple surgical tray was used.  The right upper neck was palpated to confirm the presence of trigger points and next, the area was prepared with a ChloraPrep scrub.  Thereafter, a 1.5 inch 27-gauge needle was advanced into the above noted muscles one at a time. After negative aspiration for blood, approximately 0.5-1ml of 1% lidocaine was injected into the muscle layer, and then dry needling was performed in the muscle layer. Following the injection of all sites using this technique, the needle was withdrawn.  The patient tolerated the procedure well and there were no apparent complications.  After an appropriate amount of observation, the patient was dismissed from the clinic in good condition under their own power.    The patient will follow up with their referring provider in approximately 2-4 weeks. Patient was provided with our clinic contact information and guidance on post-procedural care, as well as advised to contact us in the event of any new side effect.

## 2024-02-26 DIAGNOSIS — E11.65 TYPE 2 DIABETES MELLITUS WITH HYPERGLYCEMIA, WITH LONG-TERM CURRENT USE OF INSULIN (H): ICD-10-CM

## 2024-02-26 DIAGNOSIS — Z79.4 TYPE 2 DIABETES MELLITUS WITH HYPERGLYCEMIA, WITH LONG-TERM CURRENT USE OF INSULIN (H): ICD-10-CM

## 2024-03-01 DIAGNOSIS — E11.9 TYPE 2 DIABETES MELLITUS WITHOUT COMPLICATION, WITH LONG-TERM CURRENT USE OF INSULIN (H): ICD-10-CM

## 2024-03-01 DIAGNOSIS — E78.5 HYPERLIPIDEMIA LDL GOAL <100: ICD-10-CM

## 2024-03-01 DIAGNOSIS — Z79.4 TYPE 2 DIABETES MELLITUS WITHOUT COMPLICATION, WITH LONG-TERM CURRENT USE OF INSULIN (H): ICD-10-CM

## 2024-03-01 DIAGNOSIS — J30.9 ALLERGIC RHINITIS, UNSPECIFIED SEASONALITY, UNSPECIFIED TRIGGER: ICD-10-CM

## 2024-03-04 RX ORDER — ROSUVASTATIN CALCIUM 10 MG/1
TABLET, COATED ORAL
Qty: 90 TABLET | Refills: 0 | Status: SHIPPED | OUTPATIENT
Start: 2024-03-04 | End: 2024-07-08

## 2024-03-04 RX ORDER — MONTELUKAST SODIUM 10 MG/1
TABLET ORAL
Qty: 90 TABLET | Refills: 1 | Status: SHIPPED | OUTPATIENT
Start: 2024-03-04

## 2024-03-04 RX ORDER — DULAGLUTIDE 4.5 MG/.5ML
INJECTION, SOLUTION SUBCUTANEOUS
Qty: 6 ML | Refills: 0 | Status: SHIPPED | OUTPATIENT
Start: 2024-03-04 | End: 2024-06-10

## 2024-04-29 DIAGNOSIS — Z79.4 TYPE 2 DIABETES MELLITUS WITH HYPERGLYCEMIA, WITH LONG-TERM CURRENT USE OF INSULIN (H): ICD-10-CM

## 2024-04-29 DIAGNOSIS — E11.65 TYPE 2 DIABETES MELLITUS WITH HYPERGLYCEMIA, WITH LONG-TERM CURRENT USE OF INSULIN (H): ICD-10-CM

## 2024-04-29 RX ORDER — INSULIN GLARGINE 100 [IU]/ML
INJECTION, SOLUTION SUBCUTANEOUS
Qty: 105 ML | Refills: 0 | Status: SHIPPED | OUTPATIENT
Start: 2024-04-29 | End: 2024-07-22

## 2024-05-09 DIAGNOSIS — M54.81 OCCIPITAL NEURALGIA OF RIGHT SIDE: ICD-10-CM

## 2024-05-09 RX ORDER — DULOXETIN HYDROCHLORIDE 60 MG/1
60 CAPSULE, DELAYED RELEASE ORAL DAILY
Qty: 90 CAPSULE | Refills: 3 | OUTPATIENT
Start: 2024-05-09

## 2024-05-09 NOTE — TELEPHONE ENCOUNTER
Reason for Call:  Medication or medication refill:    Do you use a [facility]?  Name of the pharmacy and phone number for the current request[facility] [place] [place] [address] [facility]     Name of the medication requested:     DULoxetine (CYMBALTA) 30 MG capsule       Other request: Pt is out of medication needs a refill asap    Can we leave a detailed message on this number? YES    Phone number patient can be reached at: Cell number on file:    Telephone Information:   Mobile [phone]       Best Time:     Call taken on 5/9/2024 at 9:53 AM by Elsy Sanchez

## 2024-05-13 DIAGNOSIS — Z79.4 TYPE 2 DIABETES MELLITUS WITH HYPERGLYCEMIA, WITH LONG-TERM CURRENT USE OF INSULIN (H): ICD-10-CM

## 2024-05-13 DIAGNOSIS — E11.65 TYPE 2 DIABETES MELLITUS WITH HYPERGLYCEMIA, WITH LONG-TERM CURRENT USE OF INSULIN (H): ICD-10-CM

## 2024-05-13 RX ORDER — PEN NEEDLE, DIABETIC 32GX 5/32"
NEEDLE, DISPOSABLE MISCELLANEOUS
Qty: 200 EACH | Refills: 3 | Status: SHIPPED | OUTPATIENT
Start: 2024-05-13

## 2024-05-14 DIAGNOSIS — M54.81 BILATERAL OCCIPITAL NEURALGIA: ICD-10-CM

## 2024-05-14 RX ORDER — DULOXETIN HYDROCHLORIDE 30 MG/1
30 CAPSULE, DELAYED RELEASE ORAL DAILY
Qty: 90 CAPSULE | Refills: 2 | Status: SHIPPED | OUTPATIENT
Start: 2024-05-14 | End: 2024-09-26

## 2024-05-14 NOTE — TELEPHONE ENCOUNTER
Routing to refill pool.  Patient is completely out.    Kristina Kjellberg, MSN, RN  [facility] T[facility]

## 2024-06-09 ENCOUNTER — TELEPHONE (OUTPATIENT)
Dept: FAMILY MEDICINE | Facility: CLINIC | Age: 49
End: 2024-06-09
Payer: COMMERCIAL

## 2024-06-09 DIAGNOSIS — E11.9 TYPE 2 DIABETES MELLITUS WITHOUT COMPLICATION, WITH LONG-TERM CURRENT USE OF INSULIN (H): ICD-10-CM

## 2024-06-09 DIAGNOSIS — Z79.4 TYPE 2 DIABETES MELLITUS WITHOUT COMPLICATION, WITH LONG-TERM CURRENT USE OF INSULIN (H): ICD-10-CM

## 2024-06-10 RX ORDER — DULAGLUTIDE 4.5 MG/.5ML
INJECTION, SOLUTION SUBCUTANEOUS
Qty: 6 ML | Refills: 0 | Status: SHIPPED | OUTPATIENT
Start: 2024-06-10 | End: 2024-06-25

## 2024-06-10 NOTE — TELEPHONE ENCOUNTER
Lyndsay refill sent  Due for labs and OV/DM check prior to further refills (virutal visit is ok)

## 2024-06-11 ENCOUNTER — LAB (OUTPATIENT)
Dept: LAB | Facility: CLINIC | Age: 49
End: 2024-06-11
Payer: COMMERCIAL

## 2024-06-11 DIAGNOSIS — E11.65 TYPE 2 DIABETES MELLITUS WITH HYPERGLYCEMIA (H): Primary | ICD-10-CM

## 2024-06-11 LAB
BASOPHILS # BLD AUTO: 0.1 10E3/UL (ref 0–0.2)
BASOPHILS NFR BLD AUTO: 1 %
EOSINOPHIL # BLD AUTO: 0.2 10E3/UL (ref 0–0.7)
EOSINOPHIL NFR BLD AUTO: 2 %
ERYTHROCYTE [DISTWIDTH] IN BLOOD BY AUTOMATED COUNT: 12.2 % (ref 10–15)
HBA1C MFR BLD: 7.7 % (ref 0–5.6)
HCT VFR BLD AUTO: 48.7 % (ref 35–47)
HGB BLD-MCNC: 16.2 G/DL (ref 11.7–15.7)
IMM GRANULOCYTES # BLD: 0 10E3/UL
IMM GRANULOCYTES NFR BLD: 0 %
LYMPHOCYTES # BLD AUTO: 3.2 10E3/UL (ref 0.8–5.3)
LYMPHOCYTES NFR BLD AUTO: 33 %
MCH RBC QN AUTO: 29.9 PG (ref 26.5–33)
MCHC RBC AUTO-ENTMCNC: 33.3 G/DL (ref 31.5–36.5)
MCV RBC AUTO: 90 FL (ref 78–100)
MONOCYTES # BLD AUTO: 0.6 10E3/UL (ref 0–1.3)
MONOCYTES NFR BLD AUTO: 7 %
NEUTROPHILS # BLD AUTO: 5.4 10E3/UL (ref 1.6–8.3)
NEUTROPHILS NFR BLD AUTO: 57 %
PLATELET # BLD AUTO: 378 10E3/UL (ref 150–450)
RBC # BLD AUTO: 5.41 10E6/UL (ref 3.8–5.2)
WBC # BLD AUTO: 9.5 10E3/UL (ref 4–11)

## 2024-06-11 PROCEDURE — 84443 ASSAY THYROID STIM HORMONE: CPT

## 2024-06-11 PROCEDURE — 82570 ASSAY OF URINE CREATININE: CPT

## 2024-06-11 PROCEDURE — 85025 COMPLETE CBC W/AUTO DIFF WBC: CPT

## 2024-06-11 PROCEDURE — 36415 COLL VENOUS BLD VENIPUNCTURE: CPT

## 2024-06-11 PROCEDURE — 80061 LIPID PANEL: CPT

## 2024-06-11 PROCEDURE — 83036 HEMOGLOBIN GLYCOSYLATED A1C: CPT

## 2024-06-11 PROCEDURE — 80053 COMPREHEN METABOLIC PANEL: CPT

## 2024-06-11 PROCEDURE — 82043 UR ALBUMIN QUANTITATIVE: CPT

## 2024-06-12 LAB
ALBUMIN SERPL BCG-MCNC: 4.3 G/DL (ref 3.5–5.2)
ALP SERPL-CCNC: 83 U/L (ref 40–150)
ALT SERPL W P-5'-P-CCNC: 25 U/L (ref 0–50)
ANION GAP SERPL CALCULATED.3IONS-SCNC: 10 MMOL/L (ref 7–15)
AST SERPL W P-5'-P-CCNC: 17 U/L (ref 0–45)
BILIRUB SERPL-MCNC: 0.2 MG/DL
BUN SERPL-MCNC: 10.3 MG/DL (ref 6–20)
CALCIUM SERPL-MCNC: 9.6 MG/DL (ref 8.6–10)
CHLORIDE SERPL-SCNC: 106 MMOL/L (ref 98–107)
CHOLEST SERPL-MCNC: 145 MG/DL
CREAT SERPL-MCNC: 0.72 MG/DL (ref 0.51–0.95)
CREAT UR-MCNC: 62.2 MG/DL
DEPRECATED HCO3 PLAS-SCNC: 27 MMOL/L (ref 22–29)
EGFRCR SERPLBLD CKD-EPI 2021: >90 ML/MIN/1.73M2
FASTING STATUS PATIENT QL REPORTED: NO
FASTING STATUS PATIENT QL REPORTED: NO
GLUCOSE SERPL-MCNC: 140 MG/DL (ref 70–99)
HDLC SERPL-MCNC: 39 MG/DL
LDLC SERPL CALC-MCNC: 57 MG/DL
MICROALBUMIN UR-MCNC: <12 MG/L
MICROALBUMIN/CREAT UR: NORMAL MG/G{CREAT}
NONHDLC SERPL-MCNC: 106 MG/DL
POTASSIUM SERPL-SCNC: 4.6 MMOL/L (ref 3.4–5.3)
PROT SERPL-MCNC: 7 G/DL (ref 6.4–8.3)
SODIUM SERPL-SCNC: 143 MMOL/L (ref 135–145)
TRIGL SERPL-MCNC: 245 MG/DL
TSH SERPL DL<=0.005 MIU/L-ACNC: 1.45 UIU/ML (ref 0.3–4.2)

## 2024-06-12 NOTE — RESULT ENCOUNTER NOTE
FIB-4: 0.45 (Low risk for fibrosis)    Cornelia,  Thanks for coming in for the blood work.  Your A1c has crept up so we have some work to do to get your diabetes back under control.  Kidney, liver and electrolyte numbers look good.  Your cholesterol panel is as expected for a nonfasting sample.  The urine shows no excess of protein leakage which is good.  Finally, your blood count panel continues to show a mildly elevated hemoglobin but is otherwise normal.  We can review these results in much better detail at your upcoming visit.  See you soon  Please MyChart or call if you have any concerns or questions.   Sincerely,  Sarah Lombardo MD

## 2024-06-25 ENCOUNTER — VIRTUAL VISIT (OUTPATIENT)
Dept: FAMILY MEDICINE | Facility: CLINIC | Age: 49
End: 2024-06-25
Payer: COMMERCIAL

## 2024-06-25 DIAGNOSIS — K76.0 NON-ALCOHOLIC FATTY LIVER DISEASE: ICD-10-CM

## 2024-06-25 DIAGNOSIS — Z12.11 SCREEN FOR COLON CANCER: ICD-10-CM

## 2024-06-25 DIAGNOSIS — I10 ESSENTIAL HYPERTENSION: ICD-10-CM

## 2024-06-25 DIAGNOSIS — E11.65 TYPE 2 DIABETES MELLITUS WITH HYPERGLYCEMIA, WITH LONG-TERM CURRENT USE OF INSULIN (H): Primary | ICD-10-CM

## 2024-06-25 DIAGNOSIS — Z79.4 TYPE 2 DIABETES MELLITUS WITH HYPERGLYCEMIA, WITH LONG-TERM CURRENT USE OF INSULIN (H): Primary | ICD-10-CM

## 2024-06-25 PROCEDURE — 99443 PR PHYSICIAN TELEPHONE EVALUATION 21-30 MIN: CPT | Mod: 93 | Performed by: FAMILY MEDICINE

## 2024-06-25 RX ORDER — PROCHLORPERAZINE 25 MG/1
SUPPOSITORY RECTAL
Qty: 1 EACH | Refills: 0 | Status: SHIPPED | OUTPATIENT
Start: 2024-06-25

## 2024-06-25 NOTE — PATIENT INSTRUCTIONS
Schedule lab visit to pick of stool FIT test for colon cancer screening.     Stop trulicity and start Mounjaro instead. Message me prior to your next refill if you would like to increase the dose.       Please call [phone] to schedule with diabetic education when you can. .

## 2024-06-25 NOTE — PROGRESS NOTES
Instructions Relayed to Patient by Virtual Roomer:     Patient instructed that provider will initiate telephone visit via phone call      Patient Confirmed they will join visit via: Provider to call patient for telephone visit   Reminded patient to ensure they were logged on to virtual visit by arrival time listed.   Asked if patient has flexibility to initiate visit sooner than arrival time: patient is unable to initiate visit earlier than arrival time     If pediatric virtual visit, ensured pediatric patient along with parent/guardian will be present for video visit.     Patient offered the website [email or web address] and/or phone number to [employer] Help line: [phone]     Cornelia Savage is a 48 year old who is being evaluated via a billable telephone visit.    What phone number would you like to be contacted at? [phone]   How would you like to obtain your AVS? [employer]  Originating Location (pt. Location): Home    Distant Location (provider location):  Off-site    Assessment & Plan     Type 2 diabetes mellitus with hyperglycemia, with long-term current use of insulin (H)  Not fully controlled.  Will transition her from Trulicity to Mounjaro so we can continue to check the dose.  We discussed the importance of regular intake to prevent hypoglycemia.  Will get her started with continuous glucose monitor to help get better control, better recognize hypoglycemia.   Could also consider mealtime insulin but will try the change to Mounjaro first.  She declined referral to diabetic education as she felt her work schedule would not allow for this right now.  Follow-up in 3 months planned  - tirzepatide (MOUNJARO) 7.5 MG/0.5ML pen; Inject 7.5 mg Subcutaneous every 7 days  - Continuous Glucose  (DEXCOM G6 ) JUAN; Use to read blood sugars as per 's instructions.    Screen for colon cancer  Had 2 samples of Cologuard that were not excepted.  Will trial FIT instead.  - Fecal  "colorectal cancer screen (FIT); Future    HTN-Home blood pressure reports are normal.  Continue on losartan.  Fatty liver-fib 4 testing 0.45 which is low risk range.  Continue to monitor yearly labs          BMI  Estimated body mass index is 40.35 kg/m  as calculated from the following:    Height as of 12/19/23: 1.626 m (5' 4\").    Weight as of 12/19/23: 106.6 kg (235 lb 1.6 oz).   Weight management plan: Discussed healthy diet and exercise guidelines will push-up Mounjaro to continue to help with weight loss.      See Patient Instructions  Patient Instructions   Schedule lab visit to pick of stool FIT test for colon cancer screening.     Stop trulicity and start Mounjaro instead. Message me prior to your next refill if you would like to increase the dose.       Please call [phone] to schedule with diabetic education when you can. .       Subjective   Cornelia Savage is a 48 year old, presenting for the following health issues:  Diabetes (Recheck and Lab Results )      6/25/2024     9:05 AM   Additional Questions   Roomed by Abida ANSARI   Accompanied by Self     History of Present Illness       Diabetes:   She presents for follow up of diabetes.  She is checking home blood glucose two times daily.   She checks blood glucose before and after meals.  Blood glucose is never over 200 and sometimes under 70. She is aware of hypoglycemia symptoms including shakiness and dizziness.    She has no concerns regarding her diabetes at this time.   She is not experiencing numbness or burning in feet, excessive thirst, blurry vision, weight changes or redness, sores or blisters on feet. The patient has not had a diabetic eye exam in the last 12 months.          She eats 2-3 servings of fruits and vegetables daily.She consumes 0 sweetened beverage(s) daily.She exercises with enough effort to increase her heart rate 30 to 60 minutes per day.  She exercises with enough effort to increase her heart rate 5 days per week.   She is taking " "medications regularly.     Last visit 9/2023  Continues to work nights.     Reports her health is good  Saw spine specialist and got trigger point injection and therapy  Doing well.     Lost wt. 234 lb in Feb. (Down 10 lb since last fall)  Some days appetite is low and will force herself to eat, while other days will be very hungry.   Munch on popcorn now instead of sweets or potatoes chips  Eating more veggies/dip  Has been eating a lot of pork. Getting protein with each meal.  2-3 meals per day depending on work schedule  Has switched to sugar free candy. \"Like my candy\"    Only hypoglycemia once and \"was my own fault\", forgot to eat prior to work shift. Gluc dropped to 68. Easily corrected. Carries quick glucose source with her.   However notes another episode where she walked out of work feeling weak, light-headed. Improved with cooling cloth, eating.     Checking glucose bid.   Am sugar  if don't eat prior to bed. If eaten 120-130.   PM reading (5 hours after eating)- 125-140    Tried cologuard x 2 but samples are \"not good\"    Most days bp is 120's/80's  Losartan has been helpful                Objective           Vitals:  No vitals were obtained today due to virtual visit.    Physical Exam   General: Alert and no distress //Respiratory: No audible wheeze, cough, or shortness of breath // Psychiatric:  Appropriate affect, tone, and pace of words    FIB-4: 0.45     Recent Results (from the past 720 hour(s))   Lipid panel reflex to direct LDL Non-fasting    Collection Time: 06/11/24  1:23 PM   Result Value Ref Range    Cholesterol 145 <200 mg/dL    Triglycerides 245 (H) <150 mg/dL    Direct Measure HDL 39 (L) >=50 mg/dL    LDL Cholesterol Calculated 57 <=100 mg/dL    Non HDL Cholesterol 106 <130 mg/dL    Patient Fasting > 8hrs? No    HEMOGLOBIN A1C    Collection Time: 06/11/24  1:23 PM   Result Value Ref Range    Hemoglobin A1C 7.7 (H) 0.0 - 5.6 %   COMPREHENSIVE METABOLIC PANEL    Collection Time: " 06/11/24  1:23 PM   Result Value Ref Range    Sodium 143 135 - 145 mmol/L    Potassium 4.6 3.4 - 5.3 mmol/L    Carbon Dioxide (CO2) 27 22 - 29 mmol/L    Anion Gap 10 7 - 15 mmol/L    Urea Nitrogen 10.3 6.0 - 20.0 mg/dL    Creatinine 0.72 0.51 - 0.95 mg/dL    GFR Estimate >90 >60 mL/min/1.73m2    Calcium 9.6 8.6 - 10.0 mg/dL    Chloride 106 98 - 107 mmol/L    Glucose 140 (H) 70 - 99 mg/dL    Alkaline Phosphatase 83 40 - 150 U/L    AST 17 0 - 45 U/L    ALT 25 0 - 50 U/L    Protein Total 7.0 6.4 - 8.3 g/dL    Albumin 4.3 3.5 - 5.2 g/dL    Bilirubin Total 0.2 <=1.2 mg/dL    Patient Fasting > 8hrs? No    TSH WITH FREE T4 REFLEX    Collection Time: 06/11/24  1:23 PM   Result Value Ref Range    TSH 1.45 0.30 - 4.20 uIU/mL   CBC with platelets and differential    Collection Time: 06/11/24  1:23 PM   Result Value Ref Range    WBC Count 9.5 4.0 - 11.0 10e3/uL    RBC Count 5.41 (H) 3.80 - 5.20 10e6/uL    Hemoglobin 16.2 (H) 11.7 - 15.7 g/dL    Hematocrit 48.7 (H) 35.0 - 47.0 %    MCV 90 78 - 100 fL    MCH 29.9 26.5 - 33.0 pg    MCHC 33.3 31.5 - 36.5 g/dL    RDW 12.2 10.0 - 15.0 %    Platelet Count 378 150 - 450 10e3/uL    % Neutrophils 57 %    % Lymphocytes 33 %    % Monocytes 7 %    % Eosinophils 2 %    % Basophils 1 %    % Immature Granulocytes 0 %    Absolute Neutrophils 5.4 1.6 - 8.3 10e3/uL    Absolute Lymphocytes 3.2 0.8 - 5.3 10e3/uL    Absolute Monocytes 0.6 0.0 - 1.3 10e3/uL    Absolute Eosinophils 0.2 0.0 - 0.7 10e3/uL    Absolute Basophils 0.1 0.0 - 0.2 10e3/uL    Absolute Immature Granulocytes 0.0 <=0.4 10e3/uL   Albumin Random Urine Quantitative with Creat Ratio    Collection Time: 06/11/24  2:12 PM   Result Value Ref Range    Creatinine Urine mg/dL 62.2 mg/dL    Albumin Urine mg/L <12.0 mg/L    Albumin Urine mg/g Cr                 Phone call duration: 30 minutes  Signed Electronically by: Sarah Lombardo MD

## 2024-07-07 DIAGNOSIS — E11.65 TYPE 2 DIABETES MELLITUS WITH HYPERGLYCEMIA, WITH LONG-TERM CURRENT USE OF INSULIN (H): ICD-10-CM

## 2024-07-07 DIAGNOSIS — Z79.4 TYPE 2 DIABETES MELLITUS WITH HYPERGLYCEMIA, WITH LONG-TERM CURRENT USE OF INSULIN (H): ICD-10-CM

## 2024-07-07 DIAGNOSIS — E78.5 HYPERLIPIDEMIA LDL GOAL <100: ICD-10-CM

## 2024-07-08 RX ORDER — EMPAGLIFLOZIN 25 MG/1
25 TABLET, FILM COATED ORAL DAILY
Qty: 90 TABLET | Refills: 0 | Status: SHIPPED | OUTPATIENT
Start: 2024-07-08

## 2024-07-08 RX ORDER — ROSUVASTATIN CALCIUM 10 MG/1
TABLET, COATED ORAL
Qty: 90 TABLET | Refills: 2 | Status: SHIPPED | OUTPATIENT
Start: 2024-07-08

## 2024-07-21 DIAGNOSIS — Z79.4 TYPE 2 DIABETES MELLITUS WITH HYPERGLYCEMIA, WITH LONG-TERM CURRENT USE OF INSULIN (H): ICD-10-CM

## 2024-07-21 DIAGNOSIS — E11.65 TYPE 2 DIABETES MELLITUS WITH HYPERGLYCEMIA, WITH LONG-TERM CURRENT USE OF INSULIN (H): ICD-10-CM

## 2024-07-22 RX ORDER — INSULIN GLARGINE 100 [IU]/ML
INJECTION, SOLUTION SUBCUTANEOUS
Qty: 105 ML | Refills: 1 | Status: SHIPPED | OUTPATIENT
Start: 2024-07-22

## 2024-07-25 DIAGNOSIS — E11.9 TYPE 2 DIABETES MELLITUS WITHOUT COMPLICATION, WITH LONG-TERM CURRENT USE OF INSULIN (H): ICD-10-CM

## 2024-07-25 DIAGNOSIS — Z79.4 TYPE 2 DIABETES MELLITUS WITHOUT COMPLICATION, WITH LONG-TERM CURRENT USE OF INSULIN (H): ICD-10-CM

## 2024-07-25 RX ORDER — LOSARTAN POTASSIUM 25 MG/1
25 TABLET ORAL DAILY
Qty: 90 TABLET | Refills: 3 | OUTPATIENT
Start: 2024-07-25

## 2024-08-19 ENCOUNTER — OFFICE VISIT (OUTPATIENT)
Dept: PHYSICAL MEDICINE AND REHAB | Facility: CLINIC | Age: 49
End: 2024-08-19
Payer: COMMERCIAL

## 2024-08-19 VITALS
HEIGHT: 64 IN | SYSTOLIC BLOOD PRESSURE: 136 MMHG | WEIGHT: 233.1 LBS | DIASTOLIC BLOOD PRESSURE: 91 MMHG | HEART RATE: 103 BPM | BODY MASS INDEX: 39.79 KG/M2 | OXYGEN SATURATION: 97 %

## 2024-08-19 DIAGNOSIS — M79.18 CERVICAL MYOFASCIAL PAIN SYNDROME: Primary | ICD-10-CM

## 2024-08-19 PROCEDURE — 99214 OFFICE O/P EST MOD 30 MIN: CPT | Performed by: STUDENT IN AN ORGANIZED HEALTH CARE EDUCATION/TRAINING PROGRAM

## 2024-08-19 ASSESSMENT — PAIN SCALES - GENERAL: PAINLEVEL: SEVERE PAIN (6)

## 2024-08-19 NOTE — PROGRESS NOTES
ASSESSMENT:  Cornelia Rodriguez is a 48 year old female presents for consultation at the request of No ref. provider found who presents today for follow-up evaluation of :    Diagnoses and all orders for this visit:  Cervical myofascial pain syndrome  -     PAIN US Trigger Point Injection One to Two Muscles; Future     Patient is neurologically intact on exam. No myelopathic or red flag symptoms.    Patient is following up after right suboccipital TPI was done on 2/16/2024 and yielded 5.5 months of relief.  She is now having return of symptoms in the same area but also in the cervical paraspinals on the right side with a characteristic referral pattern down the cervical paraspinals into the upper trapezius on the right side only.  Exam is otherwise benign.  We discussed repeating the TPI since it was effective for several months and patient has been fully adherent with her home exercise program as well.  Patient would like to move forward with repeat injection so we will schedule.      PLAN:  Reviewed spine anatomy and disease process. Discussed diagnosis and treatment options with the patient today. A shared decision making model was used. The patient's values and choices were respected. The following represents what was discussed and decided upon by the provider and the patient.    1. DIAGNOSTIC TESTS  No new imaging orders at this time.    2. PHYSICAL THERAPY  Patient is already performing a home program, and was encouraged to continue doing so.  Discussed the importance of core strengthening, ROM, stretching exercises with the patient and how each of these entities is important in decreasing pain.  Explained to the patient that the purpose of physical therapy is to teach the patient a home exercise program.  These exercises need to be performed every day in order to decrease pain and prevent future occurrences of pain.  Likened it to brushing one's teeth.      3. MEDICATIONS:  Discussed multiple medication options  today with patient. Discussed risks, side effects, and proper use of medications. Patient verbalized understanding.  No new medications ordered at this visit.      4. INTERVENTIONS:  Right suboccipital and cervical paraspinal trigger point injection with ultrasound guidance  Benefits of trigger point injection were reviewed including potential improvements in pain, function, and sleep. Potential risks also reviewed, including but not limited to bleeding, bruising, infection, increased or non-improvement of pain, injury to local structures. After reviewing the risks, benefits, and alternatives, the patient was given an opportunity to ask questions. All questions were addressed, and the patient wishes to move forward with the selected injection.    5. OTHER REFERRALS:  No other referrals at this time.    6. FOLLOW-UP  In approximately 2-4 weeks to assess response to injection.  Patient advised to contact our office for earlier appointment if symptoms worsening or not improving, or any side effects are noticed.    Advised patient to call the [facility] if symptoms worsen or you have problems controlling bladder and bowel function.   ______________________________________________________________________    SUBJECTIVE:   Cornelia Rodriguez  is a 48 year old female who presents today for follow-up evaluation.    Patient reports that the suboccipital TPI done on 2/16/2024 was beneficial through the end of July, noting about 5.5 months of relief.  Patient is overall very happy with that result but now that the pain is back she is hoping the injection can be repeated.  She is not having any radicular arm pain or numbness like she was before the epidural injection.    At present she only has 1 focus of pain and it is in the right upper cervical/lower occipital area.  Pain is very focal in that region and as it worsens it travels down the cervical spine and into the upper trapezius area.  Pain worsens with movement of the neck  particularly upward to the right.    No prior neck surgeries    -Treatment to Date: Doing home exercises every day for the neck, using ice and heat as needed    12/19/23 - Rodríguez - Cervical ILESI - 40-50% improvement of neck pain, arm pain resolved  2/16/24 - Rodríguez - Right suboccipital TPI - 5-1/2 months of relief of right-sided neck pain      Oswestry (CHARLOTTE) Questionnaire        10/2/2019     3:00 PM   OSWESTRY DISABILITY INDEX   Count 10   Sum 3   Oswestry Score (%) 6 %       Neck Disability Index:      5/3/2019    12:00 PM   Neck Disability Index (  Dameon LUGO and Magdaleno STRICKLAND 1991. All rights reserved.; used with permission)   SECTION 1 - PAIN INTENSITY 0   SECTION 2 - PERSONAL CARE 0   SECTION 3 - LIFTING 0   SECTION 4 - READING 0   SECTION 5 - HEADACHES 0   SECTION 6 - CONCENTRATION 0   SECTION 7 - WORK 0   SECTION 8 - DRIVING 0   SECTION 9 - SLEEPING 0   SECTION 10 - RECREATION 0   Count 10   Sum 0   Raw Score: /50 0   Neck Disability Index Score: (%) 0 %              -Medications:    Current Outpatient Medications   Medication Sig Dispense Refill    acetaminophen (TYLENOL) 325 MG tablet       albuterol (PROAIR HFA/PROVENTIL HFA/VENTOLIN HFA) 108 (90 Base) MCG/ACT inhaler Inhale 2 puffs into the lungs      blood glucose (NO BRAND SPECIFIED) test strip Use to test blood sugar 2 times daily or as directed. 200 strip 3    blood glucose monitoring (ACCU-CHEK MULTICLIX) lancets Use to test blood sugar 2 x times daily or as directed. 204 each 3    blood glucose monitoring (NO BRAND SPECIFIED) meter device kit Use to test blood sugar 1 times daily or as directed. 1 kit 0    calcium ascorbate 500 MG TABS Take 500 mg by mouth daily      cetirizine (ZYRTEC) 10 MG tablet Take 10 mg by mouth daily      Continuous Glucose  (DEXCOM G6 ) JUAN Use to read blood sugars as per 's instructions. 1 each 0    DULoxetine (CYMBALTA) 30 MG capsule Take 1 capsule (30 mg) by mouth daily 90 capsule 2    DULoxetine  (CYMBALTA) 60 MG capsule Take 1 capsule (60 mg) by mouth daily 90 capsule 3    econazole nitrate 1 % external cream APPLY TO FEET AND TOENAILS ONCE DAILY 85 g 5    fluticasone (FLONASE) 50 MCG/ACT nasal spray Spray 2 sprays into both nostrils daily 16 g 11    insulin glargine (LANTUS SOLOSTAR) 100 UNIT/ML pen INJECT 48 UNITS UNDER THE SKIN TWICE DAILY 105 mL 1    insulin pen needle (BD PEN NEEDLE BASSEM 2ND GEN) 32G X 4 MM miscellaneous USE TWO TIMES A  each 3    JARDIANCE 25 MG TABS tablet TAKE ONE TABLET BY MOUTH EVERY DAY 90 tablet 0    losartan (COZAAR) 25 MG tablet Take 1 tablet (25 mg) by mouth daily 90 tablet 3    montelukast (SINGULAIR) 10 MG tablet TAKE ONE TABLET BY MOUTH AT BEDTIME 90 tablet 1    Multiple Vitamins-Minerals (HM HAIR/SKIN/NAILS) TABS Take 1 tablet by mouth      multivitamin w/minerals (MULTI-VITAMIN) tablet Take 1 tablet by mouth daily      multivitamin w/minerals (THERA-VIT-M) tablet Take 1 tablet by mouth      rosuvastatin (CRESTOR) 10 MG tablet TAKE ONE TABLET BY MOUTH EVERY DAY 90 tablet 2    SUMAtriptan (IMITREX) 100 MG tablet Take 100 mg by mouth as needed      tirzepatide (MOUNJARO) 7.5 MG/0.5ML pen Inject 7.5 mg Subcutaneous every 7 days 2 mL 0    vitamin C (ASCORBIC ACID) 500 MG tablet Take 500 mg by mouth       No current facility-administered medications for this visit.       Allergies   Allergen Reactions    Adhesive Tape Dermatitis    Benadryl [Diphenhydramine]     Codeine     Gabapentin     Ibuprofen     Metronidazole Fatigue     Headache    Penicillins     Ciprofloxacin Diarrhea, Nausea and Nausea and Vomiting    Cyclobenzaprine Nausea and Vomiting     Feels shakey      Ferrous Gluconate Rash    Nortriptyline Hives and Rash    Sumatriptan Nausea and Nausea and Vomiting       Past Medical History:   Diagnosis Date    Diabetes (H)         Patient Active Problem List   Diagnosis    Morbid obesity (H)    Migraine without aura and without status migrainosus, not intractable  "   Gastroesophageal reflux disease without esophagitis    Type 2 diabetes mellitus with hyperglycemia (H)    Allergic rhinitis, unspecified seasonality, unspecified trigger    Hyperlipidemia LDL goal <100    Allergic contact dermatitis due to adhesives    History of Helicobacter pylori infection    Migraine    Seborrheic dermatitis of scalp    Perennial allergic rhinitis    Low back pain    Occipital neuralgia of right side    Non-alcoholic fatty liver disease    Screen for colon cancer       Past Surgical History:   Procedure Laterality Date    BIOPSY      CHOLECYSTECTOMY  2005       Family History   Problem Relation Age of Onset    Glaucoma Mother     Hypertension Sister     Diabetes Sister     Lung Cancer Sister         smoker    Diabetes Maternal Grandmother     Glaucoma Maternal Grandmother     Macular Degeneration No family hx of        Reviewed past medical, surgical, and family history with patient found on new patient intake packet located in EMR Media tab.     SOCIAL HX: Reviewed    ROS: Specifically negative for bowel/bladder dysfunction, balance changes, headache, dizziness, foot drop, fevers, chills, appetite changes, nausea/vomiting, unexplained weight loss. Otherwise 13 systems reviewed are negative. Please see the patient's intake questionnaire from today for details.    OBJECTIVE:  BP (!) 136/91 (BP Location: Left arm, Patient Position: Sitting, Cuff Size: Adult Large)   Pulse 103   Ht 5' 4\" (1.626 m)   Wt 233 lb 1.6 oz (105.7 kg)   SpO2 97%   BMI 40.01 kg/m      PHYSICAL EXAMINATION:  --CONSTITUTIONAL: Vital signs as above. No acute distress. The patient is well nourished and well groomed.  --PSYCHIATRIC: The patient is awake, alert, oriented to person, place, time and answering questions appropriately with clear speech. Appropriate mood and affect   --RESPIRATORY: Normal rhythm and effort. No abnormal accessory muscle breathing patterns noted.   --GROSS MOTOR: Easily arises from a seated " position.  --CERVICAL SPINE: Inspection reveals no evidence of deformity. Range of motion is not limited in cervical flexion, extension, lateral rotation. Mild tenderness to palpation cervical paraspinals, no tenderness in spinous processes.  Has a focal area of myofascial tension with palpable muscle knot and trigger point in the right suboccipital region and right upper cervical paraspinals.  Spurling maneuver negative bilaterally.  --SHOULDERS: Full range of motion bilaterally. Negative empty can.  --UPPER EXTREMITY MOTOR TESTING:  Wrist flexion left 5/5, right 5/5  Wrist extension left 5/5, right 5/5  Pronators left 5/5, right 5/5  Biceps left 5/5, right 5/5   Triceps left 5/5, right 5/5   Shoulder abduction left 5/5, right 5/5   left 5/5, right 5/5  --NEUROLOGIC: Sensation to upper extremities is intact bilaterally.  Negative Marie's bilaterally.    --VASCULAR: Warm upper limbs bilaterally. Capillary refill in the upper extremities is less than 1 second.    RESULTS: Available medical records from [facility] and any other outside records were reviewed today.     Imaging:  Available relevant imaging was personally reviewed and interpreted today. The images were shown to the patient and the findings were explained using a spine model.    12/6/2023 MRI cervical spine:  IMPRESSION:  1.  Mild cervical spondylosis, worse at C6-C7 where there is mild canal stenosis and right ventral cord flattening.  2.  Multilevel foraminal stenoses, as above.

## 2024-08-19 NOTE — LETTER
8/19/2024      Cornelia Rodriguez  [address]  [place] [address]      Dear Colleague,    Thank you for referring your patient, Cornelia Rodriguez, to the [facility]. Please see a copy of my visit note below.    ASSESSMENT:  Cornelia Rodriguez is a 48 year old female presents for consultation at the request of No ref. provider found who presents today for follow-up evaluation of :    Diagnoses and all orders for this visit:  Cervical myofascial pain syndrome  -     PAIN US Trigger Point Injection One to Two Muscles; Future     Patient is neurologically intact on exam. No myelopathic or red flag symptoms.    Patient is following up after right suboccipital TPI was done on 2/16/2024 and yielded 5.5 months of relief.  She is now having return of symptoms in the same area but also in the cervical paraspinals on the right side with a characteristic referral pattern down the cervical paraspinals into the upper trapezius on the right side only.  Exam is otherwise benign.  We discussed repeating the TPI since it was effective for several months and patient has been fully adherent with her home exercise program as well.  Patient would like to move forward with repeat injection so we will schedule.      PLAN:  Reviewed spine anatomy and disease process. Discussed diagnosis and treatment options with the patient today. A shared decision making model was used. The patient's values and choices were respected. The following represents what was discussed and decided upon by the provider and the patient.    1. DIAGNOSTIC TESTS  No new imaging orders at this time.    2. PHYSICAL THERAPY  Patient is already performing a home program, and was encouraged to continue doing so.  Discussed the importance of core strengthening, ROM, stretching exercises with the patient and how each of these entities is important in decreasing pain.  Explained to the patient that the purpose of physical therapy is to  teach the patient a home exercise program.  These exercises need to be performed every day in order to decrease pain and prevent future occurrences of pain.  Likened it to brushing one's teeth.      3. MEDICATIONS:  Discussed multiple medication options today with patient. Discussed risks, side effects, and proper use of medications. Patient verbalized understanding.  No new medications ordered at this visit.      4. INTERVENTIONS:  Right suboccipital and cervical paraspinal trigger point injection with ultrasound guidance  Benefits of trigger point injection were reviewed including potential improvements in pain, function, and sleep. Potential risks also reviewed, including but not limited to bleeding, bruising, infection, increased or non-improvement of pain, injury to local structures. After reviewing the risks, benefits, and alternatives, the patient was given an opportunity to ask questions. All questions were addressed, and the patient wishes to move forward with the selected injection.    5. OTHER REFERRALS:  No other referrals at this time.    6. FOLLOW-UP  In approximately 2-4 weeks to assess response to injection.  Patient advised to contact our office for earlier appointment if symptoms worsening or not improving, or any side effects are noticed.    Advised patient to call the [facility] if symptoms worsen or you have problems controlling bladder and bowel function.   ______________________________________________________________________    SUBJECTIVE:   Cornelia Rodriguez  is a 48 year old female who presents today for follow-up evaluation.    Patient reports that the suboccipital TPI done on 2/16/2024 was beneficial through the end of July, noting about 5.5 months of relief.  Patient is overall very happy with that result but now that the pain is back she is hoping the injection can be repeated.  She is not having any radicular arm pain or numbness like she was before the epidural injection.    At  present she only has 1 focus of pain and it is in the right upper cervical/lower occipital area.  Pain is very focal in that region and as it worsens it travels down the cervical spine and into the upper trapezius area.  Pain worsens with movement of the neck particularly upward to the right.    No prior neck surgeries    -Treatment to Date: Doing home exercises every day for the neck, using ice and heat as needed    12/19/23 - Rodríguez - Cervical ILESI - 40-50% improvement of neck pain, arm pain resolved  2/16/24 - Rodríguez - Right suboccipital TPI - 5-1/2 months of relief of right-sided neck pain      Oswestry (CHARLOTTE) Questionnaire        10/2/2019     3:00 PM   OSWESTRY DISABILITY INDEX   Count 10   Sum 3   Oswestry Score (%) 6 %       Neck Disability Index:      5/3/2019    12:00 PM   Neck Disability Index (  Dameon BLAKE. and Magdaleno STRICKLAND 1991. All rights reserved.; used with permission)   SECTION 1 - PAIN INTENSITY 0   SECTION 2 - PERSONAL CARE 0   SECTION 3 - LIFTING 0   SECTION 4 - READING 0   SECTION 5 - HEADACHES 0   SECTION 6 - CONCENTRATION 0   SECTION 7 - WORK 0   SECTION 8 - DRIVING 0   SECTION 9 - SLEEPING 0   SECTION 10 - RECREATION 0   Count 10   Sum 0   Raw Score: /50 0   Neck Disability Index Score: (%) 0 %              -Medications:    Current Outpatient Medications   Medication Sig Dispense Refill     acetaminophen (TYLENOL) 325 MG tablet        albuterol (PROAIR HFA/PROVENTIL HFA/VENTOLIN HFA) 108 (90 Base) MCG/ACT inhaler Inhale 2 puffs into the lungs       blood glucose (NO BRAND SPECIFIED) test strip Use to test blood sugar 2 times daily or as directed. 200 strip 3     blood glucose monitoring (ACCU-CHEK MULTICLIX) lancets Use to test blood sugar 2 x times daily or as directed. 204 each 3     blood glucose monitoring (NO BRAND SPECIFIED) meter device kit Use to test blood sugar 1 times daily or as directed. 1 kit 0     calcium ascorbate 500 MG TABS Take 500 mg by mouth daily       cetirizine (ZYRTEC) 10 MG  tablet Take 10 mg by mouth daily       Continuous Glucose  (DEXCOM G6 ) JUAN Use to read blood sugars as per 's instructions. 1 each 0     DULoxetine (CYMBALTA) 30 MG capsule Take 1 capsule (30 mg) by mouth daily 90 capsule 2     DULoxetine (CYMBALTA) 60 MG capsule Take 1 capsule (60 mg) by mouth daily 90 capsule 3     econazole nitrate 1 % external cream APPLY TO FEET AND TOENAILS ONCE DAILY 85 g 5     fluticasone (FLONASE) 50 MCG/ACT nasal spray Spray 2 sprays into both nostrils daily 16 g 11     insulin glargine (LANTUS SOLOSTAR) 100 UNIT/ML pen INJECT 48 UNITS UNDER THE SKIN TWICE DAILY 105 mL 1     insulin pen needle (BD PEN NEEDLE BASSEM 2ND GEN) 32G X 4 MM miscellaneous USE TWO TIMES A  each 3     JARDIANCE 25 MG TABS tablet TAKE ONE TABLET BY MOUTH EVERY DAY 90 tablet 0     losartan (COZAAR) 25 MG tablet Take 1 tablet (25 mg) by mouth daily 90 tablet 3     montelukast (SINGULAIR) 10 MG tablet TAKE ONE TABLET BY MOUTH AT BEDTIME 90 tablet 1     Multiple Vitamins-Minerals (HM HAIR/SKIN/NAILS) TABS Take 1 tablet by mouth       multivitamin w/minerals (MULTI-VITAMIN) tablet Take 1 tablet by mouth daily       multivitamin w/minerals (THERA-VIT-M) tablet Take 1 tablet by mouth       rosuvastatin (CRESTOR) 10 MG tablet TAKE ONE TABLET BY MOUTH EVERY DAY 90 tablet 2     SUMAtriptan (IMITREX) 100 MG tablet Take 100 mg by mouth as needed       tirzepatide (MOUNJARO) 7.5 MG/0.5ML pen Inject 7.5 mg Subcutaneous every 7 days 2 mL 0     vitamin C (ASCORBIC ACID) 500 MG tablet Take 500 mg by mouth       No current facility-administered medications for this visit.       Allergies   Allergen Reactions     Adhesive Tape Dermatitis     Benadryl [Diphenhydramine]      Codeine      Gabapentin      Ibuprofen      Metronidazole Fatigue     Headache     Penicillins      Ciprofloxacin Diarrhea, Nausea and Nausea and Vomiting     Cyclobenzaprine Nausea and Vomiting     Feels shakey       Ferrous  "Gluconate Rash     Nortriptyline Hives and Rash     Sumatriptan Nausea and Nausea and Vomiting       Past Medical History:   Diagnosis Date     Diabetes (H)         Patient Active Problem List   Diagnosis     Morbid obesity (H)     Migraine without aura and without status migrainosus, not intractable     Gastroesophageal reflux disease without esophagitis     Type 2 diabetes mellitus with hyperglycemia (H)     Allergic rhinitis, unspecified seasonality, unspecified trigger     Hyperlipidemia LDL goal <100     Allergic contact dermatitis due to adhesives     History of Helicobacter pylori infection     Migraine     Seborrheic dermatitis of scalp     Perennial allergic rhinitis     Low back pain     Occipital neuralgia of right side     Non-alcoholic fatty liver disease     Screen for colon cancer       Past Surgical History:   Procedure Laterality Date     BIOPSY       CHOLECYSTECTOMY  2005       Family History   Problem Relation Age of Onset     Glaucoma Mother      Hypertension Sister      Diabetes Sister      Lung Cancer Sister         smoker     Diabetes Maternal Grandmother      Glaucoma Maternal Grandmother      Macular Degeneration No family hx of        Reviewed past medical, surgical, and family history with patient found on new patient intake packet located in EMR Media tab.     SOCIAL HX: Reviewed    ROS: Specifically negative for bowel/bladder dysfunction, balance changes, headache, dizziness, foot drop, fevers, chills, appetite changes, nausea/vomiting, unexplained weight loss. Otherwise 13 systems reviewed are negative. Please see the patient's intake questionnaire from today for details.    OBJECTIVE:  BP (!) 136/91 (BP Location: Left arm, Patient Position: Sitting, Cuff Size: Adult Large)   Pulse 103   Ht 5' 4\" (1.626 m)   Wt 233 lb 1.6 oz (105.7 kg)   SpO2 97%   BMI 40.01 kg/m      PHYSICAL EXAMINATION:  --CONSTITUTIONAL: Vital signs as above. No acute distress. The patient is well nourished and " well groomed.  --PSYCHIATRIC: The patient is awake, alert, oriented to person, place, time and answering questions appropriately with clear speech. Appropriate mood and affect   --RESPIRATORY: Normal rhythm and effort. No abnormal accessory muscle breathing patterns noted.   --GROSS MOTOR: Easily arises from a seated position.  --CERVICAL SPINE: Inspection reveals no evidence of deformity. Range of motion is not limited in cervical flexion, extension, lateral rotation. Mild tenderness to palpation cervical paraspinals, no tenderness in spinous processes.  Has a focal area of myofascial tension with palpable muscle knot and trigger point in the right suboccipital region and right upper cervical paraspinals.  Spurling maneuver negative bilaterally.  --SHOULDERS: Full range of motion bilaterally. Negative empty can.  --UPPER EXTREMITY MOTOR TESTING:  Wrist flexion left 5/5, right 5/5  Wrist extension left 5/5, right 5/5  Pronators left 5/5, right 5/5  Biceps left 5/5, right 5/5   Triceps left 5/5, right 5/5   Shoulder abduction left 5/5, right 5/5   left 5/5, right 5/5  --NEUROLOGIC: Sensation to upper extremities is intact bilaterally.  Negative Marie's bilaterally.    --VASCULAR: Warm upper limbs bilaterally. Capillary refill in the upper extremities is less than 1 second.    RESULTS: Available medical records from [facility] and any other outside records were reviewed today.     Imaging:  Available relevant imaging was personally reviewed and interpreted today. The images were shown to the patient and the findings were explained using a spine model.    12/6/2023 MRI cervical spine:  IMPRESSION:  1.  Mild cervical spondylosis, worse at C6-C7 where there is mild canal stenosis and right ventral cord flattening.  2.  Multilevel foraminal stenoses, as above.       Again, thank you for allowing me to participate in the care of your patient.        Sincerely,        Mychal Rodríguez MD

## 2024-08-21 DIAGNOSIS — Z79.4 TYPE 2 DIABETES MELLITUS WITHOUT COMPLICATION, WITH LONG-TERM CURRENT USE OF INSULIN (H): ICD-10-CM

## 2024-08-21 DIAGNOSIS — E11.9 TYPE 2 DIABETES MELLITUS WITHOUT COMPLICATION, WITH LONG-TERM CURRENT USE OF INSULIN (H): ICD-10-CM

## 2024-08-21 RX ORDER — LOSARTAN POTASSIUM 25 MG/1
25 TABLET ORAL DAILY
Qty: 90 TABLET | Refills: 1 | Status: SHIPPED | OUTPATIENT
Start: 2024-08-21

## 2024-08-26 ENCOUNTER — RADIOLOGY INJECTION OFFICE VISIT (OUTPATIENT)
Dept: PHYSICAL MEDICINE AND REHAB | Facility: CLINIC | Age: 49
End: 2024-08-26
Attending: STUDENT IN AN ORGANIZED HEALTH CARE EDUCATION/TRAINING PROGRAM
Payer: COMMERCIAL

## 2024-08-26 VITALS
OXYGEN SATURATION: 98 % | DIASTOLIC BLOOD PRESSURE: 72 MMHG | BODY MASS INDEX: 40.68 KG/M2 | HEART RATE: 79 BPM | TEMPERATURE: 98.2 F | WEIGHT: 237 LBS | SYSTOLIC BLOOD PRESSURE: 136 MMHG

## 2024-08-26 DIAGNOSIS — M79.18 CERVICAL MYOFASCIAL PAIN SYNDROME: ICD-10-CM

## 2024-08-26 PROCEDURE — 76942 ECHO GUIDE FOR BIOPSY: CPT | Performed by: STUDENT IN AN ORGANIZED HEALTH CARE EDUCATION/TRAINING PROGRAM

## 2024-08-26 PROCEDURE — 20552 NJX 1/MLT TRIGGER POINT 1/2: CPT | Performed by: STUDENT IN AN ORGANIZED HEALTH CARE EDUCATION/TRAINING PROGRAM

## 2024-08-26 RX ORDER — LIDOCAINE HYDROCHLORIDE 10 MG/ML
INJECTION, SOLUTION EPIDURAL; INFILTRATION; INTRACAUDAL; PERINEURAL
Status: COMPLETED | OUTPATIENT
Start: 2024-08-26 | End: 2024-08-26

## 2024-08-26 RX ADMIN — LIDOCAINE HYDROCHLORIDE 5 ML: 10 INJECTION, SOLUTION EPIDURAL; INFILTRATION; INTRACAUDAL; PERINEURAL at 08:18

## 2024-08-26 ASSESSMENT — PAIN SCALES - GENERAL
PAINLEVEL: EXTREME PAIN (8)
PAINLEVEL: NO PAIN (1)

## 2024-08-26 NOTE — PATIENT INSTRUCTIONS
Follow-up visit with Dr. Rodríguez in 2-4 weeks to discuss injection outcome and determine care plan going forward.      DISCHARGE INSTRUCTIONS    During office hours (8:00 a.m.-4:00 p.m.) questions or concerns may be answered  by calling [facility] Nurses at  [phone].  Messages received after hours will be returned the following business day.      In the case of an emergency,please dial 911 or seek assistance at the nearest Emergency Room/Urgent Care facility.     All Patients:  You may experience an increase in your symptoms for the first 5-7 days. Soreness may persist during the first few weeks as it takes 4-6 weeks for the nerves to fully heal.    You may use ice on the injection site,as frequently as 20 minutes each hour if needed. It is recommended NOT to apply heat during the first 24 hours.    You may take your pain medicine.    You may continue taking your regular medication.    You may shower. No swimming, tub bath or hot tub for 48hours. This also includes no lotions, ointments or patches to the needle sites as well. You may remove your bandaid/bandage as soon as you are home.    You mayresume light activities, as tolerated.    Resume your usual diet as tolerated.    It is strongly advised that you do not drive for 1-3 hours post injection.    If you have had oral sedation:  Do not drive for 8 hours post injection.             POSSIBLE PROCEDURE SIDE EFFECTS    -Call the [facility] if you are concerned    IncreasedPain           Increased numbness/tingling      Nausea/Vomiting          Bruising/bleeding at site      Redness or swelling  Difficulty walking      Weakness          Fever greater than 100.5

## 2024-09-16 ENCOUNTER — TELEPHONE (OUTPATIENT)
Dept: PODIATRY | Facility: CLINIC | Age: 49
End: 2024-09-16

## 2024-09-16 NOTE — TELEPHONE ENCOUNTER
Called patient to discuss concerns and she was concerned her swelling and pain in her feet may be from her diabetes. I informed her that Dr. Phelps would be an appropriate provider to see but if she is concerned her diabetes is affecting it, she should also reach out to PCP. She agreed with that and will reach out.      Aleah Dixon MA, ALCIRA        Hop, Aleah Winter ATC  Appointment has been reschedule    Patient has concerns and asking a nurse to call    Thanks  [facility]   Orthopedics, Podiatry, Sports Medicine, Ent ,Eye , Audiology, Adult Endocrine & Diabetes, Nutrition & Medication Therapy Management Specialties  [facility]     ----- Message -----  From: Aleah Dixon ATC  Sent: 9/16/2024  11:45 AM CDT  To: Chrissy Singletary    Patient would be better seen with podiatry. Please reschedule to next available.      Aleah Dixon MA, ALCIRA

## 2024-09-17 ENCOUNTER — OFFICE VISIT (OUTPATIENT)
Dept: PHYSICAL MEDICINE AND REHAB | Facility: CLINIC | Age: 49
End: 2024-09-17
Payer: COMMERCIAL

## 2024-09-17 VITALS
BODY MASS INDEX: 39.78 KG/M2 | OXYGEN SATURATION: 98 % | SYSTOLIC BLOOD PRESSURE: 130 MMHG | HEIGHT: 64 IN | HEART RATE: 96 BPM | WEIGHT: 233 LBS | DIASTOLIC BLOOD PRESSURE: 74 MMHG

## 2024-09-17 DIAGNOSIS — M54.81 BILATERAL OCCIPITAL NEURALGIA: ICD-10-CM

## 2024-09-17 DIAGNOSIS — M53.82 MUSCULOSKELETAL DISORDER OF THE SUBOCCIPITAL: Primary | ICD-10-CM

## 2024-09-17 PROCEDURE — 99213 OFFICE O/P EST LOW 20 MIN: CPT | Performed by: STUDENT IN AN ORGANIZED HEALTH CARE EDUCATION/TRAINING PROGRAM

## 2024-09-17 ASSESSMENT — PAIN SCALES - GENERAL: PAINLEVEL: SEVERE PAIN (6)

## 2024-09-17 NOTE — LETTER
9/17/2024      Cornelia Rodriguez  [address]  [place] [address]      Dear Colleague,    Thank you for referring your patient, Cornelia Rodriguez, to the [facility]. Please see a copy of my visit note below.    ASSESSMENT:  Cornelia Rodriguez is a 48 year old female presents for follow-up of :    Diagnoses and all orders for this visit:  Musculoskeletal disorder of the suboccipital  -     Physical Therapy  Referral; Future  Bilateral occipital neuralgia  -     Physical Therapy  Referral; Future     Patient is neurologically intact on exam. No myelopathic or red flag symptoms.    Patient is returning after right cervical paraspinal and suboccipital TPI performed on 8/17/2024 has yielded significant global improvement of her right-sided neck and occipital pain, but she has an ongoing focus of pain in the right suboccipital area.  This area is  to palpation with a palpable myofascial knot but as it has not responded to trigger point injection we discussed other potential options.  Could investigate with further imaging such as CT to identify facet arthropathy or MRI with fiesta sequence to identify potential vascular compression of occipital nerve.  We also discussed trying myofascial release as a noninvasive therapy modality to help with suboccipital pain, and patient would like to try this first.  Will place referral for therapy.        PLAN:  Reviewed spine anatomy and disease process. Discussed diagnosis and treatment options with the patient today. A shared decision making model was used. The patient's values and choices were respected. The following represents what was discussed and decided upon by the provider and the patient.    1. DIAGNOSTIC TESTS  No new imaging orders at this time.    2. PHYSICAL THERAPY  Physical therapy was ordered through [facility] or the external clinic of patient's choice.  Discussed the importance of core strengthening,  ROM, stretching exercises with the patient and how each of these entities is important in decreasing pain.  Explained to the patient that the purpose of physical therapy is to teach the patient a home exercise program.  These exercises need to be performed every day in order to decrease pain and prevent future occurrences of pain.  Likened it to brushing one's teeth.      3. MEDICATIONS:  Discussed multiple medication options today with patient. Discussed risks, side effects, and proper use of medications. Patient verbalized understanding.  No new medications ordered at this visit.      4. INTERVENTIONS:  No injections at this time  Could consider occipital nerve stimulator with PNS system    5. OTHER REFERRALS:  No other referrals at this time.    6. FOLLOW-UP  After completion of physical therapy.  Patient advised to contact our office for earlier appointment if symptoms worsening or not improving, or any side effects are noticed.    Advised patient to call the [facility] if symptoms worsen or you have problems controlling bladder and bowel function.   ______________________________________________________________________    SUBJECTIVE:   Cornelia Rodriguez  is a 48 year old female who presents today for follow-up evaluation.    Patient reports the right cervical paraspinal and suboccipital TPI done on 8/26/2024 relieved 98% of her neck pain but she has 1 area of ongoing pain right in the suboccipital area which has not improved.  Pain remains intermittent throughout the day, with no identifiable activity or time of day triggers.  Not having any radicular symptoms, no new symptoms since the injection.    No prior neck surgeries    -Treatment to Date: Doing home exercises every day for the neck, using ice and heat as needed    12/19/23 - Rodríguez - Cervical ILESI - 40-50% improvement of neck pain, arm pain resolved  2/16/24 - Rodríguez - Right suboccipital TPI - 5-1/2 months of relief of right-sided neck pain  8//26/24 -  Rodríguez - Right cervical paraspinal and suboccipital TPI - 98% relief of neck pain, did not help occipital area      Oswestry ([place]OTTE) Questionnaire        10/2/2019     3:00 PM   OSWESTRY DISABILITY INDEX   Count 10   Sum 3   Oswestry Score (%) 6 %       Neck Disability Index:      5/3/2019    12:00 PM   Neck Disability Index (  Dameon BLAKE. and Magdaleno STRICKLAND 1991. All rights reserved.; used with permission)   SECTION 1 - PAIN INTENSITY 0   SECTION 2 - PERSONAL CARE 0   SECTION 3 - LIFTING 0   SECTION 4 - READING 0   SECTION 5 - HEADACHES 0   SECTION 6 - CONCENTRATION 0   SECTION 7 - WORK 0   SECTION 8 - DRIVING 0   SECTION 9 - SLEEPING 0   SECTION 10 - RECREATION 0   Count 10   Sum 0   Raw Score: /50 0   Neck Disability Index Score: (%) 0 %              -Medications:    Current Outpatient Medications   Medication Sig Dispense Refill     acetaminophen (TYLENOL) 325 MG tablet        albuterol (PROAIR HFA/PROVENTIL HFA/VENTOLIN HFA) 108 (90 Base) MCG/ACT inhaler Inhale 2 puffs into the lungs       blood glucose (NO BRAND SPECIFIED) test strip Use to test blood sugar 2 times daily or as directed. 200 strip 3     blood glucose monitoring (ACCU-CHEK MULTICLIX) lancets Use to test blood sugar 2 x times daily or as directed. 204 each 3     blood glucose monitoring (NO BRAND SPECIFIED) meter device kit Use to test blood sugar 1 times daily or as directed. 1 kit 0     calcium ascorbate 500 MG TABS Take 500 mg by mouth daily       cetirizine (ZYRTEC) 10 MG tablet Take 10 mg by mouth daily       Continuous Glucose  (DEXCOM G6 ) JUAN Use to read blood sugars as per 's instructions. 1 each 0     DULoxetine (CYMBALTA) 30 MG capsule Take 1 capsule (30 mg) by mouth daily 90 capsule 2     DULoxetine (CYMBALTA) 60 MG capsule Take 1 capsule (60 mg) by mouth daily 90 capsule 3     econazole nitrate 1 % external cream APPLY TO FEET AND TOENAILS ONCE DAILY 85 g 5     fluticasone (FLONASE) 50 MCG/ACT nasal spray Spray 2  sprays into both nostrils daily 16 g 11     insulin glargine (LANTUS SOLOSTAR) 100 UNIT/ML pen INJECT 48 UNITS UNDER THE SKIN TWICE DAILY 105 mL 1     insulin pen needle (BD PEN NEEDLE BASSEM 2ND GEN) 32G X 4 MM miscellaneous USE TWO TIMES A  each 3     JARDIANCE 25 MG TABS tablet TAKE ONE TABLET BY MOUTH EVERY DAY 90 tablet 0     losartan (COZAAR) 25 MG tablet TAKE ONE TABLET BY MOUTH EVERY DAY 90 tablet 1     montelukast (SINGULAIR) 10 MG tablet TAKE ONE TABLET BY MOUTH AT BEDTIME 90 tablet 1     Multiple Vitamins-Minerals (HM HAIR/SKIN/NAILS) TABS Take 1 tablet by mouth       multivitamin w/minerals (MULTI-VITAMIN) tablet Take 1 tablet by mouth daily       multivitamin w/minerals (THERA-VIT-M) tablet Take 1 tablet by mouth       rosuvastatin (CRESTOR) 10 MG tablet TAKE ONE TABLET BY MOUTH EVERY DAY 90 tablet 2     SUMAtriptan (IMITREX) 100 MG tablet Take 100 mg by mouth as needed       tirzepatide (MOUNJARO) 7.5 MG/0.5ML pen Inject 7.5 mg Subcutaneous every 7 days 2 mL 0     vitamin C (ASCORBIC ACID) 500 MG tablet Take 500 mg by mouth       No current facility-administered medications for this visit.       Allergies   Allergen Reactions     Adhesive Tape Dermatitis     Benadryl [Diphenhydramine]      Codeine      Gabapentin      Ibuprofen      Metronidazole Fatigue     Headache     Penicillins      Ciprofloxacin Diarrhea, Nausea and Nausea and Vomiting     Cyclobenzaprine Nausea and Vomiting     Feels shakey       Ferrous Gluconate Rash     Nortriptyline Hives and Rash     Sumatriptan Nausea and Nausea and Vomiting       Past Medical History:   Diagnosis Date     Diabetes (H)         Patient Active Problem List   Diagnosis     Morbid obesity (H)     Migraine without aura and without status migrainosus, not intractable     Gastroesophageal reflux disease without esophagitis     Type 2 diabetes mellitus with hyperglycemia (H)     Allergic rhinitis, unspecified seasonality, unspecified trigger      "Hyperlipidemia LDL goal <100     Allergic contact dermatitis due to adhesives     History of Helicobacter pylori infection     Migraine     Seborrheic dermatitis of scalp     Perennial allergic rhinitis     Low back pain     Occipital neuralgia of right side     Non-alcoholic fatty liver disease     Screen for colon cancer       Past Surgical History:   Procedure Laterality Date     BIOPSY       CHOLECYSTECTOMY  2005       Family History   Problem Relation Age of Onset     Glaucoma Mother      Hypertension Sister      Diabetes Sister      Lung Cancer Sister         smoker     Diabetes Maternal Grandmother      Glaucoma Maternal Grandmother      Macular Degeneration No family hx of        Reviewed past medical, surgical, and family history with patient found on new patient intake packet located in EMR Media tab.     SOCIAL HX: Reviewed    ROS: Specifically negative for bowel/bladder dysfunction, balance changes, headache, dizziness, foot drop, fevers, chills, appetite changes, nausea/vomiting, unexplained weight loss. Otherwise 13 systems reviewed are negative. Please see the patient's intake questionnaire from today for details.    OBJECTIVE:  /74 (BP Location: Right arm, Patient Position: Sitting, Cuff Size: Adult Large)   Pulse 96   Ht 5' 4\" (1.626 m)   Wt 233 lb (105.7 kg)   SpO2 98%   BMI 39.99 kg/m      PHYSICAL EXAMINATION:  --CONSTITUTIONAL: Vital signs as above. No acute distress. The patient is well nourished and well groomed.  --PSYCHIATRIC: The patient is awake, alert, oriented to person, place, time and answering questions appropriately with clear speech. Appropriate mood and affect   --RESPIRATORY: Normal rhythm and effort. No abnormal accessory muscle breathing patterns noted.   --GROSS MOTOR: Easily arises from a seated position.  --CERVICAL SPINE: Inspection reveals no evidence of deformity. Range of motion is not limited in cervical flexion, extension, lateral rotation. Mild tenderness to " palpation cervical paraspinals, no tenderness in spinous processes.  Has a focal area of myofascial tension with palpable muscle knot and trigger point in the right suboccipital region.  Spurling maneuver negative bilaterally.  --SHOULDERS: Full range of motion bilaterally. Negative empty can.  --UPPER EXTREMITY MOTOR TESTING:  Wrist flexion left 5/5, right 5/5  Wrist extension left 5/5, right 5/5  Pronators left 5/5, right 5/5  Biceps left 5/5, right 5/5   Triceps left 5/5, right 5/5   Shoulder abduction left 5/5, right 5/5   left 5/5, right 5/5  --NEUROLOGIC: Sensation to upper extremities is intact bilaterally.  Negative Marie's bilaterally.    --VASCULAR: Warm upper limbs bilaterally. Capillary refill in the upper extremities is less than 1 second.    RESULTS: Available medical records from [facility] and any other outside records were reviewed today.     Imaging:  Available relevant imaging was personally reviewed and interpreted today. The images were shown to the patient and the findings were explained using a spine model.    12/6/2023 MRI cervical spine:  IMPRESSION:  1.  Mild cervical spondylosis, worse at C6-C7 where there is mild canal stenosis and right ventral cord flattening.  2.  Multilevel foraminal stenoses, as above.       Again, thank you for allowing me to participate in the care of your patient.        Sincerely,        Mychal Rodríguez MD

## 2024-09-17 NOTE — PROGRESS NOTES
ASSESSMENT:  Cornelia Rodriguez is a 48 year old female presents for follow-up of :    Diagnoses and all orders for this visit:  Musculoskeletal disorder of the suboccipital  -     Physical Therapy  Referral; Future  Bilateral occipital neuralgia  -     Physical Therapy  Referral; Future     Patient is neurologically intact on exam. No myelopathic or red flag symptoms.    Patient is returning after right cervical paraspinal and suboccipital TPI performed on 8/17/2024 has yielded significant global improvement of her right-sided neck and occipital pain, but she has an ongoing focus of pain in the right suboccipital area.  This area is  to palpation with a palpable myofascial knot but as it has not responded to trigger point injection we discussed other potential options.  Could investigate with further imaging such as CT to identify facet arthropathy or MRI with fiesta sequence to identify potential vascular compression of occipital nerve.  We also discussed trying myofascial release as a noninvasive therapy modality to help with suboccipital pain, and patient would like to try this first.  Will place referral for therapy.        PLAN:  Reviewed spine anatomy and disease process. Discussed diagnosis and treatment options with the patient today. A shared decision making model was used. The patient's values and choices were respected. The following represents what was discussed and decided upon by the provider and the patient.    1. DIAGNOSTIC TESTS  No new imaging orders at this time.    2. PHYSICAL THERAPY  Physical therapy was ordered through [place] or the external clinic of patient's choice.  Discussed the importance of core strengthening, ROM, stretching exercises with the patient and how each of these entities is important in decreasing pain.  Explained to the patient that the purpose of physical therapy is to teach the patient a home exercise program.  These exercises need to be  performed every day in order to decrease pain and prevent future occurrences of pain.  Likened it to brushing one's teeth.      3. MEDICATIONS:  Discussed multiple medication options today with patient. Discussed risks, side effects, and proper use of medications. Patient verbalized understanding.  No new medications ordered at this visit.      4. INTERVENTIONS:  No injections at this time  Could consider occipital nerve stimulator with PNS system    5. OTHER REFERRALS:  No other referrals at this time.    6. FOLLOW-UP  After completion of physical therapy.  Patient advised to contact our office for earlier appointment if symptoms worsening or not improving, or any side effects are noticed.    Advised patient to call the Spine Center if symptoms worsen or you have problems controlling bladder and bowel function.   ______________________________________________________________________    SUBJECTIVE:   Cornelia Rodriguez  is a 48 year old female who presents today for follow-up evaluation.    Patient reports the right cervical paraspinal and suboccipital TPI done on 8/26/2024 relieved 98% of her neck pain but she has 1 area of ongoing pain right in the suboccipital area which has not improved.  Pain remains intermittent throughout the day, with no identifiable activity or time of day triggers.  Not having any radicular symptoms, no new symptoms since the injection.    No prior neck surgeries    -Treatment to Date: Doing home exercises every day for the neck, using ice and heat as needed    12/19/23 - Rodríguez - Cervical ILESI - 40-50% improvement of neck pain, arm pain resolved  2/16/24 - Rodríguez - Right suboccipital TPI - 5-1/2 months of relief of right-sided neck pain  8//26/24 - Rodríguez - Right cervical paraspinal and suboccipital TPI - 98% relief of neck pain, did not help occipital area      Oswestry ([place]OTTE) Questionnaire        10/2/2019     3:00 PM   OSWESTRY DISABILITY INDEX   Count 10   Sum 3   Oswestry Score (%) 6 %        Neck Disability Index:      5/3/2019    12:00 PM   Neck Disability Index (  Dameon LUGO and Magdaleno STRICKLAND 1991. All rights reserved.; used with permission)   SECTION 1 - PAIN INTENSITY 0   SECTION 2 - PERSONAL CARE 0   SECTION 3 - LIFTING 0   SECTION 4 - READING 0   SECTION 5 - HEADACHES 0   SECTION 6 - CONCENTRATION 0   SECTION 7 - WORK 0   SECTION 8 - DRIVING 0   SECTION 9 - SLEEPING 0   SECTION 10 - RECREATION 0   Count 10   Sum 0   Raw Score: /50 0   Neck Disability Index Score: (%) 0 %              -Medications:    Current Outpatient Medications   Medication Sig Dispense Refill    acetaminophen (TYLENOL) 325 MG tablet       albuterol (PROAIR HFA/PROVENTIL HFA/VENTOLIN HFA) 108 (90 Base) MCG/ACT inhaler Inhale 2 puffs into the lungs      blood glucose (NO BRAND SPECIFIED) test strip Use to test blood sugar 2 times daily or as directed. 200 strip 3    blood glucose monitoring (ACCU-CHEK MULTICLIX) lancets Use to test blood sugar 2 x times daily or as directed. 204 each 3    blood glucose monitoring (NO BRAND SPECIFIED) meter device kit Use to test blood sugar 1 times daily or as directed. 1 kit 0    calcium ascorbate 500 MG TABS Take 500 mg by mouth daily      cetirizine (ZYRTEC) 10 MG tablet Take 10 mg by mouth daily      Continuous Glucose  (DEXCOM G6 ) JUAN Use to read blood sugars as per 's instructions. 1 each 0    DULoxetine (CYMBALTA) 30 MG capsule Take 1 capsule (30 mg) by mouth daily 90 capsule 2    DULoxetine (CYMBALTA) 60 MG capsule Take 1 capsule (60 mg) by mouth daily 90 capsule 3    econazole nitrate 1 % external cream APPLY TO FEET AND TOENAILS ONCE DAILY 85 g 5    fluticasone (FLONASE) 50 MCG/ACT nasal spray Spray 2 sprays into both nostrils daily 16 g 11    insulin glargine (LANTUS SOLOSTAR) 100 UNIT/ML pen INJECT 48 UNITS UNDER THE SKIN TWICE DAILY 105 mL 1    insulin pen needle (BD PEN NEEDLE BASSEM 2ND GEN) 32G X 4 MM miscellaneous USE TWO TIMES A  each 3     JARDIANCE 25 MG TABS tablet TAKE ONE TABLET BY MOUTH EVERY DAY 90 tablet 0    losartan (COZAAR) 25 MG tablet TAKE ONE TABLET BY MOUTH EVERY DAY 90 tablet 1    montelukast (SINGULAIR) 10 MG tablet TAKE ONE TABLET BY MOUTH AT BEDTIME 90 tablet 1    Multiple Vitamins-Minerals (HM HAIR/SKIN/NAILS) TABS Take 1 tablet by mouth      multivitamin w/minerals (MULTI-VITAMIN) tablet Take 1 tablet by mouth daily      multivitamin w/minerals (THERA-VIT-M) tablet Take 1 tablet by mouth      rosuvastatin (CRESTOR) 10 MG tablet TAKE ONE TABLET BY MOUTH EVERY DAY 90 tablet 2    SUMAtriptan (IMITREX) 100 MG tablet Take 100 mg by mouth as needed      tirzepatide (MOUNJARO) 7.5 MG/0.5ML pen Inject 7.5 mg Subcutaneous every 7 days 2 mL 0    vitamin C (ASCORBIC ACID) 500 MG tablet Take 500 mg by mouth       No current facility-administered medications for this visit.       Allergies   Allergen Reactions    Adhesive Tape Dermatitis    Benadryl [Diphenhydramine]     Codeine     Gabapentin     Ibuprofen     Metronidazole Fatigue     Headache    Penicillins     Ciprofloxacin Diarrhea, Nausea and Nausea and Vomiting    Cyclobenzaprine Nausea and Vomiting     Feels shakey      Ferrous Gluconate Rash    Nortriptyline Hives and Rash    Sumatriptan Nausea and Nausea and Vomiting       Past Medical History:   Diagnosis Date    Diabetes (H)         Patient Active Problem List   Diagnosis    Morbid obesity (H)    Migraine without aura and without status migrainosus, not intractable    Gastroesophageal reflux disease without esophagitis    Type 2 diabetes mellitus with hyperglycemia (H)    Allergic rhinitis, unspecified seasonality, unspecified trigger    Hyperlipidemia LDL goal <100    Allergic contact dermatitis due to adhesives    History of Helicobacter pylori infection    Migraine    Seborrheic dermatitis of scalp    Perennial allergic rhinitis    Low back pain    Occipital neuralgia of right side    Non-alcoholic fatty liver disease    Screen  "for colon cancer       Past Surgical History:   Procedure Laterality Date    BIOPSY      CHOLECYSTECTOMY  2005       Family History   Problem Relation Age of Onset    Glaucoma Mother     Hypertension Sister     Diabetes Sister     Lung Cancer Sister         smoker    Diabetes Maternal Grandmother     Glaucoma Maternal Grandmother     Macular Degeneration No family hx of        Reviewed past medical, surgical, and family history with patient found on new patient intake packet located in EMR Media tab.     SOCIAL HX: Reviewed    ROS: Specifically negative for bowel/bladder dysfunction, balance changes, headache, dizziness, foot drop, fevers, chills, appetite changes, nausea/vomiting, unexplained weight loss. Otherwise 13 systems reviewed are negative. Please see the patient's intake questionnaire from today for details.    OBJECTIVE:  /74 (BP Location: Right arm, Patient Position: Sitting, Cuff Size: Adult Large)   Pulse 96   Ht 5' 4\" (1.626 m)   Wt 233 lb (105.7 kg)   SpO2 98%   BMI 39.99 kg/m      PHYSICAL EXAMINATION:  --CONSTITUTIONAL: Vital signs as above. No acute distress. The patient is well nourished and well groomed.  --PSYCHIATRIC: The patient is awake, alert, oriented to person, place, time and answering questions appropriately with clear speech. Appropriate mood and affect   --RESPIRATORY: Normal rhythm and effort. No abnormal accessory muscle breathing patterns noted.   --GROSS MOTOR: Easily arises from a seated position.  --CERVICAL SPINE: Inspection reveals no evidence of deformity. Range of motion is not limited in cervical flexion, extension, lateral rotation. Mild tenderness to palpation cervical paraspinals, no tenderness in spinous processes.  Has a focal area of myofascial tension with palpable muscle knot and trigger point in the right suboccipital region.  Spurling maneuver negative bilaterally.  --SHOULDERS: Full range of motion bilaterally. Negative empty can.  --UPPER EXTREMITY " MOTOR TESTING:  Wrist flexion left 5/5, right 5/5  Wrist extension left 5/5, right 5/5  Pronators left 5/5, right 5/5  Biceps left 5/5, right 5/5   Triceps left 5/5, right 5/5   Shoulder abduction left 5/5, right 5/5   left 5/5, right 5/5  --NEUROLOGIC: Sensation to upper extremities is intact bilaterally.  Negative Marie's bilaterally.    --VASCULAR: Warm upper limbs bilaterally. Capillary refill in the upper extremities is less than 1 second.    RESULTS: Available medical records from [facility] and any other outside records were reviewed today.     Imaging:  Available relevant imaging was personally reviewed and interpreted today. The images were shown to the patient and the findings were explained using a spine model.    12/6/2023 MRI cervical spine:  IMPRESSION:  1.  Mild cervical spondylosis, worse at C6-C7 where there is mild canal stenosis and right ventral cord flattening.  2.  Multilevel foraminal stenoses, as above.

## 2024-09-17 NOTE — PATIENT INSTRUCTIONS
~Spine Center Scheduling #[phone].  ~Please call our [facility] Spine Nurse Navigation #[phone] with any questions or concerns about your treatment plan, if symptoms worsen and you would like to be seen urgently, or if you have problems controlling bladder and bowel function.  ~For any future flareups or new symptoms, recommend follow-up in clinic or contact the nurse navigator line.  ~Please note that any My Chart messages may take multiple days for a response due to the high volume of patients seen in clinic.  Anything sent Friday night or after will be answered the following week when able.     Physical Therapy:  We have placed a referral for you to start physical therapy. You should be receiving a call within the next 2-4 business days to schedule your first session. If you do not hear from the PT clinic within a week please call our clinic for assistance.    Physical therapy is one of the most safe and effective treatments available for spine-related pain. Consistency is the most important factor when it comes to physical therapy - your therapist will give you exercises and stretches that you can perform at home, often without any special equipment. You should try to do those exercises at least once a day as long as they do not worsen your pain. Your therapist is trained to identify when your symptoms are worsening, but if you feel any new symptoms of numbness, weakness, or loss of bladder/bowel control please inform your therapist or call our clinic.    We will plan to follow up with you once your therapy visits are completed.

## 2024-09-24 ENCOUNTER — THERAPY VISIT (OUTPATIENT)
Dept: PHYSICAL THERAPY | Facility: CLINIC | Age: 49
End: 2024-09-24
Payer: COMMERCIAL

## 2024-09-24 ENCOUNTER — OFFICE VISIT (OUTPATIENT)
Dept: PODIATRY | Facility: CLINIC | Age: 49
End: 2024-09-24
Payer: COMMERCIAL

## 2024-09-24 VITALS — WEIGHT: 233 LBS | HEIGHT: 64 IN | HEART RATE: 95 BPM | OXYGEN SATURATION: 94 % | BODY MASS INDEX: 39.78 KG/M2

## 2024-09-24 DIAGNOSIS — M21.6X2 PRONATION DEFORMITY OF BOTH FEET: Primary | ICD-10-CM

## 2024-09-24 DIAGNOSIS — M54.2 NECK PAIN: Primary | ICD-10-CM

## 2024-09-24 DIAGNOSIS — M54.81 BILATERAL OCCIPITAL NEURALGIA: ICD-10-CM

## 2024-09-24 DIAGNOSIS — M21.6X1 PRONATION DEFORMITY OF BOTH FEET: Primary | ICD-10-CM

## 2024-09-24 DIAGNOSIS — R60.0 EDEMA OF BOTH FEET: ICD-10-CM

## 2024-09-24 DIAGNOSIS — M53.82 MUSCULOSKELETAL DISORDER OF THE SUBOCCIPITAL: ICD-10-CM

## 2024-09-24 DIAGNOSIS — E11.9 TYPE 2 DIABETES MELLITUS WITHOUT COMPLICATION, WITHOUT LONG-TERM CURRENT USE OF INSULIN (H): ICD-10-CM

## 2024-09-24 PROCEDURE — 97110 THERAPEUTIC EXERCISES: CPT | Mod: GP | Performed by: PHYSICAL THERAPIST

## 2024-09-24 PROCEDURE — 97161 PT EVAL LOW COMPLEX 20 MIN: CPT | Mod: GP | Performed by: PHYSICAL THERAPIST

## 2024-09-24 PROCEDURE — 99203 OFFICE O/P NEW LOW 30 MIN: CPT | Performed by: PODIATRIST

## 2024-09-24 ASSESSMENT — PAIN SCALES - GENERAL: PAINLEVEL: EXTREME PAIN (9)

## 2024-09-24 NOTE — LETTER
September 24, 2024      Cornelia Rodriguez  [address]  [facility] [address]        To Whom It May Concern:    Cornelia Rodriguez  was seen on 9/24/2024.  Please excuse her  until 10/01/2024 due to illness.        Sincerely,        David Renee DPM

## 2024-09-24 NOTE — PATIENT INSTRUCTIONS
What are Prescription Custom Orthotics?  Custom orthotics are specially-made devices designed to support and comfort your feet. Prescription orthotics are crafted for you and no one else. They match the contours of your feet precisely and are designed for the way you move. Orthotics are only manufactured after a podiatrist has conducted a complete evaluation of your feet, ankles, and legs, so the orthotic can accommodate your unique foot structure and pathology.  Prescription orthotics are divided into two categories:  Functional orthotics are designed to control abnormal motion. They may be used to treat foot pain caused by abnormal motion; they can also be used to treat injuries such as shin splints or tendinitis. Functional orthotics are usually crafted of a semi-rigid material such as plastic or graphite.  Accommodative orthotics are softer and meant to provide additional cushioning and support. They can be used to treat diabetic foot ulcers, painful calluses on the bottom of the foot, and other uncomfortable conditions.  Podiatrists use orthotics to treat foot problems such as plantar fasciitis, bursitis, tendinitis, diabetic foot ulcers, and foot, ankle, and heel pain. Clinical research studies have shown that podiatrist-prescribed foot orthotics decrease foot pain and improve function.  Orthotics typically cost more than shoe inserts purchased in a retail store, but the additional cost is usually well worth it. Unlike shoe inserts, orthotics are molded to fit each individual foot, so you can be sure that your orthotics fit and do what they're supposed to do. Prescription orthotics are also made of top-notch materials and last many years when cared for properly. Insurance often helps pay for prescription orthotics.  What are Shoe Inserts?   You've seen them at the grocery store and at the mall. You've probably even seen them on TV and online. Shoe inserts are any kind of non-prescription foot support designed  to be worn inside a shoe. Pre-packaged, mass produced, arch supports are shoe inserts. So are the  custom-made  insoles and foot supports that you can order online or at retail stores. Unless the device has been prescribed by a doctor and crafted for your specific foot, it's a shoe insert, not a custom orthotic device--despite what the ads might say.  Shoe inserts can be very helpful for a variety of foot ailments, including flat arches and foot and leg pain. They can cushion your feet, provide comfort, and support your arches, but they can't correct biomechanical foot problems or cure long-standing foot issues.  The most common types of shoe inserts are:  Arch supports: Some people have high arches. Others have low arches or flat feet. Arch supports generally have a  bumped-up  appearance and are designed to support the foot's natural arch.   Insoles: Insoles slip into your shoe to provide extra cushioning and support. Insoles are often made of gel, foam, or plastic.   Heel liners: Heel liners, sometimes called heel pads or heel cups, provide extra cushioning in the heel region. They may be especially useful for patients who have foot pain caused by age-related thinning of the heels' natural fat pads.   Foot cushions: Do your shoes rub against your heel or your toes? Foot cushions come in many different shapes and sizes and can be used as a barrier between you and your shoe.  Choosing an Over-the-Counter Shoe Insert  Selecting a shoe insert from the wide variety of devices on the market can be overwhelming. Here are some podiatrist-tested tips to help you find the insert that best meets your needs:  Consider your health. Do you have diabetes? Problems with circulation? An over-the-counter insert may not be your best bet. Diabetes and poor circulation increase your risk of foot ulcers and infections, so schedule an appointment with a podiatrist. He or she can help you select a solution that won't cause additional  health problems.   Think about the purpose. Are you planning to run a marathon, or do you just need a little arch support in your work shoes? Look for a product that fits your planned level of activity.   Bring your shoes. For the insert to be effective, it has to fit into your shoes. So bring your sneakers, dress shoes, or work boots--whatever you plan to wear with your insert. Look for an insert that will fit the contours of your shoe.   Try them on. If all possible, slip the insert into your shoe and try it out. Walk around a little. How does it feel? Don't assume that feelings of pressure will go away with continued wear. (If you can't try the inserts at the store, ask about the store's return policy and hold on to your receipt.)    Please call one of the [place] locations below to schedule an appointment. If you received a prescription please bring it with you to your appointment. Some locations are limited to what they carry.    Office Locations    [facility]  [address] [place] [address]  Home Medical Equipment, [address]   Phone: [phone]   Orthotics and Prosthetics, [address]   Phone: [phone]    [facility]   Home Medical Equipment   [id number], [place] [address]  Phone :[phone]  Orthotics and Prosthetics   [address], [address], [facility] [address]  Phone:[phone]          [facility]  [address]  [address]   [place] [address]   Phone: [phone]    [facility]  [address]  [place] [address]  Phone: [phone]    [facility]  [address]  [place] [address]  Phone: [phone]  Fax: [phone]    [facility]   [address]  [place] [address]  Phone:  [phone]    [facility]  [address]  [place] [address]  Phone: [phone]    [facility]  [address]  [place] [address]  Phone: [phone]    [place]   [address] [place] [address]   Phone: [phone]    WEARING YOUR CUSTOM FOOT ORTHOTICS   Most insurance plans cover one pair of orthotics per year. You must check with your   insurance plan to see what your payment responsibility will be. Please call your   insurance company by calling the number on the back of your insurance card.   Orthotic's are non-refundable and non-returnable.   Orthotics are made of various designs. Some orthotics are covered with material that extends beyond your toes. If your orthotic is of this design, you will likely need to trim the toe end to get a proper fit. The insole from your shoe can be used as a template. Simply overlay the shoe insert on top of the custom orthotic. Align the heel end while tracing the length of the insert onto the custom orthotic. Use a large scissor to trim the toe end until you get a proper fit in the shoe.   The orthotic needs to be pushed as far back in the shoe as possible. The heel portion should not ride forward so as not to irritate your heel.   Orthotics are designed to work with socks. Excessive perspiration will shorten the life span of the orthotics. Remove the orthotic from the shoe frequently for proper drying.   The break-in period lasts for weeks. People new to orthotics will likely experience new aches and pains. The orthotic is forcing your foot into a new position. Arch, foot and leg muscle aches and fatigue are common during these weeks. Minor discomfort can be considered normal break in phenomenon. Start wearing your orthotic around your home your first day. Limited activity for one to two hours is recommended. You can increase one or two additional hours each  day provided the aches and pains are subsiding. The degree of discomfort, fatigue and problems will dictate the speed of break in. You may require multiple weeks to work up to full time use.   Do not continue wearing your orthotics if they are creating problems such as blisters or sores. Do not hesitate to call the clinic to speak with a nurse regarding orthotic   break in, fit, trimming, etc. You may also need to see the doctor if the orthotics are   simply not working out. Adjustments are sometimes made to improve orthotic   function.     Orthotics will only work in certain styles and types of shoes. Orthotics rarely work in dress shoes. Slip-ons, clogs, sandals and heels are particularly troublesome. Specially designed orthotics may be necessary for these types of shoes. Your custom orthotic was designed for activities that require appropriate walking or running shoes. Lace up athletic shoes, walking shoes or work boots should work appropriately. You may need a wider or longer shoe. Shoes with a removable  or insert work best. In general, you want to remove an insert from the shoe before placing the orthotic into the shoe. Shoes without a removable liner may not work as well.     When purchasing new shoes, bring your orthotics along to get a proper fit. Shop at stores that are familiar with orthotics.   Frequent washing of the orthotic may shorten the life span of the top cover. The top cover can be replaced but will generally last one to five years depending on use and foot perspiration.

## 2024-09-24 NOTE — PROGRESS NOTES
FOOT AND ANKLE SURGERY/PODIATRY CONSULT NOTE         ASSESSMENT:   Pronation deformity bilateral feet  Edema bilateral feet  DM 2      TREATMENT:  I have recommended orthotics.  After wearing her orthotics consistently for at least 3 months if her foot pain continues I recommend she return to clinic for follow-up visit at which time x-rays will be taken.  The patient was also given a prescription for compression stockings to be worn daily.  She is to remove them at night.  I told the patient this would help to manage her moderate swelling of both feet.        HPI: Cornelia Rodriguez presented to the clinic today complaining of pain involving both feet and swelling.  The patient indicated that she has had this pain and swelling for several months.  She has been working for [employer] for 27 years.  She stands on concrete surfaces for several hours daily.  She stated that she recently has noticed swelling of both feet and aching of both feet particularly in the arches of both feet.  The pain is aggravated with prolonged weightbearing and ambulation.  She has minimal pain while resting.  She denies trauma to her feet.  She has not had any associated redness with her swelling.  The patient indicated that she is a well-controlled diabetic.  She has no numbness or tingling in her feet.  She denies any other previous treatment.    Past Medical History:   Diagnosis Date    Diabetes (H)        Social History     Socioeconomic History    Marital status: Single     Spouse name: Not on file    Number of children: Not on file    Years of education: Not on file    Highest education level: Not on file   Occupational History    Not on file   Tobacco Use    Smoking status: Former     Current packs/day: 0.00     Average packs/day: 1.5 packs/day for 25.0 years (37.6 ttl pk-yrs)     Types: Cigarettes     Start date: 1989     Quit date: 3/31/2013     Years since quittin.4    Smokeless tobacco: Never    Tobacco comments:     quit 3/2013    Vaping Use    Vaping status: Never Used   Substance and Sexual Activity    Alcohol use: Yes     Comment: rare alcohol    Drug use: No    Sexual activity: Not Currently     Partners: Male   Other Topics Concern    Parent/sibling w/ CABG, MI or angioplasty before 65F 55M? No   Social History Narrative    Not on file     Social Determinants of Health     Financial Resource Strain: Not on file   Food Insecurity: Not on file   Transportation Needs: Not on file   Physical Activity: Not on file   Stress: Not on file   Social Connections: Not on file   Interpersonal Safety: Not on file   Housing Stability: Not on file          Allergies   Allergen Reactions    Adhesive Tape Dermatitis    Benadryl [Diphenhydramine]     Codeine     Gabapentin     Ibuprofen     Metronidazole Fatigue     Headache    Penicillins     Ciprofloxacin Diarrhea, Nausea and Nausea and Vomiting    Cyclobenzaprine Nausea and Vomiting     Feels shakey      Ferrous Gluconate Rash    Nortriptyline Hives and Rash    Sumatriptan Nausea and Nausea and Vomiting          Current Outpatient Medications:     acetaminophen (TYLENOL) 325 MG tablet, , Disp: , Rfl:     albuterol (PROAIR HFA/PROVENTIL HFA/VENTOLIN HFA) 108 (90 Base) MCG/ACT inhaler, Inhale 2 puffs into the lungs, Disp: , Rfl:     blood glucose (NO BRAND SPECIFIED) test strip, Use to test blood sugar 2 times daily or as directed., Disp: 200 strip, Rfl: 3    blood glucose monitoring (ACCU-CHEK MULTICLIX) lancets, Use to test blood sugar 2 x times daily or as directed., Disp: 204 each, Rfl: 3    blood glucose monitoring (NO BRAND SPECIFIED) meter device kit, Use to test blood sugar 1 times daily or as directed., Disp: 1 kit, Rfl: 0    calcium ascorbate 500 MG TABS, Take 500 mg by mouth daily, Disp: , Rfl:     cetirizine (ZYRTEC) 10 MG tablet, Take 10 mg by mouth daily, Disp: , Rfl:     Continuous Glucose  (DEXCOM G6 ) JUAN, Use to read blood sugars as per 's instructions.,  Disp: 1 each, Rfl: 0    DULoxetine (CYMBALTA) 30 MG capsule, Take 1 capsule (30 mg) by mouth daily, Disp: 90 capsule, Rfl: 2    DULoxetine (CYMBALTA) 60 MG capsule, Take 1 capsule (60 mg) by mouth daily, Disp: 90 capsule, Rfl: 3    econazole nitrate 1 % external cream, APPLY TO FEET AND TOENAILS ONCE DAILY, Disp: 85 g, Rfl: 5    fluticasone (FLONASE) 50 MCG/ACT nasal spray, Spray 2 sprays into both nostrils daily, Disp: 16 g, Rfl: 11    insulin glargine (LANTUS SOLOSTAR) 100 UNIT/ML pen, INJECT 48 UNITS UNDER THE SKIN TWICE DAILY, Disp: 105 mL, Rfl: 1    insulin pen needle (BD PEN NEEDLE BASSEM 2ND GEN) 32G X 4 MM miscellaneous, USE TWO TIMES A DAY, Disp: 200 each, Rfl: 3    JARDIANCE 25 MG TABS tablet, TAKE ONE TABLET BY MOUTH EVERY DAY, Disp: 90 tablet, Rfl: 0    losartan (COZAAR) 25 MG tablet, TAKE ONE TABLET BY MOUTH EVERY DAY, Disp: 90 tablet, Rfl: 1    montelukast (SINGULAIR) 10 MG tablet, TAKE ONE TABLET BY MOUTH AT BEDTIME, Disp: 90 tablet, Rfl: 1    Multiple Vitamins-Minerals (HM HAIR/SKIN/NAILS) TABS, Take 1 tablet by mouth, Disp: , Rfl:     multivitamin w/minerals (MULTI-VITAMIN) tablet, Take 1 tablet by mouth daily, Disp: , Rfl:     multivitamin w/minerals (THERA-VIT-M) tablet, Take 1 tablet by mouth, Disp: , Rfl:     rosuvastatin (CRESTOR) 10 MG tablet, TAKE ONE TABLET BY MOUTH EVERY DAY, Disp: 90 tablet, Rfl: 2    SUMAtriptan (IMITREX) 100 MG tablet, Take 100 mg by mouth as needed, Disp: , Rfl:     tirzepatide (MOUNJARO) 7.5 MG/0.5ML pen, Inject 7.5 mg Subcutaneous every 7 days, Disp: 2 mL, Rfl: 0    vitamin C (ASCORBIC ACID) 500 MG tablet, Take 500 mg by mouth, Disp: , Rfl:      Family History   Problem Relation Age of Onset    Glaucoma Mother     Hypertension Sister     Diabetes Sister     Lung Cancer Sister         smoker    Diabetes Maternal Grandmother     Glaucoma Maternal Grandmother     Macular Degeneration No family hx of         Social History     Socioeconomic History    Marital status: Single  "    Spouse name: Not on file    Number of children: Not on file    Years of education: Not on file    Highest education level: Not on file   Occupational History    Not on file   Tobacco Use    Smoking status: Former     Current packs/day: 0.00     Average packs/day: 1.5 packs/day for 25.0 years (37.6 ttl pk-yrs)     Types: Cigarettes     Start date: 1989     Quit date: 3/31/2013     Years since quittin.4    Smokeless tobacco: Never    Tobacco comments:     quit 3/2013   Vaping Use    Vaping status: Never Used   Substance and Sexual Activity    Alcohol use: Yes     Comment: rare alcohol    Drug use: No    Sexual activity: Not Currently     Partners: Male   Other Topics Concern    Parent/sibling w/ CABG, MI or angioplasty before 65F 55M? No   Social History Narrative    Not on file     Social Determinants of Health     Financial Resource Strain: Not on file   Food Insecurity: Not on file   Transportation Needs: Not on file   Physical Activity: Not on file   Stress: Not on file   Social Connections: Not on file   Interpersonal Safety: Not on file   Housing Stability: Not on file        Review of Systems - Patient denies fever, chills, rash, wound, stiffness, numbness, weakness, heart burn, blood in stool, chest pain with activity, calf pain when walking, shortness of breath with activity, chronic cough, easy bleeding/bruising, swelling of ankles, excessive thirst, fatigue, depression, anxiety.  Patient admits to bilateral foot pain and swelling.      OBJECTIVE:  Appearance: alert, well appearing, and in no distress.    Ht 1.626 m (5' 4\")   Wt 105.7 kg (233 lb)   BMI 39.99 kg/m       Body mass index is 39.99 kg/m .     General appearance: Patient is alert and fully cooperative with history & exam.  No sign of distress is noted during the visit.  Psychiatric: Affect is pleasant & appropriate.  Patient appears motivated to improve health.  Respiratory: Breathing is regular & unlabored while sitting.  HEENT: " Hearing is intact to spoken word.  Speech is clear.  No gross evidence of visual impairment that would impact ambulation.    Vascular: Dorsalis pedis and posterior tibial pulses are palpable. There is no pedal hair growth bilaterally.  CFT < 3 sec from anterior tibial surface to distal digits bilaterally. There is no appreciable edema noted bilateral feet.  Dermatologic: Turgor and texture are within normal limits. No coloration or temperature changes. No primary or secondary lesions noted.  Neurologic: All epicritic and proprioceptive sensations are grossly intact bilaterally.  Musculoskeletal: All active and passive ankle, subtalar, midtarsal, and 1st MPJ range of motion are grossly intact bilaterally.  Manual muscle strength is within normal limits bilaterally. All dorsiflexors, plantarflexors, invertors, evertors are intact bilaterally. Tenderness present to both feet on palpation.  Mild tenderness to the both feet with range of motion.  Mild flattening of the medial longitudinal arch noted bilaterally.  Calf is soft/non-tender without warmth/induration    Imaging:       No images are attached to the encounter or orders placed in the encounter.     PAIN US Trigger Point Injection One to Two Muscles    Result Date: 8/26/2024  Procedure:  Ultrasound-guided trigger point injection Referring provider: Mychal Rodríguez Pre Procedure Diagnosis:  Myofascial pain Post Procedure Diagnosis:  Same Procedure Performed:  Trigger point injection with Ultrasound Guidance Clinical Scenario:  As per office notes Vital Signs:  As per rooming/preprocedure documentation Side Injected: Right cervical paraspinals and suboccipitalis Patient presents with myofascial pain involving the identified sites above. This was confirmed with the patient. After discussing the risks, benefits, and alternatives to the procedure, the patient expressed understanding and wished to proceed. The patient signed a written consent and the target areas were  marked with a sterile marking pen. The patient was brought to the procedure suite and placed in the prone position.  A procedural pause was conducted to verify:  correct patient identity, procedure to be performed and as applicable, correct side and site, correct patient position, and availability of implants, special equipment or special requirements.  A simple surgical tray was used. FNext, the area was prepared with a ChloraPrep scrub, then examined using a high frequency linear ultrasound transducer, a sterile ultrasound transducer cover, and sterile ultrasound transducer gel.  Thereafter, using ultrasound guidance, a 1.5 inch 25-gauge needle was advanced into the above noted muscles one at a time. After visualization of the tip and negative aspiration for blood, approximately 0.5-1ml of 1% lidocaine was injected into the muscle layer, and then dry needling was performed in the muscle layer. Following the injection of all sites using this technique, the needle was withdrawn.  The patient tolerated the procedure well and there were no apparent complications.  After an appropriate amount of observation, the patient was dismissed from the clinic in good condition under their own power.  Images were stored. Pre-Pain Score:  8/10 Post-Pain Score:  1/10 The patient will follow up with their referring provider in approximately 2-4 weeks. Patient was provided with our clinic contact information and guidance on post-procedural care, as well as advised to contact us in the event of any new side effect.     PAIN US Trigger Point Injection One to Two Muscles    Result Date: 8/26/2024  Procedure:  Ultrasound-guided trigger point injection Referring provider: Mychal Rodríguez Pre Procedure Diagnosis:  Myofascial pain Post Procedure Diagnosis:  Same Procedure Performed:  Trigger point injection with Ultrasound Guidance Clinical Scenario:  As per office notes Vital Signs:  As per rooming/preprocedure documentation Side Injected:  Right cervical paraspinals and suboccipitalis Patient presents with myofascial pain involving the identified sites above. This was confirmed with the patient. After discussing the risks, benefits, and alternatives to the procedure, the patient expressed understanding and wished to proceed. The patient signed a written consent and the target areas were marked with a sterile marking pen. The patient was brought to the procedure suite and placed in the prone position.  A procedural pause was conducted to verify:  correct patient identity, procedure to be performed and as applicable, correct side and site, correct patient position, and availability of implants, special equipment or special requirements.  A simple surgical tray was used. FNext, the area was prepared with a ChloraPrep scrub, then examined using a high frequency linear ultrasound transducer, a sterile ultrasound transducer cover, and sterile ultrasound transducer gel.  Thereafter, using ultrasound guidance, a 1.5 inch 25-gauge needle was advanced into the above noted muscles one at a time. After visualization of the tip and negative aspiration for blood, approximately 0.5-1ml of 1% lidocaine was injected into the muscle layer, and then dry needling was performed in the muscle layer. Following the injection of all sites using this technique, the needle was withdrawn.  The patient tolerated the procedure well and there were no apparent complications.  After an appropriate amount of observation, the patient was dismissed from the clinic in good condition under their own power.  Images were stored. Pre-Pain Score:  8/10 Post-Pain Score:  1/10 The patient will follow up with their referring provider in approximately 2-4 weeks. Patient was provided with our clinic contact information and guidance on post-procedural care, as well as advised to contact us in the event of any new side effect.           David Renee; BIRNA  [facility]  Surgery/Podiatry

## 2024-09-24 NOTE — PROGRESS NOTES
"PHYSICAL THERAPY EVALUATION  Type of Visit: Evaluation              Subjective       Presenting condition or subjective complaint: pain in the area at the base of the skull down through my neck  Date of onset: 09/17/24 (referred to PT)    Relevant medical history: Depression; Diabetes; Menopause; Migraines or headaches; Overweight; Smoking; Vision problems   Dates & types of surgery: gallblader removal 2005    Pt reports pain started in 2019 and was diagnosed with occipital neuralgia.  Found injections helpful initially but they \"stopped working.\"  Connected with Dr Rodríguez and has done TPIs to be helpful.  Worse with sleeping, \"over the shoulder lifting.\"  Has tried variety of topical ointments without success.  No particular pattern re: time of day.    \"There's always a nagging pain.\"    Goals, in addition to below, include being more comfortable working.    Prior diagnostic imaging/testing results: MRI     Prior therapy history for the same diagnosis, illness or injury: No      Prior Level of Function  Transfers: Independent  Ambulation: Independent  ADL: Independent    Living Environment  Social support: With family members   Type of home: [facility]   Stairs to enter the home: No       Ramp: No   Stairs inside the home: Yes 13 Is there a railing: Yes     Help at home: None  Equipment owned:       Employment: Yes    Hobbies/Interests:      Patient goals for therapy: be pain free, able to sleep without being woken up due to pain       Objective   CERVICAL SPINE EVALUATION  PAIN:  see above  INTEGUMENTARY (edema, incisions): WNL  POSTURE: forward head and shoulders  GAIT: pt ambulates without device without gross asymmetry  ROM: cervical AROM all directions without gross restriction but pt noted feeling \"tight\" at end range B rotation and SB.  SPECIAL TESTS: negative Spurling in neutral and extension.  Supine manual axial distraction reported to feel good.  PALPATION: diffuse tenderness to palpation B paracervicals " "and subocciptials  SPINAL SEGMENTAL CONCLUSIONS: facet mobility restriction B C3-7 without pain    Assessment & Plan   CLINICAL IMPRESSIONS  Medical Diagnosis: Musculoskeletal disorder of the suboccipital  Bilateral occipital neuralgia    Treatment Diagnosis: Neck pain   Impression/Assessment: Patient is a 48 year old female with neck complaints.  The following significant findings have been identified: Pain, Decreased ROM/flexibility, Decreased joint mobility, and Decreased activity tolerance. These impairments interfere with their ability to perform self care tasks as compared to previous level of function.     Clinical Decision Making (Complexity):  Clinical Presentation: Stable/Uncomplicated  Clinical Presentation Rationale: based on medical and personal factors listed in PT evaluation  Clinical Decision Making (Complexity): Low complexity    PLAN OF CARE  Treatment Interventions:  Interventions: Manual Therapy, Neuromuscular Re-education, Therapeutic Activity, Therapeutic Exercise    Long Term Goals     PT Goal 1  Goal Description: Sleep through the night without being disturbed by pain  Rationale: to maximize safety and independence with performance of ADLs and functional tasks  Goal Progress: 1-2 hours of disrupted sleep per night  Target Date: 11/19/24      Frequency of Treatment: once per week  Duration of Treatment: six weeks[facility] next scheduled to return iin about two weeks  Pt will be out of town for the next week or so.  She is treating through podiatry and reports she \"can't wear shoes\" for the next week.  Will plan to initiate manual work at next session, although will be curious how pt feels with HEP between now and then.    Education Assessment:   Learner/Method: Patient  Education Comments: Performance in clinic    Risks and benefits of evaluation/treatment have been explained.   Patient/Family/caregiver agrees with Plan of Care.     Evaluation Time:     PT Eval, Low Complexity Minutes " (18624): 25    Signing Clinician: Gary Lewis PT

## 2024-09-24 NOTE — Clinical Note
9/24/2024      Cornelia Rodriguez  [address]  [place] [address]      Dear Colleague,    Thank you for referring your patient, Cornelia Rodriguez, to the [facility]. Please see a copy of my visit note below.    No notes on file    Again, thank you for allowing me to participate in the care of your patient.        Sincerely,        David Renee DPM

## 2024-09-26 DIAGNOSIS — M54.81 BILATERAL OCCIPITAL NEURALGIA: ICD-10-CM

## 2024-09-26 RX ORDER — DULOXETIN HYDROCHLORIDE 30 MG/1
30 CAPSULE, DELAYED RELEASE ORAL DAILY
Qty: 90 CAPSULE | Refills: 2 | Status: SHIPPED | OUTPATIENT
Start: 2024-09-26

## 2024-09-26 NOTE — TELEPHONE ENCOUNTER
RX Authorization    Medication:  duloxetine hcl 30 mg cpep 30    Date last refill ordered:  04/02/2024    Quantity ordered:  30    # refills:  2    Date of last clinic visit with ordering provider:    Date of next clinic visit with ordering provider:    All pertinent protocol data (lab date/result):    Include pertinent information from patients message:

## 2024-10-03 DIAGNOSIS — Z79.4 TYPE 2 DIABETES MELLITUS WITH HYPERGLYCEMIA, WITH LONG-TERM CURRENT USE OF INSULIN (H): ICD-10-CM

## 2024-10-03 DIAGNOSIS — E11.65 TYPE 2 DIABETES MELLITUS WITH HYPERGLYCEMIA, WITH LONG-TERM CURRENT USE OF INSULIN (H): ICD-10-CM

## 2024-10-10 ENCOUNTER — THERAPY VISIT (OUTPATIENT)
Dept: PHYSICAL THERAPY | Facility: CLINIC | Age: 49
End: 2024-10-10
Attending: STUDENT IN AN ORGANIZED HEALTH CARE EDUCATION/TRAINING PROGRAM
Payer: COMMERCIAL

## 2024-10-10 DIAGNOSIS — E11.65 TYPE 2 DIABETES MELLITUS WITH HYPERGLYCEMIA, WITH LONG-TERM CURRENT USE OF INSULIN (H): ICD-10-CM

## 2024-10-10 DIAGNOSIS — M54.81 BILATERAL OCCIPITAL NEURALGIA: ICD-10-CM

## 2024-10-10 DIAGNOSIS — Z79.4 TYPE 2 DIABETES MELLITUS WITH HYPERGLYCEMIA, WITH LONG-TERM CURRENT USE OF INSULIN (H): ICD-10-CM

## 2024-10-10 DIAGNOSIS — M54.2 NECK PAIN: ICD-10-CM

## 2024-10-10 DIAGNOSIS — M53.82 MUSCULOSKELETAL DISORDER OF THE SUBOCCIPITAL: Primary | ICD-10-CM

## 2024-10-10 PROCEDURE — 97140 MANUAL THERAPY 1/> REGIONS: CPT | Mod: GP | Performed by: PHYSICAL THERAPIST

## 2024-10-10 PROCEDURE — 97110 THERAPEUTIC EXERCISES: CPT | Mod: GP | Performed by: PHYSICAL THERAPIST

## 2024-10-17 ENCOUNTER — THERAPY VISIT (OUTPATIENT)
Dept: PHYSICAL THERAPY | Facility: CLINIC | Age: 49
End: 2024-10-17
Attending: STUDENT IN AN ORGANIZED HEALTH CARE EDUCATION/TRAINING PROGRAM
Payer: COMMERCIAL

## 2024-10-17 DIAGNOSIS — M54.2 NECK PAIN: ICD-10-CM

## 2024-10-17 DIAGNOSIS — M54.81 BILATERAL OCCIPITAL NEURALGIA: ICD-10-CM

## 2024-10-17 DIAGNOSIS — M53.82 MUSCULOSKELETAL DISORDER OF THE SUBOCCIPITAL: Primary | ICD-10-CM

## 2024-10-17 PROCEDURE — 97110 THERAPEUTIC EXERCISES: CPT | Mod: GP | Performed by: PHYSICAL THERAPIST

## 2024-10-17 PROCEDURE — 97140 MANUAL THERAPY 1/> REGIONS: CPT | Mod: GP | Performed by: PHYSICAL THERAPIST

## 2024-10-21 DIAGNOSIS — M54.81 OCCIPITAL NEURALGIA OF RIGHT SIDE: ICD-10-CM

## 2024-10-21 RX ORDER — DULOXETIN HYDROCHLORIDE 60 MG/1
60 CAPSULE, DELAYED RELEASE ORAL DAILY
Qty: 90 CAPSULE | Refills: 0 | Status: SHIPPED | OUTPATIENT
Start: 2024-10-21

## 2024-10-23 ENCOUNTER — DOCUMENTATION ONLY (OUTPATIENT)
Dept: FAMILY MEDICINE | Facility: CLINIC | Age: 49
End: 2024-10-23
Payer: COMMERCIAL

## 2024-10-23 NOTE — PROGRESS NOTES
Lonnie Soares    The patient will have an appointment at [facility] on Monday 10/28 for diabetes labs. However, there is not any labs that are related to diabetes yet. Could you please check and place orders for patient? Thanks     Bob

## 2024-10-24 ENCOUNTER — THERAPY VISIT (OUTPATIENT)
Dept: PHYSICAL THERAPY | Facility: CLINIC | Age: 49
End: 2024-10-24
Attending: STUDENT IN AN ORGANIZED HEALTH CARE EDUCATION/TRAINING PROGRAM
Payer: COMMERCIAL

## 2024-10-24 DIAGNOSIS — M54.81 BILATERAL OCCIPITAL NEURALGIA: ICD-10-CM

## 2024-10-24 DIAGNOSIS — M54.2 NECK PAIN: ICD-10-CM

## 2024-10-24 DIAGNOSIS — M53.82 MUSCULOSKELETAL DISORDER OF THE SUBOCCIPITAL: Primary | ICD-10-CM

## 2024-10-24 PROCEDURE — 97110 THERAPEUTIC EXERCISES: CPT | Mod: GP | Performed by: PHYSICAL THERAPIST

## 2024-10-24 NOTE — PROGRESS NOTES
"   10/24/24 0500   Appointment Info   Signing clinician's name / credentials Gary Lewis PT   Total/Authorized Visits 6   Visits Used 4   Medical Diagnosis Musculoskeletal disorder of the suboccipital  Bilateral occipital neuralgia   PT Tx Diagnosis Neck pain   Progress Note/Certification   Onset of illness/injury or Date of Surgery 09/17/24  (referred to PT)   Therapy Frequency once per week   Predicted Duration six weeks, altlhough pt next scheduled to return iin about two weeks   Progress Note Completed Date 09/24/24   PT Goal 1   Goal Description Sleep through the night without being disturbed by pain   Rationale to maximize safety and independence with performance of ADLs and functional tasks   Goal Progress no restrictions   Target Date 11/19/24   Date Met 10/24/24   Subjective Report   Subjective Report Pt reports she has had another very good week.  Likes the new exercises.  Been pushing and pulling up to 55 pounds without issue.  \"I'm sleeping better.\"   Objective Measure 1   Details No discomfort cervical AROM all directions.   Therapeutic Procedure/Exercise   Therapeutic Procedures: strength, endurance, ROM, flexibility minutes (21792) 10   Ther Proc 1 HEP   Ther Proc 1 - Details Review of previous HEP.  Encourage consistency and continuation.  Pt reports liking the exercises and feels comfortable with them.   Ther Proc 2 Rowing   Ther Proc 2 - Details Added blue band rowing to HEP.  Pt demonstrated good technique without shoulder shrug.  Target 20 repetitions.  As this gets easier, OK to move to SLS each side.   Skilled Intervention See above   Patient Response/Progress See above         DISCHARGE    Discharge Plan: Given progress, pt agrees discharge to HEP but will let me know if there are further issues.    Referring Provider:  Mychal Rodríguez    "

## 2024-10-28 ENCOUNTER — LAB (OUTPATIENT)
Dept: LAB | Facility: CLINIC | Age: 49
End: 2024-10-28
Payer: COMMERCIAL

## 2024-11-02 ENCOUNTER — HEALTH MAINTENANCE LETTER (OUTPATIENT)
Age: 49
End: 2024-11-02

## 2024-12-27 ENCOUNTER — ANCILLARY PROCEDURE (OUTPATIENT)
Dept: GENERAL RADIOLOGY | Facility: CLINIC | Age: 49
End: 2024-12-27
Attending: STUDENT IN AN ORGANIZED HEALTH CARE EDUCATION/TRAINING PROGRAM
Payer: COMMERCIAL

## 2024-12-27 DIAGNOSIS — M79.672 BILATERAL FOOT PAIN: ICD-10-CM

## 2024-12-27 DIAGNOSIS — M79.671 BILATERAL FOOT PAIN: ICD-10-CM

## 2024-12-27 PROCEDURE — 73630 X-RAY EXAM OF FOOT: CPT | Mod: TC | Performed by: RADIOLOGY

## 2024-12-29 ENCOUNTER — HEALTH MAINTENANCE LETTER (OUTPATIENT)
Age: 49
End: 2024-12-29

## 2025-01-11 DIAGNOSIS — Z79.4 TYPE 2 DIABETES MELLITUS WITH HYPERGLYCEMIA, WITH LONG-TERM CURRENT USE OF INSULIN (H): ICD-10-CM

## 2025-01-11 DIAGNOSIS — E11.65 TYPE 2 DIABETES MELLITUS WITH HYPERGLYCEMIA, WITH LONG-TERM CURRENT USE OF INSULIN (H): ICD-10-CM

## 2025-01-13 RX ORDER — INSULIN GLARGINE 100 [IU]/ML
INJECTION, SOLUTION SUBCUTANEOUS
Qty: 90 ML | Refills: 1 | Status: SHIPPED | OUTPATIENT
Start: 2025-01-13

## 2025-01-21 ENCOUNTER — ORDERS ONLY (AUTO-RELEASED) (OUTPATIENT)
Dept: FAMILY MEDICINE | Facility: CLINIC | Age: 50
End: 2025-01-21

## 2025-01-21 ENCOUNTER — VIRTUAL VISIT (OUTPATIENT)
Dept: FAMILY MEDICINE | Facility: CLINIC | Age: 50
End: 2025-01-21
Payer: COMMERCIAL

## 2025-01-21 DIAGNOSIS — Z12.11 SCREEN FOR COLON CANCER: ICD-10-CM

## 2025-01-21 DIAGNOSIS — E11.9 TYPE 2 DIABETES MELLITUS WITHOUT COMPLICATION, WITH LONG-TERM CURRENT USE OF INSULIN (H): Primary | ICD-10-CM

## 2025-01-21 DIAGNOSIS — E66.01 MORBID OBESITY (H): ICD-10-CM

## 2025-01-21 DIAGNOSIS — Z87.891 PERSONAL HISTORY OF TOBACCO USE: ICD-10-CM

## 2025-01-21 DIAGNOSIS — Z12.31 ENCOUNTER FOR SCREENING MAMMOGRAM FOR BREAST CANCER: ICD-10-CM

## 2025-01-21 DIAGNOSIS — I10 ESSENTIAL HYPERTENSION: ICD-10-CM

## 2025-01-21 DIAGNOSIS — Z79.4 TYPE 2 DIABETES MELLITUS WITHOUT COMPLICATION, WITH LONG-TERM CURRENT USE OF INSULIN (H): Primary | ICD-10-CM

## 2025-01-21 PROCEDURE — 98014 SYNCH AUDIO-ONLY EST MOD 30: CPT | Performed by: FAMILY MEDICINE

## 2025-01-21 NOTE — PATIENT INSTRUCTIONS
Lung Cancer Screening   Frequently Asked Questions  If you are at high-risk for lung cancer, getting screened with low-dose computed tomography (LDCT) every year can help save your life. This handout offers answers to some of the most common questions about lung cancer screening. If you have other questions, please call [phone] ([phone]).     What is it?  Lung cancer screening uses special X-ray technology to create an image of your lung tissue. The exam is quick and easy and takes less than 10 seconds. We don t give you any medicine or use any needles. You can eat before and after the exam. You don t need to change your clothes as long as the clothing on your chest doesn t contain metal. But, you do need to be able to hold your breath for at least 6 seconds during the exam.    What is the goal of lung cancer screening?  The goal of lung cancer screening is to save lives. Many times, lung cancer is not found until a person starts having physical symptoms. Lung cancer screening can help detect lung cancer in the earliest stages when it may be easier to treat.    Who should be screened for lung cancer?  We suggest lung cancer screening for anyone who is at high-risk for lung cancer. You are in the high-risk group if you:      are between the ages of 55 and 79, and    have smoked at least 1 pack of cigarettes a day for 20 or more years, and    still smoke or have quit within the past 15 years.    However, if you have a new cough or shortness of breath, you should talk to your doctor before being screened.    Why does it matter if I have symptoms?  Certain symptoms can be a sign that you have a condition in your lungs that should be checked and treated by your doctor. These symptoms include fever, chest pain, a new or changing cough, shortness of breath that you have never felt before, coughing up blood or unexplained weight loss. Having any of these symptoms can greatly affect the results of lung  cancer screening.       Should all smokers get an LDCT lung cancer screening exam?  It depends. Lung cancer screening is for a very specific group of men and women who have a history of heavy smoking over a long period of time (see  Who should be screened for lung cancer  above).  I am in the high-risk group, but have been diagnosed with cancer in the past. Is LDCT lung cancer screening right for me?  In some cases, you should not have LDCT lung screening, such as when your doctor is already following your cancer with CT scan studies. Your doctor will help you decide if LDCT lung screening is right for you.  Do I need to have a screening exam every year?  Yes. If you are in the high-risk group described earlier, you should get an LDCT lung cancer screening exam every year until you are 79, or are no longer willing or able to undergo screening and possible procedures to diagnose and treat lung cancer.  How effective is LDCT at preventing death from lung cancer?  Studies have shown that LDCT lung cancer screening can lower the risk of death from lung cancer by 20 percent in people who are at high-risk.  What are the risks?  There are some risks and limitations of LDCT lung cancer screening. We want to make sure you understand the risks and benefits, so please let us know if you have any questions. Your doctor may want to talk with you more about these risks.    Radiation exposure: As with any exam that uses radiation, there is a very small increased risk of cancer. The amount of radiation in LDCT is small--about the same amount a person would get from a mammogram. Your doctor orders the exam when he or she feels the potential benefits outweigh the risks.    False negatives: No test is perfect, including LDCT. It is possible that you may have a medical condition, including lung cancer, that is not found during your exam. This is called a false negative result.    False positives and more testing: LDCT very often finds  something in the lung that could be cancer, but in fact is not. This is called a false positive result. False positive tests often cause anxiety. To make sure these findings are not cancer, you may need to have more tests. These tests will be done only if you give us permission. Sometimes patients need a treatment that can have side effects, such as a biopsy. For more information on false positives, see  What can I expect from the results?     Findings not related to lung cancer: Your LDCT exam also takes pictures of areas of your body next to your lungs. In a very small number of cases, the CT scan will show an abnormal finding in one of these areas, such as your kidneys, adrenal glands, liver or thyroid. This finding may not be serious, but you may need more tests. Your doctor can help you decide what other tests you may need, if any.  What can I expect from the results?  About 1 out of 4 LDCT exams will find something that may need more tests. Most of the time, these findings are lung nodules. Lung nodules are very small collections of tissue in the lung. These nodules are very common, and the vast majority--more than 97 percent--are not cancer (benign). Most are normal lymph nodes or small areas of scarring from past infections.  But, if a small lung nodule is found to be cancer, the cancer can be cured more than 90 percent of the time. To know if the nodule is cancer, we may need to get more images before your next yearly screening exam. If the nodule has suspicious features (for example, it is large, has an odd shape or grows over time), we will refer you to a specialist for further testing.  Will my doctor also get the results?  Yes. Your doctor will get a copy of your results.  Is it okay to keep smoking now that there s a cancer screening exam?  No. Tobacco is one of the strongest cancer-causing agents. It causes not only lung cancer, but other cancers and cardiovascular (heart) diseases as well. The damage  caused by smoking builds over time. This means that the longer you smoke, the higher your risk of disease. While it is never too late to quit, the sooner you quit, the better.  Where can I find help to quit smoking?  The best way to prevent lung cancer is to stop smoking. If you have already quit smoking, congratulations and keep it up! For help on quitting smoking, please call [facility] at 9-065-QUITNOW (1-870.895.4384) or the [employer] Society at 1-167.631.5836 to find local resources near you.  One-on-one health coaching:  If you d prefer to work individually with a health care provider on tobacco cessation, we offer:      Medication Therapy Management:  Our specially trained pharmacists work closely with you and your doctor to help you quit smoking.  Call [phone] or [phone] (toll free).

## 2025-01-21 NOTE — PROGRESS NOTES
"Cornelia Savage is a 49 year old who is being evaluated via a billable telephone visit.    What phone number would you like to be contacted at? [phone]  How would you like to obtain your AVS? Leaf  Originating Location (pt. Location): Home    Distant Location (provider location):  Off-site  Telephone visit completed due to the patient did not have access to video, while the distant provider did.    If patient has telephone visit, have they been educated on video visit as preferred visit method and offered to change to video visit? N/A    Instructions Relayed to Patient by Virtual Roomer:     Patient is active on [facility]:   Relayed following to patient: \"It looks like you are active on [employer], are you able to join the visit this way? If not, do you need us to send you a link now or would you like your provider to send a link via text or email when they are ready to initiate the visit?\"    Patient Confirmed they will join visit via: Provider to call patient for telephone visit   Reminded patient to ensure they were logged on to virtual visit by arrival time listed.   Asked if patient has flexibility to initiate visit sooner than arrival time: patient stated yes, documented in appointment notes availability to initiate visit earlier than arrival time     If pediatric virtual visit, ensured pediatric patient along with parent/guardian will be present for video visit.     Patient offered the website [email or web address] and/or phone number to [employer] Help line: [phone]    Assessment & Plan     Type 2 diabetes mellitus without complication, with long-term current use of insulin (H)  Reporting excellent control of her diabetes with Mounjaro, Jardiance and long-acting insulin.  She has been slowly losing weight on the Mounjaro.  We may be able to start backing down on her insulin in the future if A1c is significantly improved as I am anticipating based on her history.  Encouraged her to get eye exam " "scheduled and referral placed for this.  She is to schedule nonfasting lab visit for A1c and metabolic panel  - Adult Eye  Referral; Future    Essential hypertension  At goal based on home readings on losartan and Jardiance.    Personal history of tobacco use  We talked through lung cancer screening, pros and cons.  She would like to think about this a little longer and let me know.  I have sent added some additional information to her after visit summary for her to review on this.  Noting some lung reactivity with extreme cold air exposure that is minimal.  We discussed this could be related to bronchospasm/asthma type reaction.  Consider some PFTs in the future or trial of albuterol.  She declined for now.    Encounter for screening mammogram for breast cancer  Overdue for mammogram.  Encouraged her to schedule  - MA Screen Bilateral w/Renny; Future    Screen for colon cancer  Overdue for colon cancer screening.  Encouraged her to  FIT test when she comes in for labs  - Fecal colorectal cancer screen (FIT); Future    Morbid obesity (H)  She is seen some good interval weight loss on the Mounjaro.  Seems to be tolerating the medication well at this point.  Will continue current dosing.  Continue to work on eating healthy          BMI  Estimated body mass index is 37.76 kg/m  as calculated from the following:    Height as of 9/24/24: 1.626 m (5' 4\").    Weight as of 12/27/24: 99.8 kg (220 lb).         See Patient Instructions    Subjective   Cornelia Savage is a 49 year old, presenting for the following health issues:  Diabetes (Follow up)        1/21/2025     8:19 AM   Additional Questions   Roomed by Apryl   Accompanied by Self         1/21/2025     8:19 AM   Patient Reported Additional Medications   Patient reports taking the following new medications None     History of Present Illness       Diabetes:   She presents for follow up of diabetes.  She is checking home blood glucose one time daily.   She " "checks blood glucose before meals.  Blood glucose is never over 200 and never under 70. She is aware of hypoglycemia symptoms including dizziness.    She has no concerns regarding her diabetes at this time.  She is having weight loss.  The patient has not had a diabetic eye exam in the last 12 months.          She eats 2-3 servings of fruits and vegetables daily.She consumes 0 sweetened beverage(s) daily.She exercises with enough effort to increase her heart rate 9 or less minutes per day.  She exercises with enough effort to increase her heart rate 3 or less days per week.   She is taking medications regularly.     DM: feels stable. No highs or lows. Monitor once daily and if > 120 will check glucose again.     Lost weight: down to 220 lb  On Mounjaro. Eating healthier  Will eat mini candy bar when craving chocolate but no longer searching for other things  Trying to eat more fruits and veggies  Small bkst (this is  her bedtime). Big meal is in the evening and will have leftovers for lunBeneStream  Lantus 48 units.   Drinking more water. No dehydration.     Home bp's overall controlled 120/80    Quit smoking 2013.   Smoked 20 + years, 1 ppd    Colon cancer screening. FIT test has been ordered but too busy to complete. Cologuard didn't work x 2.     Very cold air will trigger some dyspnea but feels minimal and not wanting to pursue further eval of this.     Had appt for foot pain. OA both feet. Working with podiatry             Objective    Vitals - Patient Reported  Weight (Patient Reported): 99.8 kg (220 lb)  Height (Patient Reported): 162.6 cm (5' 4\")  BMI (Based on Pt Reported Ht/Wt): 37.76      Vitals:  No vitals were obtained today due to virtual visit.    Physical Exam   General: Alert and no distress //Respiratory: No audible wheeze, cough, or shortness of breath // Psychiatric:  Appropriate affect, tone, and pace of words            Phone call duration: 18 minutes  Signed Electronically by: Sarah Soares " MD [place]

## 2025-01-22 ENCOUNTER — PATIENT OUTREACH (OUTPATIENT)
Dept: CARE COORDINATION | Facility: CLINIC | Age: 50
End: 2025-01-22
Payer: COMMERCIAL

## 2025-02-10 ENCOUNTER — OFFICE VISIT (OUTPATIENT)
Dept: OPTOMETRY | Facility: CLINIC | Age: 50
End: 2025-02-10
Payer: COMMERCIAL

## 2025-02-10 DIAGNOSIS — H52.4 PRESBYOPIA: ICD-10-CM

## 2025-02-10 DIAGNOSIS — Z79.4 TYPE 2 DIABETES MELLITUS WITHOUT COMPLICATION, WITH LONG-TERM CURRENT USE OF INSULIN (H): Primary | ICD-10-CM

## 2025-02-10 DIAGNOSIS — H52.223 REGULAR ASTIGMATISM OF BOTH EYES: ICD-10-CM

## 2025-02-10 DIAGNOSIS — E11.9 TYPE 2 DIABETES MELLITUS WITHOUT COMPLICATION, WITH LONG-TERM CURRENT USE OF INSULIN (H): Primary | ICD-10-CM

## 2025-02-10 DIAGNOSIS — H52.03 HYPEROPIA OF BOTH EYES: ICD-10-CM

## 2025-02-10 PROCEDURE — 92015 DETERMINE REFRACTIVE STATE: CPT

## 2025-02-10 PROCEDURE — 92004 COMPRE OPH EXAM NEW PT 1/>: CPT

## 2025-02-10 ASSESSMENT — REFRACTION_WEARINGRX
SPECS_TYPE: BIFOCAL
OD_AXIS: 118
OS_SPHERE: +1.50
OD_CYLINDER: +1.50
OD_SPHERE: +0.75
OS_AXIS: 030
OS_CYLINDER: +0.50
OD_ADD: +2.00
OS_ADD: +2.00

## 2025-02-10 ASSESSMENT — TONOMETRY
OD_IOP_MMHG: 20
OS_IOP_MMHG: 21
IOP_METHOD: TONOPEN

## 2025-02-10 ASSESSMENT — CUP TO DISC RATIO
OS_RATIO: 0.1
OD_RATIO: 0.1

## 2025-02-10 ASSESSMENT — VISUAL ACUITY
OS_CC: 20/25
OS_CC: 20/20
CORRECTION_TYPE: GLASSES
OS_CC+: +1
OD_CC: 20/20
OD_SC: 20/30
METHOD: SNELLEN - LINEAR
OS_SC: 20/40
OD_CC: 20/25

## 2025-02-10 ASSESSMENT — REFRACTION_MANIFEST
OD_AXIS: 117
OD_CYLINDER: +1.50
OS_CYLINDER: +1.00
OD_AXIS: 116
METHOD_AUTOREFRACTION: 1
OS_CYLINDER: SPHERE
OD_SPHERE: +1.00
OD_ADD: +2.00
OS_AXIS: 055
OD_CYLINDER: +1.50
OS_SPHERE: +1.25
OS_SPHERE: +1.50
OS_ADD: +2.00
OD_SPHERE: +0.75

## 2025-02-10 ASSESSMENT — CONF VISUAL FIELD
OS_INFERIOR_NASAL_RESTRICTION: 0
OS_SUPERIOR_NASAL_RESTRICTION: 0
OD_INFERIOR_NASAL_RESTRICTION: 0
OS_SUPERIOR_TEMPORAL_RESTRICTION: 0
OS_NORMAL: 1
OS_INFERIOR_TEMPORAL_RESTRICTION: 0
OD_SUPERIOR_TEMPORAL_RESTRICTION: 0
OD_NORMAL: 1
OD_INFERIOR_TEMPORAL_RESTRICTION: 0
OD_SUPERIOR_NASAL_RESTRICTION: 0

## 2025-02-10 ASSESSMENT — SLIT LAMP EXAM - LIDS
COMMENTS: NORMAL
COMMENTS: NORMAL

## 2025-02-10 ASSESSMENT — EXTERNAL EXAM - RIGHT EYE: OD_EXAM: NORMAL

## 2025-02-10 ASSESSMENT — EXTERNAL EXAM - LEFT EYE: OS_EXAM: NORMAL

## 2025-02-10 NOTE — PATIENT INSTRUCTIONS
The affects of the dilating drops last for 4- 6 hours.  You will be more sensitive to light and vision will be blurry up close.  Do not drive if you do not feel comfortable.  Mydriatic sunglasses were given if needed.     Patient Education   Diabetes weakens the blood vessels all over the body, including the eyes. Damage to the blood vessels in the eyes can cause swelling or bleeding into part of the eye (called the retina). This is called diabetic retinopathy (HALIMA-tin--pu-thee). If not treated, this disease can cause vision loss or blindness.   Symptoms may include blurred or distorted vision, but many people have no symptoms. It's important to see your eye doctor regularly to check for problems.   Early treatment and good control can help protect your vision. Here are the things you can do to help prevent vision loss:      1. Keep your blood sugar levels under tight control.      2. Bring high blood pressure under control.      3. No smoking.      4. Have yearly dilated eye exams.        Optometry Providers       Clinic Locations                                 Telephone Number   Dr. Brittney Dash   [facility] [phone]     [facility] Hours:                [facility] Hours:       [facility] Hours:   [address]   [address]     [address] [place] [address]   [place] [address]    [place] [address]  Phone: [phone]                    Phone: [phone]     Phone: [phone]                      Monday 8:00-6:00                          Monday 8:00-6:00                          Monday 8:00-6:00              Tuesday 8:00-6:00                          Tuesday 8:00-6:00                          Tuesday 8:00-6:00              Wednesday 8:00-6:00                   Wednesday 8:00-6:00                   Wednesday 8:00-6:00      Thursday 8:00-6:00                        Thursday 8:00-6:00                         Thursday 8:00-6:00            Friday 8:00-5:00                              Friday 8:00-5:00                              Friday 8:00-5:00    [facility] Hours:   [address] [place] [address]  [phone]    Monday 9:00-6:00  Tuesday 9:00-6:00  Wednesday 9:00-6:00  Thursday 9:00-6:00  Friday 9:00-5:00  As always, Thank you for trusting us with your health care needs!

## 2025-02-10 NOTE — LETTER
2/10/2025      Cornelia Rodriguez  [address]  [facility] [address]      Dear Colleague,    Thank you for referring your patient, Cornelia Rodriguez, to the [facility]. Please see a copy of my visit note below.    Chief Complaint   Patient presents with     Diabetic Eye Exam        Chief Complaint(s) and History of Present Illness(es)       Diabetic Eye Exam              Diabetes Type: Type 2, on insulin and taking oral medications    Duration: 12 years    Blood Sugars: is controlled                   Lab Results   Component Value Date    A1C 7.7 06/11/2024    A1C 6.6 06/23/2023    A1C 6.5 11/28/2022    A1C 6.8 01/17/2022    A1C 7.5 04/12/2021    A1C 8.2 01/11/2021    A1C 7.1 05/15/2020    A1C 7.5 01/21/2020    A1C 8.6 09/19/2019     Last Eye Exam: 2023  Dilated Previously: Yes    What are you currently using to see?  glasses    Distance Vision Acuity: Satisfied with vision    Near Vision Acuity: Satisfied with vision while reading and using computer with glasses    Eye Comfort: good  Do you use eye drops? : No  Occupation or Hobbies: [employer]- reading, playing apps on phone, making phone    Bonnie Chu - Optometric Assistant     Medical, surgical and family histories reviewed and updated 2/10/2025.       OBJECTIVE: See Ophthalmology exam    Cornelia Rodriguez aware  eye exam results will be sent to Sarah Lombardo.    Assessment/Plan  (E11.9,  Z79.4) Type 2 diabetes mellitus without complication, with long-term current use of insulin (H)  (primary encounter diagnosis)  Plan:   -Patient educated on condition.   -No Diabetic Retinopathy noted in this exam.   -Stressed importance of close BS/BP monitoring, diet/exercise, medication compliance, and regular visits with PCP.   -Discussed getting yearly eye exams.   -Monitor annually.      (H52.03) Hyperopia of both eyes, (H52.223) Regular astigmatism of both eyes, (H52.4) Presbyopia  Plan:   -New Glasses Rx Given 02/10/2025.    -Discussed giving a couple of weeks to get adjusted to new glasses.   -Monitor.     Return to clinic for yearly CEE with DFE.    Complete documentation of historical and exam elements from today's encounter can be found in the full encounter summary report (not reduplicated in this progress note). I personally obtained the chief complaint(s) and history of present illness. I confirmed and edited as necessary the review of systems, past medical/surgical history, family history, social history, and examination findings as document by others; and I examined the patient myself. I personally reviewed the relevant tests, images, and reports as documented above. I formulated and edited as necessary the assessment and plan and discussed the findings and management plan with the patient and family.    Honorio Ward OD      Again, thank you for allowing me to participate in the care of your patient.        Sincerely,        Honorio Ward Jr., OD    Electronically signed

## 2025-02-10 NOTE — PROGRESS NOTES
Chief Complaint   Patient presents with    Diabetic Eye Exam        Chief Complaint(s) and History of Present Illness(es)       Diabetic Eye Exam              Diabetes Type: Type 2, on insulin and taking oral medications    Duration: 12 years    Blood Sugars: is controlled                   Lab Results   Component Value Date    A1C 7.7 06/11/2024    A1C 6.6 06/23/2023    A1C 6.5 11/28/2022    A1C 6.8 01/17/2022    A1C 7.5 04/12/2021    A1C 8.2 01/11/2021    A1C 7.1 05/15/2020    A1C 7.5 01/21/2020    A1C 8.6 09/19/2019     Last Eye Exam: 2023  Dilated Previously: Yes    What are you currently using to see?  glasses    Distance Vision Acuity: Satisfied with vision    Near Vision Acuity: Satisfied with vision while reading and using computer with glasses    Eye Comfort: good  Do you use eye drops? : No  Occupation or Hobbies: [employer]- reading, playing apps on phone, making phone    Bonnie Modis - Optometric Assistant     Medical, surgical and family histories reviewed and updated 2/10/2025.       OBJECTIVE: See Ophthalmology exam    Cornelia Rodriguez aware  eye exam results will be sent to Sraah Lombardo.    Assessment/Plan  (E11.9,  Z79.4) Type 2 diabetes mellitus without complication, with long-term current use of insulin (H)  (primary encounter diagnosis)  Plan:   -Patient educated on condition.   -No Diabetic Retinopathy noted in this exam.   -Stressed importance of close BS/BP monitoring, diet/exercise, medication compliance, and regular visits with PCP.   -Discussed getting yearly eye exams.   -Monitor annually.      (H52.03) Hyperopia of both eyes, (H52.223) Regular astigmatism of both eyes, (H52.4) Presbyopia  Plan:   -New Glasses Rx Given 02/10/2025.   -Discussed giving a couple of weeks to get adjusted to new glasses.   -Monitor.     Return to clinic for yearly CEE with DFE.    Complete documentation of historical and exam elements from today's encounter can be found in the full encounter summary  report (not reduplicated in this progress note). I personally obtained the chief complaint(s) and history of present illness. I confirmed and edited as necessary the review of systems, past medical/surgical history, family history, social history, and examination findings as document by others; and I examined the patient myself. I personally reviewed the relevant tests, images, and reports as documented above. I formulated and edited as necessary the assessment and plan and discussed the findings and management plan with the patient and family.    Honorio Ward OD

## 2025-03-06 DIAGNOSIS — E11.9 TYPE 2 DIABETES MELLITUS WITHOUT COMPLICATION, WITH LONG-TERM CURRENT USE OF INSULIN (H): ICD-10-CM

## 2025-03-06 DIAGNOSIS — Z79.4 TYPE 2 DIABETES MELLITUS WITHOUT COMPLICATION, WITH LONG-TERM CURRENT USE OF INSULIN (H): ICD-10-CM

## 2025-03-06 RX ORDER — LOSARTAN POTASSIUM 25 MG/1
25 TABLET ORAL DAILY
Qty: 90 TABLET | Refills: 0 | Status: SHIPPED | OUTPATIENT
Start: 2025-03-06

## 2025-04-07 DIAGNOSIS — E11.65 TYPE 2 DIABETES MELLITUS WITH HYPERGLYCEMIA, WITH LONG-TERM CURRENT USE OF INSULIN (H): ICD-10-CM

## 2025-04-07 DIAGNOSIS — E78.5 HYPERLIPIDEMIA LDL GOAL <100: ICD-10-CM

## 2025-04-07 DIAGNOSIS — Z79.4 TYPE 2 DIABETES MELLITUS WITH HYPERGLYCEMIA, WITH LONG-TERM CURRENT USE OF INSULIN (H): ICD-10-CM

## 2025-04-07 RX ORDER — EMPAGLIFLOZIN 25 MG/1
25 TABLET, FILM COATED ORAL DAILY
Qty: 90 TABLET | Refills: 1 | Status: SHIPPED | OUTPATIENT
Start: 2025-04-07

## 2025-04-07 RX ORDER — ROSUVASTATIN CALCIUM 10 MG/1
10 TABLET, COATED ORAL
Qty: 90 TABLET | Refills: 2 | Status: SHIPPED | OUTPATIENT
Start: 2025-04-07

## (undated) RX ORDER — LIDOCAINE HYDROCHLORIDE 10 MG/ML
INJECTION, SOLUTION EPIDURAL; INFILTRATION; INTRACAUDAL; PERINEURAL
Status: DISPENSED
Start: 2023-07-10

## (undated) RX ORDER — TRIAMCINOLONE ACETONIDE 40 MG/ML
INJECTION, SUSPENSION INTRA-ARTICULAR; INTRAMUSCULAR
Status: DISPENSED
Start: 2022-08-01

## (undated) RX ORDER — TRIAMCINOLONE ACETONIDE 40 MG/ML
INJECTION, SUSPENSION INTRA-ARTICULAR; INTRAMUSCULAR
Status: DISPENSED
Start: 2019-04-19

## (undated) RX ORDER — LIDOCAINE HYDROCHLORIDE 10 MG/ML
INJECTION, SOLUTION EPIDURAL; INFILTRATION; INTRACAUDAL; PERINEURAL
Status: DISPENSED
Start: 2021-08-02

## (undated) RX ORDER — BUPIVACAINE HYDROCHLORIDE 5 MG/ML
INJECTION, SOLUTION EPIDURAL; INTRACAUDAL
Status: DISPENSED
Start: 2022-01-24

## (undated) RX ORDER — TRIAMCINOLONE ACETONIDE 40 MG/ML
INJECTION, SUSPENSION INTRA-ARTICULAR; INTRAMUSCULAR
Status: DISPENSED
Start: 2023-01-23

## (undated) RX ORDER — TRIAMCINOLONE ACETONIDE 40 MG/ML
INJECTION, SUSPENSION INTRA-ARTICULAR; INTRAMUSCULAR
Status: DISPENSED
Start: 2021-08-02

## (undated) RX ORDER — LIDOCAINE HYDROCHLORIDE 10 MG/ML
INJECTION, SOLUTION EPIDURAL; INFILTRATION; INTRACAUDAL; PERINEURAL
Status: DISPENSED
Start: 2019-10-10

## (undated) RX ORDER — BUPIVACAINE HYDROCHLORIDE 5 MG/ML
INJECTION, SOLUTION EPIDURAL; INTRACAUDAL
Status: DISPENSED
Start: 2019-04-19

## (undated) RX ORDER — BUPIVACAINE HYDROCHLORIDE 5 MG/ML
INJECTION, SOLUTION EPIDURAL; INTRACAUDAL
Status: DISPENSED
Start: 2023-07-10

## (undated) RX ORDER — BUPIVACAINE HYDROCHLORIDE 5 MG/ML
INJECTION, SOLUTION EPIDURAL; INTRACAUDAL
Status: DISPENSED
Start: 2022-10-31

## (undated) RX ORDER — TRIAMCINOLONE ACETONIDE 40 MG/ML
INJECTION, SUSPENSION INTRA-ARTICULAR; INTRAMUSCULAR
Status: DISPENSED
Start: 2021-10-25

## (undated) RX ORDER — BUPIVACAINE HYDROCHLORIDE 5 MG/ML
INJECTION, SOLUTION EPIDURAL; INTRACAUDAL
Status: DISPENSED
Start: 2021-08-02

## (undated) RX ORDER — BUPIVACAINE HYDROCHLORIDE 5 MG/ML
INJECTION, SOLUTION EPIDURAL; INTRACAUDAL
Status: DISPENSED
Start: 2022-08-01

## (undated) RX ORDER — TRIAMCINOLONE ACETONIDE 40 MG/ML
INJECTION, SUSPENSION INTRA-ARTICULAR; INTRAMUSCULAR
Status: DISPENSED
Start: 2023-07-10

## (undated) RX ORDER — TRIAMCINOLONE ACETONIDE 40 MG/ML
INJECTION, SUSPENSION INTRA-ARTICULAR; INTRAMUSCULAR
Status: DISPENSED
Start: 2023-04-17

## (undated) RX ORDER — LIDOCAINE HYDROCHLORIDE 10 MG/ML
INJECTION, SOLUTION EPIDURAL; INFILTRATION; INTRACAUDAL; PERINEURAL
Status: DISPENSED
Start: 2021-02-05

## (undated) RX ORDER — LIDOCAINE HYDROCHLORIDE 10 MG/ML
INJECTION, SOLUTION EPIDURAL; INFILTRATION; INTRACAUDAL; PERINEURAL
Status: DISPENSED
Start: 2021-10-25

## (undated) RX ORDER — LIDOCAINE HYDROCHLORIDE 10 MG/ML
INJECTION, SOLUTION EPIDURAL; INFILTRATION; INTRACAUDAL; PERINEURAL
Status: DISPENSED
Start: 2022-10-31

## (undated) RX ORDER — LIDOCAINE HYDROCHLORIDE 10 MG/ML
INJECTION, SOLUTION EPIDURAL; INFILTRATION; INTRACAUDAL; PERINEURAL
Status: DISPENSED
Start: 2023-01-23

## (undated) RX ORDER — BUPIVACAINE HYDROCHLORIDE 5 MG/ML
INJECTION, SOLUTION EPIDURAL; INTRACAUDAL
Status: DISPENSED
Start: 2023-01-23

## (undated) RX ORDER — TRIAMCINOLONE ACETONIDE 40 MG/ML
INJECTION, SUSPENSION INTRA-ARTICULAR; INTRAMUSCULAR
Status: DISPENSED
Start: 2022-01-24

## (undated) RX ORDER — TRIAMCINOLONE ACETONIDE 40 MG/ML
INJECTION, SUSPENSION INTRA-ARTICULAR; INTRAMUSCULAR
Status: DISPENSED
Start: 2019-09-12

## (undated) RX ORDER — BUPIVACAINE HYDROCHLORIDE 5 MG/ML
INJECTION, SOLUTION EPIDURAL; INTRACAUDAL
Status: DISPENSED
Start: 2019-09-12

## (undated) RX ORDER — LIDOCAINE HYDROCHLORIDE 10 MG/ML
INJECTION, SOLUTION EPIDURAL; INFILTRATION; INTRACAUDAL; PERINEURAL
Status: DISPENSED
Start: 2023-10-09

## (undated) RX ORDER — LIDOCAINE HYDROCHLORIDE 10 MG/ML
INJECTION, SOLUTION EPIDURAL; INFILTRATION; INTRACAUDAL; PERINEURAL
Status: DISPENSED
Start: 2023-04-17

## (undated) RX ORDER — TRIAMCINOLONE ACETONIDE 40 MG/ML
INJECTION, SUSPENSION INTRA-ARTICULAR; INTRAMUSCULAR
Status: DISPENSED
Start: 2023-10-09

## (undated) RX ORDER — TRIAMCINOLONE ACETONIDE 40 MG/ML
INJECTION, SUSPENSION INTRA-ARTICULAR; INTRAMUSCULAR
Status: DISPENSED
Start: 2021-02-05

## (undated) RX ORDER — BUPIVACAINE HYDROCHLORIDE 5 MG/ML
INJECTION, SOLUTION EPIDURAL; INTRACAUDAL
Status: DISPENSED
Start: 2023-04-17

## (undated) RX ORDER — BUPIVACAINE HYDROCHLORIDE 5 MG/ML
INJECTION, SOLUTION EPIDURAL; INTRACAUDAL
Status: DISPENSED
Start: 2021-10-25

## (undated) RX ORDER — LIDOCAINE HYDROCHLORIDE 10 MG/ML
INJECTION, SOLUTION EPIDURAL; INFILTRATION; INTRACAUDAL; PERINEURAL
Status: DISPENSED
Start: 2022-01-24

## (undated) RX ORDER — TRIAMCINOLONE ACETONIDE 40 MG/ML
INJECTION, SUSPENSION INTRA-ARTICULAR; INTRAMUSCULAR
Status: DISPENSED
Start: 2019-10-10

## (undated) RX ORDER — BUPIVACAINE HYDROCHLORIDE 5 MG/ML
INJECTION, SOLUTION EPIDURAL; INTRACAUDAL
Status: DISPENSED
Start: 2023-10-09

## (undated) RX ORDER — LIDOCAINE HYDROCHLORIDE 10 MG/ML
INJECTION, SOLUTION EPIDURAL; INFILTRATION; INTRACAUDAL; PERINEURAL
Status: DISPENSED
Start: 2019-04-19

## (undated) RX ORDER — BUPIVACAINE HYDROCHLORIDE 5 MG/ML
INJECTION, SOLUTION EPIDURAL; INTRACAUDAL
Status: DISPENSED
Start: 2021-02-05

## (undated) RX ORDER — TRIAMCINOLONE ACETONIDE 40 MG/ML
INJECTION, SUSPENSION INTRA-ARTICULAR; INTRAMUSCULAR
Status: DISPENSED
Start: 2022-10-31

## (undated) RX ORDER — LIDOCAINE HYDROCHLORIDE 10 MG/ML
INJECTION, SOLUTION EPIDURAL; INFILTRATION; INTRACAUDAL; PERINEURAL
Status: DISPENSED
Start: 2022-08-01